# Patient Record
Sex: FEMALE | Race: WHITE | NOT HISPANIC OR LATINO | Employment: UNEMPLOYED | URBAN - METROPOLITAN AREA
[De-identification: names, ages, dates, MRNs, and addresses within clinical notes are randomized per-mention and may not be internally consistent; named-entity substitution may affect disease eponyms.]

---

## 2018-01-25 ENCOUNTER — CONVERSION ENCOUNTER (OUTPATIENT)
Dept: FAMILY MEDICINE CLINIC | Facility: CLINIC | Age: 63
End: 2018-01-25

## 2018-03-27 ENCOUNTER — CONVERSION ENCOUNTER (OUTPATIENT)
Dept: FAMILY MEDICINE CLINIC | Facility: CLINIC | Age: 63
End: 2018-03-27

## 2018-03-27 ENCOUNTER — HOSPITAL ENCOUNTER (OUTPATIENT)
Dept: FAMILY MEDICINE CLINIC | Facility: CLINIC | Age: 63
Setting detail: SPECIMEN
Discharge: HOME | End: 2018-03-27
Attending: FAMILY MEDICINE | Admitting: FAMILY MEDICINE

## 2018-03-27 LAB
ALBUMIN SERPL-MCNC: 4 G/DL (ref 3.5–4.8)
ALBUMIN/GLOB SERPL: 1.1 {RATIO} (ref 1–1.7)
ALP SERPL-CCNC: 94 IU/L (ref 32–91)
ALT SERPL-CCNC: 31 IU/L (ref 14–54)
ANION GAP SERPL CALC-SCNC: 13.8 MMOL/L (ref 10–20)
AST SERPL-CCNC: 37 IU/L (ref 15–41)
BASOPHILS # BLD AUTO: 0 10*3/UL (ref 0–0.2)
BASOPHILS NFR BLD AUTO: 0 % (ref 0–2)
BILIRUB SERPL-MCNC: 0.4 MG/DL (ref 0.3–1.2)
BUN SERPL-MCNC: 8 MG/DL (ref 8–20)
BUN/CREAT SERPL: 13.3 (ref 5.4–26.2)
CALCIUM SERPL-MCNC: 9.5 MG/DL (ref 8.9–10.3)
CHLORIDE SERPL-SCNC: 103 MMOL/L (ref 101–111)
CHOLEST SERPL-MCNC: 171 MG/DL
CHOLEST/HDLC SERPL: 5 {RATIO}
CONV CO2: 25 MMOL/L (ref 22–32)
CONV LDL CHOLESTEROL DIRECT: 106 MG/DL (ref 0–100)
CONV TOTAL PROTEIN: 7.5 G/DL (ref 6.1–7.9)
CREAT UR-MCNC: 0.6 MG/DL (ref 0.4–1)
DIFFERENTIAL METHOD BLD: (no result)
EOSINOPHIL # BLD AUTO: 0.1 10*3/UL (ref 0–0.3)
EOSINOPHIL # BLD AUTO: 2 % (ref 0–3)
ERYTHROCYTE [DISTWIDTH] IN BLOOD BY AUTOMATED COUNT: 15.3 % (ref 11.5–14.5)
GLOBULIN UR ELPH-MCNC: 3.5 G/DL (ref 2.5–3.8)
GLUCOSE SERPL-MCNC: 133 MG/DL (ref 65–99)
HCT VFR BLD AUTO: 44.5 % (ref 35–49)
HDLC SERPL-MCNC: 34 MG/DL
HGB BLD-MCNC: 14.3 G/DL (ref 12–15)
LDLC/HDLC SERPL: 3.1 {RATIO}
LIPID INTERPRETATION: ABNORMAL
LYMPHOCYTES # BLD AUTO: 1.2 10*3/UL (ref 0.8–4.8)
LYMPHOCYTES NFR BLD AUTO: 16 % (ref 18–42)
MCH RBC QN AUTO: 28.6 PG (ref 26–32)
MCHC RBC AUTO-ENTMCNC: 32.1 G/DL (ref 32–36)
MCV RBC AUTO: 89.1 FL (ref 80–94)
MONOCYTES # BLD AUTO: 0.3 10*3/UL (ref 0.1–1.3)
MONOCYTES NFR BLD AUTO: 4 % (ref 2–11)
NEUTROPHILS # BLD AUTO: 6.1 10*3/UL (ref 2.3–8.6)
NEUTROPHILS NFR BLD AUTO: 78 % (ref 50–75)
NRBC BLD AUTO-RTO: 0 /100{WBCS}
NRBC/RBC NFR BLD MANUAL: 0 10*3/UL
PLATELET # BLD AUTO: 322 10*3/UL (ref 150–450)
PMV BLD AUTO: 7.9 FL (ref 7.4–10.4)
POTASSIUM SERPL-SCNC: 3.8 MMOL/L (ref 3.6–5.1)
RBC # BLD AUTO: 5 10*6/UL (ref 4–5.4)
SODIUM SERPL-SCNC: 138 MMOL/L (ref 136–144)
TRIGL SERPL-MCNC: 239 MG/DL
VLDLC SERPL CALC-MCNC: 30.7 MG/DL
WBC # BLD AUTO: 7.7 10*3/UL (ref 4.5–11.5)

## 2018-05-10 ENCOUNTER — HOSPITAL ENCOUNTER (OUTPATIENT)
Dept: OTHER | Facility: HOSPITAL | Age: 63
Setting detail: SPECIMEN
Discharge: HOME | End: 2018-05-10
Attending: RADIOLOGY | Admitting: RADIOLOGY

## 2018-05-18 ENCOUNTER — CONVERSION ENCOUNTER (OUTPATIENT)
Dept: FAMILY MEDICINE CLINIC | Facility: CLINIC | Age: 63
End: 2018-05-18

## 2018-05-21 ENCOUNTER — HOSPITAL ENCOUNTER (OUTPATIENT)
Dept: ONCOLOGY | Facility: CLINIC | Age: 63
Setting detail: INFUSION SERIES
Discharge: HOME | End: 2018-05-21
Attending: INTERNAL MEDICINE | Admitting: INTERNAL MEDICINE

## 2018-05-21 ENCOUNTER — HOSPITAL ENCOUNTER (OUTPATIENT)
Dept: ONCOLOGY | Facility: HOSPITAL | Age: 63
Discharge: HOME | End: 2018-05-21
Attending: INTERNAL MEDICINE | Admitting: INTERNAL MEDICINE

## 2018-05-21 ENCOUNTER — CLINICAL SUPPORT (OUTPATIENT)
Dept: ONCOLOGY | Facility: HOSPITAL | Age: 63
End: 2018-05-21

## 2018-05-21 ENCOUNTER — HOSPITAL ENCOUNTER (OUTPATIENT)
Dept: OTHER | Facility: HOSPITAL | Age: 63
Discharge: HOME | End: 2018-05-21
Attending: SURGERY | Admitting: SURGERY

## 2018-05-21 LAB
ALBUMIN SERPL-MCNC: 3.9 G/DL (ref 3.5–4.8)
ALBUMIN/GLOB SERPL: 1.3 {RATIO} (ref 1–1.7)
ALP SERPL-CCNC: 98 IU/L (ref 32–91)
ALT SERPL-CCNC: 36 IU/L (ref 14–54)
ANION GAP SERPL CALC-SCNC: 12.9 MMOL/L (ref 10–20)
ANION GAP SERPL CALC-SCNC: 14.6 MMOL/L (ref 10–20)
AST SERPL-CCNC: 34 IU/L (ref 15–41)
BASOPHILS # BLD AUTO: 0.1 10*3/UL (ref 0–0.2)
BASOPHILS NFR BLD AUTO: 1 % (ref 0–2)
BILIRUB SERPL-MCNC: 0.5 MG/DL (ref 0.3–1.2)
BUN SERPL-MCNC: 3 MG/DL (ref 8–20)
BUN SERPL-MCNC: 4 MG/DL (ref 8–20)
BUN/CREAT SERPL: 5 (ref 5.4–26.2)
BUN/CREAT SERPL: 6.7 (ref 5.4–26.2)
CALCIUM SERPL-MCNC: 9.4 MG/DL (ref 8.9–10.3)
CALCIUM SERPL-MCNC: 9.4 MG/DL (ref 8.9–10.3)
CHLORIDE SERPL-SCNC: 102 MMOL/L (ref 101–111)
CHLORIDE SERPL-SCNC: 99 MMOL/L (ref 101–111)
CONV CO2: 26 MMOL/L (ref 22–32)
CONV CO2: 27 MMOL/L (ref 22–32)
CONV TOTAL PROTEIN: 7 G/DL (ref 6.1–7.9)
CREAT UR-MCNC: 0.6 MG/DL (ref 0.4–1)
CREAT UR-MCNC: 0.6 MG/DL (ref 0.4–1)
DIFFERENTIAL METHOD BLD: (no result)
EOSINOPHIL # BLD AUTO: 0.1 10*3/UL (ref 0–0.3)
EOSINOPHIL # BLD AUTO: 1 % (ref 0–3)
ERYTHROCYTE [DISTWIDTH] IN BLOOD BY AUTOMATED COUNT: 15 % (ref 11.5–14.5)
GLOBULIN UR ELPH-MCNC: 3.1 G/DL (ref 2.5–3.8)
GLUCOSE SERPL-MCNC: 117 MG/DL (ref 65–99)
GLUCOSE SERPL-MCNC: 94 MG/DL (ref 65–99)
HCT VFR BLD AUTO: 43.1 % (ref 35–49)
HGB BLD-MCNC: 14.1 G/DL (ref 12–15)
LYMPHOCYTES # BLD AUTO: 1.3 10*3/UL (ref 0.8–4.8)
LYMPHOCYTES NFR BLD AUTO: 14 % (ref 18–42)
MCH RBC QN AUTO: 28.7 PG (ref 26–32)
MCHC RBC AUTO-ENTMCNC: 32.8 G/DL (ref 32–36)
MCV RBC AUTO: 87.3 FL (ref 80–94)
MONOCYTES # BLD AUTO: 0.5 10*3/UL (ref 0.1–1.3)
MONOCYTES NFR BLD AUTO: 6 % (ref 2–11)
NEUTROPHILS # BLD AUTO: 7.1 10*3/UL (ref 2.3–8.6)
NEUTROPHILS NFR BLD AUTO: 78 % (ref 50–75)
NRBC BLD AUTO-RTO: 0 /100{WBCS}
NRBC/RBC NFR BLD MANUAL: 0 10*3/UL
PLATELET # BLD AUTO: 270 10*3/UL (ref 150–450)
PMV BLD AUTO: 7.7 FL (ref 7.4–10.4)
POTASSIUM SERPL-SCNC: 3.6 MMOL/L (ref 3.6–5.1)
POTASSIUM SERPL-SCNC: 3.9 MMOL/L (ref 3.6–5.1)
RBC # BLD AUTO: 4.93 10*6/UL (ref 4–5.4)
SODIUM SERPL-SCNC: 136 MMOL/L (ref 136–144)
SODIUM SERPL-SCNC: 138 MMOL/L (ref 136–144)
WBC # BLD AUTO: 9.1 10*3/UL (ref 4.5–11.5)

## 2018-05-21 NOTE — PROGRESS NOTES
PATIENTS ONCOLOGY RECORD LOCATED IN [facility]      Subjective     Name:  JERROD WASSERMAN     Date:  2018  Address:  [address] [place] [address]  Home: [phone]  :  1955 AGE:  62 y.o.        RECORDS OBTAINED:  Patients Oncology Record is located in [facility]

## 2018-05-24 ENCOUNTER — HOSPITAL ENCOUNTER (OUTPATIENT)
Dept: PREOP | Facility: HOSPITAL | Age: 63
Setting detail: HOSPITAL OUTPATIENT SURGERY
Discharge: HOME | End: 2018-05-24
Attending: SURGERY | Admitting: SURGERY

## 2018-05-31 ENCOUNTER — HOSPITAL ENCOUNTER (OUTPATIENT)
Dept: RADIATION ONCOLOGY | Facility: HOSPITAL | Age: 63
Setting detail: RECURRING SERIES
Discharge: HOME | End: 2018-09-23
Attending: RADIOLOGY | Admitting: RADIOLOGY

## 2018-06-04 ENCOUNTER — CLINICAL SUPPORT (OUTPATIENT)
Dept: ONCOLOGY | Facility: HOSPITAL | Age: 63
End: 2018-06-04

## 2018-06-04 ENCOUNTER — HOSPITAL ENCOUNTER (OUTPATIENT)
Dept: ONCOLOGY | Facility: CLINIC | Age: 63
Setting detail: INFUSION SERIES
Discharge: HOME | End: 2018-06-04
Attending: INTERNAL MEDICINE | Admitting: INTERNAL MEDICINE

## 2018-06-04 NOTE — PROGRESS NOTES
PATIENTS ONCOLOGY RECORD LOCATED IN [facility]      Subjective     Name:  JERROD WASSERMAN     Date:  2018  Address:  [address] [place] [address]  Home: [phone]  :  1955 AGE:  62 y.o.        RECORDS OBTAINED:  Patients Oncology Record is located in [facility]

## 2018-06-07 ENCOUNTER — CONVERSION ENCOUNTER (OUTPATIENT)
Dept: FAMILY MEDICINE CLINIC | Facility: CLINIC | Age: 63
End: 2018-06-07

## 2018-06-18 ENCOUNTER — HOSPITAL ENCOUNTER (OUTPATIENT)
Dept: ONCOLOGY | Facility: CLINIC | Age: 63
Setting detail: INFUSION SERIES
Discharge: HOME | End: 2018-06-18
Attending: INTERNAL MEDICINE | Admitting: INTERNAL MEDICINE

## 2018-06-18 ENCOUNTER — CLINICAL SUPPORT (OUTPATIENT)
Dept: ONCOLOGY | Facility: HOSPITAL | Age: 63
End: 2018-06-18

## 2018-06-18 NOTE — PROGRESS NOTES
PATIENTS ONCOLOGY RECORD LOCATED IN [facility]      Subjective     Name:  JERROD WASSERMAN     Date:  2018  Address:  [address] [place] [address]  Home: [phone]  :  1955 AGE:  62 y.o.        RECORDS OBTAINED:  Patients Oncology Record is located in [facility]

## 2018-07-13 ENCOUNTER — CONVERSION ENCOUNTER (OUTPATIENT)
Dept: FAMILY MEDICINE CLINIC | Facility: CLINIC | Age: 63
End: 2018-07-13

## 2018-08-15 ENCOUNTER — HOSPITAL ENCOUNTER (OUTPATIENT)
Dept: ONCOLOGY | Facility: HOSPITAL | Age: 63
Discharge: HOME | End: 2018-08-15
Attending: RADIOLOGY | Admitting: RADIOLOGY

## 2018-08-20 ENCOUNTER — CLINICAL SUPPORT (OUTPATIENT)
Dept: ONCOLOGY | Facility: HOSPITAL | Age: 63
End: 2018-08-20

## 2018-08-20 ENCOUNTER — HOSPITAL ENCOUNTER (OUTPATIENT)
Dept: ONCOLOGY | Facility: HOSPITAL | Age: 63
Discharge: HOME | End: 2018-08-20
Attending: INTERNAL MEDICINE | Admitting: INTERNAL MEDICINE

## 2018-08-20 ENCOUNTER — HOSPITAL ENCOUNTER (OUTPATIENT)
Dept: ONCOLOGY | Facility: CLINIC | Age: 63
Setting detail: INFUSION SERIES
Discharge: HOME | End: 2018-08-20
Attending: INTERNAL MEDICINE | Admitting: INTERNAL MEDICINE

## 2018-08-20 LAB
ALBUMIN SERPL-MCNC: 3.8 G/DL (ref 3.5–4.8)
ALBUMIN/GLOB SERPL: 1.3 {RATIO} (ref 1–1.7)
ALP SERPL-CCNC: 89 IU/L (ref 32–91)
ALT SERPL-CCNC: 31 IU/L (ref 14–54)
ANION GAP SERPL CALC-SCNC: 11.7 MMOL/L (ref 10–20)
AST SERPL-CCNC: 31 IU/L (ref 15–41)
BILIRUB SERPL-MCNC: 0.2 MG/DL (ref 0.3–1.2)
BUN SERPL-MCNC: 5 MG/DL (ref 8–20)
BUN/CREAT SERPL: 8.3 (ref 5.4–26.2)
CALCIUM SERPL-MCNC: 9.1 MG/DL (ref 8.9–10.3)
CHLORIDE SERPL-SCNC: 104 MMOL/L (ref 101–111)
CONV CO2: 25 MMOL/L (ref 22–32)
CONV TOTAL PROTEIN: 6.8 G/DL (ref 6.1–7.9)
CREAT UR-MCNC: 0.6 MG/DL (ref 0.4–1)
GLOBULIN UR ELPH-MCNC: 3 G/DL (ref 2.5–3.8)
GLUCOSE SERPL-MCNC: 116 MG/DL (ref 65–99)
POTASSIUM SERPL-SCNC: 3.7 MMOL/L (ref 3.6–5.1)
SODIUM SERPL-SCNC: 137 MMOL/L (ref 136–144)

## 2018-08-20 NOTE — PROGRESS NOTES
PATIENTS ONCOLOGY RECORD LOCATED IN [facility]      Subjective     Name:  JERROD WASSERMAN     Date:  2018  Address:  [address] [place] [address]  Home: [phone]  :  1955 AGE:  62 y.o.        RECORDS OBTAINED:  Patients Oncology Record is located in [facility]

## 2018-09-24 ENCOUNTER — CONVERSION ENCOUNTER (OUTPATIENT)
Dept: FAMILY MEDICINE CLINIC | Facility: CLINIC | Age: 63
End: 2018-09-24

## 2018-09-24 ENCOUNTER — HOSPITAL ENCOUNTER (OUTPATIENT)
Dept: FAMILY MEDICINE CLINIC | Facility: CLINIC | Age: 63
Setting detail: SPECIMEN
Discharge: HOME | End: 2018-09-24
Attending: FAMILY MEDICINE | Admitting: FAMILY MEDICINE

## 2018-09-24 LAB
ALBUMIN SERPL-MCNC: 3.4 G/DL (ref 3.5–4.8)
ALBUMIN/GLOB SERPL: 1.1 {RATIO} (ref 1–1.7)
ALP SERPL-CCNC: 94 IU/L (ref 32–91)
ALT SERPL-CCNC: 27 IU/L (ref 14–54)
ANION GAP SERPL CALC-SCNC: 14.8 MMOL/L (ref 10–20)
AST SERPL-CCNC: 26 IU/L (ref 15–41)
BASOPHILS # BLD AUTO: 0 10*3/UL (ref 0–0.2)
BASOPHILS NFR BLD AUTO: 1 % (ref 0–2)
BILIRUB SERPL-MCNC: 0.5 MG/DL (ref 0.3–1.2)
BUN SERPL-MCNC: 5 MG/DL (ref 8–20)
BUN/CREAT SERPL: 8.3 (ref 5.4–26.2)
CALCIUM SERPL-MCNC: 8.8 MG/DL (ref 8.9–10.3)
CHLORIDE SERPL-SCNC: 104 MMOL/L (ref 101–111)
CHOLEST SERPL-MCNC: 136 MG/DL
CHOLEST/HDLC SERPL: 4.9 {RATIO}
CONV CO2: 25 MMOL/L (ref 22–32)
CONV LDL CHOLESTEROL DIRECT: 89 MG/DL (ref 0–100)
CONV TOTAL PROTEIN: 6.5 G/DL (ref 6.1–7.9)
CREAT UR-MCNC: 0.6 MG/DL (ref 0.4–1)
DIFFERENTIAL METHOD BLD: (no result)
EOSINOPHIL # BLD AUTO: 0.2 10*3/UL (ref 0–0.3)
EOSINOPHIL # BLD AUTO: 3 % (ref 0–3)
ERYTHROCYTE [DISTWIDTH] IN BLOOD BY AUTOMATED COUNT: 16.3 % (ref 11.5–14.5)
GLOBULIN UR ELPH-MCNC: 3.1 G/DL (ref 2.5–3.8)
GLUCOSE SERPL-MCNC: 119 MG/DL (ref 65–99)
HCT VFR BLD AUTO: 40.9 % (ref 35–49)
HDLC SERPL-MCNC: 28 MG/DL
HGB BLD-MCNC: 13.1 G/DL (ref 12–15)
LDLC/HDLC SERPL: 3.2 {RATIO}
LIPID INTERPRETATION: ABNORMAL
LYMPHOCYTES # BLD AUTO: 0.8 10*3/UL (ref 0.8–4.8)
LYMPHOCYTES NFR BLD AUTO: 10 % (ref 18–42)
MCH RBC QN AUTO: 29.7 PG (ref 26–32)
MCHC RBC AUTO-ENTMCNC: 32.1 G/DL (ref 32–36)
MCV RBC AUTO: 92.6 FL (ref 80–94)
MONOCYTES # BLD AUTO: 0.4 10*3/UL (ref 0.1–1.3)
MONOCYTES NFR BLD AUTO: 6 % (ref 2–11)
NEUTROPHILS # BLD AUTO: 6.3 10*3/UL (ref 2.3–8.6)
NEUTROPHILS NFR BLD AUTO: 80 % (ref 50–75)
NRBC BLD AUTO-RTO: 0 /100{WBCS}
NRBC/RBC NFR BLD MANUAL: 0 10*3/UL
PLATELET # BLD AUTO: 292 10*3/UL (ref 150–450)
PMV BLD AUTO: 7.5 FL (ref 7.4–10.4)
POTASSIUM SERPL-SCNC: 3.8 MMOL/L (ref 3.6–5.1)
RBC # BLD AUTO: 4.42 10*6/UL (ref 4–5.4)
SODIUM SERPL-SCNC: 140 MMOL/L (ref 136–144)
TRIGL SERPL-MCNC: 192 MG/DL
VLDLC SERPL CALC-MCNC: 18.7 MG/DL
WBC # BLD AUTO: 7.7 10*3/UL (ref 4.5–11.5)

## 2018-11-14 ENCOUNTER — HOSPITAL ENCOUNTER (OUTPATIENT)
Dept: RADIATION ONCOLOGY | Facility: HOSPITAL | Age: 63
Discharge: HOME | End: 2018-11-14
Attending: RADIOLOGY | Admitting: RADIOLOGY

## 2018-11-19 ENCOUNTER — HOSPITAL ENCOUNTER (OUTPATIENT)
Dept: ONCOLOGY | Facility: HOSPITAL | Age: 63
Discharge: HOME | End: 2018-11-19
Attending: INTERNAL MEDICINE | Admitting: INTERNAL MEDICINE

## 2018-11-19 ENCOUNTER — HOSPITAL ENCOUNTER (OUTPATIENT)
Dept: ONCOLOGY | Facility: CLINIC | Age: 63
Setting detail: INFUSION SERIES
Discharge: HOME | End: 2018-11-19
Attending: INTERNAL MEDICINE | Admitting: INTERNAL MEDICINE

## 2018-11-19 ENCOUNTER — CLINICAL SUPPORT (OUTPATIENT)
Dept: ONCOLOGY | Facility: HOSPITAL | Age: 63
End: 2018-11-19

## 2018-11-19 LAB
ALBUMIN SERPL-MCNC: 3.9 G/DL (ref 3.5–4.8)
ALBUMIN/GLOB SERPL: 1.3 {RATIO} (ref 1–1.7)
ALP SERPL-CCNC: 100 IU/L (ref 32–91)
ALT SERPL-CCNC: 36 IU/L (ref 14–54)
ANION GAP SERPL CALC-SCNC: 11.9 MMOL/L (ref 10–20)
AST SERPL-CCNC: 51 IU/L (ref 15–41)
BILIRUB SERPL-MCNC: 0.4 MG/DL (ref 0.3–1.2)
BUN SERPL-MCNC: 10 MG/DL (ref 8–20)
BUN/CREAT SERPL: 14.3 (ref 5.4–26.2)
CALCIUM SERPL-MCNC: 9.3 MG/DL (ref 8.9–10.3)
CHLORIDE SERPL-SCNC: 103 MMOL/L (ref 101–111)
CONV CO2: 29 MMOL/L (ref 22–32)
CONV TOTAL PROTEIN: 7 G/DL (ref 6.1–7.9)
CREAT UR-MCNC: 0.7 MG/DL (ref 0.4–1)
GLOBULIN UR ELPH-MCNC: 3.1 G/DL (ref 2.5–3.8)
GLUCOSE SERPL-MCNC: 107 MG/DL (ref 65–99)
POTASSIUM SERPL-SCNC: 3.9 MMOL/L (ref 3.6–5.1)
SODIUM SERPL-SCNC: 140 MMOL/L (ref 136–144)

## 2018-11-19 NOTE — PROGRESS NOTES
PATIENTS ONCOLOGY RECORD LOCATED IN [facility]      Subjective     Name:  JERROD WASSERMAN     Date:  2018  Address:  [address] [place] [address] [place] [facility]  Home: [unfilled]  :  1955 AGE:  62 y.o.        RECORDS OBTAINED:  Patients Oncology Record is located in [facility]

## 2018-11-21 ENCOUNTER — CONVERSION ENCOUNTER (OUTPATIENT)
Dept: FAMILY MEDICINE CLINIC | Facility: CLINIC | Age: 63
End: 2018-11-21

## 2018-12-06 ENCOUNTER — CONVERSION ENCOUNTER (OUTPATIENT)
Dept: FAMILY MEDICINE CLINIC | Facility: CLINIC | Age: 63
End: 2018-12-06

## 2019-02-18 ENCOUNTER — HOSPITAL ENCOUNTER (OUTPATIENT)
Dept: ONCOLOGY | Facility: CLINIC | Age: 64
Setting detail: INFUSION SERIES
Discharge: HOME | End: 2019-02-18
Attending: INTERNAL MEDICINE | Admitting: INTERNAL MEDICINE

## 2019-02-18 ENCOUNTER — HOSPITAL ENCOUNTER (OUTPATIENT)
Dept: ONCOLOGY | Facility: HOSPITAL | Age: 64
Discharge: HOME | End: 2019-02-18
Attending: INTERNAL MEDICINE | Admitting: INTERNAL MEDICINE

## 2019-02-18 ENCOUNTER — CLINICAL SUPPORT (OUTPATIENT)
Dept: ONCOLOGY | Facility: HOSPITAL | Age: 64
End: 2019-02-18

## 2019-02-18 LAB
ALBUMIN SERPL-MCNC: 4 G/DL (ref 3.5–4.8)
ALBUMIN/GLOB SERPL: 1.5 {RATIO} (ref 1–1.7)
ALP SERPL-CCNC: 101 IU/L (ref 32–91)
ALT SERPL-CCNC: 30 IU/L (ref 14–54)
ANION GAP SERPL CALC-SCNC: 13.1 MMOL/L (ref 10–20)
AST SERPL-CCNC: 27 IU/L (ref 15–41)
BILIRUB SERPL-MCNC: 0.9 MG/DL (ref 0.3–1.2)
BUN SERPL-MCNC: 13 MG/DL (ref 8–20)
BUN/CREAT SERPL: 26 (ref 5.4–26.2)
CALCIUM SERPL-MCNC: 9.1 MG/DL (ref 8.9–10.3)
CHLORIDE SERPL-SCNC: 104 MMOL/L (ref 101–111)
CONV CO2: 23 MMOL/L (ref 22–32)
CONV TOTAL PROTEIN: 6.7 G/DL (ref 6.1–7.9)
CREAT UR-MCNC: 0.5 MG/DL (ref 0.4–1)
GLOBULIN UR ELPH-MCNC: 2.7 G/DL (ref 2.5–3.8)
GLUCOSE SERPL-MCNC: 134 MG/DL (ref 65–99)
POTASSIUM SERPL-SCNC: 4.1 MMOL/L (ref 3.6–5.1)
SODIUM SERPL-SCNC: 136 MMOL/L (ref 136–144)

## 2019-02-18 NOTE — PROGRESS NOTES
PATIENTS ONCOLOGY RECORD LOCATED IN [facility]      Subjective     Name:  JERROD WASSERMAN     Date:  2019  Address:  [address] [place] [address] [place] [facility]  Home: [unfilled]  :  1955 AGE:  63 y.o.        RECORDS OBTAINED:  Patients Oncology Record is located in [facility]

## 2019-05-14 ENCOUNTER — HOSPITAL ENCOUNTER (OUTPATIENT)
Dept: RADIATION ONCOLOGY | Facility: HOSPITAL | Age: 64
Discharge: HOME | End: 2019-05-14
Attending: RADIOLOGY | Admitting: RADIOLOGY

## 2019-05-20 ENCOUNTER — HOSPITAL ENCOUNTER (OUTPATIENT)
Dept: FAMILY MEDICINE CLINIC | Facility: CLINIC | Age: 64
Setting detail: SPECIMEN
Discharge: HOME | End: 2019-05-20
Attending: FAMILY MEDICINE | Admitting: FAMILY MEDICINE

## 2019-05-20 ENCOUNTER — HOSPITAL ENCOUNTER (OUTPATIENT)
Dept: ONCOLOGY | Facility: HOSPITAL | Age: 64
Discharge: HOME | End: 2019-05-20
Attending: INTERNAL MEDICINE | Admitting: INTERNAL MEDICINE

## 2019-05-20 ENCOUNTER — CLINICAL SUPPORT (OUTPATIENT)
Dept: ONCOLOGY | Facility: HOSPITAL | Age: 64
End: 2019-05-20

## 2019-05-20 ENCOUNTER — HOSPITAL ENCOUNTER (OUTPATIENT)
Dept: ONCOLOGY | Facility: CLINIC | Age: 64
Setting detail: INFUSION SERIES
Discharge: HOME | End: 2019-05-20
Attending: INTERNAL MEDICINE | Admitting: INTERNAL MEDICINE

## 2019-05-20 LAB
ALBUMIN SERPL-MCNC: 3.7 G/DL (ref 3.5–4.8)
ALBUMIN SERPL-MCNC: 4 G/DL (ref 3.5–4.8)
ALBUMIN/GLOB SERPL: 1.2 {RATIO} (ref 1–1.7)
ALBUMIN/GLOB SERPL: 1.3 {RATIO} (ref 1–1.7)
ALP SERPL-CCNC: 77 IU/L (ref 32–91)
ALP SERPL-CCNC: 79 IU/L (ref 32–91)
ALT SERPL-CCNC: 34 IU/L (ref 14–54)
ALT SERPL-CCNC: 36 IU/L (ref 14–54)
ANION GAP SERPL CALC-SCNC: 14.7 MMOL/L (ref 10–20)
ANION GAP SERPL CALC-SCNC: 15 MMOL/L (ref 10–20)
AST SERPL-CCNC: 36 IU/L (ref 15–41)
AST SERPL-CCNC: 37 IU/L (ref 15–41)
BASOPHILS # BLD AUTO: 0.1 10*3/UL (ref 0–0.2)
BASOPHILS NFR BLD AUTO: 1 % (ref 0–2)
BILIRUB SERPL-MCNC: 0.5 MG/DL (ref 0.3–1.2)
BILIRUB SERPL-MCNC: 0.7 MG/DL (ref 0.3–1.2)
BUN SERPL-MCNC: 6 MG/DL (ref 8–20)
BUN SERPL-MCNC: 7 MG/DL (ref 8–20)
BUN/CREAT SERPL: 10 (ref 5.4–26.2)
BUN/CREAT SERPL: 14 (ref 5.4–26.2)
CALCIUM SERPL-MCNC: 9.1 MG/DL (ref 8.9–10.3)
CALCIUM SERPL-MCNC: 9.2 MG/DL (ref 8.9–10.3)
CHLORIDE SERPL-SCNC: 100 MMOL/L (ref 101–111)
CHLORIDE SERPL-SCNC: 100 MMOL/L (ref 101–111)
CHOLEST SERPL-MCNC: 114 MG/DL
CHOLEST/HDLC SERPL: 3.4 {RATIO}
CONV CO2: 26 MMOL/L (ref 22–32)
CONV CO2: 26 MMOL/L (ref 22–32)
CONV LDL CHOLESTEROL DIRECT: 71 MG/DL (ref 0–100)
CONV TOTAL PROTEIN: 6.7 G/DL (ref 6.1–7.9)
CONV TOTAL PROTEIN: 7.2 G/DL (ref 6.1–7.9)
CREAT UR-MCNC: 0.5 MG/DL (ref 0.4–1)
CREAT UR-MCNC: 0.6 MG/DL (ref 0.4–1)
DIFFERENTIAL METHOD BLD: (no result)
EOSINOPHIL # BLD AUTO: 0.1 10*3/UL (ref 0–0.3)
EOSINOPHIL # BLD AUTO: 2 % (ref 0–3)
ERYTHROCYTE [DISTWIDTH] IN BLOOD BY AUTOMATED COUNT: 15.8 % (ref 11.5–14.5)
GLOBULIN UR ELPH-MCNC: 3 G/DL (ref 2.5–3.8)
GLOBULIN UR ELPH-MCNC: 3.2 G/DL (ref 2.5–3.8)
GLUCOSE SERPL-MCNC: 106 MG/DL (ref 65–99)
GLUCOSE SERPL-MCNC: 111 MG/DL (ref 65–99)
HBA1C MFR BLD: 5.8 % (ref 0–5.6)
HCT VFR BLD AUTO: 38.5 % (ref 35–49)
HDLC SERPL-MCNC: 33 MG/DL
HGB BLD-MCNC: 12.7 G/DL (ref 12–15)
LDLC/HDLC SERPL: 2.1 {RATIO}
LIPID INTERPRETATION: ABNORMAL
LYMPHOCYTES # BLD AUTO: 1.1 10*3/UL (ref 0.8–4.8)
LYMPHOCYTES NFR BLD AUTO: 14 % (ref 18–42)
MCH RBC QN AUTO: 30.3 PG (ref 26–32)
MCHC RBC AUTO-ENTMCNC: 32.9 G/DL (ref 32–36)
MCV RBC AUTO: 92 FL (ref 80–94)
MONOCYTES # BLD AUTO: 0.6 10*3/UL (ref 0.1–1.3)
MONOCYTES NFR BLD AUTO: 8 % (ref 2–11)
NEUTROPHILS # BLD AUTO: 5.6 10*3/UL (ref 2.3–8.6)
NEUTROPHILS NFR BLD AUTO: 75 % (ref 50–75)
NRBC BLD AUTO-RTO: 0 /100{WBCS}
NRBC/RBC NFR BLD MANUAL: 0 10*3/UL
PLATELET # BLD AUTO: 271 10*3/UL (ref 150–450)
PMV BLD AUTO: 7.4 FL (ref 7.4–10.4)
POTASSIUM SERPL-SCNC: 3.7 MMOL/L (ref 3.6–5.1)
POTASSIUM SERPL-SCNC: 4 MMOL/L (ref 3.6–5.1)
RBC # BLD AUTO: 4.19 10*6/UL (ref 4–5.4)
SODIUM SERPL-SCNC: 137 MMOL/L (ref 136–144)
SODIUM SERPL-SCNC: 137 MMOL/L (ref 136–144)
TRIGL SERPL-MCNC: 165 MG/DL
TSH SERPL-ACNC: 3.21 UIU/ML (ref 0.34–5.6)
VLDLC SERPL CALC-MCNC: 10 MG/DL
WBC # BLD AUTO: 7.5 10*3/UL (ref 4.5–11.5)

## 2019-05-20 NOTE — PROGRESS NOTES
PATIENTS ONCOLOGY RECORD LOCATED IN [facility]      Subjective     Name:  JERROD WASSERMAN     Date:  2019  Address:  [address] [place] [address] [place] [facility]  Home: [unfilled]  :  1955 AGE:  63 y.o.        RECORDS OBTAINED:  Patients Oncology Record is located in [facility]

## 2019-06-04 VITALS
HEART RATE: 67 BPM | BODY MASS INDEX: 44.82 KG/M2 | DIASTOLIC BLOOD PRESSURE: 82 MMHG | HEART RATE: 75 BPM | DIASTOLIC BLOOD PRESSURE: 84 MMHG | OXYGEN SATURATION: 96 % | OXYGEN SATURATION: 90 % | SYSTOLIC BLOOD PRESSURE: 144 MMHG | DIASTOLIC BLOOD PRESSURE: 70 MMHG | WEIGHT: 258 LBS | WEIGHT: 267 LBS | DIASTOLIC BLOOD PRESSURE: 81 MMHG | HEIGHT: 65 IN | SYSTOLIC BLOOD PRESSURE: 139 MMHG | WEIGHT: 271 LBS | SYSTOLIC BLOOD PRESSURE: 130 MMHG | SYSTOLIC BLOOD PRESSURE: 131 MMHG | BODY MASS INDEX: 43.99 KG/M2 | HEIGHT: 65 IN | HEART RATE: 61 BPM | HEIGHT: 65 IN | OXYGEN SATURATION: 97 % | WEIGHT: 277 LBS | HEIGHT: 65 IN | WEIGHT: 269 LBS | SYSTOLIC BLOOD PRESSURE: 133 MMHG | BODY MASS INDEX: 44.26 KG/M2 | DIASTOLIC BLOOD PRESSURE: 73 MMHG | OXYGEN SATURATION: 90 % | HEART RATE: 69 BPM | HEART RATE: 82 BPM | DIASTOLIC BLOOD PRESSURE: 80 MMHG | BODY MASS INDEX: 46.15 KG/M2 | HEIGHT: 65 IN | BODY MASS INDEX: 45.15 KG/M2 | OXYGEN SATURATION: 94 % | HEART RATE: 64 BPM | WEIGHT: 266 LBS | OXYGEN SATURATION: 94 % | SYSTOLIC BLOOD PRESSURE: 145 MMHG | HEIGHT: 65 IN | OXYGEN SATURATION: 91 % | SYSTOLIC BLOOD PRESSURE: 171 MMHG | DIASTOLIC BLOOD PRESSURE: 78 MMHG | WEIGHT: 264 LBS | BODY MASS INDEX: 44.32 KG/M2 | BODY MASS INDEX: 42.99 KG/M2 | OXYGEN SATURATION: 94 % | HEIGHT: 65 IN | DIASTOLIC BLOOD PRESSURE: 80 MMHG | BODY MASS INDEX: 44.48 KG/M2 | SYSTOLIC BLOOD PRESSURE: 130 MMHG | HEART RATE: 77 BPM | HEIGHT: 65 IN | HEART RATE: 68 BPM

## 2019-06-17 ENCOUNTER — TELEPHONE (OUTPATIENT)
Dept: FAMILY MEDICINE CLINIC | Facility: CLINIC | Age: 64
End: 2019-06-17

## 2019-06-17 RX ORDER — HYDROCODONE BITARTRATE AND ACETAMINOPHEN 10; 325 MG/1; MG/1
0.5 TABLET ORAL EVERY 12 HOURS
Qty: 28 TABLET | Refills: 0 | Status: SHIPPED | OUTPATIENT
Start: 2019-06-17 | End: 2019-07-15 | Stop reason: SDUPTHER

## 2019-06-17 RX ORDER — HYDROCODONE BITARTRATE AND ACETAMINOPHEN 10; 325 MG/1; MG/1
0.5 TABLET ORAL EVERY 12 HOURS
COMMUNITY
Start: 2019-05-20 | End: 2019-06-17 | Stop reason: SDUPTHER

## 2019-06-24 RX ORDER — LISINOPRIL 10 MG/1
TABLET ORAL
Qty: 90 TABLET | Refills: 1 | Status: SHIPPED | OUTPATIENT
Start: 2019-06-24 | End: 2019-09-13 | Stop reason: SDUPTHER

## 2019-06-24 RX ORDER — ROSUVASTATIN CALCIUM 20 MG/1
TABLET, COATED ORAL
Qty: 90 TABLET | Refills: 1 | Status: ON HOLD | OUTPATIENT
Start: 2019-06-24 | End: 2022-10-10 | Stop reason: SDUPTHER

## 2019-07-15 RX ORDER — HYDROCODONE BITARTRATE AND ACETAMINOPHEN 10; 325 MG/1; MG/1
0.5 TABLET ORAL EVERY 12 HOURS
Qty: 28 TABLET | Refills: 0 | Status: SHIPPED | OUTPATIENT
Start: 2019-07-15 | End: 2019-08-13 | Stop reason: SDUPTHER

## 2019-07-24 ENCOUNTER — APPOINTMENT (OUTPATIENT)
Dept: CT IMAGING | Facility: HOSPITAL | Age: 64
End: 2019-07-24

## 2019-07-24 ENCOUNTER — HOSPITAL ENCOUNTER (EMERGENCY)
Facility: HOSPITAL | Age: 64
Discharge: HOME | End: 2019-07-24
Attending: EMERGENCY MEDICINE | Admitting: EMERGENCY MEDICINE

## 2019-07-24 VITALS
RESPIRATION RATE: 18 BRPM | HEART RATE: 74 BPM | SYSTOLIC BLOOD PRESSURE: 159 MMHG | OXYGEN SATURATION: 95 % | TEMPERATURE: 98.4 F | BODY MASS INDEX: 46.83 KG/M2 | DIASTOLIC BLOOD PRESSURE: 82 MMHG | HEIGHT: 65 IN | WEIGHT: 281.09 LBS

## 2019-07-24 DIAGNOSIS — N39.0 URINARY TRACT INFECTION WITHOUT HEMATURIA, SITE UNSPECIFIED: ICD-10-CM

## 2019-07-24 DIAGNOSIS — R10.9 ABDOMINAL PAIN, UNSPECIFIED ABDOMINAL LOCATION: Primary | ICD-10-CM

## 2019-07-24 LAB
ALBUMIN SERPL-MCNC: 3.6 G/DL (ref 3.5–4.8)
ALBUMIN/GLOB SERPL: 1.4 G/DL (ref 1–1.7)
ALP SERPL-CCNC: 80 U/L (ref 32–91)
ALT SERPL W P-5'-P-CCNC: 29 U/L (ref 14–54)
ANION GAP SERPL CALCULATED.3IONS-SCNC: 15.6 MMOL/L (ref 5–15)
AST SERPL-CCNC: 37 U/L (ref 15–41)
BACTERIA UR QL AUTO: ABNORMAL /HPF
BASOPHILS # BLD AUTO: 0.1 10*3/MM3 (ref 0–0.2)
BASOPHILS NFR BLD AUTO: 0.9 % (ref 0–1.5)
BILIRUB SERPL-MCNC: 1.4 MG/DL (ref 0.3–1.2)
BILIRUB UR QL STRIP: NEGATIVE
BUN BLD-MCNC: 5 MG/DL (ref 8–20)
BUN/CREAT SERPL: 10 (ref 5.4–26.2)
CALCIUM SPEC-SCNC: 8.4 MG/DL (ref 8.9–10.3)
CHLORIDE SERPL-SCNC: 100 MMOL/L (ref 101–111)
CLARITY UR: ABNORMAL
CO2 SERPL-SCNC: 24 MMOL/L (ref 22–32)
COLOR UR: YELLOW
CREAT BLD-MCNC: 0.5 MG/DL (ref 0.4–1)
DEPRECATED RDW RBC AUTO: 52.1 FL (ref 37–54)
EOSINOPHIL # BLD AUTO: 0.2 10*3/MM3 (ref 0–0.4)
EOSINOPHIL NFR BLD AUTO: 2.9 % (ref 0.3–6.2)
ERYTHROCYTE [DISTWIDTH] IN BLOOD BY AUTOMATED COUNT: 15.9 % (ref 12.3–15.4)
GFR SERPL CREATININE-BSD FRML MDRD: 125 ML/MIN/1.73
GLOBULIN UR ELPH-MCNC: 2.5 GM/DL (ref 2.5–3.8)
GLUCOSE BLD-MCNC: 116 MG/DL (ref 65–99)
GLUCOSE UR STRIP-MCNC: NEGATIVE MG/DL
HCT VFR BLD AUTO: 38.3 % (ref 34–46.6)
HGB BLD-MCNC: 12.1 G/DL (ref 12–15.9)
HGB UR QL STRIP.AUTO: ABNORMAL
HYALINE CASTS UR QL AUTO: ABNORMAL /LPF
KETONES UR QL STRIP: NEGATIVE
LEUKOCYTE ESTERASE UR QL STRIP.AUTO: ABNORMAL
LIPASE SERPL-CCNC: 32 U/L (ref 22–51)
LYMPHOCYTES # BLD AUTO: 1.2 10*3/MM3 (ref 0.7–3.1)
LYMPHOCYTES NFR BLD AUTO: 17 % (ref 19.6–45.3)
MCH RBC QN AUTO: 29.3 PG (ref 26.6–33)
MCHC RBC AUTO-ENTMCNC: 31.7 G/DL (ref 31.5–35.7)
MCV RBC AUTO: 92.6 FL (ref 79–97)
MONOCYTES # BLD AUTO: 0.5 10*3/MM3 (ref 0.1–0.9)
MONOCYTES NFR BLD AUTO: 7.3 % (ref 5–12)
NEUTROPHILS # BLD AUTO: 5 10*3/MM3 (ref 1.7–7)
NEUTROPHILS NFR BLD AUTO: 71.9 % (ref 42.7–76)
NITRITE UR QL STRIP: NEGATIVE
NRBC BLD AUTO-RTO: 0.2 /100 WBC (ref 0–0.2)
PH UR STRIP.AUTO: 6 [PH] (ref 5–8)
PLATELET # BLD AUTO: 275 10*3/MM3 (ref 140–450)
PMV BLD AUTO: 7.7 FL (ref 6–12)
POTASSIUM BLD-SCNC: 3.6 MMOL/L (ref 3.6–5.1)
PROT SERPL-MCNC: 6.1 G/DL (ref 6.1–7.9)
PROT UR QL STRIP: NEGATIVE
RBC # BLD AUTO: 4.13 10*6/MM3 (ref 3.77–5.28)
RBC # UR: ABNORMAL /HPF
REF LAB TEST METHOD: ABNORMAL
SODIUM BLD-SCNC: 136 MMOL/L (ref 136–144)
SP GR UR STRIP: 1.01 (ref 1–1.03)
SQUAMOUS #/AREA URNS HPF: ABNORMAL /HPF
UROBILINOGEN UR QL STRIP: ABNORMAL
WBC NRBC COR # BLD: 6.9 10*3/MM3 (ref 3.4–10.8)
WBC UR QL AUTO: ABNORMAL /HPF

## 2019-07-24 PROCEDURE — 83690 ASSAY OF LIPASE: CPT | Performed by: EMERGENCY MEDICINE

## 2019-07-24 PROCEDURE — 74176 CT ABD & PELVIS W/O CONTRAST: CPT

## 2019-07-24 PROCEDURE — 80053 COMPREHEN METABOLIC PANEL: CPT | Performed by: EMERGENCY MEDICINE

## 2019-07-24 PROCEDURE — 96374 THER/PROPH/DIAG INJ IV PUSH: CPT

## 2019-07-24 PROCEDURE — 96375 TX/PRO/DX INJ NEW DRUG ADDON: CPT

## 2019-07-24 PROCEDURE — 25010000002 MORPHINE PER 10 MG: Performed by: EMERGENCY MEDICINE

## 2019-07-24 PROCEDURE — 85025 COMPLETE CBC W/AUTO DIFF WBC: CPT | Performed by: EMERGENCY MEDICINE

## 2019-07-24 PROCEDURE — 81001 URINALYSIS AUTO W/SCOPE: CPT | Performed by: EMERGENCY MEDICINE

## 2019-07-24 PROCEDURE — 25010000002 ONDANSETRON PER 1 MG: Performed by: EMERGENCY MEDICINE

## 2019-07-24 PROCEDURE — 99284 EMERGENCY DEPT VISIT MOD MDM: CPT

## 2019-07-24 PROCEDURE — 87086 URINE CULTURE/COLONY COUNT: CPT | Performed by: EMERGENCY MEDICINE

## 2019-07-24 RX ORDER — ONDANSETRON 2 MG/ML
4 INJECTION INTRAMUSCULAR; INTRAVENOUS ONCE
Status: COMPLETED | OUTPATIENT
Start: 2019-07-24 | End: 2019-07-24

## 2019-07-24 RX ORDER — CEFDINIR 300 MG/1
300 CAPSULE ORAL 2 TIMES DAILY
Qty: 14 CAPSULE | Refills: 0 | Status: SHIPPED | OUTPATIENT
Start: 2019-07-24 | End: 2019-07-31

## 2019-07-24 RX ORDER — MORPHINE SULFATE 4 MG/ML
4 INJECTION, SOLUTION INTRAMUSCULAR; INTRAVENOUS ONCE
Status: COMPLETED | OUTPATIENT
Start: 2019-07-24 | End: 2019-07-24

## 2019-07-24 RX ORDER — LEVOTHYROXINE SODIUM 0.03 MG/1
TABLET ORAL EVERY 24 HOURS
COMMUNITY
Start: 2018-01-25 | End: 2019-08-08 | Stop reason: SDUPTHER

## 2019-07-24 RX ORDER — ANASTROZOLE 1 MG/1
TABLET ORAL EVERY 24 HOURS
COMMUNITY
Start: 2018-09-24 | End: 2019-09-09 | Stop reason: SDUPTHER

## 2019-07-24 RX ORDER — RISPERIDONE 0.25 MG/1
0.25 TABLET ORAL NIGHTLY
Status: ON HOLD | COMMUNITY
Start: 2016-05-11 | End: 2022-10-10 | Stop reason: SDUPTHER

## 2019-07-24 RX ORDER — NABUMETONE 750 MG/1
TABLET, FILM COATED ORAL EVERY 12 HOURS
COMMUNITY
Start: 2018-09-06 | End: 2019-09-13 | Stop reason: SDUPTHER

## 2019-07-24 RX ORDER — OXCARBAZEPINE 150 MG/1
150 TABLET, FILM COATED ORAL 2 TIMES DAILY
Refills: 1 | Status: ON HOLD | COMMUNITY
Start: 2019-05-15 | End: 2022-10-10 | Stop reason: SDUPTHER

## 2019-07-24 RX ORDER — SODIUM CHLORIDE 0.9 % (FLUSH) 0.9 %
10 SYRINGE (ML) INJECTION AS NEEDED
Status: DISCONTINUED | OUTPATIENT
Start: 2019-07-24 | End: 2019-07-24 | Stop reason: HOSPADM

## 2019-07-24 RX ADMIN — MORPHINE SULFATE 4 MG: 4 INJECTION INTRAVENOUS at 03:51

## 2019-07-24 RX ADMIN — Medication 10 ML: at 03:52

## 2019-07-24 RX ADMIN — ONDANSETRON 4 MG: 2 INJECTION INTRAMUSCULAR; INTRAVENOUS at 03:51

## 2019-07-25 LAB — BACTERIA SPEC AEROBE CULT: NORMAL

## 2019-08-11 RX ORDER — LEVOTHYROXINE SODIUM 0.03 MG/1
TABLET ORAL
Qty: 90 TABLET | Refills: 1 | Status: SHIPPED | OUTPATIENT
Start: 2019-08-11 | End: 2022-09-24

## 2019-08-13 RX ORDER — HYDROCODONE BITARTRATE AND ACETAMINOPHEN 10; 325 MG/1; MG/1
0.5 TABLET ORAL EVERY 12 HOURS
Qty: 28 TABLET | Refills: 0 | Status: SHIPPED | OUTPATIENT
Start: 2019-08-13 | End: 2019-09-12 | Stop reason: SDUPTHER

## 2019-08-26 ENCOUNTER — RESULTS ENCOUNTER (OUTPATIENT)
Dept: ONCOLOGY | Facility: CLINIC | Age: 64
End: 2019-08-26

## 2019-08-26 ENCOUNTER — APPOINTMENT (OUTPATIENT)
Dept: LAB | Facility: HOSPITAL | Age: 64
End: 2019-08-26

## 2019-08-26 ENCOUNTER — OFFICE VISIT (OUTPATIENT)
Dept: ONCOLOGY | Facility: CLINIC | Age: 64
End: 2019-08-26

## 2019-08-26 VITALS — HEIGHT: 65 IN | TEMPERATURE: 97.9 F | WEIGHT: 267.8 LBS | RESPIRATION RATE: 20 BRPM | BODY MASS INDEX: 44.62 KG/M2

## 2019-08-26 DIAGNOSIS — C50.912 PRIMARY CANCER OF LEFT FEMALE BREAST (HCC): Primary | ICD-10-CM

## 2019-08-26 DIAGNOSIS — C50.912 PRIMARY CANCER OF LEFT FEMALE BREAST (HCC): ICD-10-CM

## 2019-08-26 PROCEDURE — 36415 COLL VENOUS BLD VENIPUNCTURE: CPT | Performed by: INTERNAL MEDICINE

## 2019-08-26 PROCEDURE — G0463 HOSPITAL OUTPT CLINIC VISIT: HCPCS | Performed by: INTERNAL MEDICINE

## 2019-08-26 PROCEDURE — 99214 OFFICE O/P EST MOD 30 MIN: CPT | Performed by: INTERNAL MEDICINE

## 2019-08-26 RX ORDER — BETAMETHASONE DIPROPIONATE 0.5 MG/G
1 CREAM TOPICAL 2 TIMES DAILY
Refills: 3 | COMMUNITY
Start: 2019-06-08 | End: 2019-11-13

## 2019-08-26 NOTE — PROGRESS NOTES
Hematology/Oncology Outpatient Follow Up    Morgan Quick  1955    Primary Care Physician: Katy Fairchild MD  Referring Physician: Patsy Henry*    Chief complaint:  Left breast infiltrating ductal carcinoma ER positive TN positive HER-2/karol negative  History of Present Illness:    Ms. Quick was diagnosed with left breast infiltrating ductal   carcinoma.    1. Oncologic History:  Ms. Quick had a routine mammogram on 4/3/18.   · 4/3/18 - Bilateral screening mammogram showed in the lateral left breast, far posterior, 8.2 cm   lateral to the nipple there is a 15 mm diameter irregularly marginated mass.    · 4/13/18 - Diagnostic left mammogram and ultrasound confirms the mass at 1.2 x 1.2 x 1.8 cm.    Round in shape at 2 to 3 cm from skin surface.    · 5/10/18 - Core needle biopsy of the left breast mass was obtained which was invasive ductal   carcinoma and DCIS low to intermediate grade.  ER positive, TN positive and HER-2 equivocal.    · Patient was sent to the [facility] and seen initially on 5/21/18.    · 5/24/18 - Patient underwent left breast lumpectomy and sentinel lymph node biopsy.  Pathology report was invasive moderately differentiated mammary carcinoma, size 1.6 cm.  Resection margins negative.  Three sentinel lymph nodes were excised which were all negative for involvement.  Tumor was staged as pT1c, snN0.   ·  6/11/18 - Oncotype DX was obtained.  Recurrence score was 22.  ER score 9.9.  TN score 7.2 positive.  HER-2/karol score 9.3, which was negative.    · 8/15/18 - Patient completed whole breast radiation therapy of her left breast.    · · August 2018 - Patient initiated on Arimidex 1 mg a day.      Past Medical History:   Diagnosis Date   • Cancer (CMS/HCC)    • Hypertension        Past Surgical History:   Procedure Laterality Date   • ABDOMINAL SURGERY     • APPENDECTOMY     • HYSTERECTOMY     • MASTECTOMY      L side         Current Outpatient  Medications:   •  anastrozole (ARIMIDEX) 1 MG tablet, Daily., Disp: , Rfl:   •  HYDROcodone-acetaminophen (NORCO)  MG per tablet, Take 0.5 tablets by mouth Every 12 (Twelve) Hours., Disp: 28 tablet, Rfl: 0  •  levothyroxine (SYNTHROID, LEVOTHROID) 25 MCG tablet, TAKE ONE TABLET BY MOUTH EVERY DAY, Disp: 90 tablet, Rfl: 1  •  lisinopril (PRINIVIL,ZESTRIL) 10 MG tablet, TAKE ONE TABLET BY MOUTH ONCE DAILY, Disp: 90 tablet, Rfl: 1  •  nabumetone (RELAFEN) 750 MG tablet, Every 12 (Twelve) Hours., Disp: , Rfl:   •  OXcarbazepine (TRILEPTAL) 150 MG tablet, Take 150 mg by mouth 2 (Two) Times a Day., Disp: , Rfl: 1  •  risperiDONE (risperDAL) 0.25 MG tablet, RISPERDAL TABS, Disp: , Rfl:   •  rosuvastatin (CRESTOR) 20 MG tablet, TAKE ONE TABLET BY MOUTH DAILY, Disp: 90 tablet, Rfl: 1  •  Sertraline HCl (ZOLOFT PO), ZOLOFT TABS, Disp: , Rfl:   •  betamethasone, augmented, (DIPROLENE) 0.05 % cream, Apply 1 application topically to the appropriate area as directed 2 (Two) Times a Day. APPLY TO AFFECTED AREA, Disp: , Rfl: 3    Allergies   Allergen Reactions   • Contrast Dye Shortness Of Breath     Pt states she had trouble breathing when she had contrast in the past.        No family history on file.    Cancer-related family history is not on file.    Social History     Tobacco Use   • Smoking status: Never Smoker   Substance Use Topics   • Alcohol use: No     Frequency: Never   • Drug use: No       I have reviewed the history of present illness, past medical history, family history, social history, lab results, all notes and other records since the patient was last seen on 5/20/2019    SUBJECTIVE:    Patient is my office for follow-up of her breast cancer.  Tolerating Arimidex well.  Trying to lose weight not successful she actually gained weight.  Has arthritis.      ROS:     Review of Systems   Constitutional: Negative for fever.   HENT: Negative for nosebleeds and trouble swallowing.    Eyes: Negative for visual  "disturbance.   Respiratory: Negative for cough, shortness of breath and wheezing.    Cardiovascular: Negative for chest pain.   Gastrointestinal: Negative for abdominal pain and blood in stool.   Endocrine: Negative for cold intolerance.   Genitourinary: Negative for dysuria and hematuria.   Musculoskeletal: Negative for joint swelling.   Skin: Negative for rash.   Allergic/Immunologic: Negative for environmental allergies.   Neurological: Negative for seizures.   Hematological: Does not bruise/bleed easily.   Psychiatric/Behavioral: The patient is not nervous/anxious.          Objective:       Vitals:    08/26/19 1054   Resp: 20   Temp: 97.9 °F (36.6 °C)   Weight: 121 kg (267 lb 12.8 oz)   Height: 165.1 cm (65\")   PainSc: 0-No pain         PHYSICAL EXAM:     Physical Exam   Constitutional: She is oriented to person, place, and time. No distress.   Morbidly obese   HENT:   Head: Normocephalic and atraumatic.   Eyes: Conjunctivae and EOM are normal. Right eye exhibits no discharge. Left eye exhibits no discharge. No scleral icterus.   Neck: Normal range of motion. Neck supple. No thyromegaly present.   Cardiovascular: Normal rate, regular rhythm and normal heart sounds. Exam reveals no gallop and no friction rub.   Pulmonary/Chest: Effort normal. No stridor. No respiratory distress. She has no wheezes.   Abdominal: Soft. Bowel sounds are normal. She exhibits no mass. There is no tenderness. There is no rebound and no guarding.   Musculoskeletal: Normal range of motion. She exhibits no tenderness.   Lymphadenopathy:     She has no cervical adenopathy.   Neurological: She is alert and oriented to person, place, and time. She exhibits normal muscle tone.   Skin: Skin is warm. No rash noted. She is not diaphoretic. No erythema.   Psychiatric: She has a normal mood and affect. Her behavior is normal.   Nursing note and vitals reviewed.       RECENT LABS:     WBC   Date Value Ref Range Status   07/24/2019 6.90 3.40 - 10.80 " 10*3/mm3 Final     RBC   Date Value Ref Range Status   07/24/2019 4.13 3.77 - 5.28 10*6/mm3 Final     Hemoglobin   Date Value Ref Range Status   07/24/2019 12.1 12.0 - 15.9 g/dL Final     Hematocrit   Date Value Ref Range Status   07/24/2019 38.3 34.0 - 46.6 % Final     MCV   Date Value Ref Range Status   07/24/2019 92.6 79.0 - 97.0 fL Final     MCH   Date Value Ref Range Status   07/24/2019 29.3 26.6 - 33.0 pg Final     MCHC   Date Value Ref Range Status   07/24/2019 31.7 31.5 - 35.7 g/dL Final     RDW   Date Value Ref Range Status   07/24/2019 15.9 (H) 12.3 - 15.4 % Final     RDW-SD   Date Value Ref Range Status   07/24/2019 52.1 37.0 - 54.0 fl Final     MPV   Date Value Ref Range Status   07/24/2019 7.7 6.0 - 12.0 fL Final     Platelets   Date Value Ref Range Status   07/24/2019 275 140 - 450 10*3/mm3 Final     Neutrophil %   Date Value Ref Range Status   07/24/2019 71.9 42.7 - 76.0 % Final     Lymphocyte %   Date Value Ref Range Status   07/24/2019 17.0 (L) 19.6 - 45.3 % Final     Monocyte %   Date Value Ref Range Status   07/24/2019 7.3 5.0 - 12.0 % Final     Eosinophil %   Date Value Ref Range Status   07/24/2019 2.9 0.3 - 6.2 % Final     Basophil %   Date Value Ref Range Status   07/24/2019 0.9 0.0 - 1.5 % Final     Neutrophils, Absolute   Date Value Ref Range Status   07/24/2019 5.00 1.70 - 7.00 10*3/mm3 Final     Lymphocytes, Absolute   Date Value Ref Range Status   07/24/2019 1.20 0.70 - 3.10 10*3/mm3 Final     Monocytes, Absolute   Date Value Ref Range Status   07/24/2019 0.50 0.10 - 0.90 10*3/mm3 Final     Eosinophils, Absolute   Date Value Ref Range Status   07/24/2019 0.20 0.00 - 0.40 10*3/mm3 Final     Basophils, Absolute   Date Value Ref Range Status   07/24/2019 0.10 0.00 - 0.20 10*3/mm3 Final     nRBC   Date Value Ref Range Status   07/24/2019 0.2 0.0 - 0.2 /100 WBC Final       Lab Results   Component Value Date    GLUCOSE 116 (H) 07/24/2019    BUN 5 (L) 07/24/2019    CREATININE 0.50 07/24/2019     EGFRIFNONA 125 07/24/2019    BCR 10.0 07/24/2019    K 3.6 07/24/2019    CO2 24.0 07/24/2019    CALCIUM 8.4 (L) 07/24/2019    ALBUMIN 3.60 07/24/2019    LABIL2 1.2 05/20/2019    AST 37 07/24/2019    ALT 29 07/24/2019         Assessment/Plan      ASSESSMENT:     1. Stage I left breast infiltrating ductal carcinoma ER positive MT positive HER-2/karol negative  2. Obesity  3. Arthritis  4. Abdominal pain  5. ECOG 1    PLAN:      1. Patient is taking Arimidex a day every day tolerating it well.  Continue at this time.  Check CBC CMP and CA-27-29.  I will schedule bilateral screening mammogram  2. Encouraged her to lose weight  3. Take Tylenol arthritis.  4. She reports abdominal pain and CT scan abdomen pelvis was normal.  She will see [facility] later this month  5. I will see her back in my office in 3 months recheck CBC and CMP CA-27-29 at that time    I have reviewed labs results, imaging, vitals, and medications with the patient today.       Patient verbalized understanding and is in agreement of the above plan.          This report was compiled using Dragon voice recognition software. I have made every effort to proof read this document; however, typographical errors may persist.

## 2019-08-27 ENCOUNTER — RESULTS ENCOUNTER (OUTPATIENT)
Dept: ONCOLOGY | Facility: CLINIC | Age: 64
End: 2019-08-27

## 2019-08-27 DIAGNOSIS — C50.912 PRIMARY CANCER OF LEFT FEMALE BREAST (HCC): ICD-10-CM

## 2019-08-27 DIAGNOSIS — C50.912 PRIMARY CANCER OF LEFT FEMALE BREAST (HCC): Primary | ICD-10-CM

## 2019-08-30 ENCOUNTER — OFFICE VISIT (OUTPATIENT)
Dept: FAMILY MEDICINE CLINIC | Facility: CLINIC | Age: 64
End: 2019-08-30

## 2019-08-30 VITALS
SYSTOLIC BLOOD PRESSURE: 137 MMHG | OXYGEN SATURATION: 92 % | HEART RATE: 62 BPM | TEMPERATURE: 97.9 F | DIASTOLIC BLOOD PRESSURE: 80 MMHG | WEIGHT: 265.2 LBS | BODY MASS INDEX: 45.27 KG/M2 | HEIGHT: 64 IN

## 2019-08-30 DIAGNOSIS — Z11.59 NEED FOR HEPATITIS C SCREENING TEST: ICD-10-CM

## 2019-08-30 DIAGNOSIS — E66.01 MORBIDLY OBESE (HCC): ICD-10-CM

## 2019-08-30 DIAGNOSIS — I10 ESSENTIAL HYPERTENSION: ICD-10-CM

## 2019-08-30 DIAGNOSIS — R60.0 BILATERAL LOWER EXTREMITY EDEMA: Primary | ICD-10-CM

## 2019-08-30 DIAGNOSIS — R76.8 HEPATITIS C ANTIBODY POSITIVE IN BLOOD: Primary | ICD-10-CM

## 2019-08-30 LAB — HCV AB SER DONR QL: REACTIVE

## 2019-08-30 PROCEDURE — 86803 HEPATITIS C AB TEST: CPT | Performed by: FAMILY MEDICINE

## 2019-08-30 PROCEDURE — 36415 COLL VENOUS BLD VENIPUNCTURE: CPT | Performed by: FAMILY MEDICINE

## 2019-08-30 PROCEDURE — 99214 OFFICE O/P EST MOD 30 MIN: CPT | Performed by: FAMILY MEDICINE

## 2019-08-30 NOTE — PROGRESS NOTES
Patient is a 63-year-old  female who was screened for hepatitis C.  Antibody screen was reactive.  Patient will need a follow-up test, genotype and PCR.  Been placed for both.    Signature    Katy Fairchild MD  [facility]        This document has been electronically signed by Katy Fairchild MD on August 30, 2019 4:11 PM

## 2019-08-30 NOTE — PROGRESS NOTES
Subjective:     Morgan Quick is a 63 y.o. female who presents for  Chief Complaint   Patient presents with   • Foot Swelling     x1 month- she stands for long periods of time at her work   • Leg Swelling     x1 month       This is a new patient to me.    Patient presents for one month history of lower extremity swelling that is exacerbated by standing. Patient works as a  at the [employer] and is required to be on her feet for approximately 5 hours a day. She does not engage in regular exercise.       Leg Swelling   This is a new problem. The current episode started more than 1 month ago. The problem occurs daily. The problem has been waxing and waning. Associated symptoms include arthralgias. Pertinent negatives include no abdominal pain, chest pain, chills, congestion, coughing, diaphoresis, fatigue, fever, myalgias, nausea, rash, sore throat or vomiting. The symptoms are aggravated by standing. She has tried position changes for the symptoms. The treatment provided mild relief.   Hypertension   This is a chronic problem. The current episode started more than 1 month ago (6 months). The problem is unchanged. The problem is controlled. Pertinent negatives include no blurred vision, chest pain or shortness of breath. Agents associated with hypertension include thyroid hormones (anastrozole). Risk factors for coronary artery disease include obesity and post-menopausal state. Current antihypertension treatment includes ACE inhibitors. The current treatment provides mild improvement. Compliance problems include exercise.      Preventative:    Health Maintenance   Topic Date Due   • TDAP/TD VACCINES (1 - Tdap) 12/22/1974   • ZOSTER VACCINE (1 of 2) 12/22/2005   • HEPATITIS C SCREENING  05/21/2018   • MEDICARE ANNUAL WELLNESS  05/21/2018   • PAP SMEAR  05/21/2018   • COLONOSCOPY  05/21/2018   • INFLUENZA VACCINE  08/01/2019   • MAMMOGRAM  05/10/2020       Past Medical Hx:  Past Medical History:    Diagnosis Date   • Cancer (CMS/HCC)    • Hypertension        Past Surgical Hx:  Past Surgical History:   Procedure Laterality Date   • ABDOMINAL SURGERY     • APPENDECTOMY     • HYSTERECTOMY     • MASTECTOMY      L side       Family Hx:  Family History   Family history unknown: Yes        Social History:  Social History     Socioeconomic History   • Marital status:      Spouse name: Not on file   • Number of children: Not on file   • Years of education: Not on file   • Highest education level: Not on file   Tobacco Use   • Smoking status: Never Smoker   • Smokeless tobacco: Never Used   Substance and Sexual Activity   • Alcohol use: No     Frequency: Never   • Drug use: No   • Sexual activity: Defer       Allergies:  Contrast dye    Current Meds:    Current Outpatient Medications:   •  anastrozole (ARIMIDEX) 1 MG tablet, Daily., Disp: , Rfl:   •  betamethasone, augmented, (DIPROLENE) 0.05 % cream, Apply 1 application topically to the appropriate area as directed 2 (Two) Times a Day. APPLY TO AFFECTED AREA, Disp: , Rfl: 3  •  HYDROcodone-acetaminophen (NORCO)  MG per tablet, Take 0.5 tablets by mouth Every 12 (Twelve) Hours., Disp: 28 tablet, Rfl: 0  •  levothyroxine (SYNTHROID, LEVOTHROID) 25 MCG tablet, TAKE ONE TABLET BY MOUTH EVERY DAY, Disp: 90 tablet, Rfl: 1  •  lisinopril (PRINIVIL,ZESTRIL) 10 MG tablet, TAKE ONE TABLET BY MOUTH ONCE DAILY, Disp: 90 tablet, Rfl: 1  •  nabumetone (RELAFEN) 750 MG tablet, Every 12 (Twelve) Hours., Disp: , Rfl:   •  OXcarbazepine (TRILEPTAL) 150 MG tablet, Take 150 mg by mouth 2 (Two) Times a Day., Disp: , Rfl: 1  •  risperiDONE (risperDAL) 0.25 MG tablet, RISPERDAL TABS, Disp: , Rfl:   •  rosuvastatin (CRESTOR) 20 MG tablet, TAKE ONE TABLET BY MOUTH DAILY, Disp: 90 tablet, Rfl: 1  •  Sertraline HCl (ZOLOFT PO), ZOLOFT TABS, Disp: , Rfl:     The following portions of the patient's history were reviewed and updated as appropriate: allergies, current medications,  "past family history, past medical history, past social history, past surgical history and problem list.    Review of Systems  Review of Systems   Constitutional: Negative for chills, diaphoresis, fatigue and fever.   HENT: Negative for congestion, rhinorrhea, sinus pressure, sneezing and sore throat.    Eyes: Negative for blurred vision, double vision and redness.   Respiratory: Negative for cough, shortness of breath and wheezing.    Cardiovascular: Positive for leg swelling. Negative for chest pain.   Gastrointestinal: Negative for abdominal pain, constipation, diarrhea, nausea and vomiting.   Endocrine: Negative for polydipsia, polyphagia and polyuria.   Genitourinary: Negative for difficulty urinating, dysuria and hematuria.   Musculoskeletal: Positive for arthralgias. Negative for gait problem and myalgias.   Skin: Negative for rash and skin lesions.   Neurological: Negative for tremors, seizures, syncope and headache.   Psychiatric/Behavioral: Negative for sleep disturbance and depressed mood. The patient is not nervous/anxious.        Objective:     /80 (BP Location: Left arm, Patient Position: Sitting, Cuff Size: Large Adult)   Pulse 62   Temp 97.9 °F (36.6 °C) (Oral)   Ht 162.6 cm (64\")   Wt 120 kg (265 lb 3.2 oz)   SpO2 92%   BMI 45.52 kg/m²     Physical Exam   Constitutional: She is oriented to person, place, and time. She appears well-developed and well-nourished. No distress. She is morbidly obese.  HENT:   Head: Normocephalic and atraumatic.   Nose: Nose normal.   Mouth/Throat: Oropharynx is clear and moist. Dental caries present.   Eyes: Conjunctivae and EOM are normal. Pupils are equal, round, and reactive to light. No scleral icterus.   Neck: Neck supple. No tracheal deviation present. No thyromegaly present.   Cardiovascular: Normal rate, regular rhythm, normal heart sounds and intact distal pulses.   Pulmonary/Chest: Effort normal and breath sounds normal.   Abdominal: Soft. Bowel " sounds are normal. There is no tenderness.   Musculoskeletal: She exhibits edema (bilateral pedal edema). She exhibits no tenderness.   Lymphadenopathy:     She has no cervical adenopathy.   Neurological: She is alert and oriented to person, place, and time.   Skin: Skin is warm and dry. Capillary refill takes less than 2 seconds. No rash noted. She is not diaphoretic.   Psychiatric: She has a normal mood and affect. Her behavior is normal.   Vitals reviewed.      Lab Results   Component Value Date    WBC 6.90 07/24/2019    HGB 12.1 07/24/2019    HCT 38.3 07/24/2019    MCV 92.6 07/24/2019     07/24/2019     Lab Results   Component Value Date    GLUCOSE 116 (H) 07/24/2019    BUN 5 (L) 07/24/2019    CREATININE 0.50 07/24/2019    EGFRIFNONA 125 07/24/2019    BCR 10.0 07/24/2019    K 3.6 07/24/2019    CO2 24.0 07/24/2019    CALCIUM 8.4 (L) 07/24/2019    ALBUMIN 3.60 07/24/2019    LABIL2 1.2 05/20/2019    AST 37 07/24/2019    ALT 29 07/24/2019     Lab Results   Component Value Date    CHOL 114 05/20/2019    TRIG 165 (H) 05/20/2019    HDL 33 (L) 05/20/2019    LDL 71 05/20/2019        Assessment/Plan:     oMrgan was seen today for foot swelling and leg swelling.    Diagnoses and all orders for this visit:    Bilateral lower extremity edema  -     Compression Knee High Stockings  - Patient's physical exam is consistent with dependent type edema.  Patient encouraged to participate in 30 minutes moderate intense activity at least 5 days a week.  Advised to keep legs elevated at rest.  Encouraged a trial of compression stockings prior to utilization of diuretics.    Essential hypertension  This is a chronic problem. Condition is well controlled. Patient was not hypertensive in the office.   Encouraged medication compliance with ACE inhibitor (lisinopril 10mg).   Encouraged 30 minutes of moderate intensity activity at least 5 days a week.   Patient provided with educational material on a DASH diet in AVS.    Morbidly  obese (CMS/HCC)  -     Compression Knee High Stockings  - Discussed health risk associated with obesity.  Encouraged 30 minutes of moderate intense activity at least 5 days a week.  Patient provided with educational material and information on lifestyle modifications in AVS.    Need for hepatitis C screening test  -     Hepatitis C Antibody      Follow-up:     Return in about 2 weeks (around 9/13/2019) for Recheck leg swelling.      Signature    Katy Fairchild MD  [facility]        This document has been electronically signed by Katy Fairchild MD on August 30, 2019 8:17 AM

## 2019-08-30 NOTE — PATIENT INSTRUCTIONS
Preventive Care 40-64 Years, Female  Preventive care refers to lifestyle choices and visits with your health care provider that can promote health and wellness.  What does preventive care include?    · A yearly physical exam. This is also called an annual well check.  · Dental exams once or twice a year.  · Routine eye exams. Ask your health care provider how often you should have your eyes checked.  · Personal lifestyle choices, including:  ? Daily care of your teeth and gums.  ? Regular physical activity.  ? Eating a healthy diet.  ? Avoiding tobacco and drug use.  ? Limiting alcohol use.  ? Practicing safe sex.  ? Taking low-dose aspirin daily starting at age 50.  ? Taking vitamin and mineral supplements as recommended by your health care provider.  What happens during an annual well check?  The services and screenings done by your health care provider during your annual well check will depend on your age, overall health, lifestyle risk factors, and family history of disease.  Counseling  Your health care provider may ask you questions about your:  · Alcohol use.  · Tobacco use.  · Drug use.  · Emotional well-being.  · Home and relationship well-being.  · Sexual activity.  · Eating habits.  · Work and work environment.  · Method of birth control.  · Menstrual cycle.  · Pregnancy history.  Screening  You may have the following tests or measurements:  · Height, weight, and BMI.  · Blood pressure.  · Lipid and cholesterol levels. These may be checked every 5 years, or more frequently if you are over 50 years old.  · Skin check.  · Lung cancer screening. You may have this screening every year starting at age 55 if you have a 30-pack-year history of smoking and currently smoke or have quit within the past 15 years.  · Colorectal cancer screening. All adults should have this screening starting at age 50 and continuing until age 75. Your health care provider may recommend screening at age 45. You will have tests every  1-10 years, depending on your results and the type of screening test. People at increased risk should start screening at an earlier age. Screening tests may include:  ? Guaiac-based fecal occult blood testing.  ? Fecal immunochemical test (FIT).  ? Stool DNA test.  ? Virtual colonoscopy.  ? Sigmoidoscopy. During this test, a flexible tube with a tiny camera (sigmoidoscope) is used to examine your rectum and lower colon. The sigmoidoscope is inserted through your anus into your rectum and lower colon.  ? Colonoscopy. During this test, a long, thin, flexible tube with a tiny camera (colonoscope) is used to examine your entire colon and rectum.  · Hepatitis C blood test.  · Hepatitis B blood test.  · Sexually transmitted disease (STD) testing.  · Diabetes screening. This is done by checking your blood sugar (glucose) after you have not eaten for a while (fasting). You may have this done every 1-3 years.  · Mammogram. This may be done every 1-2 years. Talk to your health care provider about when you should start having regular mammograms. This may depend on whether you have a family history of breast cancer.  · BRCA-related cancer screening. This may be done if you have a family history of breast, ovarian, tubal, or peritoneal cancers.  · Pelvic exam and Pap test. This may be done every 3 years starting at age 21. Starting at age 30, this may be done every 5 years if you have a Pap test in combination with an HPV test.  · Bone density scan. This is done to screen for osteoporosis. You may have this scan if you are at high risk for osteoporosis.  Discuss your test results, treatment options, and if necessary, the need for more tests with your health care provider.  Vaccines  Your health care provider may recommend certain vaccines, such as:  · Influenza vaccine. This is recommended every year.  · Tetanus, diphtheria, and acellular pertussis (Tdap, Td) vaccine. You may need a Td booster every 10 years.  · Varicella  vaccine. You may need this if you have not been vaccinated.  · Zoster vaccine. You may need this after age 60.  · Measles, mumps, and rubella (MMR) vaccine. You may need at least one dose of MMR if you were born in 1957 or later. You may also need a second dose.  · Pneumococcal 13-valent conjugate (PCV13) vaccine. You may need this if you have certain conditions and were not previously vaccinated.  · Pneumococcal polysaccharide (PPSV23) vaccine. You may need one or two doses if you smoke cigarettes or if you have certain conditions.  · Meningococcal vaccine. You may need this if you have certain conditions.  · Hepatitis A vaccine. You may need this if you have certain conditions or if you travel or work in places where you may be exposed to hepatitis A.  · Hepatitis B vaccine. You may need this if you have certain conditions or if you travel or work in places where you may be exposed to hepatitis B.  · Haemophilus influenzae type b (Hib) vaccine. You may need this if you have certain conditions.  Talk to your health care provider about which screenings and vaccines you need and how often you need them.  This information is not intended to replace advice given to you by your health care provider. Make sure you discuss any questions you have with your health care provider.  Document Released: 01/13/2017 Document Revised: 02/07/2019 Document Reviewed: 10/18/2016  [employer] Interactive Patient Education © 2019 [employer]    Exercising to Stay Healthy  To become healthy and stay healthy, it is recommended that you do moderate-intensity and vigorous-intensity exercise. You can tell that you are exercising at a moderate intensity if your heart starts beating faster and you start breathing faster but can still hold a conversation. You can tell that you are exercising at a vigorous intensity if you are breathing much harder and faster and cannot hold a conversation while exercising.  Exercising regularly is important. It  has many health benefits, such as:  · Improving overall fitness, flexibility, and endurance.  · Increasing bone density.  · Helping with weight control.  · Decreasing body fat.  · Increasing muscle strength.  · Reducing stress and tension.  · Improving overall health.  How often should I exercise?  Choose an activity that you enjoy, and set realistic goals. Your health care provider can help you make an activity plan that works for you.  Exercise regularly as told by your health care provider. This may include:  · Doing strength training two times a week, such as:  ? Lifting weights.  ? Using resistance bands.  ? Push-ups.  ? Sit-ups.  ? Yoga.  · Doing a certain intensity of exercise for a given amount of time. Choose from these options:  ? A total of 150 minutes of moderate-intensity exercise every week.  ? A total of 75 minutes of vigorous-intensity exercise every week.  ? A mix of moderate-intensity and vigorous-intensity exercise every week.  Children, pregnant women, people who have not exercised regularly, people who are overweight, and older adults may need to talk with a health care provider about what activities are safe to do. If you have a medical condition, be sure to talk with your health care provider before you start a new exercise program.  What are some exercise ideas?  Moderate-intensity exercise ideas include:  · Walking 1 mile (1.6 km) in about 15 minutes.  · Biking.  · Hiking.  · Golfing.  · Dancing.  · Water aerobics.  Vigorous-intensity exercise ideas include:  · Walking 4.5 miles (7.2 km) or more in about 1 hour.  · Jogging or running 5 miles (8 km) in about 1 hour.  · Biking 10 miles (16.1 km) or more in about 1 hour.  · Lap swimming.  · Roller-skating or in-line skating.  · Cross-country skiing.  · Vigorous competitive sports, such as football, basketball, and soccer.  · Jumping rope.  · Aerobic dancing.  What are some everyday activities that can help me to get exercise?  · Yard work, such  as:  ? Pushing a .  ? Raking and bagging leaves.  · Washing your car.  · Pushing a stroller.  · Shoveling snow.  · Gardening.  · Washing windows or floors.  How can I be more active in my day-to-day activities?  · Use stairs instead of an elevator.  · Take a walk during your lunch break.  · If you drive, park your car farther away from your work or school.  · If you take public transportation, get off one stop early and walk the rest of the way.  · Stand up or walk around during all of your indoor phone calls.  · Get up, stretch, and walk around every 30 minutes throughout the day.  · Enjoy exercise with a friend. Support to continue exercising will help you keep a regular routine of activity.  What guidelines can I follow while exercising?  · Before you start a new exercise program, talk with your health care provider.  · Do not exercise so much that you hurt yourself, feel dizzy, or get very short of breath.  · Wear comfortable clothes and wear shoes with good support.  · Drink plenty of water while you exercise to prevent dehydration or heat stroke.  · Work out until your breathing and your heartbeat get faster.  Where to find more information  · [place] Department of Health and Human Services: www.hhs.gov  · Centers for Disease Control and Prevention (CDC): www.cdc.gov  Summary  · Exercising regularly is important. It will improve your overall fitness, flexibility, and endurance.  · Regular exercise also will improve your overall health. It can help you control your weight, reduce stress, and improve your bone density.  · Do not exercise so much that you hurt yourself, feel dizzy, or get very short of breath.  · Before you start a new exercise program, talk with your health care provider.  This information is not intended to replace advice given to you by your health care provider. Make sure you discuss any questions you have with your health care provider.  Document Released: 01/20/2012 Document Revised:  11/08/2018 Document Reviewed: 11/08/2018  [employer] Interactive Patient Education © 2019 [employer]    Edema    Edema is when you have too much fluid in your body or under your skin. Edema may make your legs, feet, and ankles swell up. Swelling is also common in looser tissues, like around your eyes. This is a common condition. It gets more common as you get older. There are many possible causes of edema. Eating too much salt (sodium) and being on your feet or sitting for a long time can cause edema in your legs, feet, and ankles. Hot weather may make edema worse.  Edema is usually painless. Your skin may look swollen or shiny.  Follow these instructions at home:  · Keep the swollen body part raised (elevated) above the level of your heart when you are sitting or lying down.  · Do not sit still or stand for a long time.  · Do not wear tight clothes. Do not wear garters on your upper legs.  · Exercise your legs. This can help the swelling go down.  · Wear elastic bandages or support stockings as told by your doctor.  · Eat a low-salt (low-sodium) diet to reduce fluid as told by your doctor.  · Depending on the cause of your swelling, you may need to limit how much fluid you drink (fluid restriction).  · Take over-the-counter and prescription medicines only as told by your doctor.  Contact a doctor if:  · Treatment is not working.  · You have heart, liver, or kidney disease and have symptoms of edema.  · You have sudden and unexplained weight gain.  Get help right away if:  · You have shortness of breath or chest pain.  · You cannot breathe when you lie down.  · You have pain, redness, or warmth in the swollen areas.  · You have heart, liver, or kidney disease and get edema all of a sudden.  · You have a fever and your symptoms get worse all of a sudden.  Summary  · Edema is when you have too much fluid in your body or under your skin.  · Edema may make your legs, feet, and ankles swell up. Swelling is also common  "in looser tissues, like around your eyes.  · Raise (elevate) the swollen body part above the level of your heart when you are sitting or lying down.  · Follow your doctor's instructions about diet and how much fluid you can drink (fluid restriction).  This information is not intended to replace advice given to you by your health care provider. Make sure you discuss any questions you have with your health care provider.  Document Released: 06/05/2009 Document Revised: 01/05/2018 Document Reviewed: 01/05/2018  [employer] Interactive Patient Education © 2019 [employer]    DASH Eating Plan  DASH stands for \"Dietary Approaches to Stop Hypertension.\" The DASH eating plan is a healthy eating plan that has been shown to reduce high blood pressure (hypertension). It may also reduce your risk for type 2 diabetes, heart disease, and stroke. The DASH eating plan may also help with weight loss.  What are tips for following this plan?    General guidelines  · Avoid eating more than 2,300 mg (milligrams) of salt (sodium) a day. If you have hypertension, you may need to reduce your sodium intake to 1,500 mg a day.  · Limit alcohol intake to no more than 1 drink a day for nonpregnant women and 2 drinks a day for men. One drink equals 12 oz of beer, 5 oz of wine, or 1½ oz of hard liquor.  · Work with your health care provider to maintain a healthy body weight or to lose weight. Ask what an ideal weight is for you.  · Get at least 30 minutes of exercise that causes your heart to beat faster (aerobic exercise) most days of the week. Activities may include walking, swimming, or biking.  · Work with your health care provider or diet and nutrition specialist (dietitian) to adjust your eating plan to your individual calorie needs.  Reading food labels    · Check food labels for the amount of sodium per serving. Choose foods with less than 5 percent of the Daily Value of sodium. Generally, foods with less than 300 mg of sodium per serving " "fit into this eating plan.  · To find whole grains, look for the word \"whole\" as the first word in the ingredient list.  Shopping  · Buy products labeled as \"low-sodium\" or \"no salt added.\"  · Buy fresh foods. Avoid canned foods and premade or frozen meals.  Cooking  · Avoid adding salt when cooking. Use salt-free seasonings or herbs instead of table salt or sea salt. Check with your health care provider or pharmacist before using salt substitutes.  · Do not rodriguez foods. Cook foods using healthy methods such as baking, boiling, grilling, and broiling instead.  · Cook with heart-healthy oils, such as olive, canola, soybean, or sunflower oil.  Meal planning  · Eat a balanced diet that includes:  ? 5 or more servings of fruits and vegetables each day. At each meal, try to fill half of your plate with fruits and vegetables.  ? Up to 6-8 servings of whole grains each day.  ? Less than 6 oz of lean meat, poultry, or fish each day. A 3-oz serving of meat is about the same size as a deck of cards. One egg equals 1 oz.  ? 2 servings of low-fat dairy each day.  ? A serving of nuts, seeds, or beans 5 times each week.  ? Heart-healthy fats. Healthy fats called Omega-3 fatty acids are found in foods such as flaxseeds and coldwater fish, like sardines, salmon, and mackerel.  · Limit how much you eat of the following:  ? Canned or prepackaged foods.  ? Food that is high in trans fat, such as fried foods.  ? Food that is high in saturated fat, such as fatty meat.  ? Sweets, desserts, sugary drinks, and other foods with added sugar.  ? Full-fat dairy products.  · Do not salt foods before eating.  · Try to eat at least 2 vegetarian meals each week.  · Eat more home-cooked food and less restaurant, buffet, and fast food.  · When eating at a restaurant, ask that your food be prepared with less salt or no salt, if possible.  What foods are recommended?  The items listed may not be a complete list. Talk with your dietitian about what " dietary choices are best for you.  Grains  Whole-grain or whole-wheat bread. Whole-grain or whole-wheat pasta. Brown rice. Oatmeal. Quinoa. Bulgur. Whole-grain and low-sodium cereals. Carmen bread. Low-fat, low-sodium crackers. Whole-wheat flour tortillas.  Vegetables  Fresh or frozen vegetables (raw, steamed, roasted, or grilled). Low-sodium or reduced-sodium tomato and vegetable juice. Low-sodium or reduced-sodium tomato sauce and tomato paste. Low-sodium or reduced-sodium canned vegetables.  Fruits  All fresh, dried, or frozen fruit. Canned fruit in natural juice (without added sugar).  Meat and other protein foods  Skinless chicken or turkey. Ground chicken or turkey. Pork with fat trimmed off. Fish and seafood. Egg whites. Dried beans, peas, or lentils. Unsalted nuts, nut butters, and seeds. Unsalted canned beans. Lean cuts of beef with fat trimmed off. Low-sodium, lean deli meat.  Dairy  Low-fat (1%) or fat-free (skim) milk. Fat-free, low-fat, or reduced-fat cheeses. Nonfat, low-sodium ricotta or cottage cheese. Low-fat or nonfat yogurt. Low-fat, low-sodium cheese.  Fats and oils  Soft margarine without trans fats. Vegetable oil. Low-fat, reduced-fat, or light mayonnaise and salad dressings (reduced-sodium). Canola, safflower, olive, soybean, and sunflower oils. Avocado.  Seasoning and other foods  Herbs. Spices. Seasoning mixes without salt. Unsalted popcorn and pretzels. Fat-free sweets.  What foods are not recommended?  The items listed may not be a complete list. Talk with your dietitian about what dietary choices are best for you.  Grains  Baked goods made with fat, such as croissants, muffins, or some breads. Dry pasta or rice meal packs.  Vegetables  Creamed or fried vegetables. Vegetables in a cheese sauce. Regular canned vegetables (not low-sodium or reduced-sodium). Regular canned tomato sauce and paste (not low-sodium or reduced-sodium). Regular tomato and vegetable juice (not low-sodium or  reduced-sodium). Pickles. Olives.  Fruits  Canned fruit in a light or heavy syrup. Fried fruit. Fruit in cream or butter sauce.  Meat and other protein foods  Fatty cuts of meat. Ribs. Fried meat. Posey. Sausage. Bologna and other processed lunch meats. Salami. Fatback. Hotdogs. Bratwurst. Salted nuts and seeds. Canned beans with added salt. Canned or smoked fish. Whole eggs or egg yolks. Chicken or turkey with skin.  Dairy  Whole or 2% milk, cream, and half-and-half. Whole or full-fat cream cheese. Whole-fat or sweetened yogurt. Full-fat cheese. Nondairy creamers. Whipped toppings. Processed cheese and cheese spreads.  Fats and oils  Butter. Stick margarine. Lard. Shortening. Ghee. Posey fat. Tropical oils, such as coconut, palm kernel, or palm oil.  Seasoning and other foods  Salted popcorn and pretzels. Onion salt, garlic salt, seasoned salt, table salt, and sea salt. Worcestershire sauce. Tartar sauce. Barbecue sauce. Teriyaki sauce. Soy sauce, including reduced-sodium. Steak sauce. Canned and packaged gravies. Fish sauce. Oyster sauce. Cocktail sauce. Horseradish that you find on the shelf. Ketchup. Mustard. Meat flavorings and tenderizers. Bouillon cubes. Hot sauce and Tabasco sauce. Premade or packaged marinades. Premade or packaged taco seasonings. Relishes. Regular salad dressings.  Where to find more information:  · National Heart, Lung, and Blood [place]: www.nhlbi.nih.gov  · American Heart Association: www.heart.org  Summary  · The DASH eating plan is a healthy eating plan that has been shown to reduce high blood pressure (hypertension). It may also reduce your risk for type 2 diabetes, heart disease, and stroke.  · With the DASH eating plan, you should limit salt (sodium) intake to 2,300 mg a day. If you have hypertension, you may need to reduce your sodium intake to 1,500 mg a day.  · When on the DASH eating plan, aim to eat more fresh fruits and vegetables, whole grains, lean proteins, low-fat  dairy, and heart-healthy fats.  · Work with your health care provider or diet and nutrition specialist (dietitian) to adjust your eating plan to your individual calorie needs.  This information is not intended to replace advice given to you by your health care provider. Make sure you discuss any questions you have with your health care provider.  Document Released: 12/06/2012 Document Revised: 12/11/2017 Document Reviewed: 12/11/2017  [employer] Interactive Patient Education © 2019 [employer]

## 2019-09-09 DIAGNOSIS — C50.912 PRIMARY CANCER OF LEFT FEMALE BREAST (HCC): Primary | ICD-10-CM

## 2019-09-09 PROCEDURE — 36415 COLL VENOUS BLD VENIPUNCTURE: CPT | Performed by: FAMILY MEDICINE

## 2019-09-09 PROCEDURE — 87902 NFCT AGT GNTYP ALYS HEP C: CPT | Performed by: FAMILY MEDICINE

## 2019-09-09 PROCEDURE — 87522 HEPATITIS C REVRS TRNSCRPJ: CPT | Performed by: FAMILY MEDICINE

## 2019-09-09 RX ORDER — ANASTROZOLE 1 MG/1
1 TABLET ORAL DAILY
Qty: 90 TABLET | Refills: 1 | Status: SHIPPED | OUTPATIENT
Start: 2019-09-09 | End: 2020-03-03 | Stop reason: SDUPTHER

## 2019-09-11 LAB
HCV RNA SERPL NAA+PROBE-ACNC: NORMAL IU/ML
TEST INFORMATION: NORMAL

## 2019-09-12 RX ORDER — HYDROCODONE BITARTRATE AND ACETAMINOPHEN 10; 325 MG/1; MG/1
0.5 TABLET ORAL EVERY 12 HOURS
Qty: 28 TABLET | Refills: 0 | Status: SHIPPED | OUTPATIENT
Start: 2019-09-12 | End: 2019-10-09 | Stop reason: SDUPTHER

## 2019-09-13 ENCOUNTER — OFFICE VISIT (OUTPATIENT)
Dept: FAMILY MEDICINE CLINIC | Facility: CLINIC | Age: 64
End: 2019-09-13

## 2019-09-13 VITALS
OXYGEN SATURATION: 94 % | SYSTOLIC BLOOD PRESSURE: 143 MMHG | WEIGHT: 271 LBS | DIASTOLIC BLOOD PRESSURE: 80 MMHG | BODY MASS INDEX: 46.26 KG/M2 | HEART RATE: 68 BPM | HEIGHT: 64 IN | TEMPERATURE: 99.2 F

## 2019-09-13 DIAGNOSIS — M54.50 CHRONIC MIDLINE LOW BACK PAIN WITHOUT SCIATICA: ICD-10-CM

## 2019-09-13 DIAGNOSIS — G89.29 CHRONIC MIDLINE LOW BACK PAIN WITHOUT SCIATICA: ICD-10-CM

## 2019-09-13 DIAGNOSIS — G47.33 OBSTRUCTIVE SLEEP APNEA SYNDROME: ICD-10-CM

## 2019-09-13 DIAGNOSIS — E66.01 MORBIDLY OBESE (HCC): ICD-10-CM

## 2019-09-13 DIAGNOSIS — I10 ESSENTIAL HYPERTENSION: Primary | ICD-10-CM

## 2019-09-13 LAB
HCV GENTYP SERPL NAA+PROBE: NORMAL
Lab: NORMAL

## 2019-09-13 PROCEDURE — 99214 OFFICE O/P EST MOD 30 MIN: CPT | Performed by: FAMILY MEDICINE

## 2019-09-13 RX ORDER — LISINOPRIL 20 MG/1
20 TABLET ORAL DAILY
Qty: 30 TABLET | Refills: 5 | Status: SHIPPED | OUTPATIENT
Start: 2019-09-13 | End: 2020-07-14 | Stop reason: SDUPTHER

## 2019-09-13 RX ORDER — NABUMETONE 500 MG/1
500 TABLET, FILM COATED ORAL EVERY 12 HOURS
Qty: 60 TABLET | Refills: 0 | Status: SHIPPED | OUTPATIENT
Start: 2019-09-13 | End: 2020-03-17 | Stop reason: SDUPTHER

## 2019-09-13 NOTE — PATIENT INSTRUCTIONS
Back Exercises  The following exercises strengthen the muscles that help to support the back. They also help to keep the lower back flexible. Doing these exercises can help to prevent back pain or lessen existing pain.  If you have back pain or discomfort, try doing these exercises 2-3 times each day or as told by your health care provider. When the pain goes away, do them once each day, but increase the number of times that you repeat the steps for each exercise (do more repetitions). If you do not have back pain or discomfort, do these exercises once each day or as told by your health care provider.  Exercises  Single Knee to Chest  Repeat these steps 3-5 times for each le. Lie on your back on a firm bed or the floor with your legs extended.  2. Bring one knee to your chest. Your other leg should stay extended and in contact with the floor.  3. Hold your knee in place by grabbing your knee or thigh.  4. Pull on your knee until you feel a gentle stretch in your lower back.  5. Hold the stretch for 10-30 seconds.  6. Slowly release and straighten your leg.  Pelvic Tilt  Repeat these steps 5-10 times:  1. Lie on your back on a firm bed or the floor with your legs extended.  2. Bend your knees so they are pointing toward the ceiling and your feet are flat on the floor.  3. Tighten your lower abdominal muscles to press your lower back against the floor. This motion will tilt your pelvis so your tailbone points up toward the ceiling instead of pointing to your feet or the floor.  4. With gentle tension and even breathing, hold this position for 5-10 seconds.  Cat-Cow  Repeat these steps until your lower back becomes more flexible:  1. Get into a hands-and-knees position on a firm surface. Keep your hands under your shoulders, and keep your knees under your hips. You may place padding under your knees for comfort.  2. Let your head hang down, and point your tailbone toward the floor so your lower back becomes  rounded like the back of a cat.  3. Hold this position for 5 seconds.  4. Slowly lift your head and point your tailbone up toward the ceiling so your back forms a sagging arch like the back of a cow.  5. Hold this position for 5 seconds.    Press-Ups  Repeat these steps 5-10 times:  1. Lie on your abdomen (face-down) on the floor.  2. Place your palms near your head, about shoulder-width apart.  3. While you keep your back as relaxed as possible and keep your hips on the floor, slowly straighten your arms to raise the top half of your body and lift your shoulders. Do not use your back muscles to raise your upper torso. You may adjust the placement of your hands to make yourself more comfortable.  4. Hold this position for 5 seconds while you keep your back relaxed.  5. Slowly return to lying flat on the floor.    Bridges  Repeat these steps 10 times:  1. Lie on your back on a firm surface.  2. Bend your knees so they are pointing toward the ceiling and your feet are flat on the floor.  3. Tighten your buttocks muscles and lift your buttocks off of the floor until your waist is at almost the same height as your knees. You should feel the muscles working in your buttocks and the back of your thighs. If you do not feel these muscles, slide your feet 1-2 inches farther away from your buttocks.  4. Hold this position for 3-5 seconds.  5. Slowly lower your hips to the starting position, and allow your buttocks muscles to relax completely.  If this exercise is too easy, try doing it with your arms crossed over your chest.  Abdominal Crunches  Repeat these steps 5-10 times:  1. Lie on your back on a firm bed or the floor with your legs extended.  2. Bend your knees so they are pointing toward the ceiling and your feet are flat on the floor.  3. Cross your arms over your chest.  4. Tip your chin slightly toward your chest without bending your neck.  5. Tighten your abdominal muscles and slowly raise your trunk (torso) high  enough to lift your shoulder blades a tiny bit off of the floor. Avoid raising your torso higher than that, because it can put too much stress on your low back and it does not help to strengthen your abdominal muscles.  6. Slowly return to your starting position.  Back Lifts  Repeat these steps 5-10 times:  1. Lie on your abdomen (face-down) with your arms at your sides, and rest your forehead on the floor.  2. Tighten the muscles in your legs and your buttocks.  3. Slowly lift your chest off of the floor while you keep your hips pressed to the floor. Keep the back of your head in line with the curve in your back. Your eyes should be looking at the floor.  4. Hold this position for 3-5 seconds.  5. Slowly return to your starting position.  Contact a health care provider if:  · Your back pain or discomfort gets much worse when you do an exercise.  · Your back pain or discomfort does not lessen within 2 hours after you exercise.  If you have any of these problems, stop doing these exercises right away. Do not do them again unless your health care provider says that you can.  Get help right away if:  · You develop sudden, severe back pain. If this happens, stop doing the exercises right away. Do not do them again unless your health care provider says that you can.  This information is not intended to replace advice given to you by your health care provider. Make sure you discuss any questions you have with your health care provider.  Document Released: 01/25/2006 Document Revised: 07/31/2018 Document Reviewed: 02/11/2016  [employer] Interactive Patient Education © 2019 [employer]    Exercising to Stay Healthy  To become healthy and stay healthy, it is recommended that you do moderate-intensity and vigorous-intensity exercise. You can tell that you are exercising at a moderate intensity if your heart starts beating faster and you start breathing faster but can still hold a conversation. You can tell that you are  exercising at a vigorous intensity if you are breathing much harder and faster and cannot hold a conversation while exercising.  Exercising regularly is important. It has many health benefits, such as:  · Improving overall fitness, flexibility, and endurance.  · Increasing bone density.  · Helping with weight control.  · Decreasing body fat.  · Increasing muscle strength.  · Reducing stress and tension.  · Improving overall health.  How often should I exercise?  Choose an activity that you enjoy, and set realistic goals. Your health care provider can help you make an activity plan that works for you.  Exercise regularly as told by your health care provider. This may include:  · Doing strength training two times a week, such as:  ? Lifting weights.  ? Using resistance bands.  ? Push-ups.  ? Sit-ups.  ? Yoga.  · Doing a certain intensity of exercise for a given amount of time. Choose from these options:  ? A total of 150 minutes of moderate-intensity exercise every week.  ? A total of 75 minutes of vigorous-intensity exercise every week.  ? A mix of moderate-intensity and vigorous-intensity exercise every week.  Children, pregnant women, people who have not exercised regularly, people who are overweight, and older adults may need to talk with a health care provider about what activities are safe to do. If you have a medical condition, be sure to talk with your health care provider before you start a new exercise program.  What are some exercise ideas?  Moderate-intensity exercise ideas include:  · Walking 1 mile (1.6 km) in about 15 minutes.  · Biking.  · Hiking.  · Golfing.  · Dancing.  · Water aerobics.  Vigorous-intensity exercise ideas include:  · Walking 4.5 miles (7.2 km) or more in about 1 hour.  · Jogging or running 5 miles (8 km) in about 1 hour.  · Biking 10 miles (16.1 km) or more in about 1 hour.  · Lap swimming.  · Roller-skating or in-line skating.  · Cross-country skiing.  · Vigorous competitive sports,  such as football, basketball, and soccer.  · Jumping rope.  · Aerobic dancing.  What are some everyday activities that can help me to get exercise?  · Yard work, such as:  ? Pushing a .  ? Raking and bagging leaves.  · Washing your car.  · Pushing a stroller.  · Shoveling snow.  · Gardening.  · Washing windows or floors.  How can I be more active in my day-to-day activities?  · Use stairs instead of an elevator.  · Take a walk during your lunch break.  · If you drive, park your car farther away from your work or school.  · If you take public transportation, get off one stop early and walk the rest of the way.  · Stand up or walk around during all of your indoor phone calls.  · Get up, stretch, and walk around every 30 minutes throughout the day.  · Enjoy exercise with a friend. Support to continue exercising will help you keep a regular routine of activity.  What guidelines can I follow while exercising?  · Before you start a new exercise program, talk with your health care provider.  · Do not exercise so much that you hurt yourself, feel dizzy, or get very short of breath.  · Wear comfortable clothes and wear shoes with good support.  · Drink plenty of water while you exercise to prevent dehydration or heat stroke.  · Work out until your breathing and your heartbeat get faster.  Where to find more information  · [place] Department of Health and Human Services: www.hhs.gov  · Centers for Disease Control and Prevention (CDC): www.cdc.gov  Summary  · Exercising regularly is important. It will improve your overall fitness, flexibility, and endurance.  · Regular exercise also will improve your overall health. It can help you control your weight, reduce stress, and improve your bone density.  · Do not exercise so much that you hurt yourself, feel dizzy, or get very short of breath.  · Before you start a new exercise program, talk with your health care provider.  This information is not intended to replace advice  given to you by your health care provider. Make sure you discuss any questions you have with your health care provider.  Document Released: 01/20/2012 Document Revised: 11/08/2018 Document Reviewed: 11/08/2018  [employer] Interactive Patient Education © 2019 [employer]

## 2019-09-13 NOTE — PROGRESS NOTES
Subjective:     Morgan Quick is a 63 y.o. female who presents for  Chief Complaint   Patient presents with   • Leg Swelling     she is in for recheck on leg swelling- pt feels swelling is getting better       This is a known patient to me.     Patient presents for follow up of lower extremity edema.  Patient was prescribed compression stockings at her last visit.  Reports that she never obtained the stockings.  Has been elevating her legs.  Exacerbation of lower extremity edema continues to be requirements of work, standing for 5 hours a day as a .      Patient also presents for follow-up of essential hypertension.  Patient is currently taking lisinopril 10 mg p.o. daily.  She continues to be hypertensive in the office.  Hypertension may be exacerbated by anastrozole and untreated obstructive sleep apnea.  Patient does not have a CPAP and sleeps in a recliner.    Patient also presents with complaints of chronic low back pain.  Reports that she fell on her back while rollerskating approximately 30 years ago.  She sleeps in a recliner and does not participate in regular exercise or stretches.  Pain has been managed with Norco 10- 325 mg p.o. twice daily for several years.  Patient reports that Norco helps her stand at work.      Leg Swelling   This is a new problem. The current episode started more than 1 month ago. The problem occurs daily. The problem has been gradually improving. Associated symptoms include abdominal pain (LUQ; followed by GI) and arthralgias. Pertinent negatives include no chest pain, chills, congestion, coughing, diaphoresis, fatigue, fever, myalgias, nausea, rash, sore throat or vomiting. The symptoms are aggravated by standing. She has tried position changes for the symptoms. The treatment provided moderate relief.   Hypertension   This is a chronic problem. The current episode started more than 1 month ago (6 months). The problem is unchanged. The problem is controlled.  Associated symptoms include orthopnea (sleeps in a recliner) and peripheral edema. Pertinent negatives include no blurred vision, chest pain or shortness of breath. Agents associated with hypertension include thyroid hormones (anastrozole). Risk factors for coronary artery disease include obesity, post-menopausal state and sedentary lifestyle. Current antihypertension treatment includes ACE inhibitors (lisinopril 10mg). The current treatment provides mild improvement. Compliance problems include exercise and diet.  Identifiable causes of hypertension include a hypertension causing med (anastrozole) and sleep apnea.   Back Pain   This is a chronic problem. The current episode started more than 1 year ago (30 years ago). The problem occurs constantly. The problem is unchanged. The pain is present in the lumbar spine. The quality of the pain is described as aching. The pain does not radiate. The pain is the same all the time. The symptoms are aggravated by lying down, bending and standing. Stiffness is present all day. Associated symptoms include abdominal pain (LUQ; followed by GI). Pertinent negatives include no chest pain, dysuria or fever. Risk factors include poor posture, sedentary lifestyle, menopause, obesity and lack of exercise. She has tried analgesics (Norco 10-325mg) for the symptoms. The treatment provided moderate relief.     Preventative:    Health Maintenance   Topic Date Due   • TDAP/TD VACCINES (1 - Tdap) 12/22/1974   • ZOSTER VACCINE (1 of 2) 12/22/2005   • MEDICARE ANNUAL WELLNESS  05/21/2018   • COLONOSCOPY  05/21/2018   • MAMMOGRAM  05/16/2021   • HEPATITIS C SCREENING  Completed   • INFLUENZA VACCINE  Addressed   • PAP SMEAR  Discontinued       Past Medical Hx:  Past Medical History:   Diagnosis Date   • Cancer (CMS/HCC)    • Hypertension        Past Surgical Hx:  Past Surgical History:   Procedure Laterality Date   • ABDOMINAL SURGERY     • APPENDECTOMY     • HYSTERECTOMY     • MASTECTOMY       L side       Family Hx:  Family History   Family history unknown: Yes        Social History:  Social History     Socioeconomic History   • Marital status:      Spouse name: Not on file   • Number of children: Not on file   • Years of education: Not on file   • Highest education level: Not on file   Tobacco Use   • Smoking status: Never Smoker   • Smokeless tobacco: Never Used   Substance and Sexual Activity   • Alcohol use: No     Frequency: Never   • Drug use: No   • Sexual activity: Defer       Allergies:  Contrast dye    Current Meds:    Current Outpatient Medications:   •  anastrozole (ARIMIDEX) 1 MG tablet, Take 1 tablet by mouth Daily., Disp: 90 tablet, Rfl: 1  •  betamethasone, augmented, (DIPROLENE) 0.05 % cream, Apply 1 application topically to the appropriate area as directed 2 (Two) Times a Day. APPLY TO AFFECTED AREA, Disp: , Rfl: 3  •  HYDROcodone-acetaminophen (NORCO)  MG per tablet, Take 0.5 tablets by mouth Every 12 (Twelve) Hours., Disp: 28 tablet, Rfl: 0  •  levothyroxine (SYNTHROID, LEVOTHROID) 25 MCG tablet, TAKE ONE TABLET BY MOUTH EVERY DAY, Disp: 90 tablet, Rfl: 1  •  lisinopril (PRINIVIL,ZESTRIL) 10 MG tablet, TAKE ONE TABLET BY MOUTH ONCE DAILY, Disp: 90 tablet, Rfl: 1  •  nabumetone (RELAFEN) 750 MG tablet, Every 12 (Twelve) Hours., Disp: , Rfl:   •  OXcarbazepine (TRILEPTAL) 150 MG tablet, Take 150 mg by mouth 2 (Two) Times a Day., Disp: , Rfl: 1  •  risperiDONE (risperDAL) 0.25 MG tablet, RISPERDAL TABS, Disp: , Rfl:   •  rosuvastatin (CRESTOR) 20 MG tablet, TAKE ONE TABLET BY MOUTH DAILY, Disp: 90 tablet, Rfl: 1  •  Sertraline HCl (ZOLOFT PO), ZOLOFT TABS, Disp: , Rfl:     The following portions of the patient's history were reviewed and updated as appropriate: allergies, current medications, past family history, past medical history, past social history, past surgical history and problem list.    Review of Systems  Review of Systems   Constitutional: Negative for chills,  "diaphoresis, fatigue and fever.   HENT: Negative for congestion, rhinorrhea, sinus pressure, sneezing and sore throat.    Eyes: Negative for blurred vision, double vision and redness.   Respiratory: Negative for cough, shortness of breath and wheezing.    Cardiovascular: Positive for orthopnea (sleeps in a recliner) and leg swelling. Negative for chest pain.   Gastrointestinal: Positive for abdominal pain (LUQ; followed by GI). Negative for constipation, diarrhea, nausea and vomiting.   Endocrine: Negative for polydipsia, polyphagia and polyuria.   Genitourinary: Negative for difficulty urinating, dysuria and hematuria.   Musculoskeletal: Positive for arthralgias and back pain. Negative for gait problem and myalgias.   Skin: Negative for rash and skin lesions.   Neurological: Negative for tremors, seizures, syncope and headache.   Psychiatric/Behavioral: Positive for sleep disturbance. Negative for depressed mood. The patient is not nervous/anxious.        Objective:     /80 (BP Location: Left arm, Patient Position: Sitting, Cuff Size: Large Adult)   Pulse 68   Temp 99.2 °F (37.3 °C) (Oral)   Ht 162.6 cm (64\")   Wt 123 kg (271 lb)   SpO2 94%   BMI 46.52 kg/m²     Physical Exam   Constitutional: She is oriented to person, place, and time. She appears well-developed and well-nourished. No distress. She is morbidly obese.  HENT:   Head: Normocephalic and atraumatic.   Right Ear: Tympanic membrane and ear canal normal.   Left Ear: Tympanic membrane and ear canal normal.   Nose: Nose normal.   Mouth/Throat: Oropharynx is clear and moist and mucous membranes are normal. Dental caries present.   Eyes: Conjunctivae and EOM are normal. Pupils are equal, round, and reactive to light. No scleral icterus.   Neck: Neck supple. No tracheal deviation present. No thyromegaly present.   Cardiovascular: Normal rate, regular rhythm, normal heart sounds and intact distal pulses.   Pulmonary/Chest: Effort normal and breath " sounds normal.   Abdominal: Soft. Bowel sounds are normal. There is no tenderness.   Musculoskeletal: She exhibits edema (bilateral pedal edema). She exhibits no tenderness.        Lumbar back: She exhibits normal range of motion and no tenderness.   Patient complains of midline back pain with hip rotation and straight leg elevation.    Lymphadenopathy:     She has no cervical adenopathy.   Neurological: She is alert and oriented to person, place, and time.   Skin: Skin is warm and dry. Capillary refill takes less than 2 seconds. No rash noted. She is not diaphoretic.   Psychiatric: She has a normal mood and affect. Her behavior is normal.   Vitals reviewed.      Lab Results   Component Value Date    WBC 6.90 07/24/2019    HGB 12.1 07/24/2019    HCT 38.3 07/24/2019    MCV 92.6 07/24/2019     07/24/2019     Lab Results   Component Value Date    GLUCOSE 116 (H) 07/24/2019    BUN 5 (L) 07/24/2019    CREATININE 0.50 07/24/2019    EGFRIFNONA 125 07/24/2019    BCR 10.0 07/24/2019    K 3.6 07/24/2019    CO2 24.0 07/24/2019    CALCIUM 8.4 (L) 07/24/2019    ALBUMIN 3.60 07/24/2019    LABIL2 1.2 05/20/2019    AST 37 07/24/2019    ALT 29 07/24/2019     Lab Results   Component Value Date    CHOL 114 05/20/2019    TRIG 165 (H) 05/20/2019    HDL 33 (L) 05/20/2019    LDL 71 05/20/2019         Assessment/Plan:     Morgan was seen today for leg swelling.    Diagnoses and all orders for this visit:    Essential hypertension  -     Ambulatory Referral to Sleep Medicine  -     lisinopril (PRINIVIL,ZESTRIL) 20 MG tablet; Take 1 tablet by mouth Daily.  Increased dose from 10 mg to 20 mg.  - This is a chronic problem. Condition is moderately controlled. Patient was hypertensive in the office.   - Encouraged a low-sodium diet.  - Patient referred to sleep medicine for a sleep study and prescription for CPAP machine.    Chronic midline low back pain without sciatica  -     XR Spine Lumbar 2 or 3 View; Future  -     nabumetone  (RELAFEN) 500 MG tablet; Take 1 tablet by mouth Every 12 (Twelve) Hours. Take with food!  - This is a chronic problem that has been moderately controlled.  She is requesting a refill of her Norco.  - Review of EMR reveals no imaging of lumbar spine. Will obtain imaging and consider referral to orthopedics or pain management.   - Offered physical therapy. Patient declined. Patient provided with educational material on exercises that can be performed at home.     Obstructive sleep apnea syndrome  -     Ambulatory Referral to Sleep Medicine  - This is a chronic problem that is poorly controlled.  Patient has been without a CPAP for several years.  She is currently sleeping in a recliner.  Patient is agreeable to referral to sleep medicine for a sleep study and new CPAP prescription.    Morbidly obese (CMS/HCC)  -     Ambulatory Referral to Sleep Medicine  - Status post lab band procedure.  Discussed health risk associated with obesity.  Encouraged 30 minutes of moderate intense activity at least 5 days a week.    Follow-up:     Return in about 1 month (around 10/13/2019) for Recheck HTN and back pain.      Signature    Katy Fairchild MD  Family Medicine  [facility]        This document has been electronically signed by Katy Fairchild MD on September 13, 2019 8:20 AM

## 2019-10-11 DIAGNOSIS — G89.29 CHRONIC MIDLINE LOW BACK PAIN WITHOUT SCIATICA: Primary | ICD-10-CM

## 2019-10-11 DIAGNOSIS — M54.50 CHRONIC MIDLINE LOW BACK PAIN WITHOUT SCIATICA: Primary | ICD-10-CM

## 2019-10-11 RX ORDER — HYDROCODONE BITARTRATE AND ACETAMINOPHEN 10; 325 MG/1; MG/1
0.5 TABLET ORAL EVERY 12 HOURS
Qty: 28 TABLET | Refills: 0 | Status: SHIPPED | OUTPATIENT
Start: 2019-10-11 | End: 2020-01-29 | Stop reason: SDUPTHER

## 2019-10-11 NOTE — TELEPHONE ENCOUNTER
63-year-old female with a concurrent medical history of chronic back pain requesting refill of Norco.  Imaging ordered at last visit.  X-ray has not been done at this time.  Patient's Norco  mg p.o. daily half tablet p.o. twice daily was sent to patient's pharmacy.  INSPECT report reveals last prescription was filled on September 12, 2019.  Referral has been placed to pain management.

## 2019-10-11 NOTE — PROGRESS NOTES
63-year-old  female with a concurrent medical history of morbid obesity and chronic back pain currently managed with Norco  mg half tablet p.o. twice daily.  No imaging currently available of patient's back.  Orders have been placed for an x-ray.  Referral to pain management placed.    Signature    Katy Fairchild MD  Family Medicine  [facility]        This document has been electronically signed by Katy Fairchild MD on October 11, 2019 8:23 AM

## 2019-11-13 ENCOUNTER — OFFICE VISIT (OUTPATIENT)
Dept: RADIATION ONCOLOGY | Facility: HOSPITAL | Age: 64
End: 2019-11-13

## 2019-11-13 VITALS
HEIGHT: 64 IN | TEMPERATURE: 97.8 F | SYSTOLIC BLOOD PRESSURE: 166 MMHG | DIASTOLIC BLOOD PRESSURE: 81 MMHG | OXYGEN SATURATION: 92 % | WEIGHT: 260.2 LBS | BODY MASS INDEX: 44.42 KG/M2 | HEART RATE: 62 BPM

## 2019-11-13 DIAGNOSIS — Z17.0 MALIGNANT NEOPLASM OF UPPER-OUTER QUADRANT OF LEFT BREAST IN FEMALE, ESTROGEN RECEPTOR POSITIVE (HCC): Primary | ICD-10-CM

## 2019-11-13 DIAGNOSIS — C50.412 MALIGNANT NEOPLASM OF UPPER-OUTER QUADRANT OF LEFT BREAST IN FEMALE, ESTROGEN RECEPTOR POSITIVE (HCC): Primary | ICD-10-CM

## 2019-11-13 PROCEDURE — G0463 HOSPITAL OUTPT CLINIC VISIT: HCPCS | Performed by: RADIOLOGY

## 2019-11-13 NOTE — PROGRESS NOTES
FOLLOW-UP NOTE    Name: Morgan Quick  YOB: 1955  MRN #: [id number]  Date of Service: 11/13/2019  Primary Care Provider: Katy Fairchild MD    DIAGNOSIS:   1. Malignant neoplasm of left female breast, unspecified estrogen receptor status, unspecified site of breast (CMS/HCC)    Breast cancer follow-up; treatment fibrosis.  [id number] IDC ER/[place]+Her2-    REASON FOR VISIT: Routine scheduled follow-up for residual toxicity assessment.    RADIATION TREATMENT COURSE:s/p lumpectomy and sentinel lymph node biopsy followed by external beam radiation therapy as part of breast conserving surgery, completed on 08/08/2018; 50 Gy in 25 fractions.    HISTORY OF PRESENT ILLNESS: The patient is a 63 y.o. year old female who was last seen here 05/14/2019.  Over the interval, she had the MMG 05/16/2019 which was reviewed and is Bi-Rads 2 benign.    She denies any new breast related complaints. She is doing well overall with no complaints. She does have some left breast skin dryness without pain or discomfort. She is taking Arimidex and tolerating this well. Ms. Quick denies breast masses or suspicious skin changes other than new onset palmar erythema. She denies headaches or bone pain.    She is due bilateral MMGs each 05/2019.    The following portions of the patient's history were reviewed and updated as appropriate: allergies, current medications, past family history, past medical history, past social history, past surgical history and problem list. Reviewed with the patient and remain unchanged.    PAST MEDICAL HISTORY:  she  has a past medical history of Bipolar 1 disorder (CMS/[facility]), Breast cancer (CMS/[facility]), Cancer (CMS/HCC), Disease of thyroid gland, High cholesterol, and Hypertension.  MEDICATIONS:   Current Outpatient Medications:   •  anastrozole (ARIMIDEX) 1 MG tablet, Take 1 tablet by mouth Daily., Disp: 90 tablet, Rfl: 1  •  HYDROcodone-acetaminophen (NORCO)  MG per tablet, Take 0.5 tablets  "by mouth Every 12 (Twelve) Hours., Disp: 28 tablet, Rfl: 0  •  levothyroxine (SYNTHROID, LEVOTHROID) 25 MCG tablet, TAKE ONE TABLET BY MOUTH EVERY DAY, Disp: 90 tablet, Rfl: 1  •  lisinopril (PRINIVIL,ZESTRIL) 20 MG tablet, Take 1 tablet by mouth Daily., Disp: 30 tablet, Rfl: 5  •  nabumetone (RELAFEN) 500 MG tablet, Take 1 tablet by mouth Every 12 (Twelve) Hours. Take with food!, Disp: 60 tablet, Rfl: 0  •  OXcarbazepine (TRILEPTAL) 150 MG tablet, Take 150 mg by mouth 2 (Two) Times a Day., Disp: , Rfl: 1  •  risperiDONE (risperDAL) 0.25 MG tablet, RISPERDAL TABS, Disp: , Rfl:   •  rosuvastatin (CRESTOR) 20 MG tablet, TAKE ONE TABLET BY MOUTH DAILY, Disp: 90 tablet, Rfl: 1  •  Sertraline HCl (ZOLOFT PO), ZOLOFT TABS, Disp: , Rfl:   ALLERGIES:   Allergies   Allergen Reactions   • Contrast Dye Shortness Of Breath     Pt states she had trouble breathing when she had contrast in the past.      PAST SURGICAL HISTORY: she has a past surgical history that includes Hysterectomy; Appendectomy; Mastectomy; and Abdominal surgery.  PREVIOUS RADIOTHERAPY OR CHEMOTHERAPY: XRT as above.  FAMILY HISTORY: her family history includes Cancer in her mother.  SOCIAL HISTORY: she  reports that she has never smoked. She has never used smokeless tobacco. She reports that she does not drink alcohol or use drugs.  PAIN AND PAIN MANAGEMENT:   Vitals:    11/13/19 0911   BP: 166/81   Pulse: 62   Temp: 97.8 °F (36.6 °C)   TempSrc: Oral   SpO2: 92%   Weight: 118 kg (260 lb 3.2 oz)   Height: 162.6 cm (64\")   PainSc: 0-No pain     NUTRITIONAL STATUS:    Otherwise no issues  KPS: 80    Review of Systems:   Review of Systems   Cardiovascular: Positive for leg swelling.        And feet     Endocrine: Positive for polydipsia.   Genitourinary:        Incontinence in urgency   Musculoskeletal: Positive for back pain.   Neurological: Positive for tremors.        Since the patient was taking depakote.  She is no longer taking it but still has the tremors " "   Patient denies any breast swelling, discharge, pain, or trouble with her skin  Otherwise negative as below.   General: No fevers, chills, weight change, or drenching night sweats. Skin: No rashes or jaundice.  Heme/Lymph: No easy bruising or bleeding.  Respiratory System: No shortness of breath or cough.  Cardiovascular: No chest pain, palpitations, or dyspnea on exertion.  - Pacemaker. GI: No nausea, vomiting, diarrhea, melena, or hematochezia.   Psych: No mood changes or depression. Ext: Denies swelling.        Objective     Vitals:  Vitals:    11/13/19 0911   BP: 166/81   Pulse: 62   Temp: 97.8 °F (36.6 °C)   TempSrc: Oral   SpO2: 92%   Weight: 118 kg (260 lb 3.2 oz)   Height: 162.6 cm (64\")   PainSc: 0-No pain       PHYSICAL EXAM:  GENERAL: alert; in no apparent distress.     HEENT: normocephalic, atraumatic. Pupils are equal, round, reactive to light. Sclera anicteric. Conjunctiva not injected. Oropharynx pink, without erythema, ulcerations or thrush.   NECK: supple.  LYMPHATIC: no cervical, supraclavicular or axillary adenopathy appreciated bilaterally.   CHEST: clear to auscultation bilaterally; no wheezes, crackles or rubs. Patient is speaking comfortably on room air with normal work of breathing.  CARDIOVASCULAR: S1 and S2 detected; no murmurs, rubs or gallops.  ABDOMEN: Bowel sounds present. Soft, nontender, nondistended.   MUSCULOSKELETAL: no tenderness to palpation along the spine or scapulae. Normal range of motion.  EXTREMITIES: no clubbing, cyanosis, edema.  SKIN: no erythema, rashes, ulcerations noted.   NEUROLOGIC: cranial nerves II-XII grossly intact bilaterally. No focal neurological deficits.  PSYCHIATRIC: alert, aware, and appropriate.  BREASTS: Breasts dense bilaterally, Left breast with residual slight hyperpigmentation and fibrosis with fair cosmesis but no dominant masses,  discharge or pain; Right breast unremarkable with no dominant masses, skin changes, discharge or pain.  PERTINENT " IMAGING/PATHOLOGY/LABS (Medical Decision Making):     COORDINATION OF CARE: A copy of this note is sent to the referring provider.    PATHOLOGY (Reviewed):     IMAGING (Reviewed): MMG due next 05/2020.    LABS (Reviewed):  Hematology WBC   Date Value Ref Range Status   07/24/2019 6.90 3.40 - 10.80 10*3/mm3 Final     RBC   Date Value Ref Range Status   07/24/2019 4.13 3.77 - 5.28 10*6/mm3 Final     Hemoglobin   Date Value Ref Range Status   07/24/2019 12.1 12.0 - 15.9 g/dL Final     Hematocrit   Date Value Ref Range Status   07/24/2019 38.3 34.0 - 46.6 % Final     Platelets   Date Value Ref Range Status   07/24/2019 275 140 - 450 10*3/mm3 Final      Chemistry   Lab Results   Component Value Date    GLUCOSE 116 (H) 07/24/2019    BUN 5 (L) 07/24/2019    CREATININE 0.50 07/24/2019    EGFRIFNONA 125 07/24/2019    BCR 10.0 07/24/2019    K 3.6 07/24/2019    CO2 24.0 07/24/2019    CALCIUM 8.4 (L) 07/24/2019    ALBUMIN 3.60 07/24/2019    LABIL2 1.2 05/20/2019    AST 37 07/24/2019    ALT 29 07/24/2019         Assessment/Plan     ASSESSMENT AND PLAN:    1. Malignant neoplasm of left female breast, unspecified estrogen receptor status, unspecified site of breast (CMS/[facility])     Mrs. Quick is a 63 year old female with [id number] IDC of the left breast s/p lumpectomy and sentinel lymph node biopsy followed by external beam radiation therapy as part of breast conserving surgery, completed on 08/08/2018; 50 Gy in 25 fractions.  cNED by exam but patient is due a bilateral MMG in May and I will follow up with her in June.  Continue with Vit E, cNED now.      This assessment comes from my review of the imaging, pathology, physician notes and other pertinent information as mentioned.    TIME SPENT WITH PATIENT:   I spent greater than 15 minutes in face-to-face time with the patient and greater than 50% of those minutes  were spent in counseling and coordination of care, including review of imaging and pathology; indications, goals,  logistics, alternatives and risks - both common and rare - for my recommendations as well as surveillance and potential outcomes.    CC: MD Patsy Elmore MD John A. Cox, MD  11/13/2019  9:15 AM

## 2019-11-14 DIAGNOSIS — Z17.0 MALIGNANT NEOPLASM OF UPPER-OUTER QUADRANT OF LEFT BREAST IN FEMALE, ESTROGEN RECEPTOR POSITIVE (HCC): Primary | ICD-10-CM

## 2019-11-14 DIAGNOSIS — C50.412 MALIGNANT NEOPLASM OF UPPER-OUTER QUADRANT OF LEFT BREAST IN FEMALE, ESTROGEN RECEPTOR POSITIVE (HCC): Primary | ICD-10-CM

## 2019-11-26 ENCOUNTER — RESULTS ENCOUNTER (OUTPATIENT)
Dept: ONCOLOGY | Facility: CLINIC | Age: 64
End: 2019-11-26

## 2019-11-26 DIAGNOSIS — C50.912 PRIMARY CANCER OF LEFT FEMALE BREAST (HCC): ICD-10-CM

## 2020-01-28 ENCOUNTER — HOSPITAL ENCOUNTER (EMERGENCY)
Facility: HOSPITAL | Age: 65
Discharge: HOME | End: 2020-01-29
Attending: EMERGENCY MEDICINE | Admitting: EMERGENCY MEDICINE

## 2020-01-28 VITALS
WEIGHT: 260 LBS | DIASTOLIC BLOOD PRESSURE: 87 MMHG | SYSTOLIC BLOOD PRESSURE: 158 MMHG | BODY MASS INDEX: 43.32 KG/M2 | HEIGHT: 65 IN | TEMPERATURE: 98.6 F | OXYGEN SATURATION: 95 % | HEART RATE: 80 BPM | RESPIRATION RATE: 16 BRPM

## 2020-01-28 DIAGNOSIS — M25.562 PAIN IN BOTH KNEES, UNSPECIFIED CHRONICITY: Primary | ICD-10-CM

## 2020-01-28 DIAGNOSIS — M25.561 PAIN IN BOTH KNEES, UNSPECIFIED CHRONICITY: Primary | ICD-10-CM

## 2020-01-28 PROCEDURE — 99283 EMERGENCY DEPT VISIT LOW MDM: CPT

## 2020-01-29 RX ORDER — HYDROCODONE BITARTRATE AND ACETAMINOPHEN 10; 325 MG/1; MG/1
1 TABLET ORAL EVERY 6 HOURS PRN
Status: DISCONTINUED | OUTPATIENT
Start: 2020-01-29 | End: 2020-01-29 | Stop reason: HOSPADM

## 2020-01-29 RX ORDER — HYDROCODONE BITARTRATE AND ACETAMINOPHEN 10; 325 MG/1; MG/1
0.5 TABLET ORAL EVERY 12 HOURS
Qty: 15 TABLET | Refills: 0 | Status: SHIPPED | OUTPATIENT
Start: 2020-01-29 | End: 2020-03-17

## 2020-01-29 RX ADMIN — HYDROCODONE BITARTRATE AND ACETAMINOPHEN 1 TABLET: 10; 325 TABLET ORAL at 00:37

## 2020-03-03 ENCOUNTER — TELEPHONE (OUTPATIENT)
Dept: ONCOLOGY | Facility: CLINIC | Age: 65
End: 2020-03-03

## 2020-03-03 DIAGNOSIS — C50.912 PRIMARY CANCER OF LEFT FEMALE BREAST (HCC): ICD-10-CM

## 2020-03-03 RX ORDER — ANASTROZOLE 1 MG/1
1 TABLET ORAL DAILY
Qty: 90 TABLET | Refills: 1 | Status: SHIPPED | OUTPATIENT
Start: 2020-03-03 | End: 2020-09-16 | Stop reason: SDUPTHER

## 2020-03-17 ENCOUNTER — OFFICE VISIT (OUTPATIENT)
Dept: FAMILY MEDICINE CLINIC | Facility: CLINIC | Age: 65
End: 2020-03-17

## 2020-03-17 ENCOUNTER — HOSPITAL ENCOUNTER (OUTPATIENT)
Dept: GENERAL RADIOLOGY | Facility: HOSPITAL | Age: 65
Discharge: HOME | End: 2020-03-17
Admitting: NURSE PRACTITIONER

## 2020-03-17 VITALS
HEART RATE: 78 BPM | SYSTOLIC BLOOD PRESSURE: 148 MMHG | OXYGEN SATURATION: 93 % | BODY MASS INDEX: 43.65 KG/M2 | DIASTOLIC BLOOD PRESSURE: 80 MMHG | WEIGHT: 262 LBS | HEIGHT: 65 IN

## 2020-03-17 DIAGNOSIS — M54.50 CHRONIC BILATERAL LOW BACK PAIN WITHOUT SCIATICA: ICD-10-CM

## 2020-03-17 DIAGNOSIS — M54.50 CHRONIC BILATERAL LOW BACK PAIN WITHOUT SCIATICA: Primary | ICD-10-CM

## 2020-03-17 DIAGNOSIS — G89.29 CHRONIC BILATERAL LOW BACK PAIN WITHOUT SCIATICA: ICD-10-CM

## 2020-03-17 DIAGNOSIS — G89.29 CHRONIC BILATERAL LOW BACK PAIN WITHOUT SCIATICA: Primary | ICD-10-CM

## 2020-03-17 PROCEDURE — 99213 OFFICE O/P EST LOW 20 MIN: CPT | Performed by: NURSE PRACTITIONER

## 2020-03-17 PROCEDURE — 72100 X-RAY EXAM L-S SPINE 2/3 VWS: CPT

## 2020-03-17 RX ORDER — SERTRALINE HYDROCHLORIDE 100 MG/1
200 TABLET, FILM COATED ORAL DAILY
Status: ON HOLD | COMMUNITY
Start: 2020-02-05 | End: 2022-10-10 | Stop reason: SDUPTHER

## 2020-03-17 RX ORDER — NABUMETONE 500 MG/1
500 TABLET, FILM COATED ORAL EVERY 12 HOURS
Qty: 60 TABLET | Refills: 0 | Status: SHIPPED | OUTPATIENT
Start: 2020-03-17 | End: 2022-09-24

## 2020-03-17 RX ORDER — TIZANIDINE HYDROCHLORIDE 4 MG/1
4 CAPSULE, GELATIN COATED ORAL 3 TIMES DAILY PRN
Qty: 20 CAPSULE | Refills: 0 | Status: SHIPPED | OUTPATIENT
Start: 2020-03-17 | End: 2020-06-18

## 2020-03-18 DIAGNOSIS — M43.9 COMPRESSION DEFORMITY OF VERTEBRA: Primary | ICD-10-CM

## 2020-06-11 ENCOUNTER — TELEPHONE (OUTPATIENT)
Dept: RADIATION ONCOLOGY | Facility: HOSPITAL | Age: 65
End: 2020-06-11

## 2020-06-11 NOTE — TELEPHONE ENCOUNTER
Called patient and noted patient had orders for mammogram that were due and had not been completed prior to appointment. Called patient who wished to have mammogram done at [facility]. Gave patient time of 8:00am 6/15/2020 at [facility] for mammogram. Informed patient we were moving her appointment with Dr. Estes so he could have mammogram visit record, patient scheduled 6/17/2020 with Dr. Estes at 8:50am.

## 2020-06-12 DIAGNOSIS — Z17.0 MALIGNANT NEOPLASM OF UPPER-OUTER QUADRANT OF LEFT BREAST IN FEMALE, ESTROGEN RECEPTOR POSITIVE (HCC): Primary | ICD-10-CM

## 2020-06-12 DIAGNOSIS — C50.412 MALIGNANT NEOPLASM OF UPPER-OUTER QUADRANT OF LEFT BREAST IN FEMALE, ESTROGEN RECEPTOR POSITIVE (HCC): Primary | ICD-10-CM

## 2020-06-12 DIAGNOSIS — Z12.31 BREAST CANCER SCREENING BY MAMMOGRAM: ICD-10-CM

## 2020-06-12 NOTE — PROGRESS NOTES
[facility] called and stated they needed the order for the mammogram to be changed to a screening mammogram instead of a diagnostic mammogram.  This RN put a new order in and faxed to [facility] for appointment on 6-.                           Sandhya Zarate RN

## 2020-06-16 DIAGNOSIS — Z85.3 HISTORY OF LEFT BREAST CANCER: ICD-10-CM

## 2020-06-16 DIAGNOSIS — Z12.31 BREAST CANCER SCREENING BY MAMMOGRAM: Primary | ICD-10-CM

## 2020-06-18 ENCOUNTER — OFFICE VISIT (OUTPATIENT)
Dept: RADIATION ONCOLOGY | Facility: HOSPITAL | Age: 65
End: 2020-06-18

## 2020-06-18 VITALS
BODY MASS INDEX: 44.75 KG/M2 | DIASTOLIC BLOOD PRESSURE: 82 MMHG | RESPIRATION RATE: 19 BRPM | SYSTOLIC BLOOD PRESSURE: 173 MMHG | TEMPERATURE: 97.6 F | WEIGHT: 268.6 LBS | HEIGHT: 65 IN | OXYGEN SATURATION: 92 % | HEART RATE: 76 BPM

## 2020-06-18 DIAGNOSIS — Z17.0 MALIGNANT NEOPLASM OF OVERLAPPING SITES OF LEFT BREAST IN FEMALE, ESTROGEN RECEPTOR POSITIVE (HCC): Primary | ICD-10-CM

## 2020-06-18 DIAGNOSIS — C50.812 MALIGNANT NEOPLASM OF OVERLAPPING SITES OF LEFT BREAST IN FEMALE, ESTROGEN RECEPTOR POSITIVE (HCC): Primary | ICD-10-CM

## 2020-06-18 NOTE — PROGRESS NOTES
FOLLOW-UP NOTE    PATIENT NAME: Morgan Quick  MRN: [id number]  YOB: 1955  DATE SEEN: 06/18/2020    DIAGNOSIS:   1. Malignant neoplasm of left female breast, unspecified estrogen receptor status, unspecified site of breast (CMS/HCC)    Breast cancer follow-up; treatment fibrosis.  [id number] IDC ER/[place]+Her2-    REASON FOR VISIT: Routine scheduled follow-up for Malignant neoplasm of overlapping sites of left breast in female, estrogen receptor positive (CMS/HCC)     RADIATION TREATMENT COURSE:s/p lumpectomy and sentinel lymph node biopsy followed by external beam radiation therapy as part of breast conserving surgery, completed on 08/08/2018; 50 Gy in 25 fractions    HISTORY OF PRESENT ILLNESS: The patient is a 64 y.o.  female who was last seen here 11/13/2019.  Over the interval, she had bilateral screening MMG done on 06/15/2020--It showed a questionable posterior outer left breast asymmetry with no abnormal R breast findings.  Bi-Rads 0.  Radiology called and recommended Unilateral Diagnostic MMG +/- US and this was ordered and performed on 06/17/2020 and showed:    echnique: Tomography was utilized. A left breast digital diagnostic mammogram was performed.     FINDINGS:   There are scattered areas of fibroglandular density.     Previously questioned asymmetry within the far posterior outer left breast appears to efface on spot compression views, demonstrating a similar appearance to the patient's prior mammogram from 5/16/2019, and likely represents benign overlapping fibroglandular tissue and/or sequelae from the patient's prior lumpectomy and is probably benign.     These mammographic images were interpreted with the assistance of a computer aided detection system.     IMPRESSION:   Probably benign left breast asymmetry     RECOMMENDATION:   Recommend 6 month follow-up left breast diagnostic mammogram to ensure continued stability and/or benign progression.     BI-RADS ASSESSMENT: BI-RADS 3.  Probably Benign. Short interval followup recommended.       U/S was not required.        Clinically, she reports that she is doing well.  She denies any new breast related complaints. She is doing well overall with no complaints. She does have some left breast skin dryness without pain or discomfort. She is taking Arimidex and tolerating this well. Ms. Quick denies breast masses or suspicious skin changes other than new onset palmar erythema. She denies headaches or bone pain.       The following portions of the patient's history were reviewed and updated as appropriate: allergies, current medications, past family history, past medical history, past social history, past surgical history and problem list. Reviewed with the patient and remain unchanged.    PAST MEDICAL HISTORY:  she  has a past medical history of Bipolar 1 disorder (CMS/HCC), Breast cancer (CMS/HCC), Cancer (CMS/HCC), Disease of thyroid gland, High cholesterol, and Hypertension.  MEDICATIONS:   Current Outpatient Medications:   •  anastrozole (ARIMIDEX) 1 MG tablet, Take 1 tablet by mouth Daily., Disp: 90 tablet, Rfl: 1  •  levothyroxine (SYNTHROID, LEVOTHROID) 25 MCG tablet, TAKE ONE TABLET BY MOUTH EVERY DAY, Disp: 90 tablet, Rfl: 1  •  lisinopril (PRINIVIL,ZESTRIL) 20 MG tablet, Take 1 tablet by mouth Daily., Disp: 30 tablet, Rfl: 5  •  OXcarbazepine (TRILEPTAL) 150 MG tablet, Take 150 mg by mouth 2 (Two) Times a Day., Disp: , Rfl: 1  •  risperiDONE (risperDAL) 0.25 MG tablet, RISPERDAL TABS, Disp: , Rfl:   •  rosuvastatin (CRESTOR) 20 MG tablet, TAKE ONE TABLET BY MOUTH DAILY, Disp: 90 tablet, Rfl: 1  •  sertraline (ZOLOFT) 100 MG tablet, Take 200 mg by mouth Daily., Disp: , Rfl:   •  nabumetone (RELAFEN) 500 MG tablet, Take 1 tablet by mouth Every 12 (Twelve) Hours. Take with food!, Disp: 60 tablet, Rfl: 0  ALLERGIES:   Allergies   Allergen Reactions   • Contrast Dye Shortness Of Breath     Pt states she had trouble breathing when she had  "contrast in the past.      PAST SURGICAL HISTORY: she has a past surgical history that includes Hysterectomy; Appendectomy; Mastectomy; and Abdominal surgery.  PREVIOUS RADIOTHERAPY OR CHEMOTHERAPY: XRT as noted above.  FAMILY HISTORY: her family history includes Cancer in her mother.  SOCIAL HISTORY: she  reports that she has never smoked. She has never used smokeless tobacco. She reports that she does not drink alcohol or use drugs.  PAIN AND PAIN MANAGEMENT:  No pain.  Vitals:    06/18/20 1306   BP: 173/82   Pulse: 76   Resp: 19   Temp: 97.6 °F (36.4 °C)   TempSrc: Oral   SpO2: 92%   Weight: 122 kg (268 lb 9.6 oz)   Height: 165.1 cm (65\")   PainSc: 0-No pain     NUTRITIONAL STATUS:      Most Recent Value   Today's Session   General Information   Pregnancy Assessment   Oncology Specific Assessment   Physical Findings   Anthropometrics   Height  165.1 cm (65\")   Weight  122 kg (268 lb 9.6 oz)   Anthropometrics (Special Considerations)   Ideal Body Weight (IBW)   Ideal Body Weight (IBW) (kg)  57.29   % Ideal Body Weight  212.67   Usual Body Weight (UBW)   Body Mass Index (BMI)   BMI (kg/m2)  44.79   Nutritional Information   Physical Activity   Home Nutrition Report   Nutrition Prescription EN   Nutrition Prescription PN   Calculation Measurements   Height  165.1 cm (65\")   Estimated/Assessed Energy Needs   Indirect Calorimetry   Estimated/Assessed Protein Needs   Estimated/Assessed Fluid Needs   Estimated/Assessed Fiber Needs   Estimated/Assessed Electrolyte/Mineral Needs   Estimated/Assessed Vitamin Needs   Calorie Requirements for Pregnancy   Labs/Tests/Procedures/Meds   Labs/Procedures/Meds   Medications          REVIEW OF SYSTEMS:   Review of Systems   Musculoskeletal: Positive for back pain.   Neurological: Positive for headaches.   Both of the above are chronic.  General: No fevers, chills, weight change, or drenching night sweats. Skin: No rashes or jaundice.  HEENT: No change in vision or hearing, no " "headaches.  Neck: No dysphagia or masses.  Heme/Lymph: No easy bruising or bleeding.  Respiratory System: No shortness of breath or cough.  Cardiovascular: No chest pain, palpitations, or dyspnea on exertion.  - Pacemaker. GI: No nausea, vomiting, diarrhea, melena, or hematochezia.  : No dysuria or hematuria.  Endocrine: No heat or cold intolerance.  Psych: No mood changes or depression. Ext: Denies swelling.         Vitals:    06/18/20 1306   BP: 173/82   Pulse: 76   Resp: 19   Temp: 97.6 °F (36.4 °C)   TempSrc: Oral   SpO2: 92%   Weight: 122 kg (268 lb 9.6 oz)   Height: 165.1 cm (65\")   PainSc: 0-No pain     KPS 90    Physical Exam      PHYSICAL EXAM:  GENERAL: alert; in no apparent distress.                   HEENT: normocephalic, atraumatic. Pupils are equal, round, reactive to light. Sclera anicteric. Conjunctiva not injected. Oropharynx pink, without erythema, ulcerations or thrush.   NECK: supple.  LYMPHATIC: no cervical, supraclavicular or axillary adenopathy appreciated bilaterally.   CHEST: clear to auscultation bilaterally; no wheezes, crackles or rubs. Patient is speaking comfortably on room air with normal work of breathing.  CARDIOVASCULAR: S1 and S2 detected; no murmurs, rubs or gallops.  ABDOMEN: Bowel sounds present. Soft, nontender, nondistended.   MUSCULOSKELETAL: no tenderness to palpation along the spine or scapulae. Normal range of motion.  EXTREMITIES: no clubbing, cyanosis, edema.  SKIN: no erythema, rashes, ulcerations noted.   NEUROLOGIC: cranial nerves II-XII grossly intact bilaterally. No focal neurological deficits.  PSYCHIATRIC: alert, aware, and appropriate.  BREASTS: Breasts dense bilaterally, Left breast with residual slight hyperpigmentation and fibrosis with fair cosmesis but no dominant masses,  discharge or pain; Right breast unremarkable with no dominant masses, skin changes, discharge or pain.    PERTINENT IMAGING/PATHOLOGY/LABS (Medical Decision Making):     COORDINATION OF " CARE: A copy of this note is sent to the referring provider.    PATHOLOGY (Reviewed):    IMAGING (Reviewed): MMG unilateral due in 6 months (I will order for [facility] and see after that MMG)    LABS (Reviewed):  Hematology WBC   Date Value Ref Range Status   07/24/2019 6.90 3.40 - 10.80 10*3/mm3 Final     RBC   Date Value Ref Range Status   07/24/2019 4.13 3.77 - 5.28 10*6/mm3 Final     Hemoglobin   Date Value Ref Range Status   07/24/2019 12.1 12.0 - 15.9 g/dL Final     Hematocrit   Date Value Ref Range Status   07/24/2019 38.3 34.0 - 46.6 % Final     Platelets   Date Value Ref Range Status   07/24/2019 275 140 - 450 10*3/mm3 Final      Chemistry   Lab Results   Component Value Date    GLUCOSE 116 (H) 07/24/2019    BUN 5 (L) 07/24/2019    CREATININE 0.50 07/24/2019    EGFRIFNONA 125 07/24/2019    BCR 10.0 07/24/2019    K 3.6 07/24/2019    CO2 24.0 07/24/2019    CALCIUM 8.4 (L) 07/24/2019    ALBUMIN 3.60 07/24/2019    LABIL2 1.2 05/20/2019    AST 37 07/24/2019    ALT 29 07/24/2019       Assessment/Plan     1. Malignant neoplasm of left female breast, unspecified estrogen receptor status, unspecified site of breast (CMS/HCC)     Mrs. Quick is a 64 year old female with [id number] IDC of the left breast s/p lumpectomy and sentinel lymph node biopsy followed by external beam radiation therapy as part of breast conserving surgery, completed on 08/08/2018; 50 Gy in 25 fractions.      -cNED by exam.  -Interval Bi-RADS 0 followed by Bi-Rads 3 MMG on the L noted above.  I am ordering a repeat MMG for 6 months and will follow-up with her then.    -Continue with Vit E and massages.  -Discussed survivorship, tolerance of endocrine therapy and answered all generalized questions today.             TIME SPENT WITH PATIENT:   I spent greater than 16 minutes in face-to-face time with the patient and greater than 75% of those minutes were spent in counseling and coordination of care, including review of imaging and pathology;  indications, goals, logistics, alternatives and risks - both common and rare - for my recommendations as well as surveillance and potential outcomes.      CC: MD Patsy Merritt MD John A Cox, MD  6/22/2020  1:07 PM

## 2020-06-22 ENCOUNTER — TELEPHONE (OUTPATIENT)
Dept: ONCOLOGY | Facility: CLINIC | Age: 65
End: 2020-06-22

## 2020-06-22 PROBLEM — C50.812 MALIGNANT NEOPLASM OF OVERLAPPING SITES OF LEFT BREAST IN FEMALE, ESTROGEN RECEPTOR POSITIVE (HCC): Status: ACTIVE | Noted: 2020-06-22

## 2020-06-22 PROBLEM — Z17.0 MALIGNANT NEOPLASM OF OVERLAPPING SITES OF LEFT BREAST IN FEMALE, ESTROGEN RECEPTOR POSITIVE (HCC): Status: ACTIVE | Noted: 2020-06-22

## 2020-06-23 ENCOUNTER — TELEPHONE (OUTPATIENT)
Dept: ONCOLOGY | Facility: CLINIC | Age: 65
End: 2020-06-23

## 2020-06-23 NOTE — TELEPHONE ENCOUNTER
PT HAD A MAMMOGRAM DONE ON 6/18/20 AT [facility].   THEY FOUND A MASS ON HER LEFT BREAST.   SHE WOULD LIKE TO MAKE AN APPT TO SEE DR BURNS NEXT WEEK.      PLEASE GIVE JERROD A CALL [phone].

## 2020-06-24 ENCOUNTER — APPOINTMENT (OUTPATIENT)
Dept: LAB | Facility: HOSPITAL | Age: 65
End: 2020-06-24

## 2020-06-24 ENCOUNTER — OFFICE VISIT (OUTPATIENT)
Dept: ONCOLOGY | Facility: CLINIC | Age: 65
End: 2020-06-24

## 2020-06-24 VITALS
BODY MASS INDEX: 44.79 KG/M2 | HEIGHT: 65 IN | WEIGHT: 268.8 LBS | RESPIRATION RATE: 18 BRPM | HEART RATE: 75 BPM | SYSTOLIC BLOOD PRESSURE: 157 MMHG | DIASTOLIC BLOOD PRESSURE: 85 MMHG | TEMPERATURE: 98.4 F

## 2020-06-24 DIAGNOSIS — R93.7 ABNORMAL FINDINGS ON DIAGNOSTIC IMAGING OF OTHER PARTS OF MUSCULOSKELETAL SYSTEM: ICD-10-CM

## 2020-06-24 DIAGNOSIS — C50.912 PRIMARY CANCER OF LEFT FEMALE BREAST (HCC): Primary | ICD-10-CM

## 2020-06-24 PROCEDURE — 99214 OFFICE O/P EST MOD 30 MIN: CPT | Performed by: INTERNAL MEDICINE

## 2020-06-24 NOTE — PROGRESS NOTES
Hematology/Oncology Outpatient Follow Up    Morgan Quick  1955    Primary Care Physician: Gloria Jimenez APRN  Referring Physician: Gloria Jimenez APRN  Chief complaint:  pT1c, snN0 Left breast infiltrating ductal carcinoma ER positive NV positive HER-2/karol negative with Oncotype recurrence score of 22 diagnosed in  April 2018.  History of Present Illness:   · Ms. Quick had a routine mammogram on 4/3/18.   · 4/3/18 - Bilateral screening mammogram showed in the lateral left breast, far posterior, 8.2 cm lateral to the nipple there is a 15 mm diameter irregularly marginated mass.    · 4/13/18 - Diagnostic left mammogram and ultrasound confirms the mass at 1.2 x 1.2 x 1.8 cm.  Round in shape at 2 to 3 cm from skin surface.    · 5/10/18 - Core needle biopsy of the left breast mass was obtained which was invasive ductal  carcinoma and DCIS low to intermediate grade.  ER positive, NV positive and HER-2 equivocal.    · Patient was sent to the [facility] and seen initially on 5/21/18.    · 5/24/18 - Patient underwent left breast lumpectomy and sentinel lymph node biopsy.  Pathology report was invasive moderately differentiated mammary carcinoma, size 1.6 cm.  Resection margins negative. Three sentinel lymph nodes were excised which were all negative for involvement.  Tumor was staged as pT1c, snN0.   · 6/11/18 - Oncotype DX was obtained.  Recurrence score was 22.  ER score 9.9.  NV score 7.2 positive.  HER-2/karol score 9.3, which was negative.    · 8/15/18 - Patient completed whole breast radiation therapy of her left breast.  · August 2018 - Patient initiated on Arimidex 1 mg a day.      Past Medical History:   Diagnosis Date   • Bipolar 1 disorder (CMS/HCC)    • Breast cancer (CMS/HCC)    • Cancer (CMS/HCC)    • Disease of thyroid gland    • High cholesterol    • Hypertension        Past Surgical History:   Procedure Laterality Date   • ABDOMINAL SURGERY     • APPENDECTOMY     •  HYSTERECTOMY     • MASTECTOMY      L side         Current Outpatient Medications:   •  anastrozole (ARIMIDEX) 1 MG tablet, Take 1 tablet by mouth Daily., Disp: 90 tablet, Rfl: 1  •  levothyroxine (SYNTHROID, LEVOTHROID) 25 MCG tablet, TAKE ONE TABLET BY MOUTH EVERY DAY, Disp: 90 tablet, Rfl: 1  •  lisinopril (PRINIVIL,ZESTRIL) 20 MG tablet, Take 1 tablet by mouth Daily., Disp: 30 tablet, Rfl: 5  •  OXcarbazepine (TRILEPTAL) 150 MG tablet, Take 150 mg by mouth 2 (Two) Times a Day., Disp: , Rfl: 1  •  risperiDONE (risperDAL) 0.25 MG tablet, RISPERDAL TABS, Disp: , Rfl:   •  rosuvastatin (CRESTOR) 20 MG tablet, TAKE ONE TABLET BY MOUTH DAILY, Disp: 90 tablet, Rfl: 1  •  sertraline (ZOLOFT) 100 MG tablet, Take 200 mg by mouth Daily., Disp: , Rfl:   •  nabumetone (RELAFEN) 500 MG tablet, Take 1 tablet by mouth Every 12 (Twelve) Hours. Take with food!, Disp: 60 tablet, Rfl: 0    Allergies   Allergen Reactions   • Contrast Dye Shortness Of Breath     Pt states she had trouble breathing when she had contrast in the past.        Family History   Problem Relation Age of Onset   • Cancer Mother        Cancer-related family history includes Cancer in her mother.    Social History     Tobacco Use   • Smoking status: Never Smoker   • Smokeless tobacco: Never Used   Substance Use Topics   • Alcohol use: No     Frequency: Never   • Drug use: No       I have reviewed the history of present illness, past medical history, family history, social history, lab results, all notes and other records since the patient was last seen on 5/20/2019    SUBJECTIVE:    Patient is my office for follow-up of her breast cancer.  Tolerating Arimidex well.  Trying to lose weight not successful she actually gained weight.  Has arthritis.      ROS:     Review of Systems   Constitutional: Negative for fever.   HENT: Negative for nosebleeds and trouble swallowing.    Eyes: Negative for visual disturbance.   Respiratory: Negative for cough, shortness of  "breath and wheezing.    Cardiovascular: Negative for chest pain.   Gastrointestinal: Negative for abdominal pain and blood in stool.   Endocrine: Negative for cold intolerance.   Genitourinary: Negative for dysuria and hematuria.   Musculoskeletal: Negative for joint swelling.   Skin: Negative for rash.   Allergic/Immunologic: Negative for environmental allergies.   Neurological: Negative for seizures.   Hematological: Does not bruise/bleed easily.   Psychiatric/Behavioral: The patient is not nervous/anxious.      MD performed ROS and are negative except as mentioned in Subjective.      Objective:       Vitals:    06/24/20 1440   BP: 157/85   Pulse: 75   Resp: 18   Temp: 98.4 °F (36.9 °C)   TempSrc: Oral   Weight: 122 kg (268 lb 12.8 oz)   Height: 165.1 cm (65\")   PainSc: 0-No pain         PHYSICAL EXAM:     Physical Exam   Constitutional: She is oriented to person, place, and time. No distress.   Morbidly obese   HENT:   Head: Normocephalic and atraumatic.   Eyes: Conjunctivae and EOM are normal. Right eye exhibits no discharge. Left eye exhibits no discharge. No scleral icterus.   Neck: Normal range of motion. Neck supple. No thyromegaly present.   Cardiovascular: Normal rate, regular rhythm and normal heart sounds. Exam reveals no gallop and no friction rub.   Pulmonary/Chest: Effort normal. No stridor. No respiratory distress. She has no wheezes.   Abdominal: Soft. Bowel sounds are normal. She exhibits no mass. There is no tenderness. There is no rebound and no guarding.   Musculoskeletal: Normal range of motion. She exhibits no tenderness.   Lymphadenopathy:     She has no cervical adenopathy.   Neurological: She is alert and oriented to person, place, and time. She exhibits normal muscle tone.   Skin: Skin is warm. No rash noted. She is not diaphoretic. No erythema.   Psychiatric: She has a normal mood and affect. Her behavior is normal.   Nursing note and vitals reviewed.       RECENT LABS:     WBC   Date " Value Ref Range Status   07/24/2019 6.90 3.40 - 10.80 10*3/mm3 Final     RBC   Date Value Ref Range Status   07/24/2019 4.13 3.77 - 5.28 10*6/mm3 Final     Hemoglobin   Date Value Ref Range Status   07/24/2019 12.1 12.0 - 15.9 g/dL Final     Hematocrit   Date Value Ref Range Status   07/24/2019 38.3 34.0 - 46.6 % Final     MCV   Date Value Ref Range Status   07/24/2019 92.6 79.0 - 97.0 fL Final     MCH   Date Value Ref Range Status   07/24/2019 29.3 26.6 - 33.0 pg Final     MCHC   Date Value Ref Range Status   07/24/2019 31.7 31.5 - 35.7 g/dL Final     RDW   Date Value Ref Range Status   07/24/2019 15.9 (H) 12.3 - 15.4 % Final     RDW-SD   Date Value Ref Range Status   07/24/2019 52.1 37.0 - 54.0 fl Final     MPV   Date Value Ref Range Status   07/24/2019 7.7 6.0 - 12.0 fL Final     Platelets   Date Value Ref Range Status   07/24/2019 275 140 - 450 10*3/mm3 Final     Neutrophil %   Date Value Ref Range Status   07/24/2019 71.9 42.7 - 76.0 % Final     Lymphocyte %   Date Value Ref Range Status   07/24/2019 17.0 (L) 19.6 - 45.3 % Final     Monocyte %   Date Value Ref Range Status   07/24/2019 7.3 5.0 - 12.0 % Final     Eosinophil %   Date Value Ref Range Status   07/24/2019 2.9 0.3 - 6.2 % Final     Basophil %   Date Value Ref Range Status   07/24/2019 0.9 0.0 - 1.5 % Final     Neutrophils, Absolute   Date Value Ref Range Status   07/24/2019 5.00 1.70 - 7.00 10*3/mm3 Final     Lymphocytes, Absolute   Date Value Ref Range Status   07/24/2019 1.20 0.70 - 3.10 10*3/mm3 Final     Monocytes, Absolute   Date Value Ref Range Status   07/24/2019 0.50 0.10 - 0.90 10*3/mm3 Final     Eosinophils, Absolute   Date Value Ref Range Status   07/24/2019 0.20 0.00 - 0.40 10*3/mm3 Final     Basophils, Absolute   Date Value Ref Range Status   07/24/2019 0.10 0.00 - 0.20 10*3/mm3 Final     nRBC   Date Value Ref Range Status   07/24/2019 0.2 0.0 - 0.2 /100 WBC Final       Lab Results   Component Value Date    GLUCOSE 116 (H) 07/24/2019     BUN 5 (L) 07/24/2019    CREATININE 0.50 07/24/2019    EGFRIFNONA 125 07/24/2019    BCR 10.0 07/24/2019    K 3.6 07/24/2019    CO2 24.0 07/24/2019    CALCIUM 8.4 (L) 07/24/2019    ALBUMIN 3.60 07/24/2019    LABIL2 1.2 05/20/2019    AST 37 07/24/2019    ALT 29 07/24/2019         Assessment/Plan      ASSESSMENT:     1. Stage I left breast infiltrating ductal carcinoma ER positive [place] positive HER-2/karol negative  2. Obesity  3. Arthritis  4. Abdominal pain  5. ECOG 1    PLAN:      1. Patient is taking Arimidex a day every day tolerating it well.  Continue at this time.  Check CBC CMP and CA-27-29.  I will schedule bilateral screening mammogram  2. Encouraged her to lose weight  3. Take Tylenol arthritis.  4. She reports abdominal pain and CT scan abdomen pelvis was normal.  She will see [facility] later this month  5. I will see her back in my office in 3 months recheck CBC and CMP CA-27-29 at that time    I have reviewed labs results, imaging, vitals, and medications with the patient today.       Patient verbalized understanding and is in agreement of the above plan.  Electronically signed by Cynthia Kaplan MD, 06/25/20, 1:49 PM.          This report was compiled using Dragon voice recognition software. I have made every effort to proof read this document; however, typographical errors may persist.

## 2020-07-14 DIAGNOSIS — I10 ESSENTIAL HYPERTENSION: ICD-10-CM

## 2020-07-14 RX ORDER — LISINOPRIL 20 MG/1
20 TABLET ORAL DAILY
Qty: 90 TABLET | Refills: 1 | Status: SHIPPED | OUTPATIENT
Start: 2020-07-14 | End: 2022-10-10 | Stop reason: HOSPADM

## 2020-08-19 ENCOUNTER — OFFICE VISIT (OUTPATIENT)
Dept: RADIATION ONCOLOGY | Facility: HOSPITAL | Age: 65
End: 2020-08-19

## 2020-08-19 VITALS
BODY MASS INDEX: 44.45 KG/M2 | DIASTOLIC BLOOD PRESSURE: 81 MMHG | TEMPERATURE: 97.8 F | SYSTOLIC BLOOD PRESSURE: 148 MMHG | HEART RATE: 71 BPM | WEIGHT: 266.8 LBS | HEIGHT: 65 IN

## 2020-08-19 DIAGNOSIS — Z17.0 MALIGNANT NEOPLASM OF OVERLAPPING SITES OF LEFT BREAST IN FEMALE, ESTROGEN RECEPTOR POSITIVE (HCC): ICD-10-CM

## 2020-08-19 DIAGNOSIS — C50.812 MALIGNANT NEOPLASM OF OVERLAPPING SITES OF LEFT BREAST IN FEMALE, ESTROGEN RECEPTOR POSITIVE (HCC): ICD-10-CM

## 2020-08-19 PROCEDURE — 99212 OFFICE O/P EST SF 10 MIN: CPT | Performed by: RADIOLOGY

## 2020-08-19 NOTE — PROGRESS NOTES
"FOLLOW-UP NOTE    Name: Morgan Quick  YOB: 1955  MRN #: [id number]  Date of Service: 8/19/2020  Referring Provider: Gloria Jimenez APRN  [address] [facility]  [address]  [place] [address]  Primary Care Provider: Gloria Jimenez APRN    DIAGNOSIS: [id number] IDC, ER/VT+Her2-  Lt breast  1. Malignant neoplasm of overlapping sites of left breast in female, estrogen receptor positive (CMS/HCC)        REASON FOR VISIT: Chief complaint of Intermittent left breast pain, patient seeking u/s evaluation    RADIATION TREATMENT COURSE:s/p lumpectomy and sentinel lymph node biopsy followed by external beam radiation therapy as part of breast conserving surgery, completed on 08/08/2018; 50 Gy in 25 fractions    HISTORY OF PRESENT ILLNESS: The patient is a 64 y.o. year old female who was last seen her on 06/18/2020.  At that time she had bilateral screening MMG done on 6/15/2020 which showed a questionable posterior outer left breast asymmetry with no abnormal R breast findings.  She was called back for Diagnostic MMG +/- US on the Left for 06/17/2020 and saw me the following day.  She did not require the U/S as the previous findings on Screening MMG appears to efface on spot compression views and was similar to 5/16/2019 study with plan for 6 month f/u and no u/s.    Patient here with complaints of pain in her left breast.  Patient states \"the pain comes and goes. It lasts about a minute and she says it goes up to an 8/10.\" Patient says she does not take any thing for the pain.  Patient denies any swelling, discharge, or redness.  She is worried that she needs the u/s and asked if we could discuss with Radiology.  She has not started Vit E breast massages as we had talked about prior.    The following portions of the patient's history were reviewed and updated as appropriate: allergies, current medications, past family history, past medical history, past social history, past surgical history and problem " list. Reviewed with the patient and remain unchanged.    PAST MEDICAL HISTORY:  she  has a past medical history of Bipolar 1 disorder (CMS/HCC), Breast cancer (CMS/HCC), Cancer (CMS/HCC), Disease of thyroid gland, High cholesterol, and Hypertension.  MEDICATIONS:   Current Outpatient Medications:   •  anastrozole (ARIMIDEX) 1 MG tablet, Take 1 tablet by mouth Daily., Disp: 90 tablet, Rfl: 1  •  levothyroxine (SYNTHROID, LEVOTHROID) 25 MCG tablet, TAKE ONE TABLET BY MOUTH EVERY DAY, Disp: 90 tablet, Rfl: 1  •  lisinopril (PRINIVIL,ZESTRIL) 20 MG tablet, Take 1 tablet by mouth Daily., Disp: 90 tablet, Rfl: 1  •  nabumetone (RELAFEN) 500 MG tablet, Take 1 tablet by mouth Every 12 (Twelve) Hours. Take with food!, Disp: 60 tablet, Rfl: 0  •  OXcarbazepine (TRILEPTAL) 150 MG tablet, Take 150 mg by mouth 2 (Two) Times a Day., Disp: , Rfl: 1  •  risperiDONE (risperDAL) 0.25 MG tablet, RISPERDAL TABS, Disp: , Rfl:   •  rosuvastatin (CRESTOR) 20 MG tablet, TAKE ONE TABLET BY MOUTH DAILY, Disp: 90 tablet, Rfl: 1  •  sertraline (ZOLOFT) 100 MG tablet, Take 200 mg by mouth Daily., Disp: , Rfl:   ALLERGIES:   Allergies   Allergen Reactions   • Contrast Dye Shortness Of Breath     Pt states she had trouble breathing when she had contrast in the past.      PAST SURGICAL HISTORY: she has a past surgical history that includes Hysterectomy; Appendectomy; Mastectomy; and Abdominal surgery.  PREVIOUS RADIOTHERAPY OR CHEMOTHERAPY: yes  FAMILY HISTORY: her family history includes Cancer in her mother.  SOCIAL HISTORY: she  reports that she has never smoked. She has never used smokeless tobacco. She reports that she does not drink alcohol or use drugs.  PAIN AND PAIN MANAGEMENT: OTCs, pain is sharp/intense but does not last long enough for strong med requirements she notes.  No pain in last few days but she is worried and wanted to be seen.  Vitals:    08/19/20 1336   BP: 148/81   Pulse: 71   Temp: 97.8 °F (36.6 °C)   TempSrc: Infrared  "  Weight: 121 kg (266 lb 12.8 oz)   Height: 165.1 cm (65\")   PainSc:   8   PainLoc: Breast  Comment: left     NUTRITIONAL STATUS:    Otherwise no issues  KPS: 70      Review of Systems:   Review of Systems   Constitutional: Negative for chills and fever.   HENT: Negative for sore throat and trouble swallowing.    Eyes: Negative.    Respiratory: Negative for choking and shortness of breath.    Cardiovascular: Negative for chest pain and leg swelling.   Gastrointestinal: Negative for abdominal pain, constipation, diarrhea, nausea and vomiting.   Endocrine: Negative.    Genitourinary: Negative for difficulty urinating and hematuria.   Musculoskeletal: Positive for arthralgias and back pain.        Bulging disk and L3 fracture   Skin: Negative for rash and wound.   Allergic/Immunologic: Negative for environmental allergies and food allergies.   Neurological: Negative for dizziness, seizures and headaches.   Hematological: Negative for adenopathy.   Psychiatric/Behavioral: Negative for confusion, decreased concentration and hallucinations.     Patient here with complaints of pain in her left breast.  Patient states \"the pain comes and goes. It lasts about a minute and she says it goes up to an 8/10.\" Patient says she does not take any thing for the pain.  Patient denies any swelling, discharge, or redness.        Objective     Vitals:  Vitals:    08/19/20 1336   BP: 148/81   Pulse: 71   Temp: 97.8 °F (36.6 °C)   TempSrc: Infrared   Weight: 121 kg (266 lb 12.8 oz)   Height: 165.1 cm (65\")   PainSc:   8   PainLoc: Breast  Comment: left       PHYSICAL EXAM:  GENERAL: in no apparent distress, sitting comfortably in room.    LYMPHATIC: no cervical, supraclavicular or axillary adenopathy appreciated bilaterally.   CARDIOVASCULAR: S1 & S2 detected; no murmurs, rubs or gallops.  CHEST: clear to auscultation bilaterally; no wheezes, crackles or rubs. Work of breathing normal.  MUSCULOSKELETAL: no tenderness to palpation along the " spine or scapulae. Normal range of motion.  EXTREMITIES: no clubbing, cyanosis, edema.  SKIN: no erythema, rashes, ulcerations noted.   NEUROLOGIC: cranial nerves II-XII grossly intact bilaterally. No focal neurologic deficits.  PSYCHIATRIC:  alert, aware, and appropriate.  BREASTS : Left breast without discrete mass or concerning findings, post breast conserving surgery and XRT changes appear stable on exam with no dominant masses, skin changes, discharge or pain; Right breast unremarkable with no dominant masses, skin changes, discharge or pain.      PERTINENT IMAGING/PATHOLOGY/LABS (Medical Decision Making):     COORDINATION OF CARE: A copy of this note is sent to the referring provider.    PATHOLOGY (Reviewed):     IMAGING (Reviewed): reviewed findings with patient.    LABS (Reviewed):  Hematology WBC   Date Value Ref Range Status   07/24/2019 6.90 3.40 - 10.80 10*3/mm3 Final     RBC   Date Value Ref Range Status   07/24/2019 4.13 3.77 - 5.28 10*6/mm3 Final     Hemoglobin   Date Value Ref Range Status   07/24/2019 12.1 12.0 - 15.9 g/dL Final     Hematocrit   Date Value Ref Range Status   07/24/2019 38.3 34.0 - 46.6 % Final     Platelets   Date Value Ref Range Status   07/24/2019 275 140 - 450 10*3/mm3 Final      Chemistry   Lab Results   Component Value Date    GLUCOSE 116 (H) 07/24/2019    BUN 5 (L) 07/24/2019    CREATININE 0.50 07/24/2019    EGFRIFNONA 125 07/24/2019    BCR 10.0 07/24/2019    K 3.6 07/24/2019    CO2 24.0 07/24/2019    CALCIUM 8.4 (L) 07/24/2019    ALBUMIN 3.60 07/24/2019    LABIL2 1.2 05/20/2019    AST 37 07/24/2019    ALT 29 07/24/2019         Assessment/Plan     ASSESSMENT AND PLAN:    1. Malignant neoplasm of overlapping sites of left breast in female, estrogen receptor positive (CMS/HCC)       Mrs. Quick is a 64 year old female with [id number] IDC of the left breast s/p lumpectomy and sentinel lymph node biopsy followed by external beam radiation therapy as part of breast conserving  surgery, completed on 08/08/2018; 50 Gy in 25 fractions.    -recent call back MMG without u/s appears to show cNED.  Since that MMG patient has had more pain and wanted to be evaluated. My exam is stable from the 06/18/2020 visit as well.  -I will ask radiology if she can get the L breast u/s for evaluation of the pain given the prior order.  -She is to start Vit E oil based massages 2-3 times per day.      This assessment comes from my review of the imaging, pathology, physician notes and other pertinent information as mentioned.    DISPOSITION: f/u after U/S with breast radiology if able to perform based on insurance        TIME SPENT WITH PATIENT:   I spent greater than 12 minutes in face-to-face time with the patient and greater than 60% of those minutes  were spent in counseling and coordination of care, including review of imaging and pathology; indications, goals, logistics, alternatives and risks - both common and rare - for my recommendations as well as surveillance and potential outcomes.    CC: MD Patsy Merritt MD John A Cox, MD  8/25/2020  1:41 PM

## 2020-09-02 DIAGNOSIS — Z17.0 MALIGNANT NEOPLASM OF OVERLAPPING SITES OF LEFT BREAST IN FEMALE, ESTROGEN RECEPTOR POSITIVE (HCC): Primary | ICD-10-CM

## 2020-09-02 DIAGNOSIS — C50.812 MALIGNANT NEOPLASM OF OVERLAPPING SITES OF LEFT BREAST IN FEMALE, ESTROGEN RECEPTOR POSITIVE (HCC): Primary | ICD-10-CM

## 2020-09-16 DIAGNOSIS — C50.912 PRIMARY CANCER OF LEFT FEMALE BREAST (HCC): ICD-10-CM

## 2020-09-16 RX ORDER — ANASTROZOLE 1 MG/1
1 TABLET ORAL DAILY
Qty: 90 TABLET | Refills: 1 | Status: SHIPPED | OUTPATIENT
Start: 2020-09-16 | End: 2022-09-24

## 2020-09-16 NOTE — TELEPHONE ENCOUNTER
Can you create a tx plan for the anastrazole please?   Looks like it was sent.     Electronically signed by NIDIA Mathur, 09/16/20, 5:23 PM EDT.

## 2020-09-16 NOTE — TELEPHONE ENCOUNTER
Received prescription refill request, via fax, from [facility] for Anastrozole 1 mg po daily #90.  Last filled on 6-.

## 2020-09-17 PROBLEM — C50.912 PRIMARY CANCER OF LEFT FEMALE BREAST (HCC): Status: ACTIVE | Noted: 2020-09-17

## 2020-09-22 ENCOUNTER — TELEPHONE (OUTPATIENT)
Dept: RADIATION ONCOLOGY | Facility: HOSPITAL | Age: 65
End: 2020-09-22

## 2020-09-22 NOTE — TELEPHONE ENCOUNTER
I called [facility] radiology and they said the patient had an appointment for Sept 3rd 2020 that patient did not show up for.  I tried to call patient to have her reschedule the appointment and the voicemail has not been set up for the patient.  I was unable to leave a message.  [facility] does not call patients to schedule.  They did send a reminder to patient that she missed her appointment however she did not respond.                       Sandhya Zarate RN

## 2020-09-23 ENCOUNTER — TELEPHONE (OUTPATIENT)
Dept: ONCOLOGY | Facility: CLINIC | Age: 65
End: 2020-09-23

## 2020-09-25 ENCOUNTER — TELEPHONE (OUTPATIENT)
Dept: ONCOLOGY | Facility: CLINIC | Age: 65
End: 2020-09-25

## 2020-09-28 ENCOUNTER — OFFICE VISIT (OUTPATIENT)
Dept: ONCOLOGY | Facility: CLINIC | Age: 65
End: 2020-09-28

## 2020-09-28 ENCOUNTER — APPOINTMENT (OUTPATIENT)
Dept: LAB | Facility: HOSPITAL | Age: 65
End: 2020-09-28

## 2020-09-28 VITALS
SYSTOLIC BLOOD PRESSURE: 173 MMHG | DIASTOLIC BLOOD PRESSURE: 80 MMHG | BODY MASS INDEX: 45.32 KG/M2 | WEIGHT: 272 LBS | TEMPERATURE: 97.7 F | HEIGHT: 65 IN | RESPIRATION RATE: 20 BRPM | HEART RATE: 71 BPM

## 2020-09-28 DIAGNOSIS — Z17.0 MALIGNANT NEOPLASM OF OVERLAPPING SITES OF LEFT BREAST IN FEMALE, ESTROGEN RECEPTOR POSITIVE (HCC): Primary | ICD-10-CM

## 2020-09-28 DIAGNOSIS — E66.01 MORBIDLY OBESE (HCC): ICD-10-CM

## 2020-09-28 DIAGNOSIS — C50.812 MALIGNANT NEOPLASM OF OVERLAPPING SITES OF LEFT BREAST IN FEMALE, ESTROGEN RECEPTOR POSITIVE (HCC): Primary | ICD-10-CM

## 2020-09-28 DIAGNOSIS — C50.912 PRIMARY CANCER OF LEFT FEMALE BREAST (HCC): ICD-10-CM

## 2020-09-28 PROCEDURE — 99213 OFFICE O/P EST LOW 20 MIN: CPT | Performed by: INTERNAL MEDICINE

## 2020-09-28 NOTE — PROGRESS NOTES
ONCOLOGY/HEMATOLOGY    PATIENT: Morgan Quick  YOB: 1955  MEDICAL RECORD NUMBER: [id number] OF SERVICE: 09/28/20      CHIEF COMPLAINT:     Chief complaint:  pT1c, snN0 Left breast infiltrating ductal carcinoma ER positive TN positive HER-2/karol negative with Oncotype recurrence score of 22 diagnosed in  April 2018.  History of Present Illness:   · Ms. Quick had a routine mammogram on 4/3/18.   · 4/3/18 - Bilateral screening mammogram showed in the lateral left breast, far posterior, 8.2 cm lateral to the nipple there is a 15 mm diameter irregularly marginated mass.    · 4/13/18 - Diagnostic left mammogram and ultrasound confirms the mass at 1.2 x 1.2 x 1.8 cm.  Round in shape at 2 to 3 cm from skin surface.    · 5/10/18 - Core needle biopsy of the left breast mass was obtained which was invasive ductal  carcinoma and DCIS low to intermediate grade.  ER positive, TN positive and HER-2 equivocal.    · Patient was sent to the [facility] and seen initially on 5/21/18.    · 5/24/18 - Patient underwent left breast lumpectomy and sentinel lymph node biopsy.  Pathology report was invasive moderately differentiated mammary carcinoma, size 1.6 cm.  Resection margins negative. Three sentinel lymph nodes were excised which were all negative for involvement.  Tumor was staged as pT1c, snN0.   · 6/11/18 - Oncotype DX was obtained.  Recurrence score was 22.  ER score 9.9.  TN score 7.2 positive.  HER-2/karol score 9.3, which was negative.    · 8/15/18 - Patient completed whole breast radiation therapy of her left breast.  · August 2018 - Patient initiated on Arimidex 1 mg a day.         HISTORY OF PRESENT ILLNESS: She comes unaccompanied today.  Of note, this patient and all of these medical issues are new to me.  Since her last visit, she was having some pain in the L breast and saw Dr. Estes in rad/onc for evaluation about 1 month ago.  No adverse findings were found on exam.  She  continues on the arimidex.  The breast pain in the L breast has now resolved.  She has her next mammogram scheduled for December 2020.      Review of Systems - Oncology     Review of systems:  Constitutional: Denies fatigue, fever, night sweats or weight loss  HEENT: Denies headache, vision changes, dysphagia or odynophagia   Lymph nodes: Denies lymphadenopathy  Heme: Denies bleeding or abnormal bruising  Respiratory: Denies dyspnea, cough  Cardiovascular: Denies chest pain, palpitations or lower extremity swelling  GI: Denies abdominal pain, nausea, vomiting, diarrhea, constipation, hematochezia or melena  : Denies dysuria or hematuria  Musculoskeletal: Denies myalgia or arthralgia  Skin: Denies rash or skin lesions  Neurologic: Denies focal weakness, paresthesia, loss of balance or coordination  Endocrine: denies hot flashes  Psychologic: Denies depression or anxiety    Past Medical History:   Diagnosis Date   • Bipolar 1 disorder (CMS/HCC)    • Breast cancer (CMS/HCC)    • Cancer (CMS/HCC)    • Disease of thyroid gland    • High cholesterol    • Hypertension        Past Surgical History:   Procedure Laterality Date   • ABDOMINAL SURGERY     • APPENDECTOMY     • HYSTERECTOMY     • MASTECTOMY      L side       Family History   Problem Relation Age of Onset   • Cancer Mother        Social History     Socioeconomic History   • Marital status:      Spouse name: Not on file   • Number of children: Not on file   • Years of education: Not on file   • Highest education level: Not on file   Tobacco Use   • Smoking status: Never Smoker   • Smokeless tobacco: Never Used   Substance and Sexual Activity   • Alcohol use: No     Frequency: Never   • Drug use: No   • Sexual activity: Defer       ALLERGIES:  Contrast dye    MEDICATION:  Current Outpatient Medications   Medication Sig Dispense Refill   • anastrozole (Arimidex) 1 MG tablet Take 1 tablet by mouth Daily. 90 tablet 1   • levothyroxine (SYNTHROID, LEVOTHROID)  25 MCG tablet TAKE ONE TABLET BY MOUTH EVERY DAY 90 tablet 1   • lisinopril (PRINIVIL,ZESTRIL) 20 MG tablet Take 1 tablet by mouth Daily. 90 tablet 1   • nabumetone (RELAFEN) 500 MG tablet Take 1 tablet by mouth Every 12 (Twelve) Hours. Take with food! 60 tablet 0   • OXcarbazepine (TRILEPTAL) 150 MG tablet Take 150 mg by mouth 2 (Two) Times a Day.  1   • risperiDONE (risperDAL) 0.25 MG tablet RISPERDAL TABS     • rosuvastatin (CRESTOR) 20 MG tablet TAKE ONE TABLET BY MOUTH DAILY 90 tablet 1   • sertraline (ZOLOFT) 100 MG tablet Take 200 mg by mouth Daily.       No current facility-administered medications for this visit.            PHYSICAL EXAM:    Current Vitals:  Temperature Blood Pressure Heart Rate Resp Rate SpO2                VITAL signs in the last 24 hours: [unfilled] There were no vitals filed for this visit.    Physical Exam     ECOG PERFORMANCE STATUS:0  Physical Exam:  General: No acute distress  Eyes: Sclera anicteric, EOMS-I  ENT: no mucositis  Neck: Supple, with full ROM  Breast: deferred given recent negative exam  Lymph nodes: No cervical, lymphadenopathy  Pulmonary: no wheezes or stridor .  CV: Regular rate and rhythm.  GI: Soft, non-tender,   Vascular: no edema  Neurologic: Cranial nerves 2-12 grossly intact, no focal motor deficits  Psych/MS: Alert and oriented x3,    LABORATORY/DATA:  No results found for: CBCDIF, CMP, FERRITIN, LABIRON, SPEP, FREELIGHTCHA, CEA, , [id number], LDH     IMAGING:  No results found.    PROBLEM LIST:  Patient Active Problem List   Diagnosis   • Morbidly obese (CMS/HCC)   • Essential hypertension   • Malignant neoplasm of overlapping sites of left breast in female, estrogen receptor positive (CMS/HCC)   • Primary cancer of left female breast (CMS/HCC)       Assessment & Plan  64 y.o. F w/ stage 1A ER+/OH+ her 2 neg invasive ductal CA of the L breast on anastrozole.      1) Breast cancer:  Continue anastrozole.  Plan to do 5 years of AI which would conclude in  august 2023.    2) Breast pain:  Resolved.        Johnathan Reaz MD,  09/28/2020   14:24 EDT

## 2020-11-10 ENCOUNTER — TELEPHONE (OUTPATIENT)
Dept: RADIATION ONCOLOGY | Facility: HOSPITAL | Age: 65
End: 2020-11-10

## 2020-11-10 NOTE — TELEPHONE ENCOUNTER
I tried to call patient again to see if she has had her ultra sound of her Left Breast.  Patient did not answer and I was unable to leave a message because the mailbox was full.                  Sandhya Zarate RN

## 2020-11-22 ENCOUNTER — APPOINTMENT (OUTPATIENT)
Dept: GENERAL RADIOLOGY | Facility: HOSPITAL | Age: 65
End: 2020-11-22

## 2020-11-22 ENCOUNTER — HOSPITAL ENCOUNTER (EMERGENCY)
Facility: HOSPITAL | Age: 65
Discharge: HOME | End: 2020-11-22
Attending: EMERGENCY MEDICINE | Admitting: EMERGENCY MEDICINE

## 2020-11-22 VITALS
SYSTOLIC BLOOD PRESSURE: 153 MMHG | BODY MASS INDEX: 28.49 KG/M2 | TEMPERATURE: 98.9 F | HEIGHT: 65 IN | WEIGHT: 171 LBS | HEART RATE: 80 BPM | RESPIRATION RATE: 18 BRPM | OXYGEN SATURATION: 98 % | DIASTOLIC BLOOD PRESSURE: 83 MMHG
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
## 2020-11-22 DIAGNOSIS — S20.211A CONTUSION OF RIGHT CHEST WALL, INITIAL ENCOUNTER: Primary | ICD-10-CM

## 2020-11-22 PROCEDURE — 99282 EMERGENCY DEPT VISIT SF MDM: CPT

## 2020-11-22 PROCEDURE — 71101 X-RAY EXAM UNILAT RIBS/CHEST: CPT

## 2020-11-22 RX ORDER — NAPROXEN 375 MG/1
375 TABLET ORAL 2 TIMES DAILY PRN
Qty: 14 TABLET | Refills: 0 | Status: SHIPPED | OUTPATIENT
Start: 2020-11-22 | End: 2022-09-24
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
## 2021-01-22 ENCOUNTER — TELEPHONE (OUTPATIENT)
Dept: RADIATION ONCOLOGY | Facility: HOSPITAL | Age: 66
End: 2021-01-22

## 2021-01-22 NOTE — TELEPHONE ENCOUNTER
I called patient and her  answered.  I informed him that she was past due for her breast Ultrasound.  He stated he would remind her to go because she was not home.               Sandhya Zarate RN

## 2021-02-22 ENCOUNTER — TELEPHONE (OUTPATIENT)
Dept: ONCOLOGY | Facility: CLINIC | Age: 66
End: 2021-02-22

## 2021-02-22 NOTE — TELEPHONE ENCOUNTER
Attempted to contact patient regarding appt on 03/08 and needing to reschedule. Her  answered the phone and stated that he will let her know that we called.

## 2021-03-08 ENCOUNTER — APPOINTMENT (OUTPATIENT)
Dept: LAB | Facility: HOSPITAL | Age: 66
End: 2021-03-08

## 2021-09-30 ENCOUNTER — TELEPHONE (OUTPATIENT)
Dept: ONCOLOGY | Facility: CLINIC | Age: 66
End: 2021-09-30

## 2021-09-30 NOTE — TELEPHONE ENCOUNTER
Provider: PAPO    Caller: LEAH    Relationship to Patient:     Reason for Call: LEAH FROM [facility] CALLED REQUESTING ORDER FAXED OVER FOR PT DIAGNOSTIC BILATERAL MAMMOGRAM AND ULTRASOUND OF LEFT BREAST [phone]. PLEASE FAX ORDER

## 2021-09-30 NOTE — TELEPHONE ENCOUNTER
Called [facility] due to the fact we do not have that order for her and that she no showed to her last follow up. I told her that his pt had no showed as well as candled or rescheduled many times. I told her I would attempt to call the pt and see what the situation was and get back with her. Lelia, at [facility] expressed that this US was meant to be a 6 month recheck but it has been over a year from her original US.

## 2021-09-30 NOTE — TELEPHONE ENCOUNTER
Called the pt to touch base on her no show apts as well as her US that [facility] contacted us about. I was unable to reach the pt but left a v/m asking to give me a call back.

## 2021-10-08 ENCOUNTER — TELEPHONE (OUTPATIENT)
Dept: ONCOLOGY | Facility: CLINIC | Age: 66
End: 2021-10-08

## 2021-10-08 DIAGNOSIS — Z17.0 MALIGNANT NEOPLASM OF OVERLAPPING SITES OF LEFT BREAST IN FEMALE, ESTROGEN RECEPTOR POSITIVE (HCC): Primary | ICD-10-CM

## 2021-10-08 DIAGNOSIS — C50.812 MALIGNANT NEOPLASM OF OVERLAPPING SITES OF LEFT BREAST IN FEMALE, ESTROGEN RECEPTOR POSITIVE (HCC): Primary | ICD-10-CM

## 2021-10-08 NOTE — TELEPHONE ENCOUNTER
Spoke with the pt and got her scheduled for 11/2/21 for a follow up with Dr. Hays. She had no other questions.

## 2021-10-12 DIAGNOSIS — C50.812 MALIGNANT NEOPLASM OF OVERLAPPING SITES OF LEFT BREAST IN FEMALE, ESTROGEN RECEPTOR POSITIVE (HCC): Primary | ICD-10-CM

## 2021-10-12 DIAGNOSIS — Z17.0 MALIGNANT NEOPLASM OF OVERLAPPING SITES OF LEFT BREAST IN FEMALE, ESTROGEN RECEPTOR POSITIVE (HCC): Primary | ICD-10-CM

## 2021-11-01 ENCOUNTER — TELEPHONE (OUTPATIENT)
Dept: ONCOLOGY | Facility: CLINIC | Age: 66
End: 2021-11-01

## 2021-11-02 ENCOUNTER — APPOINTMENT (OUTPATIENT)
Dept: LAB | Facility: HOSPITAL | Age: 66
End: 2021-11-02

## 2021-11-18 NOTE — PROGRESS NOTES
Hematology/Oncology Outpatient Follow up    PATIENT: Morgan Quick  YOB: 1955  MEDICAL RECORD NUMBER: [id number] OF SERVICE: 09/28/20      Chief complaint:  pT1c, snN0 Left breast infiltrating ductal carcinoma ER positive SC positive HER-2/karol negative with Oncotype recurrence score of 22 diagnosed in  April 2018.  History of Present Illness:   · Ms. Quick had a routine mammogram on 4/3/18.   · 4/3/18 - Bilateral screening mammogram showed in the lateral left breast, far posterior, 8.2 cm lateral to the nipple there is a 15 mm diameter irregularly marginated mass.    · 4/13/18 - Diagnostic left mammogram and ultrasound confirms the mass at 1.2 x 1.2 x 1.8 cm.  Round in shape at 2 to 3 cm from skin surface.    · 5/10/18 - Core needle biopsy of the left breast mass was obtained which was invasive ductal  carcinoma and DCIS low to intermediate grade.  ER positive, SC positive and HER-2 equivocal.    · Patient was sent to the [facility] and seen initially on 5/21/18.    · 5/24/18 - Patient underwent left breast lumpectomy and sentinel lymph node biopsy.  Pathology report was invasive moderately differentiated mammary carcinoma, size 1.6 cm.  Resection margins negative. Three sentinel lymph nodes were excised which were all negative for involvement.  Tumor was staged as pT1c, snN0.   · 6/11/18 - Oncotype DX was obtained.  Recurrence score was 22.  ER score 9.9.  SC score 7.2 positive.  HER-2/karol score 9.3, which was negative.    · 8/15/18 - Patient completed whole breast radiation therapy of her left breast.  · August 2018 - Patient initiated on Arimidex 1 mg a day.         HISTORY OF PRESENT ILLNESS: Patient in for follow-up.  No new symptoms or concern.  Continues on Arimidex.    Review of Systems - Oncology     Review of systems:  Constitutional: Denies fatigue, fever, night sweats or weight loss  HEENT: Denies headache, vision changes, dysphagia or odynophagia   Lymph  nodes: Denies lymphadenopathy  Heme: Denies bleeding   Respiratory: Denies dyspnea, cough  Cardiovascular: Denies chest pain, palpitations or lower extremity swelling  GI: Denies abdominal pain, nausea, vomiting, diarrhea, constipation, hematochezia or melena  : Denies dysuria or hematuria  Musculoskeletal: Denies myalgia or arthralgia  Skin: Denies rash or skin lesions  Neurologic: Denies focal weakness, paresthesia, loss of balance or coordination  Endocrine: denies hot flashes  Psychologic: Denies depression or anxiety    Past Medical History:   Diagnosis Date   • Bipolar 1 disorder (HCC)    • Breast cancer (HCC)    • Cancer (HCC)    • Disease of thyroid gland    • High cholesterol    • Hypertension        Past Surgical History:   Procedure Laterality Date   • ABDOMINAL SURGERY     • APPENDECTOMY     • HYSTERECTOMY     • MASTECTOMY      L side       Family History   Problem Relation Age of Onset   • Cancer Mother        Social History     Socioeconomic History   • Marital status:    Tobacco Use   • Smoking status: Never Smoker   • Smokeless tobacco: Never Used   Substance and Sexual Activity   • Alcohol use: No   • Drug use: No   • Sexual activity: Defer       ALLERGIES:  Contrast dye    MEDICATION:  Current Outpatient Medications   Medication Sig Dispense Refill   • anastrozole (Arimidex) 1 MG tablet Take 1 tablet by mouth Daily. 90 tablet 1   • levothyroxine (SYNTHROID, LEVOTHROID) 25 MCG tablet TAKE ONE TABLET BY MOUTH EVERY DAY 90 tablet 1   • lisinopril (PRINIVIL,ZESTRIL) 20 MG tablet Take 1 tablet by mouth Daily. 90 tablet 1   • naproxen (NAPROSYN) 375 MG tablet Take 1 tablet by mouth 2 (Two) Times a Day As Needed for Moderate Pain . 14 tablet 0   • OXcarbazepine (TRILEPTAL) 150 MG tablet Take 150 mg by mouth 2 (Two) Times a Day.  1   • risperiDONE (risperDAL) 0.25 MG tablet RISPERDAL TABS     • rosuvastatin (CRESTOR) 20 MG tablet TAKE ONE TABLET BY MOUTH DAILY 90 tablet 1   • sertraline (ZOLOFT)  100 MG tablet Take 200 mg by mouth Daily.     • calcium-vitamin D 250-100 MG-UNIT per tablet Take 1 tablet by mouth 2 (Two) Times a Day. 60 tablet 11   • ezetimibe (ZETIA) 10 MG tablet Take 10 mg by mouth Daily.     • nabumetone (RELAFEN) 500 MG tablet Take 1 tablet by mouth Every 12 (Twelve) Hours. Take with food! 60 tablet 0     No current facility-administered medications for this visit.           PHYSICAL EXAM:    Current Vitals:  Temperature Blood Pressure Heart Rate Resp Rate SpO2 Temp: 97.1 °F (36.2 °C) BP: 179/82 Heart Rate: 66 Resp: 18 SpO2: 92 %      VITAL signs in the last 24 hours: [unfilled]       11/19/21  1206   Weight: 122 kg (269 lb)       Physical Exam  Constitutional:       Appearance: Normal appearance.   HENT:      Head: Normocephalic and atraumatic.   Eyes:      Pupils: Pupils are equal, round, and reactive to light.   Cardiovascular:      Rate and Rhythm: Normal rate and regular rhythm.      Pulses: Normal pulses.      Heart sounds: No murmur heard.      Pulmonary:      Effort: Pulmonary effort is normal.      Breath sounds: Normal breath sounds.   Abdominal:      General: There is no distension.      Palpations: Abdomen is soft. There is no mass.      Tenderness: There is no abdominal tenderness.   Musculoskeletal:         General: Normal range of motion.      Cervical back: Normal range of motion.   Skin:     General: Skin is warm.   Neurological:      General: No focal deficit present.      Mental Status: She is alert.   Psychiatric:         Mood and Affect: Mood normal.          LABORATORY/DATA:  No results found for: CBCDIF, CMP, FERRITIN, LABIRON, SPEP, FREELIGHTCHA, CEA, , [id number], LDH     IMAGING:  No results found.    PROBLEM LIST:  Patient Active Problem List   Diagnosis   • Morbidly obese (HCC)   • Essential hypertension   • Malignant neoplasm of overlapping sites of left breast in female, estrogen receptor positive (HCC)   • Primary cancer of left female breast (HCC)        Assessment & Plan    Patient is 64-year-old female with stage Ia ER/ID positive HER-2 new negative invasive ductal carcinoma of the left breast status post treatment with lumpectomy, adjuvant breast radiation now on hormonal treatment    Breast cancer  Advised to continue Arimidex until at least 2023 when she will complete 5 years of treatment.  We can potentially get a BCI at that time to see if extended adjuvant treatment would help her.  Continue screening mammogram annually last mammogram from 11/15/2021 reviewed  Follow-up with CBC CMP in 6 months.    Bone health  Discussed osteoporosis with hormonal blockade.  Will recommend getting a DEXA scan, continue calcium vitamin D.        Pino Hays MD,  09/28/2020   17:50 EST

## 2021-11-19 ENCOUNTER — LAB (OUTPATIENT)
Dept: LAB | Facility: HOSPITAL | Age: 66
End: 2021-11-19

## 2021-11-19 ENCOUNTER — OFFICE VISIT (OUTPATIENT)
Dept: ONCOLOGY | Facility: CLINIC | Age: 66
End: 2021-11-19

## 2021-11-19 VITALS
WEIGHT: 269 LBS | BODY MASS INDEX: 44.82 KG/M2 | DIASTOLIC BLOOD PRESSURE: 82 MMHG | HEART RATE: 66 BPM | RESPIRATION RATE: 18 BRPM | HEIGHT: 65 IN | OXYGEN SATURATION: 92 % | TEMPERATURE: 97.1 F | SYSTOLIC BLOOD PRESSURE: 179 MMHG

## 2021-11-19 DIAGNOSIS — C50.912 PRIMARY CANCER OF LEFT FEMALE BREAST (HCC): ICD-10-CM

## 2021-11-19 DIAGNOSIS — Z17.0 MALIGNANT NEOPLASM OF OVERLAPPING SITES OF LEFT BREAST IN FEMALE, ESTROGEN RECEPTOR POSITIVE (HCC): ICD-10-CM

## 2021-11-19 DIAGNOSIS — Z17.0 MALIGNANT NEOPLASM OF OVERLAPPING SITES OF LEFT BREAST IN FEMALE, ESTROGEN RECEPTOR POSITIVE (HCC): Primary | ICD-10-CM

## 2021-11-19 DIAGNOSIS — C50.812 MALIGNANT NEOPLASM OF OVERLAPPING SITES OF LEFT BREAST IN FEMALE, ESTROGEN RECEPTOR POSITIVE (HCC): ICD-10-CM

## 2021-11-19 DIAGNOSIS — Z09 ENCOUNTER FOR FOLLOW-UP EXAMINATION AFTER COMPLETED TREATMENT FOR CONDITIONS OTHER THAN MALIGNANT NEOPLASM: ICD-10-CM

## 2021-11-19 DIAGNOSIS — C50.812 MALIGNANT NEOPLASM OF OVERLAPPING SITES OF LEFT BREAST IN FEMALE, ESTROGEN RECEPTOR POSITIVE (HCC): Primary | ICD-10-CM

## 2021-11-19 LAB
BASOPHILS # BLD AUTO: 0.03 10*3/MM3 (ref 0–0.2)
BASOPHILS NFR BLD AUTO: 0.4 % (ref 0–1.5)
DEPRECATED RDW RBC AUTO: 52.9 FL (ref 37–54)
EOSINOPHIL # BLD AUTO: 0.08 10*3/MM3 (ref 0–0.4)
EOSINOPHIL NFR BLD AUTO: 1 % (ref 0.3–6.2)
ERYTHROCYTE [DISTWIDTH] IN BLOOD BY AUTOMATED COUNT: 14.9 % (ref 12.3–15.4)
HCT VFR BLD AUTO: 46 % (ref 34–46.6)
HGB BLD-MCNC: 14.5 G/DL (ref 12–15.9)
LYMPHOCYTES # BLD AUTO: 0.8 10*3/MM3 (ref 0.7–3.1)
LYMPHOCYTES NFR BLD AUTO: 10.5 % (ref 19.6–45.3)
MCH RBC QN AUTO: 31 PG (ref 26.6–33)
MCHC RBC AUTO-ENTMCNC: 31.5 G/DL (ref 31.5–35.7)
MCV RBC AUTO: 98.3 FL (ref 79–97)
MONOCYTES # BLD AUTO: 0.44 10*3/MM3 (ref 0.1–0.9)
MONOCYTES NFR BLD AUTO: 5.8 % (ref 5–12)
NEUTROPHILS NFR BLD AUTO: 6.29 10*3/MM3 (ref 1.7–7)
NEUTROPHILS NFR BLD AUTO: 82.3 % (ref 42.7–76)
PLATELET # BLD AUTO: 175 10*3/MM3 (ref 140–450)
PMV BLD AUTO: 10.2 FL (ref 6–12)
RBC # BLD AUTO: 4.68 10*6/MM3 (ref 3.77–5.28)
WBC NRBC COR # BLD: 7.64 10*3/MM3 (ref 3.4–10.8)

## 2021-11-19 PROCEDURE — 85025 COMPLETE CBC W/AUTO DIFF WBC: CPT

## 2021-11-19 PROCEDURE — 99214 OFFICE O/P EST MOD 30 MIN: CPT | Performed by: INTERNAL MEDICINE

## 2021-11-19 RX ORDER — EZETIMIBE 10 MG/1
10 TABLET ORAL DAILY
COMMUNITY
Start: 2021-09-17 | End: 2022-09-24

## 2022-09-22 ENCOUNTER — HOSPITAL ENCOUNTER (EMERGENCY)
Facility: HOSPITAL | Age: 67
Discharge: HOME | End: 2022-09-22
Attending: EMERGENCY MEDICINE | Admitting: EMERGENCY MEDICINE

## 2022-09-22 ENCOUNTER — APPOINTMENT (OUTPATIENT)
Dept: GENERAL RADIOLOGY | Facility: HOSPITAL | Age: 67
End: 2022-09-22

## 2022-09-22 VITALS
HEIGHT: 65 IN | RESPIRATION RATE: 16 BRPM | BODY MASS INDEX: 40.75 KG/M2 | WEIGHT: 244.6 LBS | OXYGEN SATURATION: 99 % | TEMPERATURE: 98.7 F | DIASTOLIC BLOOD PRESSURE: 84 MMHG | HEART RATE: 80 BPM | SYSTOLIC BLOOD PRESSURE: 146 MMHG

## 2022-09-22 DIAGNOSIS — A49.9 UTI (URINARY TRACT INFECTION), BACTERIAL: Primary | ICD-10-CM

## 2022-09-22 DIAGNOSIS — N39.0 UTI (URINARY TRACT INFECTION), BACTERIAL: Primary | ICD-10-CM

## 2022-09-22 DIAGNOSIS — Z20.822 COVID-19 RULED OUT BY LABORATORY TESTING: ICD-10-CM

## 2022-09-22 DIAGNOSIS — R11.2 NAUSEA AND VOMITING, UNSPECIFIED VOMITING TYPE: ICD-10-CM

## 2022-09-22 LAB
ALBUMIN SERPL-MCNC: 3.4 G/DL (ref 3.5–5.2)
ALBUMIN/GLOB SERPL: 1.5 G/DL
ALP SERPL-CCNC: 85 U/L (ref 39–117)
ALT SERPL W P-5'-P-CCNC: 7 U/L (ref 1–33)
ANION GAP SERPL CALCULATED.3IONS-SCNC: 7 MMOL/L (ref 5–15)
AST SERPL-CCNC: 11 U/L (ref 1–32)
BACTERIA UR QL AUTO: ABNORMAL /HPF
BASOPHILS # BLD AUTO: 0 10*3/MM3 (ref 0–0.2)
BASOPHILS NFR BLD AUTO: 0.2 % (ref 0–1.5)
BILIRUB SERPL-MCNC: 0.8 MG/DL (ref 0–1.2)
BILIRUB UR QL STRIP: ABNORMAL
BUN SERPL-MCNC: 7 MG/DL (ref 8–23)
BUN/CREAT SERPL: 13.2 (ref 7–25)
CALCIUM SPEC-SCNC: 8.6 MG/DL (ref 8.6–10.5)
CHLORIDE SERPL-SCNC: 97 MMOL/L (ref 98–107)
CLARITY UR: CLEAR
CO2 SERPL-SCNC: 31 MMOL/L (ref 22–29)
COLOR UR: ABNORMAL
CREAT SERPL-MCNC: 0.53 MG/DL (ref 0.57–1)
DEPRECATED RDW RBC AUTO: 66.1 FL (ref 37–54)
EGFRCR SERPLBLD CKD-EPI 2021: 102.1 ML/MIN/1.73
EOSINOPHIL # BLD AUTO: 0 10*3/MM3 (ref 0–0.4)
EOSINOPHIL NFR BLD AUTO: 0.4 % (ref 0.3–6.2)
ERYTHROCYTE [DISTWIDTH] IN BLOOD BY AUTOMATED COUNT: 17.7 % (ref 12.3–15.4)
GLOBULIN UR ELPH-MCNC: 2.3 GM/DL
GLUCOSE SERPL-MCNC: 109 MG/DL (ref 65–99)
GLUCOSE UR STRIP-MCNC: NEGATIVE MG/DL
HCT VFR BLD AUTO: 30 % (ref 34–46.6)
HGB BLD-MCNC: 10.1 G/DL (ref 12–15.9)
HGB UR QL STRIP.AUTO: NEGATIVE
HOLD SPECIMEN: NORMAL
HYALINE CASTS UR QL AUTO: ABNORMAL /LPF
KETONES UR QL STRIP: ABNORMAL
LEUKOCYTE ESTERASE UR QL STRIP.AUTO: ABNORMAL
LIPASE SERPL-CCNC: 18 U/L (ref 13–60)
LYMPHOCYTES # BLD AUTO: 0.8 10*3/MM3 (ref 0.7–3.1)
LYMPHOCYTES NFR BLD AUTO: 8.2 % (ref 19.6–45.3)
MCH RBC QN AUTO: 35.2 PG (ref 26.6–33)
MCHC RBC AUTO-ENTMCNC: 33.7 G/DL (ref 31.5–35.7)
MCV RBC AUTO: 104.6 FL (ref 79–97)
MONOCYTES # BLD AUTO: 0.1 10*3/MM3 (ref 0.1–0.9)
MONOCYTES NFR BLD AUTO: 0.9 % (ref 5–12)
NEUTROPHILS NFR BLD AUTO: 8.4 10*3/MM3 (ref 1.7–7)
NEUTROPHILS NFR BLD AUTO: 90.3 % (ref 42.7–76)
NITRITE UR QL STRIP: POSITIVE
NRBC BLD AUTO-RTO: 0.1 /100 WBC (ref 0–0.2)
PH UR STRIP.AUTO: 6.5 [PH] (ref 5–8)
PLATELET # BLD AUTO: 281 10*3/MM3 (ref 140–450)
PMV BLD AUTO: 6.6 FL (ref 6–12)
POTASSIUM SERPL-SCNC: 4.1 MMOL/L (ref 3.5–5.2)
PROT SERPL-MCNC: 5.7 G/DL (ref 6–8.5)
PROT UR QL STRIP: ABNORMAL
RBC # BLD AUTO: 2.87 10*6/MM3 (ref 3.77–5.28)
RBC # UR STRIP: ABNORMAL /HPF
REF LAB TEST METHOD: ABNORMAL
SARS-COV-2 RNA RESP QL NAA+PROBE: NOT DETECTED
SODIUM SERPL-SCNC: 135 MMOL/L (ref 136–145)
SP GR UR STRIP: 1.02 (ref 1–1.03)
SQUAMOUS #/AREA URNS HPF: ABNORMAL /HPF
UROBILINOGEN UR QL STRIP: ABNORMAL
WBC # UR STRIP: ABNORMAL /HPF
WBC NRBC COR # BLD: 9.3 10*3/MM3 (ref 3.4–10.8)

## 2022-09-22 PROCEDURE — 81001 URINALYSIS AUTO W/SCOPE: CPT | Performed by: NURSE PRACTITIONER

## 2022-09-22 PROCEDURE — 83690 ASSAY OF LIPASE: CPT | Performed by: NURSE PRACTITIONER

## 2022-09-22 PROCEDURE — 71045 X-RAY EXAM CHEST 1 VIEW: CPT

## 2022-09-22 PROCEDURE — 85025 COMPLETE CBC W/AUTO DIFF WBC: CPT | Performed by: NURSE PRACTITIONER

## 2022-09-22 PROCEDURE — 96361 HYDRATE IV INFUSION ADD-ON: CPT

## 2022-09-22 PROCEDURE — 96365 THER/PROPH/DIAG IV INF INIT: CPT

## 2022-09-22 PROCEDURE — 99284 EMERGENCY DEPT VISIT MOD MDM: CPT

## 2022-09-22 PROCEDURE — 25010000002 CEFTRIAXONE PER 250 MG: Performed by: NURSE PRACTITIONER

## 2022-09-22 PROCEDURE — 80053 COMPREHEN METABOLIC PANEL: CPT | Performed by: NURSE PRACTITIONER

## 2022-09-22 PROCEDURE — 25010000002 ONDANSETRON PER 1 MG: Performed by: NURSE PRACTITIONER

## 2022-09-22 PROCEDURE — U0003 INFECTIOUS AGENT DETECTION BY NUCLEIC ACID (DNA OR RNA); SEVERE ACUTE RESPIRATORY SYNDROME CORONAVIRUS 2 (SARS-COV-2) (CORONAVIRUS DISEASE [COVID-19]), AMPLIFIED PROBE TECHNIQUE, MAKING USE OF HIGH THROUGHPUT TECHNOLOGIES AS DESCRIBED BY CMS-2020-01-R: HCPCS | Performed by: NURSE PRACTITIONER

## 2022-09-22 PROCEDURE — 25010000002 MORPHINE PER 10 MG: Performed by: NURSE PRACTITIONER

## 2022-09-22 PROCEDURE — 96375 TX/PRO/DX INJ NEW DRUG ADDON: CPT

## 2022-09-22 RX ORDER — ONDANSETRON 4 MG/1
4 TABLET, ORALLY DISINTEGRATING ORAL 4 TIMES DAILY PRN
Qty: 10 TABLET | Refills: 0 | Status: SHIPPED | OUTPATIENT
Start: 2022-09-22 | End: 2022-09-24

## 2022-09-22 RX ORDER — ONDANSETRON 2 MG/ML
4 INJECTION INTRAMUSCULAR; INTRAVENOUS ONCE
Status: COMPLETED | OUTPATIENT
Start: 2022-09-22 | End: 2022-09-22

## 2022-09-22 RX ORDER — SODIUM CHLORIDE 0.9 % (FLUSH) 0.9 %
10 SYRINGE (ML) INJECTION AS NEEDED
Status: DISCONTINUED | OUTPATIENT
Start: 2022-09-22 | End: 2022-09-23 | Stop reason: HOSPADM

## 2022-09-22 RX ORDER — CEFDINIR 300 MG/1
300 CAPSULE ORAL 2 TIMES DAILY
Qty: 10 CAPSULE | Refills: 0 | Status: SHIPPED | OUTPATIENT
Start: 2022-09-22 | End: 2022-10-10 | Stop reason: HOSPADM

## 2022-09-22 RX ADMIN — ONDANSETRON 4 MG: 2 INJECTION INTRAMUSCULAR; INTRAVENOUS at 21:43

## 2022-09-22 RX ADMIN — CEFTRIAXONE 1 G: 1 INJECTION, POWDER, FOR SOLUTION INTRAMUSCULAR; INTRAVENOUS at 22:19

## 2022-09-22 RX ADMIN — MORPHINE SULFATE 4 MG: 4 INJECTION INTRAVENOUS at 21:38

## 2022-09-22 RX ADMIN — SODIUM CHLORIDE 1000 ML: 9 INJECTION, SOLUTION INTRAVENOUS at 21:38

## 2022-09-24 ENCOUNTER — HOSPITAL ENCOUNTER (INPATIENT)
Facility: HOSPITAL | Age: 67
LOS: 12 days | Discharge: NURSING, REHAB OR LONG-TERM CARE | End: 2022-10-10
Attending: EMERGENCY MEDICINE | Admitting: INTERNAL MEDICINE

## 2022-09-24 ENCOUNTER — APPOINTMENT (OUTPATIENT)
Dept: CT IMAGING | Facility: HOSPITAL | Age: 67
End: 2022-09-24

## 2022-09-24 ENCOUNTER — ON CAMPUS - OUTPATIENT (OUTPATIENT)
Dept: URBAN - METROPOLITAN AREA HOSPITAL 85 | Facility: HOSPITAL | Age: 67
End: 2022-09-24

## 2022-09-24 ENCOUNTER — APPOINTMENT (OUTPATIENT)
Dept: GENERAL RADIOLOGY | Facility: HOSPITAL | Age: 67
End: 2022-09-24

## 2022-09-24 DIAGNOSIS — R11.0 NAUSEA: ICD-10-CM

## 2022-09-24 DIAGNOSIS — D53.9 NUTRITIONAL ANEMIA, UNSPECIFIED: ICD-10-CM

## 2022-09-24 DIAGNOSIS — R11.2 NAUSEA WITH VOMITING, UNSPECIFIED: ICD-10-CM

## 2022-09-24 DIAGNOSIS — K29.80 DUODENITIS: ICD-10-CM

## 2022-09-24 DIAGNOSIS — R93.3 ABNORMAL FINDINGS ON DIAGNOSTIC IMAGING OF OTHER PARTS OF DI: ICD-10-CM

## 2022-09-24 DIAGNOSIS — R10.9 ABDOMINAL PAIN, UNSPECIFIED ABDOMINAL LOCATION: Primary | ICD-10-CM

## 2022-09-24 DIAGNOSIS — N39.0 URINARY TRACT INFECTION WITH HEMATURIA, SITE UNSPECIFIED: ICD-10-CM

## 2022-09-24 DIAGNOSIS — R31.9 URINARY TRACT INFECTION WITH HEMATURIA, SITE UNSPECIFIED: ICD-10-CM

## 2022-09-24 DIAGNOSIS — R06.00 DYSPNEA, UNSPECIFIED TYPE: ICD-10-CM

## 2022-09-24 DIAGNOSIS — R79.89 ELEVATED D-DIMER: ICD-10-CM

## 2022-09-24 DIAGNOSIS — R10.9 UNSPECIFIED ABDOMINAL PAIN: ICD-10-CM

## 2022-09-24 DIAGNOSIS — R09.02 HYPOXIA: ICD-10-CM

## 2022-09-24 LAB
ALBUMIN SERPL-MCNC: 3 G/DL (ref 3.5–5.2)
ALBUMIN/GLOB SERPL: 1.2 G/DL
ALP SERPL-CCNC: 86 U/L (ref 39–117)
ALT SERPL W P-5'-P-CCNC: 7 U/L (ref 1–33)
AMYLASE SERPL-CCNC: 24 U/L (ref 28–100)
ANION GAP SERPL CALCULATED.3IONS-SCNC: 7 MMOL/L (ref 5–15)
ANION GAP SERPL CALCULATED.3IONS-SCNC: 8 MMOL/L (ref 5–15)
APTT PPP: 24.1 SECONDS (ref 61–76.5)
AST SERPL-CCNC: 12 U/L (ref 1–32)
B PARAPERT DNA SPEC QL NAA+PROBE: NOT DETECTED
B PERT DNA SPEC QL NAA+PROBE: NOT DETECTED
BACTERIA UR QL AUTO: ABNORMAL /HPF
BASOPHILS # BLD AUTO: 0 10*3/MM3 (ref 0–0.2)
BASOPHILS NFR BLD AUTO: 0.2 % (ref 0–1.5)
BILIRUB SERPL-MCNC: 0.8 MG/DL (ref 0–1.2)
BILIRUB UR QL STRIP: ABNORMAL
BUN SERPL-MCNC: 6 MG/DL (ref 8–23)
BUN SERPL-MCNC: 7 MG/DL (ref 8–23)
BUN/CREAT SERPL: 12 (ref 7–25)
BUN/CREAT SERPL: 16.3 (ref 7–25)
C PNEUM DNA NPH QL NAA+NON-PROBE: NOT DETECTED
CALCIUM SPEC-SCNC: 8.2 MG/DL (ref 8.6–10.5)
CALCIUM SPEC-SCNC: 8.3 MG/DL (ref 8.6–10.5)
CHLORIDE SERPL-SCNC: 100 MMOL/L (ref 98–107)
CHLORIDE SERPL-SCNC: 98 MMOL/L (ref 98–107)
CLARITY UR: CLEAR
CO2 SERPL-SCNC: 30 MMOL/L (ref 22–29)
CO2 SERPL-SCNC: 31 MMOL/L (ref 22–29)
COLOR UR: ABNORMAL
CREAT SERPL-MCNC: 0.43 MG/DL (ref 0.57–1)
CREAT SERPL-MCNC: 0.5 MG/DL (ref 0.57–1)
D DIMER PPP FEU-MCNC: 1.43 MG/L (FEU) (ref 0–0.59)
D-LACTATE SERPL-SCNC: 1.2 MMOL/L (ref 0.5–2)
DEPRECATED RDW RBC AUTO: 64.3 FL (ref 37–54)
EGFRCR SERPLBLD CKD-EPI 2021: 103.6 ML/MIN/1.73
EGFRCR SERPLBLD CKD-EPI 2021: 107.4 ML/MIN/1.73
EOSINOPHIL # BLD AUTO: 0 10*3/MM3 (ref 0–0.4)
EOSINOPHIL NFR BLD AUTO: 0.7 % (ref 0.3–6.2)
ERYTHROCYTE [DISTWIDTH] IN BLOOD BY AUTOMATED COUNT: 17.6 % (ref 12.3–15.4)
FLUAV SUBTYP SPEC NAA+PROBE: NOT DETECTED
FLUBV RNA ISLT QL NAA+PROBE: NOT DETECTED
GLOBULIN UR ELPH-MCNC: 2.5 GM/DL
GLUCOSE SERPL-MCNC: 107 MG/DL (ref 65–99)
GLUCOSE SERPL-MCNC: 119 MG/DL (ref 65–99)
GLUCOSE UR STRIP-MCNC: NEGATIVE MG/DL
HADV DNA SPEC NAA+PROBE: NOT DETECTED
HCOV 229E RNA SPEC QL NAA+PROBE: NOT DETECTED
HCOV HKU1 RNA SPEC QL NAA+PROBE: NOT DETECTED
HCOV NL63 RNA SPEC QL NAA+PROBE: NOT DETECTED
HCOV OC43 RNA SPEC QL NAA+PROBE: NOT DETECTED
HCT VFR BLD AUTO: 28.3 % (ref 34–46.6)
HGB BLD-MCNC: 9.5 G/DL (ref 12–15.9)
HGB UR QL STRIP.AUTO: NEGATIVE
HMPV RNA NPH QL NAA+NON-PROBE: NOT DETECTED
HPIV1 RNA ISLT QL NAA+PROBE: NOT DETECTED
HPIV2 RNA SPEC QL NAA+PROBE: NOT DETECTED
HPIV3 RNA NPH QL NAA+PROBE: NOT DETECTED
HPIV4 P GENE NPH QL NAA+PROBE: NOT DETECTED
HYALINE CASTS UR QL AUTO: ABNORMAL /LPF
INR PPP: 1.06 (ref 0.93–1.1)
IRON 24H UR-MRATE: 173 MCG/DL (ref 37–145)
IRON SATN MFR SERPL: 66 % (ref 20–50)
KETONES UR QL STRIP: ABNORMAL
LEUKOCYTE ESTERASE UR QL STRIP.AUTO: ABNORMAL
LIPASE SERPL-CCNC: 22 U/L (ref 13–60)
LYMPHOCYTES # BLD AUTO: 0.6 10*3/MM3 (ref 0.7–3.1)
LYMPHOCYTES NFR BLD AUTO: 8.8 % (ref 19.6–45.3)
M PNEUMO IGG SER IA-ACNC: NOT DETECTED
MAGNESIUM SERPL-MCNC: 1.7 MG/DL (ref 1.6–2.4)
MCH RBC QN AUTO: 35.1 PG (ref 26.6–33)
MCHC RBC AUTO-ENTMCNC: 33.7 G/DL (ref 31.5–35.7)
MCV RBC AUTO: 104.1 FL (ref 79–97)
MONOCYTES # BLD AUTO: 0.1 10*3/MM3 (ref 0.1–0.9)
MONOCYTES NFR BLD AUTO: 0.9 % (ref 5–12)
NEUTROPHILS NFR BLD AUTO: 5.9 10*3/MM3 (ref 1.7–7)
NEUTROPHILS NFR BLD AUTO: 89.4 % (ref 42.7–76)
NITRITE UR QL STRIP: NEGATIVE
NRBC BLD AUTO-RTO: 0.1 /100 WBC (ref 0–0.2)
NT-PROBNP SERPL-MCNC: 69.4 PG/ML (ref 0–900)
PH UR STRIP.AUTO: 6 [PH] (ref 5–8)
PHOSPHATE SERPL-MCNC: 3.3 MG/DL (ref 2.5–4.5)
PLATELET # BLD AUTO: 224 10*3/MM3 (ref 140–450)
PMV BLD AUTO: 6.8 FL (ref 6–12)
POTASSIUM SERPL-SCNC: 3.6 MMOL/L (ref 3.5–5.2)
POTASSIUM SERPL-SCNC: 4.2 MMOL/L (ref 3.5–5.2)
PROT SERPL-MCNC: 5.5 G/DL (ref 6–8.5)
PROT UR QL STRIP: NEGATIVE
PROTHROMBIN TIME: 10.9 SECONDS (ref 9.6–11.7)
RBC # BLD AUTO: 2.72 10*6/MM3 (ref 3.77–5.28)
RBC # UR STRIP: ABNORMAL /HPF
REF LAB TEST METHOD: ABNORMAL
RHINOVIRUS RNA SPEC NAA+PROBE: NOT DETECTED
RSV RNA NPH QL NAA+NON-PROBE: NOT DETECTED
SARS-COV-2 RNA NPH QL NAA+NON-PROBE: NOT DETECTED
SODIUM SERPL-SCNC: 136 MMOL/L (ref 136–145)
SODIUM SERPL-SCNC: 138 MMOL/L (ref 136–145)
SP GR UR STRIP: 1.02 (ref 1–1.03)
SQUAMOUS #/AREA URNS HPF: ABNORMAL /HPF
TIBC SERPL-MCNC: 262 MCG/DL (ref 298–536)
TRANSFERRIN SERPL-MCNC: 176 MG/DL (ref 200–360)
TROPONIN T SERPL-MCNC: <0.01 NG/ML (ref 0–0.03)
UROBILINOGEN UR QL STRIP: ABNORMAL
WBC # UR STRIP: ABNORMAL /HPF
WBC NRBC COR # BLD: 6.5 10*3/MM3 (ref 3.4–10.8)

## 2022-09-24 PROCEDURE — 85730 THROMBOPLASTIN TIME PARTIAL: CPT | Performed by: EMERGENCY MEDICINE

## 2022-09-24 PROCEDURE — 85379 FIBRIN DEGRADATION QUANT: CPT | Performed by: EMERGENCY MEDICINE

## 2022-09-24 PROCEDURE — 83540 ASSAY OF IRON: CPT | Performed by: NURSE PRACTITIONER

## 2022-09-24 PROCEDURE — 99204 OFFICE O/P NEW MOD 45 MIN: CPT | Performed by: NURSE PRACTITIONER

## 2022-09-24 PROCEDURE — G0378 HOSPITAL OBSERVATION PER HR: HCPCS

## 2022-09-24 PROCEDURE — 85610 PROTHROMBIN TIME: CPT | Performed by: EMERGENCY MEDICINE

## 2022-09-24 PROCEDURE — 25010000002 ONDANSETRON PER 1 MG: Performed by: EMERGENCY MEDICINE

## 2022-09-24 PROCEDURE — 84466 ASSAY OF TRANSFERRIN: CPT | Performed by: NURSE PRACTITIONER

## 2022-09-24 PROCEDURE — 25010000002 CEFTRIAXONE PER 250 MG: Performed by: EMERGENCY MEDICINE

## 2022-09-24 PROCEDURE — 80053 COMPREHEN METABOLIC PANEL: CPT | Performed by: EMERGENCY MEDICINE

## 2022-09-24 PROCEDURE — 99284 EMERGENCY DEPT VISIT MOD MDM: CPT

## 2022-09-24 PROCEDURE — 93005 ELECTROCARDIOGRAM TRACING: CPT | Performed by: EMERGENCY MEDICINE

## 2022-09-24 PROCEDURE — 83880 ASSAY OF NATRIURETIC PEPTIDE: CPT | Performed by: EMERGENCY MEDICINE

## 2022-09-24 PROCEDURE — 87086 URINE CULTURE/COLONY COUNT: CPT | Performed by: EMERGENCY MEDICINE

## 2022-09-24 PROCEDURE — 0202U NFCT DS 22 TRGT SARS-COV-2: CPT | Performed by: PHYSICIAN ASSISTANT

## 2022-09-24 PROCEDURE — 87040 BLOOD CULTURE FOR BACTERIA: CPT | Performed by: EMERGENCY MEDICINE

## 2022-09-24 PROCEDURE — 25010000002 ENOXAPARIN PER 10 MG: Performed by: INTERNAL MEDICINE

## 2022-09-24 PROCEDURE — 83690 ASSAY OF LIPASE: CPT | Performed by: EMERGENCY MEDICINE

## 2022-09-24 PROCEDURE — 83605 ASSAY OF LACTIC ACID: CPT

## 2022-09-24 PROCEDURE — 83735 ASSAY OF MAGNESIUM: CPT | Performed by: INTERNAL MEDICINE

## 2022-09-24 PROCEDURE — 25010000002 ENOXAPARIN PER 10 MG: Performed by: PHYSICIAN ASSISTANT

## 2022-09-24 PROCEDURE — 85025 COMPLETE CBC W/AUTO DIFF WBC: CPT | Performed by: EMERGENCY MEDICINE

## 2022-09-24 PROCEDURE — 84484 ASSAY OF TROPONIN QUANT: CPT | Performed by: EMERGENCY MEDICINE

## 2022-09-24 PROCEDURE — 81001 URINALYSIS AUTO W/SCOPE: CPT | Performed by: EMERGENCY MEDICINE

## 2022-09-24 PROCEDURE — 82150 ASSAY OF AMYLASE: CPT | Performed by: INTERNAL MEDICINE

## 2022-09-24 PROCEDURE — 74176 CT ABD & PELVIS W/O CONTRAST: CPT

## 2022-09-24 PROCEDURE — 84100 ASSAY OF PHOSPHORUS: CPT | Performed by: INTERNAL MEDICINE

## 2022-09-24 PROCEDURE — 93005 ELECTROCARDIOGRAM TRACING: CPT

## 2022-09-24 PROCEDURE — 71045 X-RAY EXAM CHEST 1 VIEW: CPT

## 2022-09-24 PROCEDURE — 36415 COLL VENOUS BLD VENIPUNCTURE: CPT

## 2022-09-24 RX ORDER — ENOXAPARIN SODIUM 150 MG/ML
1 INJECTION SUBCUTANEOUS ONCE
Status: COMPLETED | OUTPATIENT
Start: 2022-09-24 | End: 2022-09-24

## 2022-09-24 RX ORDER — SODIUM CHLORIDE 0.9 % (FLUSH) 0.9 %
10 SYRINGE (ML) INJECTION AS NEEDED
Status: DISCONTINUED | OUTPATIENT
Start: 2022-09-24 | End: 2022-10-10 | Stop reason: HOSPADM

## 2022-09-24 RX ORDER — SODIUM CHLORIDE 0.9 % (FLUSH) 0.9 %
10 SYRINGE (ML) INJECTION EVERY 12 HOURS SCHEDULED
Status: DISCONTINUED | OUTPATIENT
Start: 2022-09-24 | End: 2022-10-10 | Stop reason: HOSPADM

## 2022-09-24 RX ORDER — RISPERIDONE 0.25 MG/1
0.25 TABLET ORAL NIGHTLY
Status: DISCONTINUED | OUTPATIENT
Start: 2022-09-24 | End: 2022-10-10 | Stop reason: HOSPADM

## 2022-09-24 RX ORDER — ONDANSETRON 2 MG/ML
4 INJECTION INTRAMUSCULAR; INTRAVENOUS ONCE
Status: COMPLETED | OUTPATIENT
Start: 2022-09-24 | End: 2022-09-24

## 2022-09-24 RX ORDER — ROSUVASTATIN CALCIUM 10 MG/1
20 TABLET, COATED ORAL NIGHTLY
Status: DISCONTINUED | OUTPATIENT
Start: 2022-09-24 | End: 2022-10-10 | Stop reason: HOSPADM

## 2022-09-24 RX ORDER — SORBITOL SOLUTION 70 %
100 SOLUTION, ORAL MISCELLANEOUS ONCE
Status: COMPLETED | OUTPATIENT
Start: 2022-09-24 | End: 2022-09-24

## 2022-09-24 RX ORDER — BISACODYL 10 MG
10 SUPPOSITORY, RECTAL RECTAL ONCE
Status: COMPLETED | OUTPATIENT
Start: 2022-09-24 | End: 2022-09-24

## 2022-09-24 RX ORDER — NITROGLYCERIN 0.4 MG/1
0.4 TABLET SUBLINGUAL
Status: DISCONTINUED | OUTPATIENT
Start: 2022-09-24 | End: 2022-10-10 | Stop reason: HOSPADM

## 2022-09-24 RX ORDER — ONDANSETRON 2 MG/ML
4 INJECTION INTRAMUSCULAR; INTRAVENOUS EVERY 6 HOURS PRN
Status: DISCONTINUED | OUTPATIENT
Start: 2022-09-24 | End: 2022-09-25

## 2022-09-24 RX ORDER — OXCARBAZEPINE 150 MG/1
150 TABLET, FILM COATED ORAL 2 TIMES DAILY
Status: DISCONTINUED | OUTPATIENT
Start: 2022-09-24 | End: 2022-10-10 | Stop reason: HOSPADM

## 2022-09-24 RX ORDER — HYDROCODONE BITARTRATE AND ACETAMINOPHEN 5; 325 MG/1; MG/1
1 TABLET ORAL EVERY 4 HOURS PRN
Status: DISPENSED | OUTPATIENT
Start: 2022-09-24 | End: 2022-10-01

## 2022-09-24 RX ORDER — SODIUM CHLORIDE 9 MG/ML
100 INJECTION, SOLUTION INTRAVENOUS CONTINUOUS
Status: DISCONTINUED | OUTPATIENT
Start: 2022-09-24 | End: 2022-09-25

## 2022-09-24 RX ORDER — CHOLECALCIFEROL (VITAMIN D3) 125 MCG
5 CAPSULE ORAL NIGHTLY PRN
Status: DISCONTINUED | OUTPATIENT
Start: 2022-09-24 | End: 2022-10-10 | Stop reason: HOSPADM

## 2022-09-24 RX ORDER — BISACODYL 10 MG
10 SUPPOSITORY, RECTAL RECTAL DAILY
Status: DISCONTINUED | OUTPATIENT
Start: 2022-09-24 | End: 2022-09-24

## 2022-09-24 RX ORDER — ENOXAPARIN SODIUM 100 MG/ML
40 INJECTION SUBCUTANEOUS 2 TIMES DAILY
Status: DISCONTINUED | OUTPATIENT
Start: 2022-09-24 | End: 2022-10-01

## 2022-09-24 RX ORDER — CEFDINIR 300 MG/1
300 CAPSULE ORAL 2 TIMES DAILY
Status: COMPLETED | OUTPATIENT
Start: 2022-09-25 | End: 2022-09-26

## 2022-09-24 RX ORDER — SERTRALINE HYDROCHLORIDE 100 MG/1
200 TABLET, FILM COATED ORAL DAILY
Status: DISCONTINUED | OUTPATIENT
Start: 2022-09-24 | End: 2022-10-10 | Stop reason: HOSPADM

## 2022-09-24 RX ORDER — PANTOPRAZOLE SODIUM 40 MG/10ML
40 INJECTION, POWDER, LYOPHILIZED, FOR SOLUTION INTRAVENOUS
Status: DISCONTINUED | OUTPATIENT
Start: 2022-09-25 | End: 2022-09-26

## 2022-09-24 RX ORDER — SUCRALFATE 1 G/1
1 TABLET ORAL
Status: DISCONTINUED | OUTPATIENT
Start: 2022-09-24 | End: 2022-10-10 | Stop reason: HOSPADM

## 2022-09-24 RX ORDER — LISINOPRIL 20 MG/1
20 TABLET ORAL DAILY
Status: DISCONTINUED | OUTPATIENT
Start: 2022-09-24 | End: 2022-10-08

## 2022-09-24 RX ORDER — PANTOPRAZOLE SODIUM 40 MG/10ML
40 INJECTION, POWDER, LYOPHILIZED, FOR SOLUTION INTRAVENOUS ONCE
Status: COMPLETED | OUTPATIENT
Start: 2022-09-24 | End: 2022-09-24

## 2022-09-24 RX ADMIN — OXCARBAZEPINE 150 MG: 150 TABLET, FILM COATED ORAL at 13:09

## 2022-09-24 RX ADMIN — LISINOPRIL 20 MG: 20 TABLET ORAL at 13:09

## 2022-09-24 RX ADMIN — PANTOPRAZOLE SODIUM 40 MG: 40 INJECTION, POWDER, FOR SOLUTION INTRAVENOUS at 05:43

## 2022-09-24 RX ADMIN — SUCRALFATE 1 G: 1 TABLET ORAL at 21:02

## 2022-09-24 RX ADMIN — BISACODYL 10 MG: 10 SUPPOSITORY RECTAL at 14:19

## 2022-09-24 RX ADMIN — SUCRALFATE 1 G: 1 TABLET ORAL at 16:51

## 2022-09-24 RX ADMIN — RISPERIDONE 0.25 MG: 0.25 TABLET ORAL at 21:03

## 2022-09-24 RX ADMIN — Medication 10 ML: at 21:02

## 2022-09-24 RX ADMIN — SORBITOL SOLUTION (BULK) 100 ML: 70 SOLUTION at 14:19

## 2022-09-24 RX ADMIN — CEFTRIAXONE 1 G: 1 INJECTION, POWDER, FOR SOLUTION INTRAMUSCULAR; INTRAVENOUS at 07:36

## 2022-09-24 RX ADMIN — ENOXAPARIN SODIUM 110 MG: 150 INJECTION SUBCUTANEOUS at 10:21

## 2022-09-24 RX ADMIN — SERTRALINE 200 MG: 100 TABLET, FILM COATED ORAL at 13:09

## 2022-09-24 RX ADMIN — OXCARBAZEPINE 150 MG: 150 TABLET, FILM COATED ORAL at 21:02

## 2022-09-24 RX ADMIN — Medication 10 ML: at 13:09

## 2022-09-24 RX ADMIN — ONDANSETRON 4 MG: 2 INJECTION INTRAMUSCULAR; INTRAVENOUS at 05:43

## 2022-09-24 RX ADMIN — SUCRALFATE 1 G: 1 TABLET ORAL at 13:09

## 2022-09-24 RX ADMIN — ENOXAPARIN SODIUM 40 MG: 100 INJECTION SUBCUTANEOUS at 21:02

## 2022-09-24 RX ADMIN — SODIUM CHLORIDE 100 ML/HR: 9 INJECTION, SOLUTION INTRAVENOUS at 10:20

## 2022-09-24 RX ADMIN — ROSUVASTATIN CALCIUM 20 MG: 10 TABLET, FILM COATED ORAL at 21:02

## 2022-09-25 ENCOUNTER — ANESTHESIA EVENT (OUTPATIENT)
Dept: GASTROENTEROLOGY | Facility: HOSPITAL | Age: 67
End: 2022-09-25

## 2022-09-25 ENCOUNTER — ANESTHESIA (OUTPATIENT)
Dept: GASTROENTEROLOGY | Facility: HOSPITAL | Age: 67
End: 2022-09-25

## 2022-09-25 ENCOUNTER — APPOINTMENT (OUTPATIENT)
Dept: GENERAL RADIOLOGY | Facility: HOSPITAL | Age: 67
End: 2022-09-25

## 2022-09-25 ENCOUNTER — ON CAMPUS - OUTPATIENT (OUTPATIENT)
Dept: URBAN - METROPOLITAN AREA HOSPITAL 85 | Facility: HOSPITAL | Age: 67
End: 2022-09-25

## 2022-09-25 DIAGNOSIS — R10.9 UNSPECIFIED ABDOMINAL PAIN: ICD-10-CM

## 2022-09-25 DIAGNOSIS — K29.80 DUODENITIS WITHOUT BLEEDING: ICD-10-CM

## 2022-09-25 DIAGNOSIS — K22.89 OTHER SPECIFIED DISEASE OF ESOPHAGUS: ICD-10-CM

## 2022-09-25 DIAGNOSIS — R11.0 NAUSEA: ICD-10-CM

## 2022-09-25 LAB
ALBUMIN SERPL-MCNC: 2.6 G/DL (ref 3.5–5.2)
ALBUMIN/GLOB SERPL: 1.2 G/DL
ALP SERPL-CCNC: 69 U/L (ref 39–117)
ALT SERPL W P-5'-P-CCNC: 7 U/L (ref 1–33)
ANION GAP SERPL CALCULATED.3IONS-SCNC: 8 MMOL/L (ref 5–15)
AST SERPL-CCNC: 12 U/L (ref 1–32)
BACTERIA SPEC AEROBE CULT: NORMAL
BILIRUB SERPL-MCNC: 0.5 MG/DL (ref 0–1.2)
BUN SERPL-MCNC: 5 MG/DL (ref 8–23)
BUN/CREAT SERPL: 11.9 (ref 7–25)
CA-I SERPL ISE-MCNC: 1.14 MMOL/L (ref 1.2–1.3)
CALCIUM SPEC-SCNC: 7.6 MG/DL (ref 8.6–10.5)
CHLORIDE SERPL-SCNC: 102 MMOL/L (ref 98–107)
CHOLEST SERPL-MCNC: 108 MG/DL (ref 0–200)
CK SERPL-CCNC: 24 U/L (ref 20–180)
CO2 SERPL-SCNC: 29 MMOL/L (ref 22–29)
CREAT SERPL-MCNC: 0.42 MG/DL (ref 0.57–1)
D-LACTATE SERPL-SCNC: 1.8 MMOL/L (ref 0.5–2)
EGFRCR SERPLBLD CKD-EPI 2021: 108 ML/MIN/1.73
GLOBULIN UR ELPH-MCNC: 2.2 GM/DL
GLUCOSE SERPL-MCNC: 105 MG/DL (ref 65–99)
HDLC SERPL-MCNC: 26 MG/DL (ref 40–60)
LDLC SERPL CALC-MCNC: 56 MG/DL (ref 0–100)
LDLC/HDLC SERPL: 2.01 {RATIO}
MAGNESIUM SERPL-MCNC: 1.8 MG/DL (ref 1.6–2.4)
PHOSPHATE SERPL-MCNC: 3.4 MG/DL (ref 2.5–4.5)
POTASSIUM SERPL-SCNC: 3.4 MMOL/L (ref 3.5–5.2)
PROCALCITONIN SERPL-MCNC: 0.11 NG/ML (ref 0–0.25)
PROT SERPL-MCNC: 4.8 G/DL (ref 6–8.5)
SODIUM SERPL-SCNC: 139 MMOL/L (ref 136–145)
TRIGL SERPL-MCNC: 149 MG/DL (ref 0–150)
TSH SERPL DL<=0.05 MIU/L-ACNC: 3.52 UIU/ML (ref 0.27–4.2)
VLDLC SERPL-MCNC: 26 MG/DL (ref 5–40)

## 2022-09-25 PROCEDURE — 88305 TISSUE EXAM BY PATHOLOGIST: CPT | Performed by: INTERNAL MEDICINE

## 2022-09-25 PROCEDURE — 83605 ASSAY OF LACTIC ACID: CPT | Performed by: INTERNAL MEDICINE

## 2022-09-25 PROCEDURE — 43239 EGD BIOPSY SINGLE/MULTIPLE: CPT | Performed by: INTERNAL MEDICINE

## 2022-09-25 PROCEDURE — G0378 HOSPITAL OBSERVATION PER HR: HCPCS

## 2022-09-25 PROCEDURE — 80061 LIPID PANEL: CPT | Performed by: INTERNAL MEDICINE

## 2022-09-25 PROCEDURE — 83735 ASSAY OF MAGNESIUM: CPT | Performed by: INTERNAL MEDICINE

## 2022-09-25 PROCEDURE — 80053 COMPREHEN METABOLIC PANEL: CPT | Performed by: INTERNAL MEDICINE

## 2022-09-25 PROCEDURE — 82330 ASSAY OF CALCIUM: CPT | Performed by: INTERNAL MEDICINE

## 2022-09-25 PROCEDURE — 82550 ASSAY OF CK (CPK): CPT | Performed by: INTERNAL MEDICINE

## 2022-09-25 PROCEDURE — 25010000002 ENOXAPARIN PER 10 MG: Performed by: INTERNAL MEDICINE

## 2022-09-25 PROCEDURE — 0DB48ZX EXCISION OF ESOPHAGOGASTRIC JUNCTION, VIA NATURAL OR ARTIFICIAL OPENING ENDOSCOPIC, DIAGNOSTIC: ICD-10-PCS | Performed by: INTERNAL MEDICINE

## 2022-09-25 PROCEDURE — 88342 IMHCHEM/IMCYTCHM 1ST ANTB: CPT | Performed by: INTERNAL MEDICINE

## 2022-09-25 PROCEDURE — 84443 ASSAY THYROID STIM HORMONE: CPT | Performed by: INTERNAL MEDICINE

## 2022-09-25 PROCEDURE — 25010000002 PROPOFOL 200 MG/20ML EMULSION: Performed by: ANESTHESIOLOGY

## 2022-09-25 PROCEDURE — 71046 X-RAY EXAM CHEST 2 VIEWS: CPT

## 2022-09-25 PROCEDURE — 84100 ASSAY OF PHOSPHORUS: CPT | Performed by: INTERNAL MEDICINE

## 2022-09-25 PROCEDURE — 83036 HEMOGLOBIN GLYCOSYLATED A1C: CPT | Performed by: INTERNAL MEDICINE

## 2022-09-25 PROCEDURE — 0DB98ZX EXCISION OF DUODENUM, VIA NATURAL OR ARTIFICIAL OPENING ENDOSCOPIC, DIAGNOSTIC: ICD-10-PCS | Performed by: INTERNAL MEDICINE

## 2022-09-25 PROCEDURE — 84145 PROCALCITONIN (PCT): CPT | Performed by: INTERNAL MEDICINE

## 2022-09-25 PROCEDURE — 97161 PT EVAL LOW COMPLEX 20 MIN: CPT

## 2022-09-25 PROCEDURE — 25010000002 CALCIUM GLUCONATE 2-0.675 GM/100ML-% SOLUTION: Performed by: INTERNAL MEDICINE

## 2022-09-25 RX ORDER — ONDANSETRON 2 MG/ML
4 INJECTION INTRAMUSCULAR; INTRAVENOUS EVERY 6 HOURS PRN
Status: DISCONTINUED | OUTPATIENT
Start: 2022-09-25 | End: 2022-10-10 | Stop reason: HOSPADM

## 2022-09-25 RX ORDER — SODIUM CHLORIDE 9 MG/ML
INJECTION, SOLUTION INTRAVENOUS CONTINUOUS PRN
Status: DISCONTINUED | OUTPATIENT
Start: 2022-09-25 | End: 2022-09-25 | Stop reason: SURG

## 2022-09-25 RX ORDER — CLOTRIMAZOLE AND BETAMETHASONE DIPROPIONATE 10; .64 MG/G; MG/G
1 CREAM TOPICAL EVERY 12 HOURS SCHEDULED
Status: DISCONTINUED | OUTPATIENT
Start: 2022-09-25 | End: 2022-10-10 | Stop reason: HOSPADM

## 2022-09-25 RX ORDER — POTASSIUM CHLORIDE 20 MEQ/1
40 TABLET, EXTENDED RELEASE ORAL DAILY
Status: DISCONTINUED | OUTPATIENT
Start: 2022-09-25 | End: 2022-10-09

## 2022-09-25 RX ORDER — ONDANSETRON 4 MG/1
4 TABLET, FILM COATED ORAL EVERY 6 HOURS PRN
Status: DISCONTINUED | OUTPATIENT
Start: 2022-09-25 | End: 2022-10-10 | Stop reason: HOSPADM

## 2022-09-25 RX ORDER — PROPOFOL 10 MG/ML
INJECTION, EMULSION INTRAVENOUS AS NEEDED
Status: DISCONTINUED | OUTPATIENT
Start: 2022-09-25 | End: 2022-09-25 | Stop reason: SURG

## 2022-09-25 RX ORDER — CALCIUM GLUCONATE 20 MG/ML
2 INJECTION, SOLUTION INTRAVENOUS ONCE
Status: COMPLETED | OUTPATIENT
Start: 2022-09-25 | End: 2022-09-25

## 2022-09-25 RX ADMIN — ENOXAPARIN SODIUM 40 MG: 100 INJECTION SUBCUTANEOUS at 20:49

## 2022-09-25 RX ADMIN — PANTOPRAZOLE SODIUM 40 MG: 40 INJECTION, POWDER, FOR SOLUTION INTRAVENOUS at 06:02

## 2022-09-25 RX ADMIN — SODIUM CHLORIDE 100 ML/HR: 9 INJECTION, SOLUTION INTRAVENOUS at 02:30

## 2022-09-25 RX ADMIN — SODIUM CHLORIDE: 0.9 INJECTION, SOLUTION INTRAVENOUS at 16:46

## 2022-09-25 RX ADMIN — CEFDINIR 300 MG: 300 CAPSULE ORAL at 08:17

## 2022-09-25 RX ADMIN — ROSUVASTATIN CALCIUM 20 MG: 10 TABLET, FILM COATED ORAL at 20:49

## 2022-09-25 RX ADMIN — SERTRALINE 200 MG: 100 TABLET, FILM COATED ORAL at 08:17

## 2022-09-25 RX ADMIN — RISPERIDONE 0.25 MG: 0.25 TABLET ORAL at 20:49

## 2022-09-25 RX ADMIN — SODIUM CHLORIDE 100 ML/HR: 9 INJECTION, SOLUTION INTRAVENOUS at 12:50

## 2022-09-25 RX ADMIN — CLOTRIMAZOLE AND BETAMETHASONE DIPROPIONATE 1 APPLICATION: 10; .5 CREAM TOPICAL at 11:26

## 2022-09-25 RX ADMIN — SUCRALFATE 1 G: 1 TABLET ORAL at 18:01

## 2022-09-25 RX ADMIN — POTASSIUM CHLORIDE 40 MEQ: 1500 TABLET, EXTENDED RELEASE ORAL at 14:38

## 2022-09-25 RX ADMIN — CALCIUM GLUCONATE 2 G: 20 INJECTION, SOLUTION INTRAVENOUS at 14:40

## 2022-09-25 RX ADMIN — CEFDINIR 300 MG: 300 CAPSULE ORAL at 20:48

## 2022-09-25 RX ADMIN — OXCARBAZEPINE 150 MG: 150 TABLET, FILM COATED ORAL at 08:17

## 2022-09-25 RX ADMIN — SUCRALFATE 1 G: 1 TABLET ORAL at 11:26

## 2022-09-25 RX ADMIN — CLOTRIMAZOLE AND BETAMETHASONE DIPROPIONATE 1 APPLICATION: 10; .5 CREAM TOPICAL at 20:49

## 2022-09-25 RX ADMIN — Medication 10 ML: at 20:49

## 2022-09-25 RX ADMIN — SUCRALFATE 1 G: 1 TABLET ORAL at 20:48

## 2022-09-25 RX ADMIN — SUCRALFATE 1 G: 1 TABLET ORAL at 08:17

## 2022-09-25 RX ADMIN — PROPOFOL 150 MG: 10 INJECTION, EMULSION INTRAVENOUS at 16:52

## 2022-09-25 RX ADMIN — OXCARBAZEPINE 150 MG: 150 TABLET, FILM COATED ORAL at 20:49

## 2022-09-25 NOTE — ANESTHESIA POSTPROCEDURE EVALUATION
Patient: Morgan Quick    Procedure Summary     Date: 09/25/22 Room / Location: [facility] ENDOSCOPY 1 / [facility] ENDOSCOPY    Anesthesia Start: 1646 Anesthesia Stop: 1701    Procedure: ESOPHAGOGASTRODUODENOSCOPY (N/A ) Diagnosis:       Abdominal pain, unspecified abdominal location      Nausea      (Abdominal pain, unspecified abdominal location [R10.9])      (Nausea [R11.0])    Surgeons: Deonte Wong MD Provider: Evens Yousif MD    Anesthesia Type: MAC ASA Status: 3          Anesthesia Type: MAC    Vitals  No vitals data found for the desired time range.          Post Anesthesia Care and Evaluation    Patient location during evaluation: PACU  Patient participation: complete - patient participated  Level of consciousness: awake  Pain scale: See nurse's notes for pain score.  Pain management: adequate    Airway patency: patent  Anesthetic complications: No anesthetic complications  PONV Status: none  Cardiovascular status: acceptable  Respiratory status: acceptable  Hydration status: acceptable    Comments: Patient seen and examined postoperatively; vital signs stable; SpO2 greater than or equal to 90%; cardiopulmonary status stable; nausea/vomiting adequately controlled; pain adequately controlled; no apparent anesthesia complications; patient discharged from anesthesia care when discharge criteria were met

## 2022-09-25 NOTE — ANESTHESIA PREPROCEDURE EVALUATION
Anesthesia Evaluation     Patient summary reviewed and Nursing notes reviewed                Airway   Mallampati: II  TM distance: >3 FB  Neck ROM: full  No difficulty expected  Dental - normal exam     Pulmonary - negative pulmonary ROS and normal exam   Cardiovascular - normal exam    (+) hypertension, hyperlipidemia,       Neuro/Psych  (+) psychiatric history,    GI/Hepatic/Renal/Endo    (+) morbid obesity,  thyroid problem hypothyroidism    Musculoskeletal (-) negative ROS    Abdominal  - normal exam    Bowel sounds: normal.   Substance History - negative use     OB/GYN negative ob/gyn ROS         Other                        Anesthesia Plan    ASA 3     MAC   total IV anesthesia  intravenous induction     Anesthetic plan, risks, benefits, and alternatives have been provided, discussed and informed consent has been obtained with: patient.  Pre-procedure education provided      CODE STATUS:

## 2022-09-26 ENCOUNTER — APPOINTMENT (OUTPATIENT)
Dept: NUCLEAR MEDICINE | Facility: HOSPITAL | Age: 67
End: 2022-09-26

## 2022-09-26 LAB
ANION GAP SERPL CALCULATED.3IONS-SCNC: 5 MMOL/L (ref 5–15)
BUN SERPL-MCNC: 6 MG/DL (ref 8–23)
BUN/CREAT SERPL: 16.2 (ref 7–25)
CALCIUM SPEC-SCNC: 8.3 MG/DL (ref 8.6–10.5)
CHLORIDE SERPL-SCNC: 100 MMOL/L (ref 98–107)
CO2 SERPL-SCNC: 30 MMOL/L (ref 22–29)
CREAT SERPL-MCNC: 0.37 MG/DL (ref 0.57–1)
EGFRCR SERPLBLD CKD-EPI 2021: 111.4 ML/MIN/1.73
GLUCOSE SERPL-MCNC: 95 MG/DL (ref 65–99)
HBA1C MFR BLD: 6.2 % (ref 3.5–5.6)
MAGNESIUM SERPL-MCNC: 1.7 MG/DL (ref 1.6–2.4)
PHOSPHATE SERPL-MCNC: 3.6 MG/DL (ref 2.5–4.5)
POTASSIUM SERPL-SCNC: 4.4 MMOL/L (ref 3.5–5.2)
SODIUM SERPL-SCNC: 135 MMOL/L (ref 136–145)

## 2022-09-26 PROCEDURE — 0 TECHNETIUM TC99M PYROPHOSPHATE: Performed by: INTERNAL MEDICINE

## 2022-09-26 PROCEDURE — 25010000002 ENOXAPARIN PER 10 MG: Performed by: INTERNAL MEDICINE

## 2022-09-26 PROCEDURE — 25010000002 ONDANSETRON PER 1 MG: Performed by: INTERNAL MEDICINE

## 2022-09-26 PROCEDURE — G0378 HOSPITAL OBSERVATION PER HR: HCPCS

## 2022-09-26 PROCEDURE — A9538 TC99M PYROPHOSPHATE: HCPCS | Performed by: INTERNAL MEDICINE

## 2022-09-26 PROCEDURE — 0 TECHNETIUM ALBUMIN AGGREGATED: Performed by: INTERNAL MEDICINE

## 2022-09-26 PROCEDURE — 83735 ASSAY OF MAGNESIUM: CPT | Performed by: INTERNAL MEDICINE

## 2022-09-26 PROCEDURE — 84100 ASSAY OF PHOSPHORUS: CPT | Performed by: INTERNAL MEDICINE

## 2022-09-26 PROCEDURE — 78582 LUNG VENTILAT&PERFUS IMAGING: CPT

## 2022-09-26 PROCEDURE — 80048 BASIC METABOLIC PNL TOTAL CA: CPT | Performed by: INTERNAL MEDICINE

## 2022-09-26 PROCEDURE — A9540 TC99M MAA: HCPCS | Performed by: INTERNAL MEDICINE

## 2022-09-26 RX ORDER — PANTOPRAZOLE SODIUM 40 MG/1
40 TABLET, DELAYED RELEASE ORAL
Status: DISCONTINUED | OUTPATIENT
Start: 2022-09-27 | End: 2022-10-10 | Stop reason: HOSPADM

## 2022-09-26 RX ORDER — POLYETHYLENE GLYCOL 3350 17 G/17G
34 POWDER, FOR SOLUTION ORAL DAILY
Status: DISCONTINUED | OUTPATIENT
Start: 2022-09-26 | End: 2022-09-29

## 2022-09-26 RX ADMIN — POLYETHYLENE GLYCOL 3350 34 G: 17 POWDER, FOR SOLUTION ORAL at 15:35

## 2022-09-26 RX ADMIN — CEFDINIR 300 MG: 300 CAPSULE ORAL at 08:22

## 2022-09-26 RX ADMIN — ROSUVASTATIN CALCIUM 20 MG: 10 TABLET, FILM COATED ORAL at 20:00

## 2022-09-26 RX ADMIN — POTASSIUM CHLORIDE 40 MEQ: 1500 TABLET, EXTENDED RELEASE ORAL at 08:22

## 2022-09-26 RX ADMIN — SUCRALFATE 1 G: 1 TABLET ORAL at 20:00

## 2022-09-26 RX ADMIN — RISPERIDONE 0.25 MG: 0.25 TABLET ORAL at 20:00

## 2022-09-26 RX ADMIN — ENOXAPARIN SODIUM 40 MG: 100 INJECTION SUBCUTANEOUS at 08:22

## 2022-09-26 RX ADMIN — ENOXAPARIN SODIUM 40 MG: 100 INJECTION SUBCUTANEOUS at 20:00

## 2022-09-26 RX ADMIN — TECHNETIUM TC99M PYROPHOSPHATE 1 DOSE: 12 INJECTION INTRAVENOUS at 10:04

## 2022-09-26 RX ADMIN — LISINOPRIL 20 MG: 20 TABLET ORAL at 08:22

## 2022-09-26 RX ADMIN — OXCARBAZEPINE 150 MG: 150 TABLET, FILM COATED ORAL at 20:00

## 2022-09-26 RX ADMIN — CLOTRIMAZOLE AND BETAMETHASONE DIPROPIONATE 1 APPLICATION: 10; .5 CREAM TOPICAL at 08:23

## 2022-09-26 RX ADMIN — SERTRALINE 200 MG: 100 TABLET, FILM COATED ORAL at 08:22

## 2022-09-26 RX ADMIN — CEFDINIR 300 MG: 300 CAPSULE ORAL at 20:00

## 2022-09-26 RX ADMIN — SUCRALFATE 1 G: 1 TABLET ORAL at 08:22

## 2022-09-26 RX ADMIN — KIT FOR THE PREPARATION OF TECHNETIUM TC 99M ALBUMIN AGGREGATED 1 DOSE: 2.5 INJECTION, POWDER, FOR SOLUTION INTRAVENOUS at 10:04

## 2022-09-26 RX ADMIN — SUCRALFATE 1 G: 1 TABLET ORAL at 11:38

## 2022-09-26 RX ADMIN — PANTOPRAZOLE SODIUM 40 MG: 40 INJECTION, POWDER, FOR SOLUTION INTRAVENOUS at 05:20

## 2022-09-26 RX ADMIN — Medication 10 ML: at 20:01

## 2022-09-26 RX ADMIN — Medication 10 ML: at 08:22

## 2022-09-26 RX ADMIN — OXCARBAZEPINE 150 MG: 150 TABLET, FILM COATED ORAL at 08:22

## 2022-09-26 RX ADMIN — ONDANSETRON 4 MG: 2 INJECTION INTRAMUSCULAR; INTRAVENOUS at 11:44

## 2022-09-26 RX ADMIN — CLOTRIMAZOLE AND BETAMETHASONE DIPROPIONATE 1 APPLICATION: 10; .5 CREAM TOPICAL at 20:00

## 2022-09-26 RX ADMIN — SUCRALFATE 1 G: 1 TABLET ORAL at 16:45

## 2022-09-27 ENCOUNTER — APPOINTMENT (OUTPATIENT)
Dept: GENERAL RADIOLOGY | Facility: HOSPITAL | Age: 67
End: 2022-09-27

## 2022-09-27 LAB
ALBUMIN SERPL-MCNC: 2.6 G/DL (ref 3.5–5.2)
ALBUMIN/GLOB SERPL: 1.2 G/DL
ALP SERPL-CCNC: 67 U/L (ref 39–117)
ALT SERPL W P-5'-P-CCNC: 5 U/L (ref 1–33)
ANION GAP SERPL CALCULATED.3IONS-SCNC: 5 MMOL/L (ref 5–15)
AST SERPL-CCNC: 9 U/L (ref 1–32)
BASOPHILS # BLD AUTO: 0 10*3/MM3 (ref 0–0.2)
BASOPHILS NFR BLD AUTO: 0.2 % (ref 0–1.5)
BILIRUB SERPL-MCNC: 0.6 MG/DL (ref 0–1.2)
BUN SERPL-MCNC: 6 MG/DL (ref 8–23)
BUN/CREAT SERPL: 16.7 (ref 7–25)
CALCIUM SPEC-SCNC: 8.1 MG/DL (ref 8.6–10.5)
CHLORIDE SERPL-SCNC: 99 MMOL/L (ref 98–107)
CO2 SERPL-SCNC: 32 MMOL/L (ref 22–29)
CREAT SERPL-MCNC: 0.36 MG/DL (ref 0.57–1)
DEPRECATED RDW RBC AUTO: 63 FL (ref 37–54)
EGFRCR SERPLBLD CKD-EPI 2021: 112.1 ML/MIN/1.73
EOSINOPHIL # BLD AUTO: 0 10*3/MM3 (ref 0–0.4)
EOSINOPHIL NFR BLD AUTO: 1.4 % (ref 0.3–6.2)
ERYTHROCYTE [DISTWIDTH] IN BLOOD BY AUTOMATED COUNT: 17.2 % (ref 12.3–15.4)
GLOBULIN UR ELPH-MCNC: 2.1 GM/DL
GLUCOSE SERPL-MCNC: 94 MG/DL (ref 65–99)
HCT VFR BLD AUTO: 21.7 % (ref 34–46.6)
HGB BLD-MCNC: 7.3 G/DL (ref 12–15.9)
LAB AP CASE REPORT: NORMAL
LAB AP DIAGNOSIS COMMENT: NORMAL
LYMPHOCYTES # BLD AUTO: 0.7 10*3/MM3 (ref 0.7–3.1)
LYMPHOCYTES NFR BLD AUTO: 20.2 % (ref 19.6–45.3)
MCH RBC QN AUTO: 35.1 PG (ref 26.6–33)
MCHC RBC AUTO-ENTMCNC: 33.5 G/DL (ref 31.5–35.7)
MCV RBC AUTO: 104.6 FL (ref 79–97)
MONOCYTES # BLD AUTO: 0 10*3/MM3 (ref 0.1–0.9)
MONOCYTES NFR BLD AUTO: 1.3 % (ref 5–12)
NEUTROPHILS NFR BLD AUTO: 2.6 10*3/MM3 (ref 1.7–7)
NEUTROPHILS NFR BLD AUTO: 76.9 % (ref 42.7–76)
NRBC BLD AUTO-RTO: 0.1 /100 WBC (ref 0–0.2)
PATH REPORT.FINAL DX SPEC: NORMAL
PATH REPORT.GROSS SPEC: NORMAL
PLATELET # BLD AUTO: 119 10*3/MM3 (ref 140–450)
PMV BLD AUTO: 7.4 FL (ref 6–12)
POTASSIUM SERPL-SCNC: 4.3 MMOL/L (ref 3.5–5.2)
PROT SERPL-MCNC: 4.7 G/DL (ref 6–8.5)
RBC # BLD AUTO: 2.07 10*6/MM3 (ref 3.77–5.28)
SODIUM SERPL-SCNC: 136 MMOL/L (ref 136–145)
WBC NRBC COR # BLD: 3.4 10*3/MM3 (ref 3.4–10.8)

## 2022-09-27 PROCEDURE — G0378 HOSPITAL OBSERVATION PER HR: HCPCS

## 2022-09-27 PROCEDURE — 71045 X-RAY EXAM CHEST 1 VIEW: CPT

## 2022-09-27 PROCEDURE — 85025 COMPLETE CBC W/AUTO DIFF WBC: CPT | Performed by: INTERNAL MEDICINE

## 2022-09-27 PROCEDURE — 80053 COMPREHEN METABOLIC PANEL: CPT | Performed by: INTERNAL MEDICINE

## 2022-09-27 PROCEDURE — 25010000002 ENOXAPARIN PER 10 MG: Performed by: INTERNAL MEDICINE

## 2022-09-27 RX ORDER — MAGNESIUM CARB/ALUMINUM HYDROX 105-160MG
150 TABLET,CHEWABLE ORAL ONCE
Status: DISCONTINUED | OUTPATIENT
Start: 2022-09-27 | End: 2022-09-27

## 2022-09-27 RX ORDER — MAGNESIUM CARB/ALUMINUM HYDROX 105-160MG
296 TABLET,CHEWABLE ORAL ONCE
Status: COMPLETED | OUTPATIENT
Start: 2022-09-27 | End: 2022-09-27

## 2022-09-27 RX ADMIN — OXCARBAZEPINE 150 MG: 150 TABLET, FILM COATED ORAL at 10:27

## 2022-09-27 RX ADMIN — SERTRALINE 200 MG: 100 TABLET, FILM COATED ORAL at 10:27

## 2022-09-27 RX ADMIN — PANTOPRAZOLE SODIUM 40 MG: 40 TABLET, DELAYED RELEASE ORAL at 05:41

## 2022-09-27 RX ADMIN — SUCRALFATE 1 G: 1 TABLET ORAL at 17:38

## 2022-09-27 RX ADMIN — SUCRALFATE 1 G: 1 TABLET ORAL at 20:01

## 2022-09-27 RX ADMIN — OXCARBAZEPINE 150 MG: 150 TABLET, FILM COATED ORAL at 20:01

## 2022-09-27 RX ADMIN — POTASSIUM CHLORIDE 40 MEQ: 1500 TABLET, EXTENDED RELEASE ORAL at 10:27

## 2022-09-27 RX ADMIN — Medication 10 ML: at 09:00

## 2022-09-27 RX ADMIN — MAGNESIUM CITRATE 296 ML: 1.75 LIQUID ORAL at 15:07

## 2022-09-27 RX ADMIN — Medication 10 ML: at 20:06

## 2022-09-27 RX ADMIN — SUCRALFATE 1 G: 1 TABLET ORAL at 13:12

## 2022-09-27 RX ADMIN — HYDROCODONE BITARTRATE AND ACETAMINOPHEN 1 TABLET: 5; 325 TABLET ORAL at 10:34

## 2022-09-27 RX ADMIN — CLOTRIMAZOLE AND BETAMETHASONE DIPROPIONATE 1 APPLICATION: 10; .5 CREAM TOPICAL at 10:27

## 2022-09-27 RX ADMIN — POLYETHYLENE GLYCOL 3350 34 G: 17 POWDER, FOR SOLUTION ORAL at 10:26

## 2022-09-27 RX ADMIN — ROSUVASTATIN CALCIUM 20 MG: 10 TABLET, FILM COATED ORAL at 20:01

## 2022-09-27 RX ADMIN — ENOXAPARIN SODIUM 40 MG: 100 INJECTION SUBCUTANEOUS at 20:02

## 2022-09-27 RX ADMIN — CLOTRIMAZOLE AND BETAMETHASONE DIPROPIONATE 1 APPLICATION: 10; .5 CREAM TOPICAL at 20:06

## 2022-09-27 RX ADMIN — Medication 5 MG: at 20:01

## 2022-09-27 RX ADMIN — RISPERIDONE 0.25 MG: 0.25 TABLET ORAL at 20:02

## 2022-09-27 RX ADMIN — ENOXAPARIN SODIUM 40 MG: 100 INJECTION SUBCUTANEOUS at 10:26

## 2022-09-28 ENCOUNTER — INPATIENT HOSPITAL (OUTPATIENT)
Dept: URBAN - METROPOLITAN AREA HOSPITAL 84 | Facility: HOSPITAL | Age: 67
End: 2022-09-28

## 2022-09-28 ENCOUNTER — APPOINTMENT (OUTPATIENT)
Dept: CT IMAGING | Facility: HOSPITAL | Age: 67
End: 2022-09-28

## 2022-09-28 DIAGNOSIS — E83.51 HYPOCALCEMIA: ICD-10-CM

## 2022-09-28 DIAGNOSIS — R10.9 UNSPECIFIED ABDOMINAL PAIN: ICD-10-CM

## 2022-09-28 DIAGNOSIS — D61.818 OTHER PANCYTOPENIA: ICD-10-CM

## 2022-09-28 DIAGNOSIS — R11.0 NAUSEA: ICD-10-CM

## 2022-09-28 DIAGNOSIS — E87.6 HYPOKALEMIA: ICD-10-CM

## 2022-09-28 DIAGNOSIS — D50.9 IRON DEFICIENCY ANEMIA, UNSPECIFIED: ICD-10-CM

## 2022-09-28 DIAGNOSIS — K59.00 CONSTIPATION, UNSPECIFIED: ICD-10-CM

## 2022-09-28 LAB
ABO GROUP BLD: NORMAL
ALBUMIN SERPL-MCNC: 2.5 G/DL (ref 3.5–5.2)
ALBUMIN/GLOB SERPL: 1 G/DL
ALP SERPL-CCNC: 68 U/L (ref 39–117)
ALT SERPL W P-5'-P-CCNC: 5 U/L (ref 1–33)
ANION GAP SERPL CALCULATED.3IONS-SCNC: 6 MMOL/L (ref 5–15)
ANION GAP SERPL CALCULATED.3IONS-SCNC: 6 MMOL/L (ref 5–15)
AST SERPL-CCNC: 10 U/L (ref 1–32)
BASOPHILS # BLD AUTO: 0 10*3/MM3 (ref 0–0.2)
BASOPHILS NFR BLD AUTO: 0.2 % (ref 0–1.5)
BILIRUB SERPL-MCNC: 0.6 MG/DL (ref 0–1.2)
BLD GP AB SCN SERPL QL: NEGATIVE
BUN SERPL-MCNC: 7 MG/DL (ref 8–23)
BUN SERPL-MCNC: 8 MG/DL (ref 8–23)
BUN/CREAT SERPL: 15.6 (ref 7–25)
BUN/CREAT SERPL: 21.6 (ref 7–25)
CALCIUM SPEC-SCNC: 7.9 MG/DL (ref 8.6–10.5)
CALCIUM SPEC-SCNC: 8.3 MG/DL (ref 8.6–10.5)
CHLORIDE SERPL-SCNC: 93 MMOL/L (ref 98–107)
CHLORIDE SERPL-SCNC: 95 MMOL/L (ref 98–107)
CO2 SERPL-SCNC: 31 MMOL/L (ref 22–29)
CO2 SERPL-SCNC: 34 MMOL/L (ref 22–29)
CREAT SERPL-MCNC: 0.37 MG/DL (ref 0.57–1)
CREAT SERPL-MCNC: 0.45 MG/DL (ref 0.57–1)
DEPRECATED RDW RBC AUTO: 64.8 FL (ref 37–54)
EGFRCR SERPLBLD CKD-EPI 2021: 106.3 ML/MIN/1.73
EGFRCR SERPLBLD CKD-EPI 2021: 111.4 ML/MIN/1.73
EOSINOPHIL # BLD AUTO: 0 10*3/MM3 (ref 0–0.4)
EOSINOPHIL NFR BLD AUTO: 1.2 % (ref 0.3–6.2)
ERYTHROCYTE [DISTWIDTH] IN BLOOD BY AUTOMATED COUNT: 17.3 % (ref 12.3–15.4)
GLOBULIN UR ELPH-MCNC: 2.4 GM/DL
GLUCOSE SERPL-MCNC: 117 MG/DL (ref 65–99)
GLUCOSE SERPL-MCNC: 123 MG/DL (ref 65–99)
HCT VFR BLD AUTO: 21.8 % (ref 34–46.6)
HCT VFR BLD AUTO: 25.9 % (ref 34–46.6)
HGB BLD-MCNC: 7.1 G/DL (ref 12–15.9)
HGB BLD-MCNC: 8.9 G/DL (ref 12–15.9)
LYMPHOCYTES # BLD AUTO: 0.4 10*3/MM3 (ref 0.7–3.1)
LYMPHOCYTES NFR BLD AUTO: 12.1 % (ref 19.6–45.3)
MCH RBC QN AUTO: 35 PG (ref 26.6–33)
MCHC RBC AUTO-ENTMCNC: 32.7 G/DL (ref 31.5–35.7)
MCV RBC AUTO: 106.9 FL (ref 79–97)
MONOCYTES # BLD AUTO: 0.1 10*3/MM3 (ref 0.1–0.9)
MONOCYTES NFR BLD AUTO: 1.9 % (ref 5–12)
NEUTROPHILS NFR BLD AUTO: 2.8 10*3/MM3 (ref 1.7–7)
NEUTROPHILS NFR BLD AUTO: 84.6 % (ref 42.7–76)
NRBC BLD AUTO-RTO: 0.1 /100 WBC (ref 0–0.2)
PLATELET # BLD AUTO: 92 10*3/MM3 (ref 140–450)
PMV BLD AUTO: 8.1 FL (ref 6–12)
POTASSIUM SERPL-SCNC: 4.1 MMOL/L (ref 3.5–5.2)
POTASSIUM SERPL-SCNC: 4.5 MMOL/L (ref 3.5–5.2)
PROT SERPL-MCNC: 4.9 G/DL (ref 6–8.5)
RBC # BLD AUTO: 2.04 10*6/MM3 (ref 3.77–5.28)
RH BLD: POSITIVE
SODIUM SERPL-SCNC: 132 MMOL/L (ref 136–145)
SODIUM SERPL-SCNC: 133 MMOL/L (ref 136–145)
T&S EXPIRATION DATE: NORMAL
WBC NRBC COR # BLD: 3.3 10*3/MM3 (ref 3.4–10.8)

## 2022-09-28 PROCEDURE — 86901 BLOOD TYPING SEROLOGIC RH(D): CPT

## 2022-09-28 PROCEDURE — 63710000001 ONDANSETRON PER 8 MG: Performed by: INTERNAL MEDICINE

## 2022-09-28 PROCEDURE — 86850 RBC ANTIBODY SCREEN: CPT | Performed by: INTERNAL MEDICINE

## 2022-09-28 PROCEDURE — 80053 COMPREHEN METABOLIC PANEL: CPT | Performed by: INTERNAL MEDICINE

## 2022-09-28 PROCEDURE — 86900 BLOOD TYPING SEROLOGIC ABO: CPT

## 2022-09-28 PROCEDURE — 36430 TRANSFUSION BLD/BLD COMPNT: CPT

## 2022-09-28 PROCEDURE — 99232 SBSQ HOSP IP/OBS MODERATE 35: CPT | Performed by: NURSE PRACTITIONER

## 2022-09-28 PROCEDURE — 85025 COMPLETE CBC W/AUTO DIFF WBC: CPT | Performed by: INTERNAL MEDICINE

## 2022-09-28 PROCEDURE — 86901 BLOOD TYPING SEROLOGIC RH(D): CPT | Performed by: INTERNAL MEDICINE

## 2022-09-28 PROCEDURE — 85014 HEMATOCRIT: CPT | Performed by: INTERNAL MEDICINE

## 2022-09-28 PROCEDURE — P9016 RBC LEUKOCYTES REDUCED: HCPCS

## 2022-09-28 PROCEDURE — 86923 COMPATIBILITY TEST ELECTRIC: CPT

## 2022-09-28 PROCEDURE — 85018 HEMOGLOBIN: CPT | Performed by: INTERNAL MEDICINE

## 2022-09-28 PROCEDURE — 74176 CT ABD & PELVIS W/O CONTRAST: CPT

## 2022-09-28 PROCEDURE — 25010000002 FUROSEMIDE PER 20 MG: Performed by: INTERNAL MEDICINE

## 2022-09-28 PROCEDURE — 86900 BLOOD TYPING SEROLOGIC ABO: CPT | Performed by: INTERNAL MEDICINE

## 2022-09-28 PROCEDURE — 25010000002 ENOXAPARIN PER 10 MG: Performed by: INTERNAL MEDICINE

## 2022-09-28 RX ORDER — FUROSEMIDE 10 MG/ML
40 INJECTION INTRAMUSCULAR; INTRAVENOUS EVERY 8 HOURS
Status: DISCONTINUED | OUTPATIENT
Start: 2022-09-28 | End: 2022-10-03

## 2022-09-28 RX ORDER — BISACODYL 10 MG
10 SUPPOSITORY, RECTAL RECTAL ONCE
Status: DISCONTINUED | OUTPATIENT
Start: 2022-09-28 | End: 2022-10-08

## 2022-09-28 RX ADMIN — POLYETHYLENE GLYCOL 3350 34 G: 17 POWDER, FOR SOLUTION ORAL at 12:24

## 2022-09-28 RX ADMIN — ENOXAPARIN SODIUM 40 MG: 100 INJECTION SUBCUTANEOUS at 12:24

## 2022-09-28 RX ADMIN — CLOTRIMAZOLE AND BETAMETHASONE DIPROPIONATE 1 APPLICATION: 10; .5 CREAM TOPICAL at 12:25

## 2022-09-28 RX ADMIN — ENOXAPARIN SODIUM 40 MG: 100 INJECTION SUBCUTANEOUS at 21:03

## 2022-09-28 RX ADMIN — FUROSEMIDE 40 MG: 10 INJECTION, SOLUTION INTRAMUSCULAR; INTRAVENOUS at 18:23

## 2022-09-28 RX ADMIN — POTASSIUM CHLORIDE 40 MEQ: 1500 TABLET, EXTENDED RELEASE ORAL at 12:26

## 2022-09-28 RX ADMIN — SERTRALINE 200 MG: 100 TABLET, FILM COATED ORAL at 12:26

## 2022-09-28 RX ADMIN — Medication 10 ML: at 12:27

## 2022-09-28 RX ADMIN — SUCRALFATE 1 G: 1 TABLET ORAL at 18:23

## 2022-09-28 RX ADMIN — CLOTRIMAZOLE AND BETAMETHASONE DIPROPIONATE 1 APPLICATION: 10; .5 CREAM TOPICAL at 21:04

## 2022-09-28 RX ADMIN — RISPERIDONE 0.25 MG: 0.25 TABLET ORAL at 21:03

## 2022-09-28 RX ADMIN — METHYLNALTREXONE BROMIDE 450 MG: 150 TABLET ORAL at 15:51

## 2022-09-28 RX ADMIN — OXCARBAZEPINE 150 MG: 150 TABLET, FILM COATED ORAL at 21:03

## 2022-09-28 RX ADMIN — PANTOPRAZOLE SODIUM 40 MG: 40 TABLET, DELAYED RELEASE ORAL at 05:27

## 2022-09-28 RX ADMIN — SUCRALFATE 1 G: 1 TABLET ORAL at 21:03

## 2022-09-28 RX ADMIN — Medication 10 ML: at 21:03

## 2022-09-28 RX ADMIN — SUCRALFATE 1 G: 1 TABLET ORAL at 12:26

## 2022-09-28 RX ADMIN — OXCARBAZEPINE 150 MG: 150 TABLET, FILM COATED ORAL at 12:26

## 2022-09-28 RX ADMIN — ONDANSETRON HYDROCHLORIDE 4 MG: 4 TABLET, FILM COATED ORAL at 00:15

## 2022-09-28 RX ADMIN — FUROSEMIDE 40 MG: 10 INJECTION, SOLUTION INTRAMUSCULAR; INTRAVENOUS at 12:24

## 2022-09-28 RX ADMIN — ROSUVASTATIN CALCIUM 20 MG: 10 TABLET, FILM COATED ORAL at 21:03

## 2022-09-29 LAB
ALBUMIN SERPL-MCNC: 2.9 G/DL (ref 3.5–5.2)
ALBUMIN/GLOB SERPL: 1 G/DL
ALP SERPL-CCNC: 92 U/L (ref 39–117)
ALT SERPL W P-5'-P-CCNC: 6 U/L (ref 1–33)
ANION GAP SERPL CALCULATED.3IONS-SCNC: 8 MMOL/L (ref 5–15)
ARTERIAL PATENCY WRIST A: POSITIVE
AST SERPL-CCNC: 16 U/L (ref 1–32)
ATMOSPHERIC PRESS: ABNORMAL MM[HG]
BACTERIA SPEC AEROBE CULT: NORMAL
BACTERIA SPEC AEROBE CULT: NORMAL
BASE EXCESS BLDA CALC-SCNC: 11 MMOL/L (ref 0–3)
BASOPHILS # BLD AUTO: 0 10*3/MM3 (ref 0–0.2)
BASOPHILS NFR BLD AUTO: 0.2 % (ref 0–1.5)
BDY SITE: ABNORMAL
BH BB BLOOD EXPIRATION DATE: NORMAL
BH BB BLOOD TYPE BARCODE: 7300
BH BB DISPENSE STATUS: NORMAL
BH BB PRODUCT CODE: NORMAL
BH BB UNIT NUMBER: NORMAL
BILIRUB SERPL-MCNC: 0.8 MG/DL (ref 0–1.2)
BUN SERPL-MCNC: 7 MG/DL (ref 8–23)
BUN/CREAT SERPL: 16.3 (ref 7–25)
CALCIUM SPEC-SCNC: 8.9 MG/DL (ref 8.6–10.5)
CHLORIDE SERPL-SCNC: 92 MMOL/L (ref 98–107)
CO2 BLDA-SCNC: 38.2 MMOL/L (ref 22–29)
CO2 SERPL-SCNC: 33 MMOL/L (ref 22–29)
CREAT SERPL-MCNC: 0.43 MG/DL (ref 0.57–1)
CROSSMATCH INTERPRETATION: NORMAL
DEPRECATED RDW RBC AUTO: 66.5 FL (ref 37–54)
EGFRCR SERPLBLD CKD-EPI 2021: 107.4 ML/MIN/1.73
EOSINOPHIL # BLD AUTO: 0.1 10*3/MM3 (ref 0–0.4)
EOSINOPHIL NFR BLD AUTO: 1.3 % (ref 0.3–6.2)
ERYTHROCYTE [DISTWIDTH] IN BLOOD BY AUTOMATED COUNT: 18.9 % (ref 12.3–15.4)
GLOBULIN UR ELPH-MCNC: 2.8 GM/DL
GLUCOSE SERPL-MCNC: 107 MG/DL (ref 65–99)
HCO3 BLDA-SCNC: 36.5 MMOL/L (ref 21–28)
HCT VFR BLD AUTO: 26.1 % (ref 34–46.6)
HCT VFR BLD AUTO: 28.3 % (ref 34–46.6)
HCT VFR BLD AUTO: 28.9 % (ref 34–46.6)
HEMODILUTION: NO
HGB BLD-MCNC: 8.7 G/DL (ref 12–15.9)
HGB BLD-MCNC: 9.5 G/DL (ref 12–15.9)
HGB BLD-MCNC: 9.8 G/DL (ref 12–15.9)
HOLD SPECIMEN: NORMAL
INHALED O2 CONCENTRATION: 32 %
LYMPHOCYTES # BLD AUTO: 0.8 10*3/MM3 (ref 0.7–3.1)
LYMPHOCYTES NFR BLD AUTO: 19.2 % (ref 19.6–45.3)
MCH RBC QN AUTO: 34.3 PG (ref 26.6–33)
MCHC RBC AUTO-ENTMCNC: 33.4 G/DL (ref 31.5–35.7)
MCV RBC AUTO: 102.6 FL (ref 79–97)
MODALITY: ABNORMAL
MONOCYTES # BLD AUTO: 0.1 10*3/MM3 (ref 0.1–0.9)
MONOCYTES NFR BLD AUTO: 2.5 % (ref 5–12)
NEUTROPHILS NFR BLD AUTO: 3.1 10*3/MM3 (ref 1.7–7)
NEUTROPHILS NFR BLD AUTO: 76.8 % (ref 42.7–76)
NRBC BLD AUTO-RTO: 0.3 /100 WBC (ref 0–0.2)
PCO2 BLDA: 54.6 MM HG (ref 35–48)
PH BLDA: 7.43 PH UNITS (ref 7.35–7.45)
PLATELET # BLD AUTO: 106 10*3/MM3 (ref 140–450)
PMV BLD AUTO: 8.4 FL (ref 6–12)
PO2 BLDA: 69.7 MM HG (ref 83–108)
POTASSIUM SERPL-SCNC: 4.3 MMOL/L (ref 3.5–5.2)
PROT SERPL-MCNC: 5.7 G/DL (ref 6–8.5)
RBC # BLD AUTO: 2.54 10*6/MM3 (ref 3.77–5.28)
SAO2 % BLDCOA: 93.8 % (ref 94–98)
SODIUM SERPL-SCNC: 133 MMOL/L (ref 136–145)
UNIT  ABO: NORMAL
UNIT  RH: NORMAL
WBC NRBC COR # BLD: 4 10*3/MM3 (ref 3.4–10.8)

## 2022-09-29 PROCEDURE — 82803 BLOOD GASES ANY COMBINATION: CPT

## 2022-09-29 PROCEDURE — 80053 COMPREHEN METABOLIC PANEL: CPT | Performed by: INTERNAL MEDICINE

## 2022-09-29 PROCEDURE — 94640 AIRWAY INHALATION TREATMENT: CPT

## 2022-09-29 PROCEDURE — 85025 COMPLETE CBC W/AUTO DIFF WBC: CPT | Performed by: INTERNAL MEDICINE

## 2022-09-29 PROCEDURE — 85018 HEMOGLOBIN: CPT | Performed by: INTERNAL MEDICINE

## 2022-09-29 PROCEDURE — 36600 WITHDRAWAL OF ARTERIAL BLOOD: CPT

## 2022-09-29 PROCEDURE — 94799 UNLISTED PULMONARY SVC/PX: CPT

## 2022-09-29 PROCEDURE — 63710000001 ONDANSETRON PER 8 MG: Performed by: INTERNAL MEDICINE

## 2022-09-29 PROCEDURE — 25010000002 ENOXAPARIN PER 10 MG: Performed by: INTERNAL MEDICINE

## 2022-09-29 PROCEDURE — 25010000002 FUROSEMIDE PER 20 MG: Performed by: INTERNAL MEDICINE

## 2022-09-29 PROCEDURE — 85014 HEMATOCRIT: CPT | Performed by: INTERNAL MEDICINE

## 2022-09-29 RX ORDER — ACETAZOLAMIDE 250 MG/1
500 TABLET ORAL 2 TIMES DAILY
Status: COMPLETED | OUTPATIENT
Start: 2022-09-29 | End: 2022-09-29

## 2022-09-29 RX ORDER — POLYETHYLENE GLYCOL 3350 17 G/17G
34 POWDER, FOR SOLUTION ORAL EVERY 4 HOURS
Status: DISPENSED | OUTPATIENT
Start: 2022-09-29 | End: 2022-09-30

## 2022-09-29 RX ORDER — BUDESONIDE AND FORMOTEROL FUMARATE DIHYDRATE 80; 4.5 UG/1; UG/1
2 AEROSOL RESPIRATORY (INHALATION)
Status: DISCONTINUED | OUTPATIENT
Start: 2022-09-29 | End: 2022-09-30

## 2022-09-29 RX ORDER — GUAIFENESIN 600 MG/1
1200 TABLET, EXTENDED RELEASE ORAL EVERY 12 HOURS SCHEDULED
Status: DISCONTINUED | OUTPATIENT
Start: 2022-09-29 | End: 2022-10-10 | Stop reason: HOSPADM

## 2022-09-29 RX ADMIN — SERTRALINE 200 MG: 100 TABLET, FILM COATED ORAL at 09:02

## 2022-09-29 RX ADMIN — ONDANSETRON HYDROCHLORIDE 4 MG: 4 TABLET, FILM COATED ORAL at 12:38

## 2022-09-29 RX ADMIN — OXCARBAZEPINE 150 MG: 150 TABLET, FILM COATED ORAL at 21:35

## 2022-09-29 RX ADMIN — CLOTRIMAZOLE AND BETAMETHASONE DIPROPIONATE 1 APPLICATION: 10; .5 CREAM TOPICAL at 21:34

## 2022-09-29 RX ADMIN — METHYLNALTREXONE BROMIDE 450 MG: 150 TABLET ORAL at 09:02

## 2022-09-29 RX ADMIN — POLYETHYLENE GLYCOL 3350 34 G: 17 POWDER, FOR SOLUTION ORAL at 12:33

## 2022-09-29 RX ADMIN — Medication 10 ML: at 09:04

## 2022-09-29 RX ADMIN — POLYETHYLENE GLYCOL 3350 34 G: 17 POWDER, FOR SOLUTION ORAL at 15:19

## 2022-09-29 RX ADMIN — FUROSEMIDE 40 MG: 10 INJECTION, SOLUTION INTRAMUSCULAR; INTRAVENOUS at 16:35

## 2022-09-29 RX ADMIN — Medication 10 ML: at 21:34

## 2022-09-29 RX ADMIN — SUCRALFATE 1 G: 1 TABLET ORAL at 12:34

## 2022-09-29 RX ADMIN — LISINOPRIL 20 MG: 20 TABLET ORAL at 09:02

## 2022-09-29 RX ADMIN — ACETAZOLAMIDE 500 MG: 250 TABLET ORAL at 12:34

## 2022-09-29 RX ADMIN — POTASSIUM CHLORIDE 40 MEQ: 1500 TABLET, EXTENDED RELEASE ORAL at 09:02

## 2022-09-29 RX ADMIN — FUROSEMIDE 40 MG: 10 INJECTION, SOLUTION INTRAMUSCULAR; INTRAVENOUS at 09:02

## 2022-09-29 RX ADMIN — GUAIFENESIN 1200 MG: 600 TABLET, EXTENDED RELEASE ORAL at 21:34

## 2022-09-29 RX ADMIN — BUDESONIDE AND FORMOTEROL FUMARATE DIHYDRATE 2 PUFF: 80; 4.5 AEROSOL RESPIRATORY (INHALATION) at 18:58

## 2022-09-29 RX ADMIN — SUCRALFATE 1 G: 1 TABLET ORAL at 16:35

## 2022-09-29 RX ADMIN — PANTOPRAZOLE SODIUM 40 MG: 40 TABLET, DELAYED RELEASE ORAL at 05:24

## 2022-09-29 RX ADMIN — ENOXAPARIN SODIUM 40 MG: 100 INJECTION SUBCUTANEOUS at 09:03

## 2022-09-29 RX ADMIN — FUROSEMIDE 40 MG: 10 INJECTION, SOLUTION INTRAMUSCULAR; INTRAVENOUS at 00:22

## 2022-09-29 RX ADMIN — ACETAZOLAMIDE 500 MG: 250 TABLET ORAL at 21:34

## 2022-09-29 RX ADMIN — SUCRALFATE 1 G: 1 TABLET ORAL at 21:35

## 2022-09-29 RX ADMIN — POLYETHYLENE GLYCOL 3350 34 G: 17 POWDER, FOR SOLUTION ORAL at 09:02

## 2022-09-29 RX ADMIN — OXCARBAZEPINE 150 MG: 150 TABLET, FILM COATED ORAL at 09:02

## 2022-09-29 RX ADMIN — ENOXAPARIN SODIUM 40 MG: 100 INJECTION SUBCUTANEOUS at 21:34

## 2022-09-29 RX ADMIN — RISPERIDONE 0.25 MG: 0.25 TABLET ORAL at 21:34

## 2022-09-29 RX ADMIN — ROSUVASTATIN CALCIUM 20 MG: 10 TABLET, FILM COATED ORAL at 21:34

## 2022-09-29 RX ADMIN — SUCRALFATE 1 G: 1 TABLET ORAL at 09:02

## 2022-09-30 LAB
HCT VFR BLD AUTO: 26 % (ref 34–46.6)
HCT VFR BLD AUTO: 28.1 % (ref 34–46.6)
HGB BLD-MCNC: 8.8 G/DL (ref 12–15.9)
HGB BLD-MCNC: 9.2 G/DL (ref 12–15.9)

## 2022-09-30 PROCEDURE — 94664 DEMO&/EVAL PT USE INHALER: CPT

## 2022-09-30 PROCEDURE — 25010000002 ENOXAPARIN PER 10 MG: Performed by: INTERNAL MEDICINE

## 2022-09-30 PROCEDURE — 97166 OT EVAL MOD COMPLEX 45 MIN: CPT

## 2022-09-30 PROCEDURE — 94799 UNLISTED PULMONARY SVC/PX: CPT

## 2022-09-30 PROCEDURE — 94761 N-INVAS EAR/PLS OXIMETRY MLT: CPT

## 2022-09-30 PROCEDURE — 85018 HEMOGLOBIN: CPT | Performed by: INTERNAL MEDICINE

## 2022-09-30 PROCEDURE — 25010000002 FUROSEMIDE PER 20 MG: Performed by: INTERNAL MEDICINE

## 2022-09-30 PROCEDURE — 85014 HEMATOCRIT: CPT | Performed by: INTERNAL MEDICINE

## 2022-09-30 PROCEDURE — 94618 PULMONARY STRESS TESTING: CPT

## 2022-09-30 RX ORDER — IPRATROPIUM BROMIDE AND ALBUTEROL SULFATE 2.5; .5 MG/3ML; MG/3ML
3 SOLUTION RESPIRATORY (INHALATION)
Status: DISCONTINUED | OUTPATIENT
Start: 2022-09-30 | End: 2022-10-02

## 2022-09-30 RX ORDER — BUDESONIDE 0.5 MG/2ML
0.5 INHALANT ORAL
Status: DISCONTINUED | OUTPATIENT
Start: 2022-09-30 | End: 2022-10-10 | Stop reason: HOSPADM

## 2022-09-30 RX ADMIN — BUDESONIDE 0.5 MG: 0.5 INHALANT RESPIRATORY (INHALATION) at 20:15

## 2022-09-30 RX ADMIN — OXCARBAZEPINE 150 MG: 150 TABLET, FILM COATED ORAL at 08:46

## 2022-09-30 RX ADMIN — Medication 10 ML: at 21:32

## 2022-09-30 RX ADMIN — OXCARBAZEPINE 150 MG: 150 TABLET, FILM COATED ORAL at 21:32

## 2022-09-30 RX ADMIN — SUCRALFATE 1 G: 1 TABLET ORAL at 08:00

## 2022-09-30 RX ADMIN — FUROSEMIDE 40 MG: 10 INJECTION, SOLUTION INTRAMUSCULAR; INTRAVENOUS at 17:05

## 2022-09-30 RX ADMIN — ROSUVASTATIN CALCIUM 20 MG: 10 TABLET, FILM COATED ORAL at 21:32

## 2022-09-30 RX ADMIN — POTASSIUM CHLORIDE 40 MEQ: 1500 TABLET, EXTENDED RELEASE ORAL at 07:59

## 2022-09-30 RX ADMIN — METHYLNALTREXONE BROMIDE 450 MG: 150 TABLET ORAL at 07:58

## 2022-09-30 RX ADMIN — CLOTRIMAZOLE AND BETAMETHASONE DIPROPIONATE 1 APPLICATION: 10; .5 CREAM TOPICAL at 21:34

## 2022-09-30 RX ADMIN — GUAIFENESIN 1200 MG: 600 TABLET, EXTENDED RELEASE ORAL at 21:32

## 2022-09-30 RX ADMIN — IPRATROPIUM BROMIDE AND ALBUTEROL SULFATE 3 ML: .5; 3 SOLUTION RESPIRATORY (INHALATION) at 20:11

## 2022-09-30 RX ADMIN — PANTOPRAZOLE SODIUM 40 MG: 40 TABLET, DELAYED RELEASE ORAL at 06:00

## 2022-09-30 RX ADMIN — FUROSEMIDE 40 MG: 10 INJECTION, SOLUTION INTRAMUSCULAR; INTRAVENOUS at 07:58

## 2022-09-30 RX ADMIN — HYDROCODONE BITARTRATE AND ACETAMINOPHEN 1 TABLET: 5; 325 TABLET ORAL at 22:14

## 2022-09-30 RX ADMIN — ENOXAPARIN SODIUM 40 MG: 100 INJECTION SUBCUTANEOUS at 21:32

## 2022-09-30 RX ADMIN — SUCRALFATE 1 G: 1 TABLET ORAL at 21:32

## 2022-09-30 RX ADMIN — RISPERIDONE 0.25 MG: 0.25 TABLET ORAL at 21:32

## 2022-09-30 RX ADMIN — SUCRALFATE 1 G: 1 TABLET ORAL at 17:05

## 2022-09-30 RX ADMIN — BUDESONIDE AND FORMOTEROL FUMARATE DIHYDRATE 2 PUFF: 80; 4.5 AEROSOL RESPIRATORY (INHALATION) at 07:25

## 2022-09-30 RX ADMIN — GUAIFENESIN 1200 MG: 600 TABLET, EXTENDED RELEASE ORAL at 07:58

## 2022-09-30 RX ADMIN — FUROSEMIDE 40 MG: 10 INJECTION, SOLUTION INTRAMUSCULAR; INTRAVENOUS at 01:43

## 2022-09-30 RX ADMIN — IPRATROPIUM BROMIDE AND ALBUTEROL SULFATE 3 ML: .5; 3 SOLUTION RESPIRATORY (INHALATION) at 11:40

## 2022-09-30 RX ADMIN — SERTRALINE 200 MG: 100 TABLET, FILM COATED ORAL at 07:59

## 2022-09-30 RX ADMIN — POLYETHYLENE GLYCOL 3350 34 G: 17 POWDER, FOR SOLUTION ORAL at 07:57

## 2022-09-30 RX ADMIN — ENOXAPARIN SODIUM 40 MG: 100 INJECTION SUBCUTANEOUS at 07:57

## 2022-09-30 RX ADMIN — LISINOPRIL 20 MG: 20 TABLET ORAL at 07:58

## 2022-09-30 RX ADMIN — Medication 10 ML: at 07:59

## 2022-09-30 RX ADMIN — CLOTRIMAZOLE AND BETAMETHASONE DIPROPIONATE 1 APPLICATION: 10; .5 CREAM TOPICAL at 08:00

## 2022-09-30 RX ADMIN — SUCRALFATE 1 G: 1 TABLET ORAL at 11:17

## 2022-10-01 LAB
ANION GAP SERPL CALCULATED.3IONS-SCNC: 10 MMOL/L (ref 5–15)
BASOPHILS # BLD AUTO: 0 10*3/MM3 (ref 0–0.2)
BASOPHILS NFR BLD AUTO: 0.1 % (ref 0–1.5)
BUN SERPL-MCNC: 10 MG/DL (ref 8–23)
BUN/CREAT SERPL: 17.2 (ref 7–25)
CALCIUM SPEC-SCNC: 8.5 MG/DL (ref 8.6–10.5)
CHLORIDE SERPL-SCNC: 88 MMOL/L (ref 98–107)
CO2 SERPL-SCNC: 29 MMOL/L (ref 22–29)
CREAT SERPL-MCNC: 0.58 MG/DL (ref 0.57–1)
DEPRECATED RDW RBC AUTO: 65.2 FL (ref 37–54)
EGFRCR SERPLBLD CKD-EPI 2021: 99.9 ML/MIN/1.73
EOSINOPHIL # BLD AUTO: 0 10*3/MM3 (ref 0–0.4)
EOSINOPHIL NFR BLD AUTO: 0.9 % (ref 0.3–6.2)
ERYTHROCYTE [DISTWIDTH] IN BLOOD BY AUTOMATED COUNT: 18.4 % (ref 12.3–15.4)
GLUCOSE SERPL-MCNC: 94 MG/DL (ref 65–99)
HCT VFR BLD AUTO: 26.5 % (ref 34–46.6)
HGB BLD-MCNC: 8.8 G/DL (ref 12–15.9)
LYMPHOCYTES # BLD AUTO: 1 10*3/MM3 (ref 0.7–3.1)
LYMPHOCYTES NFR BLD AUTO: 20.6 % (ref 19.6–45.3)
MCH RBC QN AUTO: 34.3 PG (ref 26.6–33)
MCHC RBC AUTO-ENTMCNC: 33.2 G/DL (ref 31.5–35.7)
MCV RBC AUTO: 103.4 FL (ref 79–97)
MONOCYTES # BLD AUTO: 0.2 10*3/MM3 (ref 0.1–0.9)
MONOCYTES NFR BLD AUTO: 4.4 % (ref 5–12)
NEUTROPHILS NFR BLD AUTO: 3.7 10*3/MM3 (ref 1.7–7)
NEUTROPHILS NFR BLD AUTO: 74 % (ref 42.7–76)
NRBC BLD AUTO-RTO: 0.6 /100 WBC (ref 0–0.2)
PLATELET # BLD AUTO: 107 10*3/MM3 (ref 140–450)
PMV BLD AUTO: 8.6 FL (ref 6–12)
POTASSIUM SERPL-SCNC: 3.7 MMOL/L (ref 3.5–5.2)
RBC # BLD AUTO: 2.57 10*6/MM3 (ref 3.77–5.28)
SODIUM SERPL-SCNC: 127 MMOL/L (ref 136–145)
WBC NRBC COR # BLD: 5 10*3/MM3 (ref 3.4–10.8)

## 2022-10-01 PROCEDURE — 25010000002 FUROSEMIDE PER 20 MG: Performed by: INTERNAL MEDICINE

## 2022-10-01 PROCEDURE — 25010000002 ENOXAPARIN PER 10 MG: Performed by: INTERNAL MEDICINE

## 2022-10-01 PROCEDURE — 80048 BASIC METABOLIC PNL TOTAL CA: CPT | Performed by: HOSPITALIST

## 2022-10-01 PROCEDURE — 85007 BL SMEAR W/DIFF WBC COUNT: CPT | Performed by: HOSPITALIST

## 2022-10-01 PROCEDURE — 94799 UNLISTED PULMONARY SVC/PX: CPT

## 2022-10-01 PROCEDURE — 94664 DEMO&/EVAL PT USE INHALER: CPT

## 2022-10-01 PROCEDURE — 94761 N-INVAS EAR/PLS OXIMETRY MLT: CPT

## 2022-10-01 PROCEDURE — 85025 COMPLETE CBC W/AUTO DIFF WBC: CPT | Performed by: HOSPITALIST

## 2022-10-01 PROCEDURE — 97162 PT EVAL MOD COMPLEX 30 MIN: CPT

## 2022-10-01 RX ADMIN — FUROSEMIDE 40 MG: 10 INJECTION, SOLUTION INTRAMUSCULAR; INTRAVENOUS at 09:48

## 2022-10-01 RX ADMIN — GUAIFENESIN 1200 MG: 600 TABLET, EXTENDED RELEASE ORAL at 09:43

## 2022-10-01 RX ADMIN — SUCRALFATE 1 G: 1 TABLET ORAL at 08:01

## 2022-10-01 RX ADMIN — OXCARBAZEPINE 150 MG: 150 TABLET, FILM COATED ORAL at 09:43

## 2022-10-01 RX ADMIN — PANTOPRAZOLE SODIUM 40 MG: 40 TABLET, DELAYED RELEASE ORAL at 06:23

## 2022-10-01 RX ADMIN — BUDESONIDE 0.5 MG: 0.5 INHALANT RESPIRATORY (INHALATION) at 07:50

## 2022-10-01 RX ADMIN — BUDESONIDE 0.5 MG: 0.5 INHALANT RESPIRATORY (INHALATION) at 19:24

## 2022-10-01 RX ADMIN — SUCRALFATE 1 G: 1 TABLET ORAL at 18:09

## 2022-10-01 RX ADMIN — FUROSEMIDE 40 MG: 10 INJECTION, SOLUTION INTRAMUSCULAR; INTRAVENOUS at 18:09

## 2022-10-01 RX ADMIN — ROSUVASTATIN CALCIUM 20 MG: 10 TABLET, FILM COATED ORAL at 21:25

## 2022-10-01 RX ADMIN — Medication 10 ML: at 09:48

## 2022-10-01 RX ADMIN — IPRATROPIUM BROMIDE AND ALBUTEROL SULFATE 3 ML: .5; 3 SOLUTION RESPIRATORY (INHALATION) at 07:45

## 2022-10-01 RX ADMIN — OXCARBAZEPINE 150 MG: 150 TABLET, FILM COATED ORAL at 21:25

## 2022-10-01 RX ADMIN — Medication 10 ML: at 21:25

## 2022-10-01 RX ADMIN — GUAIFENESIN 1200 MG: 600 TABLET, EXTENDED RELEASE ORAL at 21:25

## 2022-10-01 RX ADMIN — IPRATROPIUM BROMIDE AND ALBUTEROL SULFATE 3 ML: .5; 3 SOLUTION RESPIRATORY (INHALATION) at 11:05

## 2022-10-01 RX ADMIN — METHYLNALTREXONE BROMIDE 450 MG: 150 TABLET ORAL at 09:42

## 2022-10-01 RX ADMIN — FUROSEMIDE 40 MG: 10 INJECTION, SOLUTION INTRAMUSCULAR; INTRAVENOUS at 01:57

## 2022-10-01 RX ADMIN — ENOXAPARIN SODIUM 40 MG: 100 INJECTION SUBCUTANEOUS at 09:47

## 2022-10-01 RX ADMIN — CLOTRIMAZOLE AND BETAMETHASONE DIPROPIONATE 1 APPLICATION: 10; .5 CREAM TOPICAL at 21:24

## 2022-10-01 RX ADMIN — IPRATROPIUM BROMIDE AND ALBUTEROL SULFATE 3 ML: .5; 3 SOLUTION RESPIRATORY (INHALATION) at 19:24

## 2022-10-01 RX ADMIN — RISPERIDONE 0.25 MG: 0.25 TABLET ORAL at 21:25

## 2022-10-01 RX ADMIN — POTASSIUM CHLORIDE 40 MEQ: 1500 TABLET, EXTENDED RELEASE ORAL at 09:43

## 2022-10-01 RX ADMIN — CLOTRIMAZOLE AND BETAMETHASONE DIPROPIONATE 1 APPLICATION: 10; .5 CREAM TOPICAL at 09:48

## 2022-10-01 RX ADMIN — IPRATROPIUM BROMIDE AND ALBUTEROL SULFATE 3 ML: .5; 3 SOLUTION RESPIRATORY (INHALATION) at 14:47

## 2022-10-01 RX ADMIN — LISINOPRIL 20 MG: 20 TABLET ORAL at 09:43

## 2022-10-01 RX ADMIN — SERTRALINE 200 MG: 100 TABLET, FILM COATED ORAL at 09:43

## 2022-10-01 RX ADMIN — SUCRALFATE 1 G: 1 TABLET ORAL at 21:25

## 2022-10-01 RX ADMIN — SUCRALFATE 1 G: 1 TABLET ORAL at 12:50

## 2022-10-01 NOTE — PLAN OF CARE
Goal Outcome Evaluation:  Plan of Care Reviewed With: patient, spouse           Outcome Evaluation: Pt is a 65 yo F who presents to [facility] with abdominal pain. Evaluated by PT and discharged on 9/25 due to ability to complete moblity with SBA. Pt with EGD performed on 9/25. Evaluation this date performed due to increased weakness and decreased activity tolerance. Pt typically indepdendent with ALDs, driving, and community mobility. Significant impairments in activity tolerance with balance impairments noted this date. Pt requires increased UE assist to maintain balance and reduce falls risk. Recommending dc to KIM to facilitate improvements in mobility, activity tolerance, strength, safety with ADLs, and decreased caregiver assist.

## 2022-10-01 NOTE — PLAN OF CARE
Goal Outcome Evaluation:      Pt alert and oriented x 4. Psych doctor came back and asked can we hold psych consult b/o pt oriented today.  Dr Gant replied ok to hold it.  Spouse at bedside. No complained pain this shift. Wearing 2 liter oxygen with NC . Supplied pt with ensure shake.

## 2022-10-01 NOTE — PLAN OF CARE
Goal Outcome Evaluation:  Plan of Care Reviewed With: patient        Progress: no change  Outcome Evaluation: Pt currently resting in bed with no complaints. Remains on 2L O2 via NC. Call light within reach;  at bedside; will continue to monitor.

## 2022-10-01 NOTE — PROGRESS NOTES
[facility]     Progress Note    Patient Name: Morgan Quick  : 1955  MRN: [id number]  Primary Care Physician:  Wayne Saavedra MD  Date of admission: 2022  Service date and time: 10/01/22 13:04 EDT  Subjective   Subjective     Chief Complaint: abdominal pain     HPI:  Patient worked with PT/OT and requires SNF, feeling fine, SW following for placement      Objective   Objective     Vitals:   Temp:  [98.1 °F (36.7 °C)-98.3 °F (36.8 °C)] 98.1 °F (36.7 °C)  Heart Rate:  [61-76] 68  Resp:  [15-18] 15  BP: ()/(52-65) 106/65  Flow (L/min):  [2-3] 2  Physical Exam    Constitutional: Awake, alert   Eyes: PERRLA, sclerae anicteric, no conjunctival injection   HENT: NCAT, mucous membranes moist   Neck: Supple, no thyromegaly, no lymphadenopathy, trachea midline   Respiratory: Clear to auscultation bilaterally, nonlabored respirations    Cardiovascular: RRR, no murmurs, rubs, or gallops, palpable pedal pulses bilaterally   Gastrointestinal: Positive bowel sounds, soft, nontender, nondistended   Musculoskeletal: No bilateral ankle edema, no clubbing or cyanosis to extremities   Psychiatric: Appropriate affect, cooperative   Neurologic: Oriented x 3, strength symmetric in all extremities, Cranial Nerves grossly intact to confrontation, speech clear   Skin: No rashes     Result Review    Result Review:  I have personally reviewed the results from the time of this admission to 10/1/2022 13:04 EDT and agree with these findings:  [x]  Laboratory list / accordion  [x]  Microbiology  [x]  Radiology  [x]  EKG/Telemetry   []  Cardiology/Vascular   []  Pathology  []  Old records  []  Other:        Assessment & Plan   Assessment / Plan       Active Hospital Problems:  Active Hospital Problems    Diagnosis    • **Abdominal pain, unspecified abdominal location    • Bipolar 1 disorder (HCC)    • High cholesterol    • Anorexia    • Nausea    • Morbidly obese (HCC)    • Essential hypertension    Physical  Deconditioning    Plan:        Abdominal pain, nausea - improved  CT>Strandy density in the anterior pararenal space inferior to the  pancreas and adjacent to the horizontal limb of the duodenal C-loop. I  would favor duodenitis versus less likely acute pancreatitis. I would  recommend correlation with pancreatic enzyme levels.  2. There is a hypodense area in the left lobe of the liver near the main  portal vein which may represent a small cyst or cavernous hemangioma. It  was not seen well on the previous exam. The patient reportedly has a  history of left breast cancer so I cannot completely exclude metastatic  Disease.  Per Pt no BM.  Repeat CT of abdomen states moderate stool burden.  Pt states, still no BM.  Will order Miralax 34g Q4H till BM.  Then can stop.  Pt should have MRI as out pt with and without contrast to evaluate liver lesion.     Duodenitis - resolved  -zophran IV  -GI following,  S/P EGD.  Showed duodenitis on EGD and a benign appearing nodule, that was biopsied.  -Protonix and Carafate.  -lovenox bid  -Diet was advanced.  -On yesterday CT, duodenitis resolved.     Constipation/Fecal retention -resolved  - having bm's  -Doc sodium/miralax  -No BM with Miralax.  Will order Mag Citrate and f/u with BMs.    -Per Pt no BM.  Repeat CT of abdomen states moderate stool burden.  -Pt states, still no BM.  Will order Miralax 34g Q4H till BM.  Then can stop.     Hypocalcemia/hypokalemia- replace/monitor     Elevated D dimer:  VQ-scan is negative.     Acute hypoxic respiratory failure  - failed 6 minute walk test, will need home oxygen setup, currently on 2 liters  -Secondary to fluid overload.  -Lasix IV was ordered.  -Pt increase her O2 needs from 2lit NC to 4.  ABG shows hypercapnia, and hypoxia.  -Pt might need Bipap. And sleep study.  -Pulmonology was consulted.     Anemia:  - cont to monitor  -Transfused a unit of blood.  -Appropriate compensation post transfusion.    Physical Deconditioning - PT/OT,  needs SNF, SW following for placement    DVT prophylaxis:  Mechanical DVT prophylaxis orders are present.    CODE STATUS:   Level Of Support Discussed With: Patient  Code Status (Patient has no pulse and is not breathing): CPR (Attempt to Resuscitate)  Medical Interventions (Patient has pulse or is breathing): Full Support  Release to patient: Routine Release    Disposition:  I expect patient to be discharged 2 days.    Gerardo Gant MD

## 2022-10-01 NOTE — THERAPY EVALUATION
Patient Name: Morgan Quick  : 1955    MRN: [id number]                              Today's Date: 10/1/2022       Admit Date: 2022    Visit Dx:     ICD-10-CM ICD-9-CM   1. Abdominal pain, unspecified abdominal location  R10.9 789.00   2. Nausea  R11.0 787.02   3. Duodenitis  K29.80 535.60   4. Urinary tract infection with hematuria, site unspecified  N39.0 599.0    R31.9 599.70   5. Dyspnea, unspecified type  R06.00 786.09   6. Hypoxia  R09.02 799.02   7. Elevated d-dimer  R79.89 790.92     Patient Active Problem List   Diagnosis   • Morbidly obese (HCC)   • Essential hypertension   • Malignant neoplasm of overlapping sites of left breast in female, estrogen receptor positive (HCC)   • Primary cancer of left female breast (HCC)   • Abdominal pain, unspecified abdominal location   • Bipolar 1 disorder (HCC)   • High cholesterol   • Anorexia   • Nausea     Past Medical History:   Diagnosis Date   • Bipolar 1 disorder (HCC)    • Breast cancer (HCC)    • Cancer (HCC)    • Disease of thyroid gland    • High cholesterol    • Hypertension      Past Surgical History:   Procedure Laterality Date   • ABDOMINAL SURGERY     • APPENDECTOMY     • ENDOSCOPY N/A 2022    Procedure: ESOPHAGOGASTRODUODENOSCOPY with biopsy x 2 areas;  Surgeon: Deonte Wong MD;  Location: [facility];  Service: Gastroenterology;  Laterality: N/A;  post: gastritis, duodinitis, nodule,    • HYSTERECTOMY     • MASTECTOMY      L side      General Information     Row Name 10/01/22 1637          Physical Therapy Time and Intention    Document Type evaluation  -HS     Mode of Treatment physical therapy  -HS     Row Name 10/01/22 163          General Information    Prior Level of Function independent:;community mobility;gait;ADL's;driving  -HS     Existing Precautions/Restrictions fall  -HS     Row Name 10/01/22 1637          Living Environment    People in Home spouse  -HS     Row Name 10/01/22 1637          Home Main Entrance     Number of Stairs, Main Entrance four  -HS     Stair Railings, Main Entrance railings safe and in good condition  -HS     Row Name 10/01/22 1637          Cognition    Orientation Status (Cognition) oriented x 4  -HS     Row Name 10/01/22 1637          Safety Issues, Functional Mobility    Safety Issues Affecting Function (Mobility) insight into deficits/self-awareness  -HS     Impairments Affecting Function (Mobility) balance;endurance/activity tolerance;strength  -HS           User Key  (r) = Recorded By, (t) = Taken By, (c) = Cosigned By    Initials Name Provider Type     Hetal Cano PT Physical Therapist               Mobility     Row Name 10/01/22 1639          Bed Mobility    Bed Mobility bed mobility (all) activities  -     All Activities, Wilcox (Bed Mobility) supervision  -     Assistive Device (Bed Mobility) bed rails  -     Comment, (Bed Mobility) Increased time to perform transfers  -HS     Row Name 10/01/22 1639          Sit-Stand Transfer    Sit-Stand Wilcox (Transfers) contact guard  -HS     Comment, (Sit-Stand Transfer) Difficulty performing STS transfer from commode to stand; pulling on HR and wall to assist. Reports independence at home.  -     Row Name 10/01/22 1639          Gait/Stairs (Locomotion)    Wilcox Level (Gait) contact guard;minimum assist (75% patient effort)  -HS     Distance in Feet (Gait) 40x2  -HS     Deviations/Abnormal Patterns (Gait) gait speed decreased;aydin decreased  -HS     Bilateral Gait Deviations forward flexed posture  -HS     Comment, (Gait/Stairs) Pt frequently holding onto HR, walls for support. Significant fatigue with noted balance impairments FDC through walking in hallway. Pt requires cueing to turn around and return to room.  -HS           User Key  (r) = Recorded By, (t) = Taken By, (c) = Cosigned By    Initials Name Provider Type     Hetal Cano PT Physical Therapist               Obj/Interventions     Row Name  10/01/22 1641          Range of Motion Comprehensive    General Range of Motion no range of motion deficits identified  -HS     Row Name 10/01/22 1641          Strength Comprehensive (MMT)    Comment, General Manual Muscle Testing (MMT) Assessment B hip flexion ~4/5; B shoulder flexion 3+/5  -HS     Row Name 10/01/22 1641          Balance    Balance Assessment standing dynamic balance  -HS     Dynamic Standing Balance minimal assist  -HS           User Key  (r) = Recorded By, (t) = Taken By, (c) = Cosigned By    Initials Name Provider Type    HS Hetal Cano PT Physical Therapist               Goals/Plan     Row Name 10/01/22 1647          Transfer Goal 1 (PT)    Activity/Assistive Device (Transfer Goal 1, PT) transfers, all  -HS     Rawson Level/Cues Needed (Transfer Goal 1, PT) independent  -HS     Time Frame (Transfer Goal 1, PT) 2 weeks  -HS     Row Name 10/01/22 1647          Gait Training Goal 1 (PT)    Activity/Assistive Device (Gait Training Goal 1, PT) gait (walking locomotion)  -HS     Rawson Level (Gait Training Goal 1, PT) independent  -HS     Distance (Gait Training Goal 1, PT) 60'  -HS     Row Name 10/01/22 1647          Stairs Goal 1 (PT)    Activity/Assistive Device (Stairs Goal 1, PT) decrease fall risk;step-to-step  -HS     Rawson Level/Cues Needed (Stairs Goal 1, PT) independent  -HS     Number of Stairs (Stairs Goal 1, PT) 4  -HS     Row Name 10/01/22 1647          Therapy Assessment/Plan (PT)    Planned Therapy Interventions (PT) balance training;bed mobility training;gait training;home exercise program;manual therapy techniques;postural re-education;patient/family education;neuromuscular re-education;stair training;strengthening;transfer training  -HS           User Key  (r) = Recorded By, (t) = Taken By, (c) = Cosigned By    Initials Name Provider Type    HS Hetal Cano PT Physical Therapist               Clinical Impression     Row Name 10/01/22 1642          Pain     Pretreatment Pain Rating 10/10  LBP; pt reports chronic disc issue but refusing medication or PT for pain  -HS     Row Name 10/01/22 1642          Plan of Care Review    Plan of Care Reviewed With patient;spouse  -HS     Outcome Evaluation Pt is a 65 yo F who [facility] to [facility] with abdominal pain. Evaluated by PT and discharged on 9/25 due to ability to complete moblity with SBA. Pt with EGD performed on 9/25. Evaluation this date performed due to increased weakness and decreased activity tolerance. Pt typically indepdendent with ALDs, driving, and community mobility. Significant impairments in activity tolerance with balance impairments noted this date. Pt requires increased UE assist to maintain balance and reduce falls risk. Recommending dc to [facility]IM to facilitate improvements in mobility, activity tolerance, strength, safety with ADLs, and decreased caregiver assist.  -HS     Row Name 10/01/22 1642          Therapy Assessment/Plan (PT)    Criteria for Skilled Interventions Met (PT) yes  -HS     Therapy Frequency (PT) 5 times/wk  -HS     Row Name 10/01/22 1642          Vital Signs    O2 Delivery Pre Treatment room air  2L  -HS     O2 Delivery Intra Treatment room air  2L  -HS     O2 Delivery Post Treatment room air  2L  -HS     Rest Breaks  2  -HS     Row Name 10/01/22 1642          Positioning and Restraints    Post Treatment Position bed  -HS     In Bed notified nsg;call light within reach;supine;encouraged to call for assist;exit alarm on  -HS           User Key  (r) = Recorded By, (t) = Taken By, (c) = Cosigned By    Initials Name Provider Type    Hetal Acevedo PT Physical Therapist               Outcome Measures     Row Name 10/01/22 1648 10/01/22 0801       How much help from another person do you currently need...    Turning from your back to your side while in flat bed without using bedrails? 4  -HS 3  -YK    Moving from lying on back to sitting on the side of a flat bed without bedrails? 4  -HS 3  -YK     Moving to and from a bed to a chair (including a wheelchair)? 3  -HS 3  -YK    Standing up from a chair using your arms (e.g., wheelchair, bedside chair)? 3  -HS 3  -YK    Climbing 3-5 steps with a railing? 3  -HS 3  -YK    To walk in hospital room? 3  -HS 3  -YK    AM-PAC 6 Clicks Score (PT) 20  -HS 18  -YK    Highest level of mobility 6 --> Walked 10 steps or more  -HS 6 --> Walked 10 steps or more  -YK          User Key  (r) = Recorded By, (t) = Taken By, (c) = Cosigned By    Initials Name Provider Type     Hetal Cano, PT Physical Therapist    Gordon Bartlett, RN Registered Nurse                             Physical Therapy Education                 Title: PT OT SLP Therapies (In Progress)     Topic: Physical Therapy (Done)     Point: Mobility training (Done)     Learning Progress Summary           Patient Acceptance, E, VU,NR by  at 10/1/2022 1648    Acceptance, E,D, VU,DU by BR at 9/25/2022 1249                   Point: Home exercise program (Done)     Learning Progress Summary           Patient Acceptance, E, VU,NR by  at 10/1/2022 1648    Acceptance, E,D, VU,DU by BR at 9/25/2022 1249                   Point: Body mechanics (Done)     Learning Progress Summary           Patient Acceptance, E, VU,NR by  at 10/1/2022 1648    Acceptance, E,D, VU,DU by BR at 9/25/2022 1249                   Point: Precautions (Done)     Learning Progress Summary           Patient Acceptance, E, VU,NR by  at 10/1/2022 1648    Acceptance, E,D, VU,DU by BR at 9/25/2022 1249                               User Key     Initials Effective Dates Name Provider Type Discipline     09/13/21 -  Hetal Caon, PT Physical Therapist PT    BR 02/01/22 -  Eve Agee, ALO Physical Therapist PT              PT Recommendation and Plan  Planned Therapy Interventions (PT): balance training, bed mobility training, gait training, home exercise program, manual therapy techniques, postural re-education, patient/family education,  neuromuscular re-education, stair training, strengthening, transfer training  Plan of Care Reviewed With: patient, spouse  Outcome Evaluation: Pt is a 67 yo F who presents to [facility] with abdominal pain. Evaluated by PT and discharged on 9/25 due to ability to complete moblity with SBA. Pt with EGD performed on 9/25. Evaluation this date performed due to increased weakness and decreased activity tolerance. Pt typically indepdendent with ALDs, driving, and community mobility. Significant impairments in activity tolerance with balance impairments noted this date. Pt requires increased UE assist to maintain balance and reduce falls risk. Recommending dc to KIM to facilitate improvements in mobility, activity tolerance, strength, safety with ADLs, and decreased caregiver assist.     Time Calculation:    PT Charges     Row Name 10/01/22 1649             Time Calculation    Start Time 1359  -HS      Stop Time 1420  -HS      Time Calculation (min) 21 min  -HS      PT Received On 10/01/22  -HS      PT - Next Appointment 10/03/22  -      PT Goal Re-Cert Due Date 10/15/22  -            User Key  (r) = Recorded By, (t) = Taken By, (c) = Cosigned By    Initials Name Provider Type     Hetal Cano PT Physical Therapist              Therapy Charges for Today     Code Description Service Date Service Provider Modifiers Qty    38155777967 HC PT EVAL MOD COMPLEXITY 3 10/1/2022 Hetal Cano PT GP 1          PT G-Codes  Outcome Measure Options: AM-PAC 6 Clicks Basic Mobility (PT)  AM-PAC 6 Clicks Score (PT): 20    Hetal Cano PT  10/1/2022

## 2022-10-01 NOTE — PROGRESS NOTES
Daily Progress Note        Abdominal pain, unspecified abdominal location    Morbidly obese (HCC)    Essential hypertension    Bipolar 1 disorder (HCC)    High cholesterol    Anorexia    Nausea      Assessment  Abdominal pain, unspecified abdominal location  Hypoxemia with hypercapnia  RIGOBERTO/obesity hypoventilation syndrome  Mild bibasilar atelectasis with small effusion  Do tinnitus  Hypodense area in the left lobe of the liver  Constipation  Elevated D-dimer but negative VQ scan  Anemia        Plan  Pulmicort and DuoNebs    Oxygen supplement and titration to maintain saturation 88 to 93%: 2 L NC and BiPAP as needed    Mucinex  DVT/GI prophylaxis-Lovenox and Protonix  Encourage use of incentive spirometer  Blood pressure controlled  Diuresis  Treatment for constipation as per GI             LOS: 3 days     Subjective     Mild cough, constipation    Objective     Vital signs for last 24 hours:  Vitals:    10/01/22 1105 10/01/22 1400 10/01/22 1447 10/01/22 1450   BP:  105/75     BP Location:       Patient Position:       Pulse: 68 70 71 71   Resp: 15 16 15 15   Temp:  98.1 °F (36.7 °C)     TempSrc:       SpO2: 94% 95% 95% 95%   Weight:       Height:           Intake/Output last 3 shifts:  I/O last 3 completed shifts:  In: 1680 [P.O.:1680]  Out: -   Intake/Output this shift:  I/O this shift:  In: 840 [P.O.:840]  Out: 600 [Urine:600]      Radiology  Imaging Results (Last 24 Hours)     ** No results found for the last 24 hours. **          Labs:  Results from last 7 days   Lab Units 10/01/22  0349   WBC 10*3/mm3 5.00   HEMOGLOBIN g/dL 8.8*   HEMATOCRIT % 26.5*   PLATELETS 10*3/mm3 107*     Results from last 7 days   Lab Units 10/01/22  0349 09/29/22  0021   SODIUM mmol/L 127* 133*   POTASSIUM mmol/L 3.7 4.3   CHLORIDE mmol/L 88* 92*   CO2 mmol/L 29.0 33.0*   BUN mg/dL 10 7*   CREATININE mg/dL 0.58 0.43*   CALCIUM mg/dL 8.5* 8.9   BILIRUBIN mg/dL  --  0.8   ALK PHOS U/L  --  92   ALT (SGPT) U/L  --  6   AST (SGOT) U/L  --   16   GLUCOSE mg/dL 94 107*     Results from last 7 days   Lab Units 09/29/22  0758   PH, ARTERIAL pH units 7.433   PO2 ART mm Hg 69.7*   PCO2, ARTERIAL mm Hg 54.6*   HCO3 ART mmol/L 36.5*     Results from last 7 days   Lab Units 09/29/22  0021 09/28/22  0133 09/27/22  0053   ALBUMIN g/dL 2.90* 2.50* 2.60*     Results from last 7 days   Lab Units 09/25/22  0439   CK TOTAL U/L 24         Results from last 7 days   Lab Units 09/26/22  0204   MAGNESIUM mg/dL 1.7         Results from last 7 days   Lab Units 09/25/22  0439   TSH uIU/mL 3.520           Meds:   SCHEDULE  bisacodyl, 10 mg, Rectal, Once  budesonide, 0.5 mg, Nebulization, BID - RT  clotrimazole-betamethasone, 1 application, Topical, Q12H  furosemide, 40 mg, Intravenous, Q8H  guaiFENesin, 1,200 mg, Oral, Q12H  ipratropium-albuterol, 3 mL, Nebulization, 4x Daily - RT  lisinopril, 20 mg, Oral, Daily  Methylnaltrexone Bromide, 450 mg, Oral, Daily  OXcarbazepine, 150 mg, Oral, BID  pantoprazole, 40 mg, Oral, Q AM  potassium chloride, 40 mEq, Oral, Daily  risperiDONE, 0.25 mg, Oral, Nightly  rosuvastatin, 20 mg, Oral, Nightly  sertraline, 200 mg, Oral, Daily  sodium chloride, 10 mL, Intravenous, Q12H  sucralfate, 1 g, Oral, 4x Daily AC & at Bedtime      Infusions     PRNs  melatonin  •  nitroglycerin  •  ondansetron **OR** ondansetron  •  [COMPLETED] Insert peripheral IV **AND** sodium chloride  •  sodium chloride    Physical Exam:  Physical Exam  General Appearance:  Alert   HEENT:  Normocephalic, without obvious abnormality, Conjunctiva/corneas clear,.   Nares normal, no drainage     Neck:  Supple, symmetrical, trachea midline. No JVD.  Lungs /Chest wall: Mild left basal rhonchi, respirations unlabored, symmetrical wall movement.     Heart:  Regular rate and rhythm, S1 S2 normal  Abdomen: Soft, non-tender, no masses, no organomegaly.    Extremities: No edema, no clubbing or cyanosis    ROS  Review of Systems  Constitutional: Negative for chills, fever and  malaise/fatigue.   HENT: Negative.    Eyes: Negative.    Cardiovascular: Negative.    Respiratory: Positive for mild cough and shortness of breath.    Skin: Negative.    Musculoskeletal: Negative.    Gastrointestinal: Improved abdominal pain,   Genitourinary: Negative.    Neurological: Negative.    Psychiatric/Behavioral: Negative.      I reviewed the recent clinical results    Much of this encounter note is an electronic transcription/translation of spoken language to printed text using Dragon Software which might include inadvertent errors in transcription.

## 2022-10-02 LAB
ANION GAP SERPL CALCULATED.3IONS-SCNC: 9 MMOL/L (ref 5–15)
ANISOCYTOSIS BLD QL: ABNORMAL
BUN SERPL-MCNC: 10 MG/DL (ref 8–23)
BUN/CREAT SERPL: 20.4 (ref 7–25)
CALCIUM SPEC-SCNC: 8.9 MG/DL (ref 8.6–10.5)
CHLORIDE SERPL-SCNC: 87 MMOL/L (ref 98–107)
CO2 SERPL-SCNC: 29 MMOL/L (ref 22–29)
CREAT SERPL-MCNC: 0.49 MG/DL (ref 0.57–1)
DEPRECATED RDW RBC AUTO: 66.5 FL (ref 37–54)
EGFRCR SERPLBLD CKD-EPI 2021: 104.1 ML/MIN/1.73
ERYTHROCYTE [DISTWIDTH] IN BLOOD BY AUTOMATED COUNT: 18.8 % (ref 12.3–15.4)
GLUCOSE SERPL-MCNC: 93 MG/DL (ref 65–99)
HCT VFR BLD AUTO: 26.3 % (ref 34–46.6)
HGB BLD-MCNC: 8.7 G/DL (ref 12–15.9)
LARGE PLATELETS: ABNORMAL
LYMPHOCYTES # BLD MANUAL: 0.98 10*3/MM3 (ref 0.7–3.1)
LYMPHOCYTES NFR BLD MANUAL: 6 % (ref 5–12)
MCH RBC QN AUTO: 34.7 PG (ref 26.6–33)
MCHC RBC AUTO-ENTMCNC: 33.1 G/DL (ref 31.5–35.7)
MCV RBC AUTO: 104.6 FL (ref 79–97)
METAMYELOCYTES NFR BLD MANUAL: 2 % (ref 0–0)
MONOCYTES # BLD: 0.37 10*3/MM3 (ref 0.1–0.9)
NEUTROPHILS # BLD AUTO: 4.64 10*3/MM3 (ref 1.7–7)
NEUTROPHILS NFR BLD MANUAL: 66 % (ref 42.7–76)
NEUTS BAND NFR BLD MANUAL: 10 % (ref 0–5)
PLATELET # BLD AUTO: 128 10*3/MM3 (ref 140–450)
PMV BLD AUTO: 8.7 FL (ref 6–12)
POTASSIUM SERPL-SCNC: 4 MMOL/L (ref 3.5–5.2)
RBC # BLD AUTO: 2.51 10*6/MM3 (ref 3.77–5.28)
SCAN SLIDE: NORMAL
SODIUM SERPL-SCNC: 125 MMOL/L (ref 136–145)
VARIANT LYMPHS NFR BLD MANUAL: 16 % (ref 19.6–45.3)
WBC MORPH BLD: NORMAL
WBC NRBC COR # BLD: 6.1 10*3/MM3 (ref 3.4–10.8)

## 2022-10-02 PROCEDURE — 94664 DEMO&/EVAL PT USE INHALER: CPT

## 2022-10-02 PROCEDURE — 25010000002 FUROSEMIDE PER 20 MG: Performed by: INTERNAL MEDICINE

## 2022-10-02 PROCEDURE — 94799 UNLISTED PULMONARY SVC/PX: CPT

## 2022-10-02 RX ORDER — HYDROCODONE BITARTRATE AND ACETAMINOPHEN 5; 325 MG/1; MG/1
1 TABLET ORAL EVERY 4 HOURS PRN
Status: DISPENSED | OUTPATIENT
Start: 2022-10-02 | End: 2022-10-03

## 2022-10-02 RX ORDER — IPRATROPIUM BROMIDE AND ALBUTEROL SULFATE 2.5; .5 MG/3ML; MG/3ML
3 SOLUTION RESPIRATORY (INHALATION)
Status: DISCONTINUED | OUTPATIENT
Start: 2022-10-02 | End: 2022-10-02

## 2022-10-02 RX ORDER — IPRATROPIUM BROMIDE AND ALBUTEROL SULFATE 2.5; .5 MG/3ML; MG/3ML
3 SOLUTION RESPIRATORY (INHALATION) EVERY 4 HOURS PRN
Status: DISCONTINUED | OUTPATIENT
Start: 2022-10-02 | End: 2022-10-10 | Stop reason: HOSPADM

## 2022-10-02 RX ADMIN — SERTRALINE 200 MG: 100 TABLET, FILM COATED ORAL at 08:35

## 2022-10-02 RX ADMIN — FUROSEMIDE 40 MG: 10 INJECTION, SOLUTION INTRAMUSCULAR; INTRAVENOUS at 18:27

## 2022-10-02 RX ADMIN — FUROSEMIDE 40 MG: 10 INJECTION, SOLUTION INTRAMUSCULAR; INTRAVENOUS at 08:32

## 2022-10-02 RX ADMIN — LISINOPRIL 20 MG: 20 TABLET ORAL at 08:37

## 2022-10-02 RX ADMIN — Medication 10 ML: at 21:29

## 2022-10-02 RX ADMIN — ROSUVASTATIN CALCIUM 20 MG: 10 TABLET, FILM COATED ORAL at 21:29

## 2022-10-02 RX ADMIN — CLOTRIMAZOLE AND BETAMETHASONE DIPROPIONATE 1 APPLICATION: 10; .5 CREAM TOPICAL at 08:36

## 2022-10-02 RX ADMIN — RISPERIDONE 0.25 MG: 0.25 TABLET ORAL at 21:29

## 2022-10-02 RX ADMIN — GUAIFENESIN 1200 MG: 600 TABLET, EXTENDED RELEASE ORAL at 21:29

## 2022-10-02 RX ADMIN — FUROSEMIDE 40 MG: 10 INJECTION, SOLUTION INTRAMUSCULAR; INTRAVENOUS at 01:22

## 2022-10-02 RX ADMIN — SUCRALFATE 1 G: 1 TABLET ORAL at 21:29

## 2022-10-02 RX ADMIN — IPRATROPIUM BROMIDE AND ALBUTEROL SULFATE 3 ML: .5; 3 SOLUTION RESPIRATORY (INHALATION) at 07:08

## 2022-10-02 RX ADMIN — Medication 10 ML: at 08:37

## 2022-10-02 RX ADMIN — SUCRALFATE 1 G: 1 TABLET ORAL at 18:27

## 2022-10-02 RX ADMIN — SUCRALFATE 1 G: 1 TABLET ORAL at 12:24

## 2022-10-02 RX ADMIN — BUDESONIDE 0.5 MG: 0.5 INHALANT RESPIRATORY (INHALATION) at 07:08

## 2022-10-02 RX ADMIN — HYDROCODONE BITARTRATE AND ACETAMINOPHEN 1 TABLET: 5; 325 TABLET ORAL at 22:24

## 2022-10-02 RX ADMIN — GUAIFENESIN 1200 MG: 600 TABLET, EXTENDED RELEASE ORAL at 08:35

## 2022-10-02 RX ADMIN — PANTOPRAZOLE SODIUM 40 MG: 40 TABLET, DELAYED RELEASE ORAL at 06:40

## 2022-10-02 RX ADMIN — METHYLNALTREXONE BROMIDE 450 MG: 150 TABLET ORAL at 08:34

## 2022-10-02 RX ADMIN — OXCARBAZEPINE 150 MG: 150 TABLET, FILM COATED ORAL at 08:34

## 2022-10-02 RX ADMIN — POTASSIUM CHLORIDE 40 MEQ: 1500 TABLET, EXTENDED RELEASE ORAL at 08:33

## 2022-10-02 RX ADMIN — SUCRALFATE 1 G: 1 TABLET ORAL at 08:34

## 2022-10-02 RX ADMIN — OXCARBAZEPINE 150 MG: 150 TABLET, FILM COATED ORAL at 21:29

## 2022-10-02 RX ADMIN — BUDESONIDE 0.5 MG: 0.5 INHALANT RESPIRATORY (INHALATION) at 18:37

## 2022-10-02 NOTE — CASE MANAGEMENT/SOCIAL WORK
Social Work Assessment   [place]     Patient Name: Morgan Quick  MRN: [id number]  Today's Date: 10/2/2022    Admit Date: 9/24/2022     Psychosocial     Row Name 10/02/22 1042       Coping/Stress    Major Change/Loss/Stressor housing concerns    Coping/Stress Comments On 9/30, LSW received a call from APS representative, Arina Fu, reporting they received a report (2 days prior roughly), that patient is homeless & possible Dementia documented in report. Reported patient/spouse have been living in their car in front of the apartment complex d/t infestation. APS representative requested  see for resource assistance. LSW advised Dementia dx was not on file and patient was documented as A&Ox3. APS requests notification of d/c plan. This LSW sent secure chat to assigned  (Sandy) and CM (Samreen) for follow-up 9/30. Patient unable to be seen d/t working with therapy by assigned social work, but SNF choices obtained at later time by CM. LSW met with patient today, 10/2, regarding above reporting. Inquired about housing and patient states they rent a home (denied it being an apartment complex), lives at home with spouse.  LSW inquired further, but patient states they have housing, no needs, even with LSW sharing above information reported. Patient did not elect to discuss further with LSW regarding resource assistance when offered. Confirmed patient does remain agreeable to SNF placement at d/c. Referrals pending from CM charting. LSW contact APS rep with update and left voicemail 10/2. Needs notified on day of d/c still.       Developmental Stage (Eriksson's)    Developmental Stage Stage 8 (65 years-death/Late Adulthood) Integrity vs. Despair           Met with patient in room wearing PPE: mask.  Maintained distance greater than six feet and spent less than 15 minutes in the room.    ALICIA Amador    Phone: [phone]  Cell: [phone]  Fax:  [phone]  [email or web address]

## 2022-10-02 NOTE — CONSULTS
"Called to see the pt as a consult with non specific reason (\"dx evaluation\"?? )   According to the nurse, no reason to see the pt, will cancel consult   "

## 2022-10-02 NOTE — PROGRESS NOTES
Daily Progress Note        Abdominal pain, unspecified abdominal location    Morbidly obese (HCC)    Essential hypertension    Bipolar 1 disorder (HCC)    High cholesterol    Anorexia    Nausea      Assessment  Abdominal pain, unspecified abdominal location  Hypoxemia with hypercapnia  RIGOBERTO/obesity hypoventilation syndrome  Mild bibasilar atelectasis with small effusion  Do tinnitus  Hypodense area in the left lobe of the liver  Constipation  Elevated D-dimer but negative VQ scan  Anemia        Plan  Pulmicort and DuoNebs    Oxygen supplement and titration to maintain saturation 88 to 93%: 2 L NC and BiPAP as needed    Mucinex  DVT/GI prophylaxis-Lovenox and Protonix  Encourage use of incentive spirometer  Blood pressure controlled  Diuresis  Treatment for constipation as per GI             LOS: 4 days     Subjective     Mild cough, constipation    Objective     Vital signs for last 24 hours:  Vitals:    10/02/22 0708 10/02/22 0711 10/02/22 0712 10/02/22 0715   BP:       BP Location:       Patient Position:       Pulse: 66 65 66 66   Resp: 18 18 18 18   Temp:       TempSrc:       SpO2: 96% 96% 97% 96%   Weight:       Height:           Intake/Output last 3 shifts:  I/O last 3 completed shifts:  In: 2280 [P.O.:2280]  Out: 800 [Urine:800]  Intake/Output this shift:  I/O this shift:  In: 600 [P.O.:600]  Out: 600 [Urine:600]      Radiology  Imaging Results (Last 24 Hours)     ** No results found for the last 24 hours. **          Labs:  Results from last 7 days   Lab Units 10/01/22  2359   WBC 10*3/mm3 6.10   HEMOGLOBIN g/dL 8.7*   HEMATOCRIT % 26.3*   PLATELETS 10*3/mm3 128*     Results from last 7 days   Lab Units 10/01/22  2359 10/01/22  0349 09/29/22  0021   SODIUM mmol/L 125*   < > 133*   POTASSIUM mmol/L 4.0   < > 4.3   CHLORIDE mmol/L 87*   < > 92*   CO2 mmol/L 29.0   < > 33.0*   BUN mg/dL 10   < > 7*   CREATININE mg/dL 0.49*   < > 0.43*   CALCIUM mg/dL 8.9   < > 8.9   BILIRUBIN mg/dL  --   --  0.8   ALK PHOS U/L   --   --  92   ALT (SGPT) U/L  --   --  6   AST (SGOT) U/L  --   --  16   GLUCOSE mg/dL 93   < > 107*    < > = values in this interval not displayed.     Results from last 7 days   Lab Units 09/29/22  0758   PH, ARTERIAL pH units 7.433   PO2 ART mm Hg 69.7*   PCO2, ARTERIAL mm Hg 54.6*   HCO3 ART mmol/L 36.5*     Results from last 7 days   Lab Units 09/29/22  0021 09/28/22  0133 09/27/22  0053   ALBUMIN g/dL 2.90* 2.50* 2.60*             Results from last 7 days   Lab Units 09/26/22  0204   MAGNESIUM mg/dL 1.7                   Meds:   SCHEDULE  bisacodyl, 10 mg, Rectal, Once  budesonide, 0.5 mg, Nebulization, BID - RT  clotrimazole-betamethasone, 1 application, Topical, Q12H  furosemide, 40 mg, Intravenous, Q8H  guaiFENesin, 1,200 mg, Oral, Q12H  ipratropium-albuterol, 3 mL, Nebulization, BID - RT  lisinopril, 20 mg, Oral, Daily  Methylnaltrexone Bromide, 450 mg, Oral, Daily  OXcarbazepine, 150 mg, Oral, BID  pantoprazole, 40 mg, Oral, Q AM  potassium chloride, 40 mEq, Oral, Daily  risperiDONE, 0.25 mg, Oral, Nightly  rosuvastatin, 20 mg, Oral, Nightly  sertraline, 200 mg, Oral, Daily  sodium chloride, 10 mL, Intravenous, Q12H  sucralfate, 1 g, Oral, 4x Daily AC & at Bedtime      Infusions     PRNs  melatonin  •  nitroglycerin  •  ondansetron **OR** ondansetron  •  [COMPLETED] Insert peripheral IV **AND** sodium chloride  •  sodium chloride    Physical Exam:  Physical Exam  General Appearance:  Alert   HEENT:  Normocephalic, without obvious abnormality, Conjunctiva/corneas clear,.   Nares normal, no drainage     Neck:  Supple, symmetrical, trachea midline. No JVD.  Lungs /Chest wall: Mild left basal rhonchi, respirations unlabored, symmetrical wall movement.     Heart:  Regular rate and rhythm, S1 S2 normal  Abdomen: Soft, non-tender, no masses, no organomegaly.    Extremities: No edema, no clubbing or cyanosis    ROS  Review of Systems  Constitutional: Negative for chills, fever and malaise/fatigue.   HENT:  Negative.    Eyes: Negative.    Cardiovascular: Negative.    Respiratory: Positive for improvement in the mild cough and shortness of breath.    Skin: Negative.    Musculoskeletal: Negative.    Gastrointestinal: Improved abdominal pain,   Genitourinary: Negative.    Neurological: Negative.    Psychiatric/Behavioral: Negative.      I reviewed the recent clinical results    Much of this encounter note is an electronic transcription/translation of spoken language to printed text using Dragon Software which might include inadvertent errors in transcription.

## 2022-10-02 NOTE — PROGRESS NOTES
[facility]     Progress Note    Patient Name: Morgan Quick  : 1955  MRN: [id number]  Primary Care Physician:  Wayne Saavedra MD  Date of admission: 2022  Service date and time: 10/02/22 11:17 EDT  Subjective   Subjective     Chief Complaint: abdominal pain     HPI:  Patient worked with PT/OT and requires SNF, awaiting placement      Objective   Objective     Vitals:   Temp:  [98 °F (36.7 °C)-98.1 °F (36.7 °C)] 98 °F (36.7 °C)  Heart Rate:  [64-80] 66  Resp:  [15-18] 18  BP: (105-108)/(68-75) 108/68  Flow (L/min):  [2] 2  Physical Exam    Constitutional: Awake, alert   Eyes: PERRLA, sclerae anicteric, no conjunctival injection   HENT: NCAT, mucous membranes moist   Neck: Supple, no thyromegaly, no lymphadenopathy, trachea midline   Respiratory: Clear to auscultation bilaterally, nonlabored respirations    Cardiovascular: RRR, no murmurs, rubs, or gallops, palpable pedal pulses bilaterally   Gastrointestinal: Positive bowel sounds, soft, nontender, nondistended   Musculoskeletal: No bilateral ankle edema, no clubbing or cyanosis to extremities   Psychiatric: Appropriate affect, cooperative   Neurologic: Oriented x 3, strength symmetric in all extremities, Cranial Nerves grossly intact to confrontation, speech clear   Skin: No rashes     Result Review    Result Review:  I have personally reviewed the results from the time of this admission to 10/2/2022 11:17 EDT and agree with these findings:  [x]  Laboratory list / accordion  [x]  Microbiology  [x]  Radiology  [x]  EKG/Telemetry   []  Cardiology/Vascular   []  Pathology  []  Old records  []  Other:        Assessment & Plan   Assessment / Plan       Active Hospital Problems:  Active Hospital Problems    Diagnosis    • **Abdominal pain, unspecified abdominal location    • Bipolar 1 disorder (HCC)    • High cholesterol    • Anorexia    • Nausea    • Morbidly obese (HCC)    • Essential hypertension    Physical Deconditioning    Plan:         Abdominal pain, nausea - improved  CT>Strandy density in the anterior pararenal space inferior to the  pancreas and adjacent to the horizontal limb of the duodenal C-loop. I  would favor duodenitis versus less likely acute pancreatitis. I would  recommend correlation with pancreatic enzyme levels.  2. There is a hypodense area in the left lobe of the liver near the main  portal vein which may represent a small cyst or cavernous hemangioma. It  was not seen well on the previous exam. The patient reportedly has a  history of left breast cancer so I cannot completely exclude metastatic  Disease.  Per Pt no BM.  Repeat CT of abdomen states moderate stool burden.  Pt states, still no BM.  Will order Miralax 34g Q4H till BM.  Then can stop.  Pt should have MRI as out pt with and without contrast to evaluate liver lesion.     Duodenitis - resolved  -zophran IV  -GI following,  S/P EGD.  Showed duodenitis on EGD and a benign appearing nodule, that was biopsied.  -Protonix and Carafate.  -lovenox bid  -Diet was advanced.  -On yesterday CT, duodenitis resolved.     Constipation/Fecal retention -resolved  - having bm's  -Doc sodium/miralax  -No BM with Miralax.  Will order Mag Citrate and f/u with BMs.    -Per Pt no BM.  Repeat CT of abdomen states moderate stool burden.  -Pt states, still no BM.  Will order Miralax 34g Q4H till BM.  Then can stop.     Hypocalcemia/hypokalemia- replace/monitor     Elevated D dimer:  VQ-scan is negative.     Acute hypoxic respiratory failure  - failed 6 minute walk test, will need home oxygen setup, currently on 2 liters  -Secondary to fluid overload.  -cont lasix  -abg reviewed  -Pt might need Bipap. And sleep study.  -Pulmonology was consulted.     Anemia:  - cont to monitor  -Transfused a unit of blood.  -Appropriate compensation post transfusion.    Physical Deconditioning - PT/OT, needs SNF, SW following for placement, possibly in 1 to 2 days    DVT prophylaxis:  Mechanical DVT  prophylaxis orders are present.    CODE STATUS:   Level Of Support Discussed With: Patient  Code Status (Patient has no pulse and is not breathing): CPR (Attempt to Resuscitate)  Medical Interventions (Patient has pulse or is breathing): Full Support  Release to patient: Routine Release    Disposition:  I expect patient to be discharged 2 days.    Gerardo Gant MD

## 2022-10-02 NOTE — CASE MANAGEMENT/SOCIAL WORK
Continued Stay Note  [facility]     Patient Name: Morgan Quick  MRN: [id number]  Today's Date: 10/2/2022    Admit Date: 9/24/2022    Plan: D/C Plan: SN[facility] with [facility], pending [facility]. Precert required and PASRR per facility. APS Report -Arina Fu needs notified of final dc plan. See SW note.   Discharge Plan     Row Name 10/02/22 1211       Plan    Plan Comments Per [facility] liaison, they cannot accept at this time. Message sent to [facility] Liaison, awaiting response.    Row Name 10/02/22 1146       Plan    Plan D/C Plan: SNF with [facility], pending acceptance. Precert required and PASRR per facility. APS Report -Arina Fu needs notified of final dc plan. See SW note.    Plan Comments Per  Liaison, pt is declined at this time. Referral sent through [facility] to [facility]. Messasge sent to [facility] Liaison, awating response.    Row Name 10/02/22 1042       Plan    Plan Comments See LSW note from 10/2.                Expected Discharge Date and Time     Expected Discharge Date Expected Discharge Time    Oct 4, 2022       Gloria Sanchez RN    Phone [phone]  Fax [phone]  Phone communication or documentation only - no physical contact with patient or family.

## 2022-10-02 NOTE — PLAN OF CARE
Goal Outcome Evaluation:      Discussed plan of care with patient and spouse. No major questions or complaints during shift.

## 2022-10-03 ENCOUNTER — APPOINTMENT (OUTPATIENT)
Dept: CARDIOLOGY | Facility: HOSPITAL | Age: 67
End: 2022-10-03

## 2022-10-03 ENCOUNTER — APPOINTMENT (OUTPATIENT)
Dept: CT IMAGING | Facility: HOSPITAL | Age: 67
End: 2022-10-03

## 2022-10-03 LAB
ANION GAP SERPL CALCULATED.3IONS-SCNC: 8 MMOL/L (ref 5–15)
BASOPHILS # BLD AUTO: 0 10*3/MM3 (ref 0–0.2)
BASOPHILS NFR BLD AUTO: 0 % (ref 0–1.5)
BUN SERPL-MCNC: 11 MG/DL (ref 8–23)
BUN/CREAT SERPL: 19.6 (ref 7–25)
CALCIUM SPEC-SCNC: 8.7 MG/DL (ref 8.6–10.5)
CHLORIDE SERPL-SCNC: 87 MMOL/L (ref 98–107)
CO2 SERPL-SCNC: 29 MMOL/L (ref 22–29)
CORTIS SERPL-MCNC: 18.95 MCG/DL
CREAT SERPL-MCNC: 0.56 MG/DL (ref 0.57–1)
DEPRECATED RDW RBC AUTO: 69.1 FL (ref 37–54)
EGFRCR SERPLBLD CKD-EPI 2021: 100.8 ML/MIN/1.73
EOSINOPHIL # BLD AUTO: 0.1 10*3/MM3 (ref 0–0.4)
EOSINOPHIL NFR BLD AUTO: 1 % (ref 0.3–6.2)
ERYTHROCYTE [DISTWIDTH] IN BLOOD BY AUTOMATED COUNT: 19.5 % (ref 12.3–15.4)
GLUCOSE SERPL-MCNC: 102 MG/DL (ref 65–99)
HCT VFR BLD AUTO: 26.5 % (ref 34–46.6)
HGB BLD-MCNC: 8.8 G/DL (ref 12–15.9)
LYMPHOCYTES # BLD AUTO: 1.1 10*3/MM3 (ref 0.7–3.1)
LYMPHOCYTES NFR BLD AUTO: 14.9 % (ref 19.6–45.3)
MCH RBC QN AUTO: 34.7 PG (ref 26.6–33)
MCHC RBC AUTO-ENTMCNC: 33.2 G/DL (ref 31.5–35.7)
MCV RBC AUTO: 104.6 FL (ref 79–97)
MONOCYTES # BLD AUTO: 0.6 10*3/MM3 (ref 0.1–0.9)
MONOCYTES NFR BLD AUTO: 7.8 % (ref 5–12)
NEUTROPHILS NFR BLD AUTO: 5.7 10*3/MM3 (ref 1.7–7)
NEUTROPHILS NFR BLD AUTO: 76.3 % (ref 42.7–76)
NRBC BLD AUTO-RTO: 0.2 /100 WBC (ref 0–0.2)
OSMOLALITY SERPL: 259 MOSM/KG (ref 280–301)
OSMOLALITY UR: 163 MOSM/KG (ref 300–800)
PLATELET # BLD AUTO: 160 10*3/MM3 (ref 140–450)
PMV BLD AUTO: 8.6 FL (ref 6–12)
POTASSIUM SERPL-SCNC: 4.3 MMOL/L (ref 3.5–5.2)
QT INTERVAL: 392 MS
QT INTERVAL: 398 MS
RBC # BLD AUTO: 2.53 10*6/MM3 (ref 3.77–5.28)
SODIUM SERPL-SCNC: 124 MMOL/L (ref 136–145)
SODIUM UR-SCNC: <20 MMOL/L
TSH SERPL DL<=0.05 MIU/L-ACNC: 6 UIU/ML (ref 0.27–4.2)
WBC NRBC COR # BLD: 7.4 10*3/MM3 (ref 3.4–10.8)

## 2022-10-03 PROCEDURE — 25010000002 ONDANSETRON PER 1 MG: Performed by: INTERNAL MEDICINE

## 2022-10-03 PROCEDURE — 83935 ASSAY OF URINE OSMOLALITY: CPT | Performed by: INTERNAL MEDICINE

## 2022-10-03 PROCEDURE — 83930 ASSAY OF BLOOD OSMOLALITY: CPT | Performed by: INTERNAL MEDICINE

## 2022-10-03 PROCEDURE — 97110 THERAPEUTIC EXERCISES: CPT

## 2022-10-03 PROCEDURE — 82533 TOTAL CORTISOL: CPT | Performed by: INTERNAL MEDICINE

## 2022-10-03 PROCEDURE — 94664 DEMO&/EVAL PT USE INHALER: CPT

## 2022-10-03 PROCEDURE — 84443 ASSAY THYROID STIM HORMONE: CPT | Performed by: INTERNAL MEDICINE

## 2022-10-03 PROCEDURE — 84300 ASSAY OF URINE SODIUM: CPT | Performed by: INTERNAL MEDICINE

## 2022-10-03 PROCEDURE — 71250 CT THORAX DX C-: CPT

## 2022-10-03 PROCEDURE — 97116 GAIT TRAINING THERAPY: CPT

## 2022-10-03 PROCEDURE — 93306 TTE W/DOPPLER COMPLETE: CPT

## 2022-10-03 PROCEDURE — 93306 TTE W/DOPPLER COMPLETE: CPT | Performed by: INTERNAL MEDICINE

## 2022-10-03 PROCEDURE — 94761 N-INVAS EAR/PLS OXIMETRY MLT: CPT

## 2022-10-03 PROCEDURE — 25010000002 SULFUR HEXAFLUORIDE MICROSPH 60.7-25 MG RECONSTITUTED SUSPENSION: Performed by: HOSPITALIST

## 2022-10-03 PROCEDURE — 25010000002 FUROSEMIDE PER 20 MG: Performed by: INTERNAL MEDICINE

## 2022-10-03 PROCEDURE — 80048 BASIC METABOLIC PNL TOTAL CA: CPT | Performed by: HOSPITALIST

## 2022-10-03 PROCEDURE — 94799 UNLISTED PULMONARY SVC/PX: CPT

## 2022-10-03 PROCEDURE — 85025 COMPLETE CBC W/AUTO DIFF WBC: CPT | Performed by: HOSPITALIST

## 2022-10-03 RX ORDER — FUROSEMIDE 40 MG/1
40 TABLET ORAL
Status: DISCONTINUED | OUTPATIENT
Start: 2022-10-03 | End: 2022-10-03

## 2022-10-03 RX ORDER — FUROSEMIDE 40 MG/1
40 TABLET ORAL 3 TIMES DAILY
Status: DISCONTINUED | OUTPATIENT
Start: 2022-10-03 | End: 2022-10-05

## 2022-10-03 RX ADMIN — BUDESONIDE 0.5 MG: 0.5 INHALANT RESPIRATORY (INHALATION) at 06:48

## 2022-10-03 RX ADMIN — Medication 10 ML: at 00:28

## 2022-10-03 RX ADMIN — GUAIFENESIN 1200 MG: 600 TABLET, EXTENDED RELEASE ORAL at 21:02

## 2022-10-03 RX ADMIN — OXCARBAZEPINE 150 MG: 150 TABLET, FILM COATED ORAL at 08:59

## 2022-10-03 RX ADMIN — RISPERIDONE 0.25 MG: 0.25 TABLET ORAL at 21:02

## 2022-10-03 RX ADMIN — BUDESONIDE 0.5 MG: 0.5 INHALANT RESPIRATORY (INHALATION) at 19:22

## 2022-10-03 RX ADMIN — PANTOPRAZOLE SODIUM 40 MG: 40 TABLET, DELAYED RELEASE ORAL at 04:30

## 2022-10-03 RX ADMIN — GUAIFENESIN 1200 MG: 600 TABLET, EXTENDED RELEASE ORAL at 08:24

## 2022-10-03 RX ADMIN — CLOTRIMAZOLE AND BETAMETHASONE DIPROPIONATE 1 APPLICATION: 10; .5 CREAM TOPICAL at 21:03

## 2022-10-03 RX ADMIN — SULFUR HEXAFLUORIDE 2 ML: KIT at 14:33

## 2022-10-03 RX ADMIN — SUCRALFATE 1 G: 1 TABLET ORAL at 12:02

## 2022-10-03 RX ADMIN — ONDANSETRON 4 MG: 2 INJECTION INTRAMUSCULAR; INTRAVENOUS at 18:07

## 2022-10-03 RX ADMIN — Medication 10 ML: at 21:03

## 2022-10-03 RX ADMIN — FUROSEMIDE 40 MG: 10 INJECTION, SOLUTION INTRAMUSCULAR; INTRAVENOUS at 08:24

## 2022-10-03 RX ADMIN — FUROSEMIDE 40 MG: 10 INJECTION, SOLUTION INTRAMUSCULAR; INTRAVENOUS at 00:27

## 2022-10-03 RX ADMIN — HYDROCODONE BITARTRATE AND ACETAMINOPHEN 1 TABLET: 5; 325 TABLET ORAL at 08:59

## 2022-10-03 RX ADMIN — POTASSIUM CHLORIDE 40 MEQ: 1500 TABLET, EXTENDED RELEASE ORAL at 08:24

## 2022-10-03 RX ADMIN — FUROSEMIDE 40 MG: 40 TABLET ORAL at 16:17

## 2022-10-03 RX ADMIN — SERTRALINE 200 MG: 100 TABLET, FILM COATED ORAL at 08:24

## 2022-10-03 RX ADMIN — FUROSEMIDE 40 MG: 40 TABLET ORAL at 21:02

## 2022-10-03 RX ADMIN — HYDROCODONE BITARTRATE AND ACETAMINOPHEN 1 TABLET: 5; 325 TABLET ORAL at 04:30

## 2022-10-03 RX ADMIN — SUCRALFATE 1 G: 1 TABLET ORAL at 08:24

## 2022-10-03 RX ADMIN — SUCRALFATE 1 G: 1 TABLET ORAL at 21:03

## 2022-10-03 RX ADMIN — Medication 10 ML: at 08:24

## 2022-10-03 RX ADMIN — ROSUVASTATIN CALCIUM 20 MG: 10 TABLET, FILM COATED ORAL at 21:02

## 2022-10-03 RX ADMIN — OXCARBAZEPINE 150 MG: 150 TABLET, FILM COATED ORAL at 21:02

## 2022-10-03 RX ADMIN — LISINOPRIL 20 MG: 20 TABLET ORAL at 08:24

## 2022-10-03 RX ADMIN — METHYLNALTREXONE BROMIDE 450 MG: 150 TABLET ORAL at 08:24

## 2022-10-03 RX ADMIN — SUCRALFATE 1 G: 1 TABLET ORAL at 16:17

## 2022-10-03 NOTE — PLAN OF CARE
Goal Outcome Evaluation:  Plan of Care Reviewed With: patient, spouse        Progress: improving  Outcome Evaluation: Pt continues to complain of lower back and has smal relief with repositioning and prn pain medications. Pt had CT chest and transthoracic echo on 10/3. Pt will go to [facility] upon discharge possibly on 10/4. Pt resting in chair with eyes closed. Will continue to monitor.

## 2022-10-03 NOTE — PLAN OF CARE
Goal Outcome Evaluation:      Assessment: Morgan Quick presents with functional mobility impairments which indicate the need for skilled intervention. Patient continues to have decreased activity tolerance as well as fear of falling. Patient using walls and furniture while ambulating in room with decreased gait speed and hesitation when encouraged to ambulate on her own. Will benefit from use of rw.  Tolerating session today without incident. Will continue to follow and progress as tolerated.

## 2022-10-03 NOTE — CASE MANAGEMENT/SOCIAL WORK
Social Work Assessment  [facility]     Patient Name: Morgan Quick  MRN: [id number]  Today's Date: 10/3/2022    Admit Date: 9/24/2022     Discharge Plan     Row Name 10/03/22 1217       Plan    Plan Comments Contacted APS representative, Arina Fu, with update on d/c plan. Left voicemail d/t no answer. Notified that patient will d/c to [facility] for skilled therapy services.           No physical contact with patient on this date.  ALICIA Amador    Phone: [phone]  Cell: [phone]  Fax: [phone]  [email or web address]

## 2022-10-03 NOTE — CASE MANAGEMENT/SOCIAL WORK
Continued Stay Note   [facility]     Patient Name: Morgan Quick  MRN: [id number]  Today's Date: 10/3/2022    Admit Date: 9/24/2022    Plan: [facility] Accepted, Precert approved 10/3, Bed available today after 2pm, PASRR per facility   Discharge Plan     Row Name 10/03/22 1137       Plan    Plan [facility] Accepted, Precert approved 10/3, Bed available today after 2pm, PASRR per facility    Plan Comments Per Edwin Peguero with [facility]. Patient accepted and precert was started and already approved today 10/3. they will have a bed available today after 2pm/ MD and Bedside Nurse sent an update. patient was update at bedside.    Update: per MD anticipate d/c tomorrow do to Sodium 124, Edwin Feliz             Met with patient at bedside wearing mask and goggles, Spent less than 15 minutes in room at greater than 6 feet distance.       Avis Mary RN

## 2022-10-03 NOTE — PROGRESS NOTES
Daily Progress Note        Abdominal pain, unspecified abdominal location    Morbidly obese (HCC)    Essential hypertension    Bipolar 1 disorder (HCC)    High cholesterol    Anorexia    Nausea      Assessment    RIGOBERTO/obesity hypoventilation syndrome   Mild bibasilar atelectasis with small effusion     hyponatremia, on exam either hypovolemic or euvolemic    Abdominal pain, unspecified abdominal location  Constipation  Hypodense area in the left lobe of the liver    Elevated D-dimer but negative VQ scan  Anemia     Plan     Work-up for hyponatremia to include non-contrast chest CT,   serum osmolarity, urine osmolarity, urine sodium, TSH, cortisol   echo    Oxygen supplement and titration to maintain saturation 88 to 93%: 2 L NC and BiPAP as needed    Lasix 40 mg every 8 hours      Pulmicort and DuoNebs  Mucinex   DVT/GI prophylaxis-Lovenox and Protonix  Encourage use of incentive spirometer  Blood pressure controlled    Treatment for constipation as per GI             LOS: 5 days     Subjective         Objective     Vital signs for last 24 hours:  Vitals:    10/02/22 1837 10/02/22 1841 10/02/22 2005 10/03/22 0417   BP:   104/68 110/48   BP Location:   Left arm Left arm   Patient Position:   Lying Lying   Pulse: 65 65 70 60   Resp: 18 18 17 16   Temp:   98.2 °F (36.8 °C) 98 °F (36.7 °C)   TempSrc:   Oral Oral   SpO2: 95% 100% 96% 95%   Weight:    107 kg (235 lb 3.7 oz)   Height:           Intake/Output last 3 shifts:  I/O last 3 completed shifts:  In: 2160 [P.O.:2160]  Out: 1500 [Urine:1500]  Intake/Output this shift:  I/O this shift:  In: 957 [P.O.:957]  Out: -       Radiology  Imaging Results (Last 24 Hours)     ** No results found for the last 24 hours. **          Labs:  Results from last 7 days   Lab Units 10/03/22  0053   WBC 10*3/mm3 7.40   HEMOGLOBIN g/dL 8.8*   HEMATOCRIT % 26.5*   PLATELETS 10*3/mm3 160     Results from last 7 days   Lab Units 10/03/22  0053 10/01/22  0349 09/29/22  0021   SODIUM mmol/L 124*    < > 133*   POTASSIUM mmol/L 4.3   < > 4.3   CHLORIDE mmol/L 87*   < > 92*   CO2 mmol/L 29.0   < > 33.0*   BUN mg/dL 11   < > 7*   CREATININE mg/dL 0.56*   < > 0.43*   CALCIUM mg/dL 8.7   < > 8.9   BILIRUBIN mg/dL  --   --  0.8   ALK PHOS U/L  --   --  92   ALT (SGPT) U/L  --   --  6   AST (SGOT) U/L  --   --  16   GLUCOSE mg/dL 102*   < > 107*    < > = values in this interval not displayed.     Results from last 7 days   Lab Units 09/29/22  0758   PH, ARTERIAL pH units 7.433   PO2 ART mm Hg 69.7*   PCO2, ARTERIAL mm Hg 54.6*   HCO3 ART mmol/L 36.5*     Results from last 7 days   Lab Units 09/29/22  0021 09/28/22  0133 09/27/22  0053   ALBUMIN g/dL 2.90* 2.50* 2.60*                               Meds:   SCHEDULE  bisacodyl, 10 mg, Rectal, Once  budesonide, 0.5 mg, Nebulization, BID - RT  clotrimazole-betamethasone, 1 application, Topical, Q12H  furosemide, 40 mg, Intravenous, Q8H  guaiFENesin, 1,200 mg, Oral, Q12H  lisinopril, 20 mg, Oral, Daily  Methylnaltrexone Bromide, 450 mg, Oral, Daily  OXcarbazepine, 150 mg, Oral, BID  pantoprazole, 40 mg, Oral, Q AM  potassium chloride, 40 mEq, Oral, Daily  risperiDONE, 0.25 mg, Oral, Nightly  rosuvastatin, 20 mg, Oral, Nightly  sertraline, 200 mg, Oral, Daily  sodium chloride, 10 mL, Intravenous, Q12H  sucralfate, 1 g, Oral, 4x Daily AC & at Bedtime      Infusions     PRNs  HYDROcodone-acetaminophen  •  ipratropium-albuterol  •  melatonin  •  nitroglycerin  •  ondansetron **OR** ondansetron  •  [COMPLETED] Insert peripheral IV **AND** sodium chloride  •  sodium chloride    Physical Exam:  Physical Exam  Pulmonary:      Breath sounds: Rhonchi and rales present.         ROS  Review of Systems    I have reviewed the patient's new clinical results.    Electronically signed by Marc Diaz MD

## 2022-10-03 NOTE — PLAN OF CARE
Goal Outcome Evaluation:  Plan of Care Reviewed With: patient, spouse        Progress: no change  Outcome Evaluation: pt c/o pain gave prn med, rested off and on during the night, d/c awaiting placement

## 2022-10-03 NOTE — THERAPY TREATMENT NOTE
"Subjective: Pt agreeable to therapeutic plan of care.    Objective:   On 2l/nc    Bed mobility - Supervision  Transfers - SBA and CGA  Ambulation- 20 feet x 2 CGA  Thera ex in sitting 10 reps  Vitals: WNL    Pain: 4 VAS  Education: Provided education on importance of mobility and skilled verbal / tactile cueing throughout intervention.     Assessment: Morgan Quick presents with functional mobility impairments which indicate the need for skilled intervention. Patient continues to have decreased activity tolerance as well as fear of falling. Patient using walls and furniture while ambulating in room with decreased gait speed and hesitation when encouraged to ambulate on her own. Will benefit from use of rw.  Tolerating session today without incident. Will continue to follow and progress as tolerated.     Plan/Recommendations:   Moderate Intensity Therapy recommended post-acute care. This is recommended as therapy feels the patient would require 3-4 days per week and wouldn't tolerate \"3 hour daily\" rehab intensity. SNF would be the preferred choice. If the patient does not agree to SNF, arrange HH or OP depending on home bound status. If patient is medically complex, consider LTACH.. Pt requires rolling walker at discharge.     Pt desires Skilled Rehab placement at discharge. Pt cooperative; agreeable to therapeutic recommendations and plan of care.         Basic Mobility 6-click:  Rollin = Total, A lot = 2, A little = 3; 4 = None  Supine>Sit:   1 = Total, A lot = 2, A little = 3; 4 = None   Sit>Stand with arms:  1 = Total, A lot = 2, A little = 3; 4 = None  Bed>Chair:   1 = Total, A lot = 2, A little = 3; 4 = None  Ambulate in room:  1 = Total, A lot = 2, A little = 3; 4 = None  3-5 Steps with railin = Total, A lot = 2, A little = 3; 4 = None  Score: 18      Post-Tx Position: Up in Chair, Alarms activated and Call light and personal items within reach  PPE: gloves, eye protection and " respirator

## 2022-10-03 NOTE — PLAN OF CARE
Goal Outcome Evaluation:      No pain reported. Ate good. No drainage or open area noted on bilateral lower leg. Just scattered scabs. Open to air. Psych consult canceled by psych doctor.

## 2022-10-04 LAB
ANION GAP SERPL CALCULATED.3IONS-SCNC: 8 MMOL/L (ref 5–15)
ANISOCYTOSIS BLD QL: ABNORMAL
BUN SERPL-MCNC: 9 MG/DL (ref 8–23)
BUN/CREAT SERPL: 17.6 (ref 7–25)
CALCIUM SPEC-SCNC: 8.7 MG/DL (ref 8.6–10.5)
CHLORIDE SERPL-SCNC: 87 MMOL/L (ref 98–107)
CO2 SERPL-SCNC: 29 MMOL/L (ref 22–29)
CREAT SERPL-MCNC: 0.51 MG/DL (ref 0.57–1)
DEPRECATED RDW RBC AUTO: 69.1 FL (ref 37–54)
EGFRCR SERPLBLD CKD-EPI 2021: 103.1 ML/MIN/1.73
EOSINOPHIL # BLD MANUAL: 0.16 10*3/MM3 (ref 0–0.4)
EOSINOPHIL NFR BLD MANUAL: 2 % (ref 0.3–6.2)
ERYTHROCYTE [DISTWIDTH] IN BLOOD BY AUTOMATED COUNT: 18.9 % (ref 12.3–15.4)
GLUCOSE SERPL-MCNC: 107 MG/DL (ref 65–99)
HCT VFR BLD AUTO: 27.6 % (ref 34–46.6)
HGB BLD-MCNC: 9.2 G/DL (ref 12–15.9)
LARGE PLATELETS: ABNORMAL
LYMPHOCYTES # BLD MANUAL: 1.34 10*3/MM3 (ref 0.7–3.1)
LYMPHOCYTES NFR BLD MANUAL: 6 % (ref 5–12)
MCH RBC QN AUTO: 35.4 PG (ref 26.6–33)
MCHC RBC AUTO-ENTMCNC: 33.5 G/DL (ref 31.5–35.7)
MCV RBC AUTO: 105.9 FL (ref 79–97)
METAMYELOCYTES NFR BLD MANUAL: 1 % (ref 0–0)
MONOCYTES # BLD: 0.47 10*3/MM3 (ref 0.1–0.9)
NEUTROPHILS # BLD AUTO: 5.85 10*3/MM3 (ref 1.7–7)
NEUTROPHILS NFR BLD MANUAL: 73 % (ref 42.7–76)
NEUTS BAND NFR BLD MANUAL: 1 % (ref 0–5)
PLATELET # BLD AUTO: 200 10*3/MM3 (ref 140–450)
PMV BLD AUTO: 8.1 FL (ref 6–12)
POIKILOCYTOSIS BLD QL SMEAR: ABNORMAL
POTASSIUM SERPL-SCNC: 4.3 MMOL/L (ref 3.5–5.2)
RBC # BLD AUTO: 2.6 10*6/MM3 (ref 3.77–5.28)
SCAN SLIDE: NORMAL
SODIUM SERPL-SCNC: 124 MMOL/L (ref 136–145)
VARIANT LYMPHS NFR BLD MANUAL: 17 % (ref 19.6–45.3)
WBC MORPH BLD: NORMAL
WBC NRBC COR # BLD: 7.9 10*3/MM3 (ref 3.4–10.8)

## 2022-10-04 PROCEDURE — 94799 UNLISTED PULMONARY SVC/PX: CPT

## 2022-10-04 PROCEDURE — 94760 N-INVAS EAR/PLS OXIMETRY 1: CPT

## 2022-10-04 PROCEDURE — 63710000001 ONDANSETRON PER 8 MG: Performed by: INTERNAL MEDICINE

## 2022-10-04 PROCEDURE — 97116 GAIT TRAINING THERAPY: CPT

## 2022-10-04 PROCEDURE — 85025 COMPLETE CBC W/AUTO DIFF WBC: CPT | Performed by: HOSPITALIST

## 2022-10-04 PROCEDURE — 85007 BL SMEAR W/DIFF WBC COUNT: CPT | Performed by: HOSPITALIST

## 2022-10-04 PROCEDURE — 97530 THERAPEUTIC ACTIVITIES: CPT

## 2022-10-04 PROCEDURE — 80048 BASIC METABOLIC PNL TOTAL CA: CPT | Performed by: HOSPITALIST

## 2022-10-04 PROCEDURE — 97535 SELF CARE MNGMENT TRAINING: CPT

## 2022-10-04 PROCEDURE — 94664 DEMO&/EVAL PT USE INHALER: CPT

## 2022-10-04 RX ORDER — OXYCODONE HYDROCHLORIDE 5 MG/1
5 TABLET ORAL EVERY 4 HOURS PRN
Status: DISCONTINUED | OUTPATIENT
Start: 2022-10-04 | End: 2022-10-10 | Stop reason: HOSPADM

## 2022-10-04 RX ORDER — SODIUM CHLORIDE 1000 MG
1 TABLET, SOLUBLE MISCELLANEOUS
Status: DISCONTINUED | OUTPATIENT
Start: 2022-10-04 | End: 2022-10-09

## 2022-10-04 RX ADMIN — OXCARBAZEPINE 150 MG: 150 TABLET, FILM COATED ORAL at 21:06

## 2022-10-04 RX ADMIN — RISPERIDONE 0.25 MG: 0.25 TABLET ORAL at 21:06

## 2022-10-04 RX ADMIN — SUCRALFATE 1 G: 1 TABLET ORAL at 11:42

## 2022-10-04 RX ADMIN — OXYCODONE 5 MG: 5 TABLET ORAL at 05:01

## 2022-10-04 RX ADMIN — ONDANSETRON HYDROCHLORIDE 4 MG: 4 TABLET, FILM COATED ORAL at 21:35

## 2022-10-04 RX ADMIN — SODIUM CHLORIDE 1 G: 1 TABLET ORAL at 12:49

## 2022-10-04 RX ADMIN — OXYCODONE 5 MG: 5 TABLET ORAL at 11:42

## 2022-10-04 RX ADMIN — FUROSEMIDE 40 MG: 40 TABLET ORAL at 18:19

## 2022-10-04 RX ADMIN — Medication 10 ML: at 08:51

## 2022-10-04 RX ADMIN — FUROSEMIDE 40 MG: 40 TABLET ORAL at 08:48

## 2022-10-04 RX ADMIN — Medication 10 ML: at 21:06

## 2022-10-04 RX ADMIN — SODIUM CHLORIDE 1 G: 1 TABLET ORAL at 09:28

## 2022-10-04 RX ADMIN — GUAIFENESIN 1200 MG: 600 TABLET, EXTENDED RELEASE ORAL at 08:47

## 2022-10-04 RX ADMIN — GUAIFENESIN 1200 MG: 600 TABLET, EXTENDED RELEASE ORAL at 21:06

## 2022-10-04 RX ADMIN — POTASSIUM CHLORIDE 40 MEQ: 1500 TABLET, EXTENDED RELEASE ORAL at 08:47

## 2022-10-04 RX ADMIN — OXCARBAZEPINE 150 MG: 150 TABLET, FILM COATED ORAL at 08:47

## 2022-10-04 RX ADMIN — BUDESONIDE 0.5 MG: 0.5 INHALANT RESPIRATORY (INHALATION) at 07:15

## 2022-10-04 RX ADMIN — CLOTRIMAZOLE AND BETAMETHASONE DIPROPIONATE 1 APPLICATION: 10; .5 CREAM TOPICAL at 21:06

## 2022-10-04 RX ADMIN — SUCRALFATE 1 G: 1 TABLET ORAL at 21:06

## 2022-10-04 RX ADMIN — SUCRALFATE 1 G: 1 TABLET ORAL at 18:20

## 2022-10-04 RX ADMIN — FUROSEMIDE 40 MG: 40 TABLET ORAL at 21:06

## 2022-10-04 RX ADMIN — SODIUM CHLORIDE 1 G: 1 TABLET ORAL at 18:20

## 2022-10-04 RX ADMIN — PANTOPRAZOLE SODIUM 40 MG: 40 TABLET, DELAYED RELEASE ORAL at 05:01

## 2022-10-04 RX ADMIN — METHYLNALTREXONE BROMIDE 450 MG: 150 TABLET ORAL at 08:47

## 2022-10-04 RX ADMIN — SERTRALINE 200 MG: 100 TABLET, FILM COATED ORAL at 08:47

## 2022-10-04 RX ADMIN — OXYCODONE 5 MG: 5 TABLET ORAL at 23:58

## 2022-10-04 RX ADMIN — LISINOPRIL 20 MG: 20 TABLET ORAL at 08:47

## 2022-10-04 RX ADMIN — ROSUVASTATIN CALCIUM 20 MG: 10 TABLET, FILM COATED ORAL at 21:06

## 2022-10-04 RX ADMIN — SUCRALFATE 1 G: 1 TABLET ORAL at 08:47

## 2022-10-04 NOTE — CASE MANAGEMENT/SOCIAL WORK
Continued Stay Note  [facility]     Patient Name: Morgan Quick  MRN: [id number]  Today's Date: 10/4/2022    Admit Date: 2022    Plan: [facility] Accepted, precert approved 10/3, expires today 10/4 will need new one submitted once d/c ready and need new PT/OT notes that day also. Sodium 124   Discharge Plan     Row Name 10/04/22 1152       Plan    Plan [facility] Accept[facility], precert approved 10/3, expires today 10/4 will need new one submitted once d/c ready and need new PT/OT notes that day also. Sodium 124    Plan Comments Per Liachiara Peguero precert  today so will need new Precert started once d/c ready and will need Pt/Ot notes on the day she is discahrge ready. D/C Barriers: [place] Sodium 124              Met with patient at bedside wearing mask and goggles, Spent less than 15 minutes in room at greater than 6 feet distance.         Avis Mary RN

## 2022-10-04 NOTE — PLAN OF CARE
"Goal Outcome Evaluation:     Bed mobility - N/A or Not attempted.  Pt already up at edge of the bed  Transfers - CGA and with rolling walker sit to stand off edge of the bed and out of bedside chair  Ambulation - 30 feet CGA and with rolling walker very slow pace, poor foot clearance, slightly stooped posture    Moderate Intensity Therapy recommended post-acute care. This is recommended as therapy feels the patient would require 3-4 days per week and wouldn't tolerate \"3 hour daily\" rehab intensity. SNF REHAB would be the preferred choice.  Pt requires no DME at discharge.     Pt desires Skilled Rehab placement at discharge. Pt reported she was not 100% sure.  Her  reported she needs to make up her own mind. Pt cooperative; agreeable to therapeutic recommendations and plan of care.             "

## 2022-10-04 NOTE — PROGRESS NOTES
[facility]     Progress Note    Patient Name: Morgan Quick  : 1955  MRN: [id number]  Primary Care Physician:  Wayne Saavedra MD  Date of admission: 2022  Service date and time: 10/04/22 10:37 EDT  Subjective   Subjective     Chief Complaint: abdominal pain     HPI:  Patient doing fine, + weak, Na 124       Objective   Objective     Vitals:   Temp:  [97.9 °F (36.6 °C)-98.2 °F (36.8 °C)] 97.9 °F (36.6 °C)  Heart Rate:  [62-82] 62  Resp:  [16-18] 18  BP: (101-125)/(63-74) 106/69  Flow (L/min):  [2] 2  Physical Exam    Constitutional: Awake, alert   Eyes: PERRLA, sclerae anicteric, no conjunctival injection   HENT: NCAT, mucous membranes moist   Neck: Supple, no thyromegaly, no lymphadenopathy, trachea midline   Respiratory: Clear to auscultation bilaterally, nonlabored respirations    Cardiovascular: RRR, no murmurs, rubs, or gallops, palpable pedal pulses bilaterally   Gastrointestinal: Positive bowel sounds, soft, nontender, nondistended   Musculoskeletal: No bilateral ankle edema, no clubbing or cyanosis to extremities   Psychiatric: Appropriate affect, cooperative   Neurologic: Oriented x 3, strength symmetric in all extremities, Cranial Nerves grossly intact to confrontation, speech clear   Skin: No rashes     Result Review    Result Review:  I have personally reviewed the results from the time of this admission to 10/4/2022 10:37 EDT and agree with these findings:  [x]  Laboratory list / accordion  [x]  Microbiology  [x]  Radiology  [x]  EKG/Telemetry   []  Cardiology/Vascular   []  Pathology  []  Old records  []  Other:        Assessment & Plan   Assessment / Plan       Active Hospital Problems:  Active Hospital Problems    Diagnosis    • **Abdominal pain, unspecified abdominal location    • Bipolar 1 disorder (HCC)    • High cholesterol    • Anorexia    • Nausea    • Morbidly obese (HCC)    • Essential hypertension    Physical Deconditioning    Plan:      Hyponatremia  - CT chest  reviewed, echo pending, Na 124, start Salt tabs, trend daily  - appears to be euvolemic    Hx of breast Cancer  - outpatient f/u    Abdominal pain, nausea - improved  CT>Strandy density in the anterior pararenal space inferior to the  pancreas and adjacent to the horizontal limb of the duodenal C-loop. I  would favor duodenitis versus less likely acute pancreatitis. I would  recommend correlation with pancreatic enzyme levels.  2. There is a hypodense area in the left lobe of the liver near the main  portal vein which may represent a small cyst or cavernous hemangioma. It  was not seen well on the previous exam. The patient reportedly has a  history of left breast cancer so I cannot completely exclude metastatic  Disease.  Per Pt no BM.  Repeat CT of abdomen states moderate stool burden.  Pt states, still no BM.  Will order Miralax 34g Q4H till BM.  Then can stop.  Pt should have MRI as out pt with and without contrast to evaluate liver lesion.     Duodenitis - resolved  -zophran IV  -GI following,  S/P EGD.  Showed duodenitis on EGD and a benign appearing nodule, that was biopsied.  -Protonix and Carafate.  -lovenox bid  -Diet was advanced.  -On yesterday CT, duodenitis resolved.     Constipation/Fecal retention -resolved  - having bm's  -Doc sodium/miralax  -No BM with Miralax.  Will order Mag Citrate and f/u with BMs.    -Per Pt no BM.  Repeat CT of abdomen states moderate stool burden.  -Pt states, still no BM.  Will order Miralax 34g Q4H till BM.  Then can stop.     Hypocalcemia/hypokalemia- replace/monitor     Elevated D dimer:  VQ-scan is negative.     Acute hypoxic respiratory failure  - failed 6 minute walk test, will need home oxygen setup, currently on 2 liters  -Secondary to fluid overload.  -cont lasix  -abg reviewed  - Bipap prn, needs outpatient sleep study.  -Pulmonology following     Anemia:  - cont to monitor  -Transfused a unit of blood.  -Appropriate compensation post transfusion.    Physical  Deconditioning - PT/OT, needs SNF, SW following for placement    DVT prophylaxis:  Mechanical DVT prophylaxis orders are present.    CODE STATUS:   Level Of Support Discussed With: Patient  Code Status (Patient has no pulse and is not breathing): CPR (Attempt to Resuscitate)  Medical Interventions (Patient has pulse or is breathing): Full Support  Release to patient: Routine Release    Disposition:  I expect patient to be discharged 2 days.    Gerardo Gant MD

## 2022-10-04 NOTE — PLAN OF CARE
Goal Outcome Evaluation:  Plan of Care Reviewed With: patient, spouse        Progress: no change  Outcome Evaluation: Pt continues to c/o of lower back pain and sees some relief with prn pain medications. Pt did c/o of some nausea and had relief with prn zofran. Pt will go to [place] upon discharge in the future. Pt resting in chair watching tv. Will continue to monitor.

## 2022-10-04 NOTE — THERAPY TREATMENT NOTE
"Subjective: Pt agreeable to therapeutic plan of care.    Objective:     Bed mobility - N/A or Not attempted.  Pt already up at edge of the bed  Transfers - CGA and with rolling walker sit to stand off edge of the bed and out of bedside chair  Ambulation - 30 feet CGA and with rolling walker very slow pace, poor foot clearance, slightly stooped posture    Vitals: Desaturates Sats on 2L nc 97%.  Ambulated pt on room air and sats dropped to 76%.  Returned pt back on 2L and pt returned back to 95%. Pt did not demonstrate being short of air after gait training.     Pain: 0 VAS  Education: Provided education on importance of mobility and skilled verbal / tactile cueing throughout intervention.     Assessment: Morgan Quick presents with functional mobility impairments which indicate the need for skilled intervention.  pt still very weak with poor endurance. Slow progress toward goals.  Tolerating session today without incident. Will continue to follow and progress as tolerated.     Plan/Recommendations:   Moderate Intensity Therapy recommended post-acute care. This is recommended as therapy feels the patient would require 3-4 days per week and wouldn't tolerate \"3 hour daily\" rehab intensity. SNF REHAB would be the preferred choice.  Pt requires no DME at discharge.     Pt desires Skilled Rehab placement at discharge. Pt reported she was not 100% sure.  Her  reported she needs to make up her own mind. Pt cooperative; agreeable to therapeutic recommendations and plan of care.     Basic Mobility 6-click:  Rollin = Total, A lot = 2, A little = 3; 4 = None  Supine>Sit:   1 = Total, A lot = 2, A little = 3; 4 = None   Sit>Stand with arms:  1 = Total, A lot = 2, A little = 3; 4 = None  Bed>Chair:   1 = Total, A lot = 2, A little = 3; 4 = None  Ambulate in room:  1 = Total, A lot = 2, A little = 3; 4 = None  3-5 Steps with railin = Total, A lot = 2, A little = 3; 4 = None  Score: 18    Post-Tx " Position: Up in Chair, Alarms activated and Call light and personal items within reach  PPE: mask, gloves and eye protection

## 2022-10-04 NOTE — PROGRESS NOTES
Daily Progress Note        Abdominal pain, unspecified abdominal location    Morbidly obese (HCC)    Essential hypertension    Bipolar 1 disorder (HCC)    High cholesterol    Anorexia    Nausea      Assessment    RIGOBERTO/obesity hypoventilation syndrome   Mild bibasilar atelectasis with small effusion    Hyponatremia, possible diuretic effect  Borderline  TSH 6.0  Cortisol 18.95  Osmolarity 259, urine osmolarity 163    Abdominal pain, unspecified abdominal location  Constipation  Hypodense area in the left lobe of the liver    Elevated D-dimer but negative VQ scan  Anemia     Plan       Oxygen supplement and titration to maintain saturation 88 to 93%: 2 L NC and BiPAP as needed    Lasix 40 mg every 8 hours  2D echo, pending      Pulmicort and DuoNebs  Mucinex   DVT/GI prophylaxis-Lovenox and Protonix  Encourage use of incentive spirometer  Blood pressure controlled    Treatment for constipation as per GI             LOS: 6 days     Subjective         Objective     Vital signs for last 24 hours:  Vitals:    10/03/22 1904 10/03/22 1925 10/03/22 1928 10/04/22 0406   BP: 101/66   114/73   BP Location: Left arm   Left arm   Patient Position: Sitting   Sitting   Pulse: 72 81 82 67   Resp: 16 18 18 17   Temp: 98.2 °F (36.8 °C)   97.9 °F (36.6 °C)   TempSrc: Oral   Oral   SpO2: 97% 98% 98% 99%   Weight:    105 kg (230 lb 9.6 oz)   Height:           Intake/Output last 3 shifts:  I/O last 3 completed shifts:  In: 1437 [P.O.:1437]  Out: 1000 [Urine:1000]  Intake/Output this shift:  No intake/output data recorded.      Radiology  Imaging Results (Last 24 Hours)     Procedure Component Value Units Date/Time    CT Chest Without Contrast Diagnostic [[id number]] Collected: 10/03/22 0959     Updated: 10/03/22 1009    Narrative:      CT CHEST WO CONTRAST DIAGNOSTIC-     Date of Exam: 10/3/2022 9:47 AM     Indication: Dyspnea, chronic, unclear etiology; R10.9-Unspecified  abdominal pain; R11.0-Nausea; K29.80-Duodenitis without  bleeding;  N39.0-Urinary tract infection, site not specified; R31.9-Hematuria,  unspecified; R06.00-Dyspnea, unspecified; R09.02-Hypoxemia; R79.89-Other  specified abnormal findings of blood chemistry     Comparison: None available.     Technique: Serial and axial CT images of the chest were obtained.  Reconstructions in the coronal and sagittal planes were performed.   Automated exposure control and iterative reconstruction methods were  used.     FINDINGS:     There are patchy groundglass opacities in the upper lobes bilaterally,  left greater than right. There is linear consolidation in the bases  bilaterally suggesting minor atelectasis. There are two 6 mm  indeterminate nodules in the posterior base of the left lower lobe on  images #62 and 63. There is an additional 6 mm nodule in the posterior  medial right lower lobe on image #51. There is an additional 5 mm nodule  in the base of the right upper lobe on image #45. There is a 6 mm  fissural nodule in the left lung on image #45 the axial series. The  central airways appear grossly patent. Coronary and other vascular  calcification is present. No gross mediastinal or hilar adenopathy is  identified. Heart size appears prominent. No gross axillary or  supraclavicular adenopathy is identified. There is a small hiatal hernia  present. There is a gastric banding device present. Mild splenomegaly is  present. There is relative hypodensity within the liver suggesting  steatosis. More focal areas of hypodensity are noted adjacent to the  jessica hepatis and in the dome of the medial left hepatic lobe. Please  see recent CT scan abdomen and pelvis 9/28/2022 4 discussion and  recommendations. No aggressive osseous lesions are identified.       Impression:         1. There are patchy groundglass opacities in the upper lobes  bilaterally, suggesting an infectious or inflammatory process.  2. Multiple subcentimeter indeterminate pulmonary nodules bilaterally,  measuring up  to 6 mm. Patient reportedly has a history of breast cancer.  Serial CT follow-up recommended.  3. Additional findings as given above.           Electronically Signed By-Cy Rondon MD On:10/3/2022 10:07 AM  This report was finalized on [id number] by  Cy Rondon MD.          Labs:  Results from last 7 days   Lab Units 10/04/22  0446   WBC 10*3/mm3 7.90   HEMOGLOBIN g/dL 9.2*   HEMATOCRIT % 27.6*   PLATELETS 10*3/mm3 200     Results from last 7 days   Lab Units 10/04/22  0446 10/01/22  0349 09/29/22  0021   SODIUM mmol/L 124*   < > 133*   POTASSIUM mmol/L 4.3   < > 4.3   CHLORIDE mmol/L 87*   < > 92*   CO2 mmol/L 29.0   < > 33.0*   BUN mg/dL 9   < > 7*   CREATININE mg/dL 0.51*   < > 0.43*   CALCIUM mg/dL 8.7   < > 8.9   BILIRUBIN mg/dL  --   --  0.8   ALK PHOS U/L  --   --  92   ALT (SGPT) U/L  --   --  6   AST (SGOT) U/L  --   --  16   GLUCOSE mg/dL 107*   < > 107*    < > = values in this interval not displayed.     Results from last 7 days   Lab Units 09/29/22  0758   PH, ARTERIAL pH units 7.433   PO2 ART mm Hg 69.7*   PCO2, ARTERIAL mm Hg 54.6*   HCO3 ART mmol/L 36.5*     Results from last 7 days   Lab Units 09/29/22  0021 09/28/22  0133   ALBUMIN g/dL 2.90* 2.50*                     Results from last 7 days   Lab Units 10/03/22  0940   TSH uIU/mL 6.000*           Meds:   SCHEDULE  bisacodyl, 10 mg, Rectal, Once  budesonide, 0.5 mg, Nebulization, BID - RT  clotrimazole-betamethasone, 1 application, Topical, Q12H  furosemide, 40 mg, Oral, TID  guaiFENesin, 1,200 mg, Oral, Q12H  lisinopril, 20 mg, Oral, Daily  Methylnaltrexone Bromide, 450 mg, Oral, Daily  OXcarbazepine, 150 mg, Oral, BID  pantoprazole, 40 mg, Oral, Q AM  potassium chloride, 40 mEq, Oral, Daily  risperiDONE, 0.25 mg, Oral, Nightly  rosuvastatin, 20 mg, Oral, Nightly  sertraline, 200 mg, Oral, Daily  sodium chloride, 10 mL, Intravenous, Q12H  sucralfate, 1 g, Oral, 4x Daily AC & at Bedtime      Infusions     PRNs  ipratropium-albuterol  •   melatonin  •  nitroglycerin  •  ondansetron **OR** ondansetron  •  oxyCODONE  •  [COMPLETED] Insert peripheral IV **AND** sodium chloride  •  sodium chloride    Physical Exam:  Physical Exam  Vitals reviewed.   Pulmonary:      Breath sounds: Rhonchi and rales present.   Skin:     General: Skin is warm and dry.   Neurological:      Mental Status: She is alert and oriented to person, place, and time.         ROS  Review of Systems    I have reviewed the patient's new clinical results.    Electronically signed by Marc Diaz MD

## 2022-10-04 NOTE — PLAN OF CARE
Goal Outcome Evaluation:  Plan of Care Reviewed With: patient        Progress: no change   Patient c/o pain in lower back, PRN pain meds ordered and given. Patient on 2L O2. Call light in reach, will continue to monitor.

## 2022-10-04 NOTE — PLAN OF CARE
Goal Outcome Evaluation:        Assessment: Morgan Quick presents with ADL impairments below baseline abilities which indicate the need for continued skilled intervention while inpatient. Pt lethargic, though participaes well in therapy.  Requires encouragement to attempt ADLs. Tolerating session today without incident. Will continue to follow and progress as tolerated.     Plan/Recommendations:   Pt would benefit from Skilled Rehab placement at discharge from facility.   Pt desires Skilled Rehab placement at discharge. Pt cooperative; agreeable to therapeutic recommendations and plan of care.

## 2022-10-04 NOTE — THERAPY TREATMENT NOTE
Subjective: Pt agreeable to therapeutic plan of care.  Cognition: arousal/alertness: Attentive and lethargic     Objective:     Bed Mobility: N/A or Not attempted.   Functional Transfers: SBA  Functional Ambulation: N/A or Not attempted.    Bathing: CGA  ADL Position: supported sitting  ADL Comments: Assist for back     Lower Body Dressing: CGA  ADL Position: supported sitting  ADL Comments: Donning socks.  Cues to scoot posteriorly in chair to prevent sliding out of chair     Pain: 0 VAS  Education: Provided education on importance of mobility and skilled verbal / tactile cueing throughout intervention.     Assessment: Morgan Quick presents with ADL impairments below baseline abilities which indicate the need for continued skilled intervention while inpatient. Pt lethargic, though participaes well in therapy.  Requires encouragement to attempt ADLs. Tolerating session today without incident. Will continue to follow and progress as tolerated.     Plan/Recommendations:   Pt would benefit from Skilled Rehab placement at discharge from facility.   Pt desires Skilled Rehab placement at discharge. Pt cooperative; agreeable to therapeutic recommendations and plan of care.     Modified Kevin: N/A = No pre-op stroke/TIA    Post-Tx Position: Up in Chair and Call light and personal items within reach  PPE: mask, gloves and eye protection

## 2022-10-05 LAB
ANION GAP SERPL CALCULATED.3IONS-SCNC: 7 MMOL/L (ref 5–15)
BASOPHILS # BLD AUTO: 0 10*3/MM3 (ref 0–0.2)
BASOPHILS NFR BLD AUTO: 0.1 % (ref 0–1.5)
BH CV ECHO MEAS - ACS: 1.98 CM
BH CV ECHO MEAS - AO MAX PG: 11 MMHG
BH CV ECHO MEAS - AO MEAN PG: 4.9 MMHG
BH CV ECHO MEAS - AO ROOT DIAM: 3.1 CM
BH CV ECHO MEAS - AO V2 MAX: 165.5 CM/SEC
BH CV ECHO MEAS - AO V2 VTI: 35.2 CM
BH CV ECHO MEAS - AVA(I,D): 3.4 CM2
BH CV ECHO MEAS - EDV(CUBED): 78.1 ML
BH CV ECHO MEAS - EDV(MOD-SP4): 94.2 ML
BH CV ECHO MEAS - EF(MOD-BP): 52 %
BH CV ECHO MEAS - EF(MOD-SP4): 52.2 %
BH CV ECHO MEAS - ESV(CUBED): 30.9 ML
BH CV ECHO MEAS - ESV(MOD-SP4): 45 ML
BH CV ECHO MEAS - FS: 26.6 %
BH CV ECHO MEAS - IVS/LVPW: 1.07 CM
BH CV ECHO MEAS - IVSD: 1.64 CM
BH CV ECHO MEAS - LA A2CS (ATRIAL LENGTH): 4.2 CM
BH CV ECHO MEAS - LV DIASTOLIC VOL/BSA (35-75): 44.5 CM2
BH CV ECHO MEAS - LV MASS(C)D: 278.8 GRAMS
BH CV ECHO MEAS - LV MAX PG: 6.4 MMHG
BH CV ECHO MEAS - LV MEAN PG: 3.2 MMHG
BH CV ECHO MEAS - LV SYSTOLIC VOL/BSA (12-30): 21.2 CM2
BH CV ECHO MEAS - LV V1 MAX: 126.6 CM/SEC
BH CV ECHO MEAS - LV V1 VTI: 32.2 CM
BH CV ECHO MEAS - LVIDD: 4.3 CM
BH CV ECHO MEAS - LVIDS: 3.1 CM
BH CV ECHO MEAS - LVOT AREA: 3.7 CM2
BH CV ECHO MEAS - LVOT DIAM: 2.17 CM
BH CV ECHO MEAS - LVPWD: 1.53 CM
BH CV ECHO MEAS - MV A MAX VEL: 6 CM/SEC
BH CV ECHO MEAS - MV DEC SLOPE: 440.8 CM/SEC2
BH CV ECHO MEAS - MV DEC TIME: 0.22 MSEC
BH CV ECHO MEAS - MV E MAX VEL: 95.5 CM/SEC
BH CV ECHO MEAS - MV E/A: 15.8
BH CV ECHO MEAS - MV MAX PG: 5 MMHG
BH CV ECHO MEAS - MV MEAN PG: 2.1 MMHG
BH CV ECHO MEAS - MV V2 VTI: 38.1 CM
BH CV ECHO MEAS - MVA(VTI): 3.1 CM2
BH CV ECHO MEAS - PA V2 MAX: 108.1 CM/SEC
BH CV ECHO MEAS - RVDD: 2.8 CM
BH CV ECHO MEAS - SI(MOD-SP4): 23.2 ML/M2
BH CV ECHO MEAS - SV(LVOT): 118.7 ML
BH CV ECHO MEAS - SV(MOD-SP4): 49.2 ML
BUN SERPL-MCNC: 9 MG/DL (ref 8–23)
BUN/CREAT SERPL: 17.3 (ref 7–25)
CALCIUM SPEC-SCNC: 9.1 MG/DL (ref 8.6–10.5)
CHLORIDE SERPL-SCNC: 84 MMOL/L (ref 98–107)
CO2 SERPL-SCNC: 29 MMOL/L (ref 22–29)
CREAT SERPL-MCNC: 0.52 MG/DL (ref 0.57–1)
DEPRECATED RDW RBC AUTO: 68.3 FL (ref 37–54)
EGFRCR SERPLBLD CKD-EPI 2021: 102.6 ML/MIN/1.73
EOSINOPHIL # BLD AUTO: 0.1 10*3/MM3 (ref 0–0.4)
EOSINOPHIL NFR BLD AUTO: 1 % (ref 0.3–6.2)
ERYTHROCYTE [DISTWIDTH] IN BLOOD BY AUTOMATED COUNT: 19.3 % (ref 12.3–15.4)
GLUCOSE SERPL-MCNC: 120 MG/DL (ref 65–99)
HCT VFR BLD AUTO: 26.9 % (ref 34–46.6)
HGB BLD-MCNC: 9.1 G/DL (ref 12–15.9)
HOLD SPECIMEN: NORMAL
LYMPHOCYTES # BLD AUTO: 1.1 10*3/MM3 (ref 0.7–3.1)
LYMPHOCYTES NFR BLD AUTO: 14.2 % (ref 19.6–45.3)
MAXIMAL PREDICTED HEART RATE: 154 BPM
MCH RBC QN AUTO: 34.8 PG (ref 26.6–33)
MCHC RBC AUTO-ENTMCNC: 33.8 G/DL (ref 31.5–35.7)
MCV RBC AUTO: 103.2 FL (ref 79–97)
MONOCYTES # BLD AUTO: 0.6 10*3/MM3 (ref 0.1–0.9)
MONOCYTES NFR BLD AUTO: 8.2 % (ref 5–12)
NEUTROPHILS NFR BLD AUTO: 5.8 10*3/MM3 (ref 1.7–7)
NEUTROPHILS NFR BLD AUTO: 76.5 % (ref 42.7–76)
NRBC BLD AUTO-RTO: 0.1 /100 WBC (ref 0–0.2)
PLATELET # BLD AUTO: 207 10*3/MM3 (ref 140–450)
PMV BLD AUTO: 7.3 FL (ref 6–12)
POTASSIUM SERPL-SCNC: 4.6 MMOL/L (ref 3.5–5.2)
RBC # BLD AUTO: 2.61 10*6/MM3 (ref 3.77–5.28)
SODIUM SERPL-SCNC: 120 MMOL/L (ref 136–145)
STRESS TARGET HR: 131 BPM
WBC NRBC COR # BLD: 7.6 10*3/MM3 (ref 3.4–10.8)

## 2022-10-05 PROCEDURE — 94799 UNLISTED PULMONARY SVC/PX: CPT

## 2022-10-05 PROCEDURE — 97116 GAIT TRAINING THERAPY: CPT | Performed by: PHYSICAL THERAPIST

## 2022-10-05 PROCEDURE — 94664 DEMO&/EVAL PT USE INHALER: CPT

## 2022-10-05 PROCEDURE — 80048 BASIC METABOLIC PNL TOTAL CA: CPT | Performed by: HOSPITALIST

## 2022-10-05 PROCEDURE — 97530 THERAPEUTIC ACTIVITIES: CPT | Performed by: PHYSICAL THERAPIST

## 2022-10-05 PROCEDURE — 63710000001 ONDANSETRON PER 8 MG: Performed by: INTERNAL MEDICINE

## 2022-10-05 PROCEDURE — 85025 COMPLETE CBC W/AUTO DIFF WBC: CPT | Performed by: HOSPITALIST

## 2022-10-05 RX ADMIN — BUDESONIDE 0.5 MG: 0.5 INHALANT RESPIRATORY (INHALATION) at 07:26

## 2022-10-05 RX ADMIN — OXCARBAZEPINE 150 MG: 150 TABLET, FILM COATED ORAL at 10:07

## 2022-10-05 RX ADMIN — SUCRALFATE 1 G: 1 TABLET ORAL at 20:51

## 2022-10-05 RX ADMIN — GUAIFENESIN 1200 MG: 600 TABLET, EXTENDED RELEASE ORAL at 20:51

## 2022-10-05 RX ADMIN — SODIUM CHLORIDE 1 G: 1 TABLET ORAL at 10:06

## 2022-10-05 RX ADMIN — ONDANSETRON HYDROCHLORIDE 4 MG: 4 TABLET, FILM COATED ORAL at 15:30

## 2022-10-05 RX ADMIN — RISPERIDONE 0.25 MG: 0.25 TABLET ORAL at 20:52

## 2022-10-05 RX ADMIN — METHYLNALTREXONE BROMIDE 450 MG: 150 TABLET ORAL at 10:07

## 2022-10-05 RX ADMIN — Medication 10 ML: at 10:07

## 2022-10-05 RX ADMIN — PANTOPRAZOLE SODIUM 40 MG: 40 TABLET, DELAYED RELEASE ORAL at 06:00

## 2022-10-05 RX ADMIN — SODIUM CHLORIDE 1 G: 1 TABLET ORAL at 12:37

## 2022-10-05 RX ADMIN — BUDESONIDE 0.5 MG: 0.5 INHALANT RESPIRATORY (INHALATION) at 18:24

## 2022-10-05 RX ADMIN — OXYCODONE 5 MG: 5 TABLET ORAL at 20:54

## 2022-10-05 RX ADMIN — POTASSIUM CHLORIDE 40 MEQ: 1500 TABLET, EXTENDED RELEASE ORAL at 10:06

## 2022-10-05 RX ADMIN — Medication 10 ML: at 20:50

## 2022-10-05 RX ADMIN — GUAIFENESIN 1200 MG: 600 TABLET, EXTENDED RELEASE ORAL at 10:07

## 2022-10-05 RX ADMIN — SODIUM CHLORIDE 1 G: 1 TABLET ORAL at 18:19

## 2022-10-05 RX ADMIN — ROSUVASTATIN CALCIUM 20 MG: 10 TABLET, FILM COATED ORAL at 20:51

## 2022-10-05 RX ADMIN — SUCRALFATE 1 G: 1 TABLET ORAL at 10:07

## 2022-10-05 RX ADMIN — SUCRALFATE 1 G: 1 TABLET ORAL at 12:37

## 2022-10-05 RX ADMIN — OXCARBAZEPINE 150 MG: 150 TABLET, FILM COATED ORAL at 20:51

## 2022-10-05 RX ADMIN — SERTRALINE 200 MG: 100 TABLET, FILM COATED ORAL at 10:07

## 2022-10-05 RX ADMIN — SUCRALFATE 1 G: 1 TABLET ORAL at 18:19

## 2022-10-05 NOTE — PROGRESS NOTES
"Daily Progress Note        Abdominal pain, unspecified abdominal location    Morbidly obese (HCC)    Essential hypertension    Bipolar 1 disorder (HCC)    High cholesterol    Anorexia    Nausea      Assessment    RIGOBERTO/obesity hypoventilation syndrome   Mild bibasilar atelectasis with small effusion    Hyponatremia, most likely diuretic effect, echo results reviewed EF 55% CT chest resolution of pleural effusions no evidence of pulmonary edema  Borderline  TSH 6.0  Cortisol 18.95  Osmolarity 259, urine osmolarity 163    Abdominal pain, unspecified abdominal location  Constipation  Hypodense area in the left lobe of the liver    Elevated D-dimer but negative VQ scan  Anemia    Echo 10/3/2022  EF 52%  Aortic calcification noted     Plan       Oxygen supplement and titration to maintain saturation 88 to 93%: 2 L NC and BiPAP as needed    DC Lasix and monitor sodium  May benefit from fluid restriction 1.5 L daily    Pulmicort and DuoNebs  Mucinex   DVT/GI prophylaxis-Lovenox and Protonix  Encourage use of incentive spirometer  Blood pressure controlled    Treatment for constipation as per GI             LOS: 7 days     Subjective         Objective     Vital signs for last 24 hours:  Vitals:    10/04/22 1400 10/04/22 1908 10/04/22 2043 10/05/22 0615   BP: 114/66   101/64   BP Location:    Left arm   Patient Position:    Lying   Pulse: 67  67 62   Resp: 19  18 17   Temp: 99 °F (37.2 °C)  97.8 °F (36.6 °C) 98.1 °F (36.7 °C)   TempSrc:   Oral Oral   SpO2: 98% 97% 97% 97%   Weight:       Height:   165.1 cm (65\")        Intake/Output last 3 shifts:  I/O last 3 completed shifts:  In: 960 [P.O.:960]  Out: 1800 [Urine:1800]  Intake/Output this shift:  I/O this shift:  In: 300 [P.O.:300]  Out: -       Radiology  Imaging Results (Last 24 Hours)     ** No results found for the last 24 hours. **          Labs:  Results from last 7 days   Lab Units 10/04/22  0446   WBC 10*3/mm3 7.90   HEMOGLOBIN g/dL 9.2*   HEMATOCRIT % 27.6* "   PLATELETS 10*3/mm3 200     Results from last 7 days   Lab Units 10/04/22  0446 10/01/22  0349 09/29/22  0021   SODIUM mmol/L 124*   < > 133*   POTASSIUM mmol/L 4.3   < > 4.3   CHLORIDE mmol/L 87*   < > 92*   CO2 mmol/L 29.0   < > 33.0*   BUN mg/dL 9   < > 7*   CREATININE mg/dL 0.51*   < > 0.43*   CALCIUM mg/dL 8.7   < > 8.9   BILIRUBIN mg/dL  --   --  0.8   ALK PHOS U/L  --   --  92   ALT (SGPT) U/L  --   --  6   AST (SGOT) U/L  --   --  16   GLUCOSE mg/dL 107*   < > 107*    < > = values in this interval not displayed.     Results from last 7 days   Lab Units 09/29/22  0758   PH, ARTERIAL pH units 7.433   PO2 ART mm Hg 69.7*   PCO2, ARTERIAL mm Hg 54.6*   HCO3 ART mmol/L 36.5*     Results from last 7 days   Lab Units 09/29/22  0021   ALBUMIN g/dL 2.90*                     Results from last 7 days   Lab Units 10/03/22  0940   TSH uIU/mL 6.000*           Meds:   SCHEDULE  bisacodyl, 10 mg, Rectal, Once  budesonide, 0.5 mg, Nebulization, BID - RT  clotrimazole-betamethasone, 1 application, Topical, Q12H  furosemide, 40 mg, Oral, TID  guaiFENesin, 1,200 mg, Oral, Q12H  lisinopril, 20 mg, Oral, Daily  Methylnaltrexone Bromide, 450 mg, Oral, Daily  OXcarbazepine, 150 mg, Oral, BID  pantoprazole, 40 mg, Oral, Q AM  potassium chloride, 40 mEq, Oral, Daily  risperiDONE, 0.25 mg, Oral, Nightly  rosuvastatin, 20 mg, Oral, Nightly  sertraline, 200 mg, Oral, Daily  sodium chloride, 10 mL, Intravenous, Q12H  sodium chloride, 1 g, Oral, TID With Meals  sucralfate, 1 g, Oral, 4x Daily AC & at Bedtime      Infusions     PRNs  ipratropium-albuterol  •  melatonin  •  nitroglycerin  •  ondansetron **OR** ondansetron  •  oxyCODONE  •  [COMPLETED] Insert peripheral IV **AND** sodium chloride  •  sodium chloride    Physical Exam:  Physical Exam  Vitals reviewed.   Pulmonary:      Breath sounds: Rhonchi and rales present.   Skin:     General: Skin is warm and dry.   Neurological:      Mental Status: She is alert and oriented to person,  place, and time.         ROS  Review of Systems    I have reviewed the patient's new clinical results.    Electronically signed by Marc Diaz MD

## 2022-10-05 NOTE — CASE MANAGEMENT/SOCIAL WORK
Continued Stay Note  DANIELA Damico     Patient Name: Morgan Quick  MRN: [id number]  Today's Date: 10/5/2022    Admit Date: 9/24/2022    Plan: [place] Accepted, Will need new precert once medically ready for discharge, D/C barriers: Sodium 120   Discharge Plan     Row Name 10/05/22 1324       Plan    Plan [place] Accepted, Will need new precert once medically ready for discharge, D/C barriers: Sodium 120              Phone communication or documentation only - no physical contact with patient or family.      Avis Mary RN

## 2022-10-05 NOTE — PLAN OF CARE
Goal Outcome Evaluation:  Plan of Care Reviewed With: patient        Progress: improving   Patient c/o intermittent nausea, alleviated with PRN anti-nausea meds. C/o lower back pain, alleviated with PRN pain meds. On 2L of O2. Resting throughout night, will continue to monitor.

## 2022-10-05 NOTE — THERAPY TREATMENT NOTE
"Subjective: Pt agreeable to therapeutic plan of care.  No new reports.     Objective:     Bed mobility - SBA  Transfers - CGA and with rolling walker  Ambulation - 35 ft, 45 ft  CGA and with rolling walker, on 2L O2     *Gait belt applied and non-skid socks worn during treatment.       Vitals: WNL    Pain: 0 VAS  Education: Provided education on importance of mobility and skilled verbal / tactile cueing throughout intervention.     Assessment: Morgan Quick presents with functional mobility impairments which indicate the need for skilled intervention. Tolerating session today without incident. Pt was able to increase her ambulation distance this date.  Is still very slow in gait speed, and has wider turn when turning around.  Will continue to follow and progress as tolerated.     Plan/Recommendations:   Moderate Intensity Therapy recommended post-acute care. This is recommended as therapy feels the patient would require 3-4 days per week and wouldn't tolerate \"3 hour daily\" rehab intensity. SNF would be the preferred choice. If the patient does not agree to SNF, arrange HH or OP depending on home bound status. If patient is medically complex, consider LTACH.. Pt requires no DME at discharge.     Pt desires Skilled Rehab placement at discharge. Pt cooperative; agreeable to therapeutic recommendations and plan of care.         Basic Mobility 6-click:  Rollin = Total, A lot = 2, A little = 3; 4 = None  Supine>Sit:   1 = Total, A lot = 2, A little = 3; 4 = None   Sit>Stand with arms:  1 = Total, A lot = 2, A little = 3; 4 = None  Bed>Chair:   1 = Total, A lot = 2, A little = 3; 4 = None  Ambulate in room:  1 = Total, A lot = 2, A little = 3; 4 = None  3-5 Steps with railin = Total, A lot = 2, A little = 3; 4 = None  Score: 19    Modified Kevin: N/A = No pre-op stroke/TIA    Post-Tx Position: Up in Chair, Alarms activated and Call light and personal items within reach  PPE: mask, gloves and eye " protection

## 2022-10-05 NOTE — PLAN OF CARE
"Goal Outcome Evaluation:               Assessment: Morgan Quick presents with functional mobility impairments which indicate the need for skilled intervention. Tolerating session today without incident. Pt was able to increase her ambulation distance this date.  Is still very slow in gait speed, and has wider turn when turning around.  Will continue to follow and progress as tolerated.     Plan/Recommendations:   Moderate Intensity Therapy recommended post-acute care. This is recommended as therapy feels the patient would require 3-4 days per week and wouldn't tolerate \"3 hour daily\" rehab intensity. SNF would be the preferred choice. If the patient does not agree to SNF, arrange HH or OP depending on home bound status. If patient is medically complex, consider LTACH.. Pt requires no DME at discharge.   "

## 2022-10-05 NOTE — PROGRESS NOTES
"Nutrition Services    Patient Name: Morgan Quick  YOB: 1955  MRN: [id number]  Admission date: 9/24/2022    Comment:    Boost Plus q daily (Provides 360 kcals, 14 g of protein if consumed)       PPE Documentation        PPE Worn By Provider mask and eye protection   PPE Worn By Patient  none     CLINICAL NUTRITION ASSESSMENT      Reason for Assessment Follow up  9/27: JUAN consult     H&P      Past Medical History:   Diagnosis Date   • Bipolar 1 disorder (HCC)    • Breast cancer (HCC)    • Cancer (HCC)    • Disease of thyroid gland    • High cholesterol    • Hypertension        Past Surgical History:   Procedure Laterality Date   • ABDOMINAL SURGERY     • APPENDECTOMY     • ENDOSCOPY N/A 9/25/2022    Procedure: ESOPHAGOGASTRODUODENOSCOPY with biopsy x 2 areas;  Surgeon: Deonte Wong MD;  Location: [facility] ENDOSCOPY;  Service: Gastroenterology;  Laterality: N/A;  post: gastritis, duodinitis, nodule,    • HYSTERECTOMY     • MASTECTOMY      L side        Current Problems   Abdominal pain, nausea    Duodenitis    Constipation/fecal retention  -on Bowel regimen    Hypocalcemia/hypokalemia    Elev D-dimer       Encounter Information        Trending Narrative     10/5: Visited patient in room, reports she is feeling better, but appetite remains depressed. Is receiving and consuming Boost supplement per her report.    9/27: Visited patient in room. Reports she currently is nauseated due to constipation, drinking laxative at visit. States a decreased appetite for 1-2 weeks PTA. Reports typically eating 1 meal per day with snacks.      Anthropometrics        Current Height, Weight Height: 165.1 cm (65\")  Weight: 105 kg (230 lb 9.6 oz) (10/04/22 0406)       Ideal Body Weight (IBW) 125lb   Usual Body Weight (UBW) GISELLE, pt unsure       Trending Weight Hx     This admission: 10/5: current weight down 13l from last assessment  9/27: current weight stable since admit             PTA: 9/27: current weight down " 10% x10 months    Wt Readings from Last 30 Encounters:   10/04/22 0406 105 kg (230 lb 9.6 oz)   10/03/22 1335 107 kg (235 lb)   10/03/22 0417 107 kg (235 lb 3.7 oz)   10/02/22 0600 106 kg (233 lb 0.4 oz)   10/02/22 0337 106 kg (233 lb 11 oz)   10/01/22 0410 105 kg (231 lb 7.7 oz)   09/30/22 0349 106 kg (234 lb 2.1 oz)   09/29/22 0513 107 kg (236 lb 5.3 oz)   09/28/22 0406 112 kg (247 lb 2.2 oz)   09/27/22 0420 111 kg (243 lb 13.3 oz)   09/26/22 0326 111 kg (245 lb 6 oz)   09/25/22 0505 110 kg (242 lb 4.6 oz)   09/24/22 0322 111 kg (244 lb 11.4 oz)   09/22/22 1844 111 kg (244 lb 9.6 oz)   11/19/21 1206 122 kg (269 lb)   11/22/20 1601 77.6 kg (171 lb)   09/28/20 1428 123 kg (272 lb)   08/19/20 1336 121 kg (266 lb 12.8 oz)   06/24/20 1440 122 kg (268 lb 12.8 oz)   06/18/20 1306 122 kg (268 lb 9.6 oz)   03/17/20 1459 119 kg (262 lb)   01/28/20 2350 118 kg (260 lb)   11/13/19 0911 118 kg (260 lb 3.2 oz)   09/13/19 0816 123 kg (271 lb)   08/30/19 0814 120 kg (265 lb 3.2 oz)   08/26/19 1054 121 kg (267 lb 12.8 oz)   07/24/19 0233 127 kg (281 lb 1.4 oz)   05/20/19 1115 122 kg (270 lb)   12/06/18 1031 121 kg (267 lb)   09/24/18 0905 121 kg (266 lb)   07/13/18 0754 117 kg (258 lb)   06/07/18 0906 120 kg (264 lb)   05/18/18 0943 122 kg (269 lb)   03/27/18 1042 123 kg (271 lb)   01/25/18 1350 126 kg (277 lb)   10/19/16 1019 126 kg (278 lb)   05/11/16 0837 126 kg (278 lb)      BMI kg/m2 Body mass index is 38.37 kg/m².       Labs        Pertinent Labs    Results from last 7 days   Lab Units 10/05/22  1043 10/04/22  0446 10/03/22  0053 10/01/22  0349 09/29/22  0021   SODIUM mmol/L 120* 124* 124*   < > 133*   POTASSIUM mmol/L 4.6 4.3 4.3   < > 4.3   CHLORIDE mmol/L 84* 87* 87*   < > 92*   CO2 mmol/L 29.0 29.0 29.0   < > 33.0*   BUN mg/dL 9 9 11   < > 7*   CREATININE mg/dL 0.52* 0.51* 0.56*   < > 0.43*   CALCIUM mg/dL 9.1 8.7 8.7   < > 8.9   BILIRUBIN mg/dL  --   --   --   --  0.8   ALK PHOS U/L  --   --   --   --  92   ALT (SGPT)  U/L  --   --   --   --  6   AST (SGOT) U/L  --   --   --   --  16   GLUCOSE mg/dL 120* 107* 102*   < > 107*    < > = values in this interval not displayed.     Results from last 7 days   Lab Units 10/05/22  0648   HEMOGLOBIN g/dL 9.1*   HEMATOCRIT % 26.9*     COVID19   Date Value Ref Range Status   09/24/2022 Not Detected Not Detected - Ref. Range Final     Lab Results   Component Value Date    HGBA1C 6.2 (H) 09/25/2022        Medications    Scheduled Medications bisacodyl, 10 mg, Rectal, Once  budesonide, 0.5 mg, Nebulization, BID - RT  clotrimazole-betamethasone, 1 application, Topical, Q12H  guaiFENesin, 1,200 mg, Oral, Q12H  lisinopril, 20 mg, Oral, Daily  Methylnaltrexone Bromide, 450 mg, Oral, Daily  OXcarbazepine, 150 mg, Oral, BID  pantoprazole, 40 mg, Oral, Q AM  potassium chloride, 40 mEq, Oral, Daily  risperiDONE, 0.25 mg, Oral, Nightly  rosuvastatin, 20 mg, Oral, Nightly  sertraline, 200 mg, Oral, Daily  sodium chloride, 10 mL, Intravenous, Q12H  sodium chloride, 1 g, Oral, TID With Meals  sucralfate, 1 g, Oral, 4x Daily AC & at Bedtime        Infusions      PRN Medications ipratropium-albuterol  •  melatonin  •  nitroglycerin  •  ondansetron **OR** ondansetron  •  oxyCODONE  •  [COMPLETED] Insert peripheral IV **AND** sodium chloride  •  sodium chloride     Physical Findings        Trending Physical   Appearance, NFPE 10/5: Continues visually well-nourished  9/27: Pt visually well-nourished   --  Edema  trace   Bowel Function Last BM 10/3   Tubes none   Chewing/Swallowing No issues   Skin No breakdown    --  Current Nutrition Orders & Evaluation of Intake       Oral Nutrition     Food Allergies NKFA   Current PO Diet Diet Regular; Fluid Restriction; 1500cc/day   Supplement    PO Evaluation     Trending % PO Intake 10/5: %  9/27: 45% last 5 meals   --  Nutritional Risk Screening        NRS-2002 Score          Nutrition Diagnosis         Nutrition Dx Problem 1 Inadequate oral intake related to  clinical course as evidenced by variable meal intake, %.      Nutrition Dx Problem 2        Intervention Goal         Intervention Goal(s) Meal intake at least 65% of meals  Consumption of ONS     Nutrition Intervention        RD Action Decreased to Boost Plus daily 2/2 fluid restriction     Nutrition Prescription          Diet Prescription Regular, 1500cc fluid restriction   Supplement Prescription Boost Plus q daily (Provides 360 kcals, 14 g of protein if consumed)      --  Monitor/Evaluation        Monitor PO intake, Supplement intake, Pertinent labs, Weight, Skin status, GI status         Electronically signed by:  Shaniqua Meek RD  10/05/22 13:34 EDT

## 2022-10-05 NOTE — PROGRESS NOTES
[facility]     Progress Note    Patient Name: Morgan Quick  : 1955  MRN: [id number]  Primary Care Physician:  Wayne Saavedra MD  Date of admission: 2022  Service date and time: 10/05/22 10:56 EDT  Subjective   Subjective     Chief Complaint: abdominal pain     HPI:  Patient doing fine, + weak, awaiting labs for AM      Objective   Objective     Vitals:   Temp:  [97.8 °F (36.6 °C)-99 °F (37.2 °C)] 97.9 °F (36.6 °C)  Heart Rate:  [61-67] 66  Resp:  [16-19] 17  BP: (101-114)/(51-66) 101/51  Flow (L/min):  [2] 2  Physical Exam    Constitutional: Awake, alert   Eyes: PERRLA, sclerae anicteric, no conjunctival injection   HENT: NCAT, mucous membranes moist   Neck: Supple, no thyromegaly, no lymphadenopathy, trachea midline   Respiratory: Clear to auscultation bilaterally, nonlabored respirations    Cardiovascular: RRR, no murmurs, rubs, or gallops, palpable pedal pulses bilaterally   Gastrointestinal: Positive bowel sounds, soft, nontender, nondistended   Musculoskeletal: No bilateral ankle edema, no clubbing or cyanosis to extremities   Psychiatric: Appropriate affect, cooperative   Neurologic: Oriented x 3, strength symmetric in all extremities, Cranial Nerves grossly intact to confrontation, speech clear   Skin: No rashes     Result Review    Result Review:  I have personally reviewed the results from the time of this admission to 10/5/2022 10:56 EDT and agree with these findings:  [x]  Laboratory list / accordion  [x]  Microbiology  [x]  Radiology  [x]  EKG/Telemetry   []  Cardiology/Vascular   []  Pathology  []  Old records  []  Other:        Assessment & Plan   Assessment / Plan       Active Hospital Problems:  Active Hospital Problems    Diagnosis    • **Abdominal pain, unspecified abdominal location    • Bipolar 1 disorder (HCC)    • High cholesterol    • Anorexia    • Nausea    • Morbidly obese (HCC)    • Essential hypertension    Physical Deconditioning    Plan:      Hyponatremia  - CT  chest reviewed, echo reviewed, awaiting labs from AM, cont Salt tabs, trend daily  - d/c lasix and fluid restriction  - appears to be euvolemic    Hx of breast Cancer  - outpatient f/u    Abdominal pain, nausea - improved  CT>Strandy density in the anterior pararenal space inferior to the  pancreas and adjacent to the horizontal limb of the duodenal C-loop. I  would favor duodenitis versus less likely acute pancreatitis. I would  recommend correlation with pancreatic enzyme levels.  2. There is a hypodense area in the left lobe of the liver near the main  portal vein which may represent a small cyst or cavernous hemangioma. It  was not seen well on the previous exam. The patient reportedly has a  history of left breast cancer so I cannot completely exclude metastatic  Disease.  Per Pt no BM.  Repeat CT of abdomen states moderate stool burden.  Pt states, still no BM.  Will order Miralax 34g Q4H till BM.  Then can stop.  Pt should have MRI as out pt with and without contrast to evaluate liver lesion.     Duodenitis - resolved  -zophran IV  -GI following,  S/P EGD.  Showed duodenitis on EGD and a benign appearing nodule, that was biopsied.  -Protonix and Carafate.  -lovenox bid  -Diet was advanced.  -On yesterday CT, duodenitis resolved.     Constipation/Fecal retention -resolved  - having bm's  -Doc sodium/miralax  -No BM with Miralax.  Will order Mag Citrate and f/u with BMs.    -Per Pt no BM.  Repeat CT of abdomen states moderate stool burden.  -Pt states, still no BM.  Will order Miralax 34g Q4H till BM.  Then can stop.     Hypocalcemia/hypokalemia- replace/monitor     Elevated D dimer:  VQ-scan is negative.     Acute hypoxic respiratory failure  - failed 6 minute walk test, will need home oxygen setup, currently on 2 liters  -Secondary to fluid overload.  -abg reviewed  - Bipap prn, needs outpatient sleep study.  -Pulmonology following     Anemia:  - cont to monitor  -Transfused a unit of blood.  -Appropriate  compensation post transfusion.    Physical Deconditioning - PT/OT, needs SNF, SW following for placement once Na normalizes    DVT prophylaxis:  Mechanical DVT prophylaxis orders are present.    CODE STATUS:   Level Of Support Discussed With: Patient  Code Status (Patient has no pulse and is not breathing): CPR (Attempt to Resuscitate)  Medical Interventions (Patient has pulse or is breathing): Full Support  Release to patient: Routine Release    Disposition:  I expect patient to be discharged 2 days.    Gerardo Gant MD

## 2022-10-06 LAB
ANION GAP SERPL CALCULATED.3IONS-SCNC: 7 MMOL/L (ref 5–15)
BASOPHILS # BLD AUTO: 0 10*3/MM3 (ref 0–0.2)
BASOPHILS NFR BLD AUTO: 0.1 % (ref 0–1.5)
BUN SERPL-MCNC: 6 MG/DL (ref 8–23)
BUN/CREAT SERPL: 12.8 (ref 7–25)
CALCIUM SPEC-SCNC: 8.9 MG/DL (ref 8.6–10.5)
CHLORIDE SERPL-SCNC: 89 MMOL/L (ref 98–107)
CO2 SERPL-SCNC: 29 MMOL/L (ref 22–29)
CREAT SERPL-MCNC: 0.47 MG/DL (ref 0.57–1)
DEPRECATED RDW RBC AUTO: 69.6 FL (ref 37–54)
EGFRCR SERPLBLD CKD-EPI 2021: 105.1 ML/MIN/1.73
EOSINOPHIL # BLD AUTO: 0 10*3/MM3 (ref 0–0.4)
EOSINOPHIL NFR BLD AUTO: 0.7 % (ref 0.3–6.2)
ERYTHROCYTE [DISTWIDTH] IN BLOOD BY AUTOMATED COUNT: 19.5 % (ref 12.3–15.4)
GLUCOSE SERPL-MCNC: 119 MG/DL (ref 65–99)
HCT VFR BLD AUTO: 25.7 % (ref 34–46.6)
HGB BLD-MCNC: 8.9 G/DL (ref 12–15.9)
LYMPHOCYTES # BLD AUTO: 0.8 10*3/MM3 (ref 0.7–3.1)
LYMPHOCYTES NFR BLD AUTO: 12.2 % (ref 19.6–45.3)
MCH RBC QN AUTO: 35.6 PG (ref 26.6–33)
MCHC RBC AUTO-ENTMCNC: 34.5 G/DL (ref 31.5–35.7)
MCV RBC AUTO: 103.4 FL (ref 79–97)
MONOCYTES # BLD AUTO: 0.6 10*3/MM3 (ref 0.1–0.9)
MONOCYTES NFR BLD AUTO: 8.8 % (ref 5–12)
NEUTROPHILS NFR BLD AUTO: 5.2 10*3/MM3 (ref 1.7–7)
NEUTROPHILS NFR BLD AUTO: 78.2 % (ref 42.7–76)
NRBC BLD AUTO-RTO: 0.2 /100 WBC (ref 0–0.2)
PLATELET # BLD AUTO: 285 10*3/MM3 (ref 140–450)
PMV BLD AUTO: 7 FL (ref 6–12)
POTASSIUM SERPL-SCNC: 4.8 MMOL/L (ref 3.5–5.2)
RBC # BLD AUTO: 2.49 10*6/MM3 (ref 3.77–5.28)
SODIUM SERPL-SCNC: 125 MMOL/L (ref 136–145)
T4 FREE SERPL-MCNC: 1.06 NG/DL (ref 0.93–1.7)
WBC NRBC COR # BLD: 6.6 10*3/MM3 (ref 3.4–10.8)

## 2022-10-06 PROCEDURE — 84439 ASSAY OF FREE THYROXINE: CPT | Performed by: INTERNAL MEDICINE

## 2022-10-06 PROCEDURE — 25010000002 FUROSEMIDE PER 20 MG: Performed by: INTERNAL MEDICINE

## 2022-10-06 PROCEDURE — 80048 BASIC METABOLIC PNL TOTAL CA: CPT | Performed by: HOSPITALIST

## 2022-10-06 PROCEDURE — 94664 DEMO&/EVAL PT USE INHALER: CPT

## 2022-10-06 PROCEDURE — 94799 UNLISTED PULMONARY SVC/PX: CPT

## 2022-10-06 PROCEDURE — 85025 COMPLETE CBC W/AUTO DIFF WBC: CPT | Performed by: HOSPITALIST

## 2022-10-06 PROCEDURE — 97116 GAIT TRAINING THERAPY: CPT

## 2022-10-06 PROCEDURE — 94760 N-INVAS EAR/PLS OXIMETRY 1: CPT

## 2022-10-06 RX ORDER — LEVOTHYROXINE SODIUM 0.03 MG/1
25 TABLET ORAL
Status: DISCONTINUED | OUTPATIENT
Start: 2022-10-06 | End: 2022-10-10 | Stop reason: HOSPADM

## 2022-10-06 RX ORDER — FUROSEMIDE 10 MG/ML
40 INJECTION INTRAMUSCULAR; INTRAVENOUS EVERY 12 HOURS
Status: DISCONTINUED | OUTPATIENT
Start: 2022-10-06 | End: 2022-10-07

## 2022-10-06 RX ADMIN — BUDESONIDE 0.5 MG: 0.5 INHALANT RESPIRATORY (INHALATION) at 22:28

## 2022-10-06 RX ADMIN — SUCRALFATE 1 G: 1 TABLET ORAL at 08:30

## 2022-10-06 RX ADMIN — OXYCODONE 5 MG: 5 TABLET ORAL at 19:15

## 2022-10-06 RX ADMIN — POTASSIUM CHLORIDE 40 MEQ: 1500 TABLET, EXTENDED RELEASE ORAL at 08:40

## 2022-10-06 RX ADMIN — OXCARBAZEPINE 150 MG: 150 TABLET, FILM COATED ORAL at 08:40

## 2022-10-06 RX ADMIN — Medication 10 ML: at 08:39

## 2022-10-06 RX ADMIN — ROSUVASTATIN CALCIUM 20 MG: 10 TABLET, FILM COATED ORAL at 20:08

## 2022-10-06 RX ADMIN — LEVOTHYROXINE SODIUM 25 MCG: 25 TABLET ORAL at 09:27

## 2022-10-06 RX ADMIN — PANTOPRAZOLE SODIUM 40 MG: 40 TABLET, DELAYED RELEASE ORAL at 05:13

## 2022-10-06 RX ADMIN — SUCRALFATE 1 G: 1 TABLET ORAL at 11:21

## 2022-10-06 RX ADMIN — SODIUM CHLORIDE 1 G: 1 TABLET ORAL at 16:57

## 2022-10-06 RX ADMIN — RISPERIDONE 0.25 MG: 0.25 TABLET ORAL at 20:08

## 2022-10-06 RX ADMIN — Medication 10 ML: at 20:09

## 2022-10-06 RX ADMIN — LISINOPRIL 20 MG: 20 TABLET ORAL at 08:39

## 2022-10-06 RX ADMIN — FUROSEMIDE 40 MG: 10 INJECTION, SOLUTION INTRAMUSCULAR; INTRAVENOUS at 22:14

## 2022-10-06 RX ADMIN — FUROSEMIDE 40 MG: 10 INJECTION, SOLUTION INTRAMUSCULAR; INTRAVENOUS at 09:28

## 2022-10-06 RX ADMIN — GUAIFENESIN 1200 MG: 600 TABLET, EXTENDED RELEASE ORAL at 08:39

## 2022-10-06 RX ADMIN — OXCARBAZEPINE 150 MG: 150 TABLET, FILM COATED ORAL at 20:08

## 2022-10-06 RX ADMIN — SODIUM CHLORIDE 1 G: 1 TABLET ORAL at 11:21

## 2022-10-06 RX ADMIN — BUDESONIDE 0.5 MG: 0.5 INHALANT RESPIRATORY (INHALATION) at 07:05

## 2022-10-06 RX ADMIN — METHYLNALTREXONE BROMIDE 450 MG: 150 TABLET ORAL at 08:40

## 2022-10-06 RX ADMIN — SUCRALFATE 1 G: 1 TABLET ORAL at 16:57

## 2022-10-06 RX ADMIN — GUAIFENESIN 1200 MG: 600 TABLET, EXTENDED RELEASE ORAL at 20:08

## 2022-10-06 RX ADMIN — CLOTRIMAZOLE AND BETAMETHASONE DIPROPIONATE 1 APPLICATION: 10; .5 CREAM TOPICAL at 09:45

## 2022-10-06 RX ADMIN — SODIUM CHLORIDE 1 G: 1 TABLET ORAL at 08:40

## 2022-10-06 RX ADMIN — SUCRALFATE 1 G: 1 TABLET ORAL at 20:08

## 2022-10-06 RX ADMIN — CLOTRIMAZOLE AND BETAMETHASONE DIPROPIONATE 1 APPLICATION: 10; .5 CREAM TOPICAL at 20:14

## 2022-10-06 RX ADMIN — SERTRALINE 200 MG: 100 TABLET, FILM COATED ORAL at 08:40

## 2022-10-06 NOTE — PLAN OF CARE
"Assessment: Morgan Quick presents with functional mobility impairments which indicate the need for skilled intervention. Tolerating session today without incident. Patient was able to walk without AD with CGA inside the room and CGA with RW outside the room.Will continue to follow and progress as tolerated.     Plan/Recommendations:   Moderate Intensity Therapy recommended post-acute care. This is recommended as therapy feels the patient would require 3-4 days per week and wouldn't tolerate \"3 hour daily\" rehab intensity. SNF would be the preferred choice. If the patient does not agree to SNF, arrange HH or OP depending on home bound status. If patient is medically complex, consider LTACH.. Pt requires no DME at discharge.     Pt desires Skilled Rehab placement at discharge. Pt cooperative; agreeable to therapeutic recommendations and plan of care.     "

## 2022-10-06 NOTE — PROGRESS NOTES
Daily Progress Note        Abdominal pain, unspecified abdominal location    Morbidly obese (HCC)    Essential hypertension    Bipolar 1 disorder (HCC)    High cholesterol    Anorexia    Nausea      Assessment    RIGOBERTO/obesity hypoventilation syndrome   Mild bibasilar atelectasis with small effusion    Hyponatremia, most likely diuretic effect, echo results reviewed EF 55% CT chest resolution of pleural effusions no evidence of pulmonary edema  Borderline  TSH 6.0  Cortisol 18.95  Osmolarity 259, urine osmolarity 163 urine sodium less than 20 several    Abdominal pain, unspecified abdominal location  Constipation  Hypodense area in the left lobe of the liver    Elevated D-dimer but negative VQ scan  Anemia    Echo 10/3/2022  EF 52%  Aortic calcification noted     Plan     Oxygen supplement and titration to maintain saturation 88 to 93%: 2 L NC and BiPAP as needed    Lasix 40 mg IV every 12 and monitor sodium  fluid restriction 1.5 L daily  Nephrology consult    Start low-dose Synthroid   Check free T4   although TSH is only 6 could have either euthyroid sick syndrome or mild hypothyroidism    Pulmicort and DuoNebs  Mucinex   DVT/GI prophylaxis-Lovenox and Protonix  Encourage use of incentive spirometer  Blood pressure controlled             LOS: 8 days     Subjective         Objective     Vital signs for last 24 hours:  Vitals:    10/05/22 1824 10/05/22 1827 10/05/22 2058 10/06/22 0557   BP:   135/70 111/73   BP Location:   Left arm Left arm   Patient Position:   Lying Lying   Pulse: 75 75 78 69   Resp: 18 18 18 16   Temp:   98.7 °F (37.1 °C) 98.3 °F (36.8 °C)   TempSrc:   Oral Oral   SpO2: 94% 94% 98% 95%   Weight:    104 kg (228 lb 9.9 oz)   Height:           Intake/Output last 3 shifts:  I/O last 3 completed shifts:  In: 780 [P.O.:780]  Out: 800 [Urine:800]  Intake/Output this shift:  I/O this shift:  In: 480 [P.O.:480]  Out: -       Radiology  Imaging Results (Last 24 Hours)     ** No results found for the last 24  hours. **          Labs:  Results from last 7 days   Lab Units 10/05/22  0648   WBC 10*3/mm3 7.60   HEMOGLOBIN g/dL 9.1*   HEMATOCRIT % 26.9*   PLATELETS 10*3/mm3 207     Results from last 7 days   Lab Units 10/05/22  1043   SODIUM mmol/L 120*   POTASSIUM mmol/L 4.6   CHLORIDE mmol/L 84*   CO2 mmol/L 29.0   BUN mg/dL 9   CREATININE mg/dL 0.52*   CALCIUM mg/dL 9.1   GLUCOSE mg/dL 120*     Results from last 7 days   Lab Units 09/29/22  0758   PH, ARTERIAL pH units 7.433   PO2 ART mm Hg 69.7*   PCO2, ARTERIAL mm Hg 54.6*   HCO3 ART mmol/L 36.5*                         Results from last 7 days   Lab Units 10/03/22  0940   TSH uIU/mL 6.000*           Meds:   SCHEDULE  bisacodyl, 10 mg, Rectal, Once  budesonide, 0.5 mg, Nebulization, BID - RT  clotrimazole-betamethasone, 1 application, Topical, Q12H  guaiFENesin, 1,200 mg, Oral, Q12H  lisinopril, 20 mg, Oral, Daily  Methylnaltrexone Bromide, 450 mg, Oral, Daily  OXcarbazepine, 150 mg, Oral, BID  pantoprazole, 40 mg, Oral, Q AM  potassium chloride, 40 mEq, Oral, Daily  risperiDONE, 0.25 mg, Oral, Nightly  rosuvastatin, 20 mg, Oral, Nightly  sertraline, 200 mg, Oral, Daily  sodium chloride, 10 mL, Intravenous, Q12H  sodium chloride, 1 g, Oral, TID With Meals  sucralfate, 1 g, Oral, 4x Daily AC & at Bedtime      Infusions     PRNs  ipratropium-albuterol  •  melatonin  •  nitroglycerin  •  ondansetron **OR** ondansetron  •  oxyCODONE  •  [COMPLETED] Insert peripheral IV **AND** sodium chloride  •  sodium chloride    Physical Exam:  Physical Exam  Vitals reviewed.   Pulmonary:      Breath sounds: Rhonchi and rales present.   Skin:     General: Skin is warm and dry.   Neurological:      Mental Status: She is alert and oriented to person, place, and time.         ROS  Review of Systems    I have reviewed the patient's new clinical results.    Electronically signed by Marc Diaz MD

## 2022-10-06 NOTE — PLAN OF CARE
Goal Outcome Evaluation:        Patient has worked with therapy this shift. No respiratory distress noted.  Patient has no c/o pain or discomfort noted. Call light within reach.

## 2022-10-06 NOTE — THERAPY TREATMENT NOTE
"Subjective: Pt agreeable to therapeutic plan of care.    Objective:     Bed mobility - SBA  Transfers - Min A and with Rolling Walker  Ambulation - 40 feet CGA and with rolling walker  15 ft CGA without AD.    Vitals: WNL    Pain: 0 VAS  Education: Provided education on importance of mobility and skilled verbal / tactile cueing throughout intervention.     Assessment: Morgan Quick presents with functional mobility impairments which indicate the need for skilled intervention. Tolerating session today without incident. Patient was able to walk without AD with CGA inside the room and CGA with RW outside the room.Will continue to follow and progress as tolerated.     Plan/Recommendations:   Moderate Intensity Therapy recommended post-acute care. This is recommended as therapy feels the patient would require 3-4 days per week and wouldn't tolerate \"3 hour daily\" rehab intensity. SNF would be the preferred choice. If the patient does not agree to SNF, arrange HH or OP depending on home bound status. If patient is medically complex, consider LTACH.. Pt requires no DME at discharge.     Pt desires Skilled Rehab placement at discharge. Pt cooperative; agreeable to therapeutic recommendations and plan of care.         Basic Mobility 6-click:  Rollin = Total, A lot = 2, A little = 3; 4 = None  Supine>Sit:   1 = Total, A lot = 2, A little = 3; 4 = None   Sit>Stand with arms:  1 = Total, A lot = 2, A little = 3; 4 = None  Bed>Chair:   1 = Total, A lot = 2, A little = 3; 4 = None  Ambulate in room:  1 = Total, A lot = 2, A little = 3; 4 = None  3-5 Steps with railin = Total, A lot = 2, A little = 3; 4 = None  Score: 20    Modified [place]: N/A = No pre-op stroke/TIA    Post-Tx Position: Supine with HOB Elevated, Alarms activated and Call light and personal items within reach  PPE: mask, gloves and eye protection  "

## 2022-10-06 NOTE — CASE MANAGEMENT/SOCIAL WORK
Continued Stay Note  DANIELA Damico     Patient Name: Morgan Quick  MRN: [id number]  Today's Date: 10/6/2022    Admit Date: 9/24/2022    Plan: DC PLAN: [facility] accepted, will need Precert once medically ready for DC.   Discharge Plan     Row Name 10/06/22 1023       Plan    Plan DC PLAN: [facility] accepted, will need Precert once medically ready for DC.    Patient/Family in Agreement with Plan yes    Plan Comments Reached out to Anny Burris regarding new precert, awaiting response. DC BARRIERS: , Nephrology consulted.                    Expected Discharge Date and Time     Expected Discharge Date Expected Discharge Time    Oct 7, 2022         Brittany Guillaume RN     Office phone: [phone]  Cell phone: [phone]

## 2022-10-06 NOTE — CONSULTS
INITIAL CONSULT NOTE      Patient Name: Morgan Quick  : 1955  MRN: [id number]  Primary Care Physician: Wayne Saavedra MD  Date of admission: 2022    Patient Care Team:  Wayne Saavedra MD as PCP - General (Internal Medicine)        Reason for Consult:       Hyponatremia  Subjective   History of Present Illness:   Chief Complaint:   Chief Complaint   Patient presents with   • Nausea     HISTORY:  Morgan Quick is a 66 y.o. female with past medical history of sleep apnea, hypertension, dyslipidemia, anemia,. who presents with increasing shortness of breath.  She was admitted on .  Her serum sodium was 132 but slowly dropped to 120 but today is 124.  Patient denies any other complaint.  No nausea vomiting.  Her medication did include lisinopril with some potassium and also on high-dose ZoloftShe is also started on sodium chloride 1 g 3 times a day.  Also on loop diuretic reticulocyte.  Patient denies any other complaint urine output is adequate.  Urine studies showed urine osmolality is only 163 with urine sodium of less than 20.    2    Review of systems:  All other review of system unremarkable  Constitutional: No fever, no chills, no lethargy, no weakness.  HEENT:  No headache, otalgia, itchy eyes, nasal discharge or sore throat.  Cardiac:  No chest pain, dyspnea, orthopnea or PND.  Chest:              No cough, phlegm or wheezing.  Abdomen:  No abdominal pain, nausea or vomiting.  Neuro:  No focal weakness, abnormal movements orseizure like activity.  :   No hematuria, no pyuria, no dysuria, no flank pain.  ROS was otherwise negative except as mentioned in the [place].       Personal History:     Past Medical History:   Past Medical History:   Diagnosis Date   • Bipolar 1 disorder (HCC)    • Breast cancer (HCC)    • Cancer (HCC)    • Disease of thyroid gland    • High cholesterol    • Hypertension        Surgical History:      Past Surgical History:   Procedure Laterality Date   •  ABDOMINAL SURGERY     • APPENDECTOMY     • ENDOSCOPY N/A 9/25/2022    Procedure: ESOPHAGOGASTRODUODENOSCOPY with biopsy x 2 areas;  Surgeon: Deonte Wong MD;  Location: [facility] ENDOSCOPY;  Service: Gastroenterology;  Laterality: N/A;  post: gastritis, duodinitis, nodule,    • HYSTERECTOMY     • MASTECTOMY      L side       Family History: family history includes Cancer in her mother. Otherwise pertinent FHx was reviewed and unremarkable.     Social History:  reports that she has never smoked. She has never used smokeless tobacco. She reports that she does not drink alcohol and does not use drugs.    Medications:  Prior to Admission medications    Medication Sig Start Date End Date Taking? Authorizing Provider   cefdinir (OMNICEF) 300 MG capsule Take 1 capsule by mouth 2 (Two) Times a Day. 9/22/22  Yes Willa Jean APRN   lisinopril (PRINIVIL,ZESTRIL) 20 MG tablet Take 1 tablet by mouth Daily. 7/14/20  Yes Glorai Orta PA   OXcarbazepine (TRILEPTAL) 150 MG tablet Take 150 mg by mouth 2 (Two) Times a Day. 5/15/19  Yes Brennon Serrano MD   risperiDONE (risperDAL) 0.25 MG tablet Take 0.25 mg by mouth Every Night. 5/11/16  Yes Brennon Serrano MD   rosuvastatin (CRESTOR) 20 MG tablet TAKE ONE TABLET BY MOUTH DAILY 6/24/19  Yes Patsy Henry MD   sertraline (ZOLOFT) 100 MG tablet Take 200 mg by mouth Daily. 2/5/20  Yes Brennon Serrano MD     Scheduled Meds:bisacodyl, 10 mg, Rectal, Once  budesonide, 0.5 mg, Nebulization, BID - RT  clotrimazole-betamethasone, 1 application, Topical, Q12H  furosemide, 40 mg, Intravenous, Q12H  guaiFENesin, 1,200 mg, Oral, Q12H  levothyroxine, 25 mcg, Oral, Q AM  lisinopril, 20 mg, Oral, Daily  Methylnaltrexone Bromide, 450 mg, Oral, Daily  OXcarbazepine, 150 mg, Oral, BID  pantoprazole, 40 mg, Oral, Q AM  potassium chloride, 40 mEq, Oral, Daily  risperiDONE, 0.25 mg, Oral, Nightly  rosuvastatin, 20 mg, Oral, Nightly  sertraline, 200 mg,  Oral, Daily  sodium chloride, 10 mL, Intravenous, Q12H  sodium chloride, 1 g, Oral, TID With Meals  sucralfate, 1 g, Oral, 4x Daily AC & at Bedtime      Continuous Infusions:   PRN Meds:ipratropium-albuterol  •  melatonin  •  nitroglycerin  •  ondansetron **OR** ondansetron  •  oxyCODONE  •  [COMPLETED] Insert peripheral IV **AND** sodium chloride  •  sodium chloride  Allergies:    Allergies   Allergen Reactions   • Contrast Dye Shortness Of Breath     Pt states she had trouble breathing when she had contrast in the past.        Objective   Exam:     Vital Signs  Temp:  [98.1 °F (36.7 °C)-98.7 °F (37.1 °C)] 98.1 °F (36.7 °C)  Heart Rate:  [68-78] 69  Resp:  [16-18] 16  BP: (104-135)/(65-73) 127/65  SpO2:  [93 %-98 %] 94 %  on  Flow (L/min):  [2] 2;   Device (Oxygen Therapy): nasal cannula  Body mass index is 38.04 kg/m².  EXAM  General: Middle-age female  female in no acute distress.    Head:      Normocephalic and atraumatic.    Eyes:      PERRL/EOM intact, conjunctivae and sclerae clear without nystagmus.    Neck:      No masses, thyromegaly,  trachea central   Lungs:    Mild rhonchi bilaterally to auscultation.    Heart:      Regular rate and rhythm, no murmur no gallop  Abd:        Soft, nontender, not distended, bowel sounds positive, no shifting dullness.  Msk:        No deformity or scoliosis noted of thoracic or lumbar spine.    Pulses:   Pulses normal in all 4 extremities.    Extremities:        No cyanosis or clubbing--mild edema.    Neuro:    No focal deficits.   alert oriented x3  Skin:       Intact without lesions or rashes.    Psych:    Alert and cooperative; normal mood and affect; normal attention span       Results Review:  I have personally reviewed most recent Data :  BMP @LABRCNTIP(creatinine:10)  CBC    Results from last 7 days   Lab Units 10/06/22  0812 10/05/22  0648 10/04/22  0446 10/03/22  0053 10/01/22  2359 10/01/22  0349 09/30/22  0734   WBC 10*3/mm3 6.60 7.60 7.90 7.40 6.10 5.00  --     HEMOGLOBIN g/dL 8.9* 9.1* 9.2* 8.8* 8.7* 8.8* 8.8*   PLATELETS 10*3/mm3 285 207 200 160 128* 107*  --      CMP   Results from last 7 days   Lab Units 10/06/22  0812 10/05/22  1043 10/04/22  0446 10/03/22  0053 10/01/22  2359 10/01/22  0349   SODIUM mmol/L 125* 120* 124* 124* 125* 127*   POTASSIUM mmol/L 4.8 4.6 4.3 4.3 4.0 3.7   CHLORIDE mmol/L 89* 84* 87* 87* 87* 88*   CO2 mmol/L 29.0 29.0 29.0 29.0 29.0 29.0   BUN mg/dL 6* 9 9 11 10 10   CREATININE mg/dL 0.47* 0.52* 0.51* 0.56* 0.49* 0.58   GLUCOSE mg/dL 119* 120* 107* 102* 93 94     ABG      CT Chest Without Contrast Diagnostic    Result Date: 10/3/2022   1. There are patchy groundglass opacities in the upper lobes bilaterally, suggesting an infectious or inflammatory process. 2. Multiple subcentimeter indeterminate pulmonary nodules bilaterally, measuring up to 6 mm. Patient reportedly has a history of breast cancer. Serial CT follow-up recommended. 3. Additional findings as given above.    Electronically Signed By-Cy Rondon MD On:10/3/2022 10:07 AM This report was finalized on [id number] by  Cy Rondon MD.      Results for orders placed during the hospital encounter of 09/24/22    Adult Transthoracic Echo Complete W/ Cont if Necessary Per Protocol    Interpretation Summary  · Estimated left ventricular EF was in agreement with the calculated left ventricular EF. Left ventricular ejection fraction appears to be 56 - 60%. Left ventricular systolic function is normal.  · There is calcification of the aortic valve mainly affecting the non-coronary, left coronary and right coronary cusp(s).  · The study is technically difficult for diagnosis.        Assessment & Plan   Assessment and Plan:         Abdominal pain, unspecified abdominal location    Morbidly obese (HCC)    Essential hypertension    Bipolar 1 disorder (HCC)    High cholesterol    Anorexia    Nausea    ASSESSMENT:  • Hyponatremia: Etiology might be related to SSRI but urine sodium is less than  20 with urine osmolality of 1.63 that is against SIADH but it do correlate with increased free water intake in the presence of SSRI and may be small roll placed by lisinopril  • Bipolar disorder  • History of hypertension  • Hyperlipidemia        Plan:     • Sodium level is already getting better  • Continue fluid restriction  • Continue loop diuretics  • Continue salt replacement  • Repeat labs tomorrow morning  • Thank you for letting me participate    Note started  by Moises Olivas MD,   [facility] kidney consultant  [phone]

## 2022-10-06 NOTE — PLAN OF CARE
Problem: Adult Inpatient Plan of Care  Goal: Plan of Care Review  Outcome: Ongoing, Progressing   Goal Outcome Evaluation:               Pt rested well overnight, c/o pain x1, treated per MAR with relief noted. VSS. Pt remains on 2 L NC. Spouse at bedside. Discharge pending to [place], barriers include NA: 120 & need for precert. Pt eductaed on current plan of care, verbalized understanding.

## 2022-10-06 NOTE — PROGRESS NOTES
[facility]     Progress Note    Patient Name: Morgan Quick  : 1955  MRN: [id number]  Primary Care Physician:  Wayne Saavedra MD  Date of admission: 2022  Service date and time: 10/06/22 11:05 EDT  Subjective   Subjective     Chief Complaint: abdominal pain     HPI:  Patient doing fine, + weak, Na 125 today      Objective   Objective     Vitals:   Temp:  [97.4 °F (36.3 °C)-98.7 °F (37.1 °C)] 98.1 °F (36.7 °C)  Heart Rate:  [68-78] 68  Resp:  [16-18] 18  BP: (104-135)/(66-73) 104/66  Flow (L/min):  [2] 2  Physical Exam    Constitutional: Awake, alert, obese   Eyes: PERRLA, sclerae anicteric, no conjunctival injection   HENT: NCAT, mucous membranes moist   Neck: Supple, no thyromegaly, no lymphadenopathy, trachea midline   Respiratory: Clear to auscultation bilaterally, nonlabored respirations    Cardiovascular: RRR, no murmurs, rubs, or gallops, palpable pedal pulses bilaterally   Gastrointestinal: Positive bowel sounds, soft, nontender, nondistended   Musculoskeletal: No bilateral ankle edema, no clubbing or cyanosis to extremities   Psychiatric: Appropriate affect, cooperative   Neurologic: Oriented x 3, strength symmetric in all extremities, Cranial Nerves grossly intact to confrontation, speech clear   Skin: No rashes     Result Review    Result Review:  I have personally reviewed the results from the time of this admission to 10/6/2022 11:05 EDT and agree with these findings:  [x]  Laboratory list / accordion  [x]  Microbiology  [x]  Radiology  [x]  EKG/Telemetry   []  Cardiology/Vascular   []  Pathology  []  Old records  []  Other:        Assessment & Plan   Assessment / Plan       Active Hospital Problems:  Active Hospital Problems    Diagnosis    • **Abdominal pain, unspecified abdominal location    • Bipolar 1 disorder (HCC)    • High cholesterol    • Anorexia    • Nausea    • Morbidly obese (HCC)    • Essential hypertension    Physical Deconditioning    Plan:      Hyponatremia  - CT  chest reviewed, echo reviewed, Na 124, cont Salt tabs, trend daily  - renal consult, fluid restriction  - appears to be euvolemic    Hx of breast Cancer  - outpatient f/u    Abdominal pain, nausea - improved  CT>Strandy density in the anterior pararenal space inferior to the  pancreas and adjacent to the horizontal limb of the duodenal C-loop. I  would favor duodenitis versus less likely acute pancreatitis. I would  recommend correlation with pancreatic enzyme levels.  2. There is a hypodense area in the left lobe of the liver near the main  portal vein which may represent a small cyst or cavernous hemangioma. It  was not seen well on the previous exam. The patient reportedly has a  history of left breast cancer so I cannot completely exclude metastatic  Disease.  Per Pt no BM.  Repeat CT of abdomen states moderate stool burden.  Pt states, still no BM.  Will order Miralax 34g Q4H till BM.  Then can stop.  Pt should have MRI as out pt with and without contrast to evaluate liver lesion.     Duodenitis - resolved  -zophran IV  -GI following,  S/P EGD.  Showed duodenitis on EGD and a benign appearing nodule, that was biopsied.  -Protonix and Carafate.  -lovenox bid  -Diet was advanced.  -On yesterday CT, duodenitis resolved.     Constipation/Fecal retention -resolved  - having bm's  -Doc sodium/miralax  -No BM with Miralax.  Will order Mag Citrate and f/u with BMs.    -Per Pt no BM.  Repeat CT of abdomen states moderate stool burden.  -Pt states, still no BM.  Will order Miralax 34g Q4H till BM.  Then can stop.     Hypocalcemia/hypokalemia- replace/monitor     Elevated D dimer:  VQ-scan is negative.     Acute hypoxic respiratory failure   - failed 6 minute walk test, will need home oxygen setup, currently on 2 liters  -Secondary to fluid overload. CT chest reviewed, cont lasix  -abg reviewed  - Bipap prn, needs outpatient sleep study.  -Pulmonology following     Anemia:  - cont to monitor  -Transfused a unit of  blood.  -Appropriate compensation post transfusion.    Physical Deconditioning - PT/OT, needs SNF, SW following for placement once Na normalizes    DVT prophylaxis:  Mechanical DVT prophylaxis orders are present.    CODE STATUS:   Level Of Support Discussed With: Patient  Code Status (Patient has no pulse and is not breathing): CPR (Attempt to Resuscitate)  Medical Interventions (Patient has pulse or is breathing): Full Support  Release to patient: Routine Release    Disposition:  I expect patient to be discharged 2 days.    Gerardo Gant MD

## 2022-10-07 LAB
ANION GAP SERPL CALCULATED.3IONS-SCNC: 10 MMOL/L (ref 5–15)
BASOPHILS # BLD AUTO: 0 10*3/MM3 (ref 0–0.2)
BASOPHILS NFR BLD AUTO: 0.2 % (ref 0–1.5)
BUN SERPL-MCNC: 10 MG/DL (ref 8–23)
BUN/CREAT SERPL: 18.5 (ref 7–25)
CALCIUM SPEC-SCNC: 9.2 MG/DL (ref 8.6–10.5)
CHLORIDE SERPL-SCNC: 89 MMOL/L (ref 98–107)
CO2 SERPL-SCNC: 27 MMOL/L (ref 22–29)
CREAT SERPL-MCNC: 0.54 MG/DL (ref 0.57–1)
DEPRECATED RDW RBC AUTO: 68.3 FL (ref 37–54)
EGFRCR SERPLBLD CKD-EPI 2021: 101.7 ML/MIN/1.73
EOSINOPHIL # BLD AUTO: 0 10*3/MM3 (ref 0–0.4)
EOSINOPHIL NFR BLD AUTO: 0.5 % (ref 0.3–6.2)
ERYTHROCYTE [DISTWIDTH] IN BLOOD BY AUTOMATED COUNT: 18.9 % (ref 12.3–15.4)
GLUCOSE SERPL-MCNC: 88 MG/DL (ref 65–99)
HCT VFR BLD AUTO: 25.8 % (ref 34–46.6)
HGB BLD-MCNC: 8.7 G/DL (ref 12–15.9)
LYMPHOCYTES # BLD AUTO: 0.6 10*3/MM3 (ref 0.7–3.1)
LYMPHOCYTES NFR BLD AUTO: 7 % (ref 19.6–45.3)
MCH RBC QN AUTO: 35.1 PG (ref 26.6–33)
MCHC RBC AUTO-ENTMCNC: 33.7 G/DL (ref 31.5–35.7)
MCV RBC AUTO: 104 FL (ref 79–97)
MONOCYTES # BLD AUTO: 0.5 10*3/MM3 (ref 0.1–0.9)
MONOCYTES NFR BLD AUTO: 5.5 % (ref 5–12)
NEUTROPHILS NFR BLD AUTO: 7.4 10*3/MM3 (ref 1.7–7)
NEUTROPHILS NFR BLD AUTO: 86.8 % (ref 42.7–76)
NRBC BLD AUTO-RTO: 0 /100 WBC (ref 0–0.2)
OSMOLALITY UR: 233 MOSM/KG (ref 300–800)
PLATELET # BLD AUTO: 273 10*3/MM3 (ref 140–450)
PMV BLD AUTO: 6.9 FL (ref 6–12)
POTASSIUM SERPL-SCNC: 4.6 MMOL/L (ref 3.5–5.2)
RBC # BLD AUTO: 2.48 10*6/MM3 (ref 3.77–5.28)
SODIUM SERPL-SCNC: 126 MMOL/L (ref 136–145)
SODIUM UR-SCNC: <20 MMOL/L
WBC NRBC COR # BLD: 8.5 10*3/MM3 (ref 3.4–10.8)

## 2022-10-07 PROCEDURE — 85025 COMPLETE CBC W/AUTO DIFF WBC: CPT | Performed by: HOSPITALIST

## 2022-10-07 PROCEDURE — 84300 ASSAY OF URINE SODIUM: CPT | Performed by: INTERNAL MEDICINE

## 2022-10-07 PROCEDURE — 83935 ASSAY OF URINE OSMOLALITY: CPT | Performed by: INTERNAL MEDICINE

## 2022-10-07 PROCEDURE — 80048 BASIC METABOLIC PNL TOTAL CA: CPT | Performed by: INTERNAL MEDICINE

## 2022-10-07 PROCEDURE — 94799 UNLISTED PULMONARY SVC/PX: CPT

## 2022-10-07 PROCEDURE — 94761 N-INVAS EAR/PLS OXIMETRY MLT: CPT

## 2022-10-07 RX ORDER — FUROSEMIDE 20 MG/1
20 TABLET ORAL DAILY
Status: DISCONTINUED | OUTPATIENT
Start: 2022-10-07 | End: 2022-10-10 | Stop reason: HOSPADM

## 2022-10-07 RX ADMIN — GUAIFENESIN 1200 MG: 600 TABLET, EXTENDED RELEASE ORAL at 08:01

## 2022-10-07 RX ADMIN — RISPERIDONE 0.25 MG: 0.25 TABLET ORAL at 20:54

## 2022-10-07 RX ADMIN — LISINOPRIL 20 MG: 20 TABLET ORAL at 08:01

## 2022-10-07 RX ADMIN — SUCRALFATE 1 G: 1 TABLET ORAL at 16:59

## 2022-10-07 RX ADMIN — FUROSEMIDE 20 MG: 20 TABLET ORAL at 08:08

## 2022-10-07 RX ADMIN — ROSUVASTATIN CALCIUM 20 MG: 10 TABLET, FILM COATED ORAL at 20:54

## 2022-10-07 RX ADMIN — CLOTRIMAZOLE AND BETAMETHASONE DIPROPIONATE 1 APPLICATION: 10; .5 CREAM TOPICAL at 20:54

## 2022-10-07 RX ADMIN — PANTOPRAZOLE SODIUM 40 MG: 40 TABLET, DELAYED RELEASE ORAL at 05:39

## 2022-10-07 RX ADMIN — OXYCODONE 5 MG: 5 TABLET ORAL at 16:59

## 2022-10-07 RX ADMIN — OXCARBAZEPINE 150 MG: 150 TABLET, FILM COATED ORAL at 08:00

## 2022-10-07 RX ADMIN — SODIUM CHLORIDE 1 G: 1 TABLET ORAL at 07:59

## 2022-10-07 RX ADMIN — LEVOTHYROXINE SODIUM 25 MCG: 25 TABLET ORAL at 05:39

## 2022-10-07 RX ADMIN — SUCRALFATE 1 G: 1 TABLET ORAL at 11:20

## 2022-10-07 RX ADMIN — Medication 10 ML: at 20:55

## 2022-10-07 RX ADMIN — OXYCODONE 5 MG: 5 TABLET ORAL at 03:51

## 2022-10-07 RX ADMIN — SUCRALFATE 1 G: 1 TABLET ORAL at 07:59

## 2022-10-07 RX ADMIN — SODIUM CHLORIDE 1 G: 1 TABLET ORAL at 17:00

## 2022-10-07 RX ADMIN — SERTRALINE 200 MG: 100 TABLET, FILM COATED ORAL at 07:59

## 2022-10-07 RX ADMIN — SODIUM CHLORIDE 1 G: 1 TABLET ORAL at 11:20

## 2022-10-07 RX ADMIN — CLOTRIMAZOLE AND BETAMETHASONE DIPROPIONATE 1 APPLICATION: 10; .5 CREAM TOPICAL at 07:57

## 2022-10-07 RX ADMIN — GUAIFENESIN 1200 MG: 600 TABLET, EXTENDED RELEASE ORAL at 20:54

## 2022-10-07 RX ADMIN — OXCARBAZEPINE 150 MG: 150 TABLET, FILM COATED ORAL at 20:55

## 2022-10-07 RX ADMIN — SUCRALFATE 1 G: 1 TABLET ORAL at 20:55

## 2022-10-07 RX ADMIN — METHYLNALTREXONE BROMIDE 450 MG: 150 TABLET ORAL at 08:01

## 2022-10-07 RX ADMIN — Medication 10 ML: at 07:57

## 2022-10-07 RX ADMIN — POTASSIUM CHLORIDE 40 MEQ: 1500 TABLET, EXTENDED RELEASE ORAL at 08:00

## 2022-10-07 RX ADMIN — BUDESONIDE 0.5 MG: 0.5 INHALANT RESPIRATORY (INHALATION) at 07:33

## 2022-10-07 NOTE — PLAN OF CARE
Goal Outcome Evaluation:  Plan of Care Reviewed With: patient        Progress: improving  Outcome Evaluation: cont. to have occ. c/o pain with prn meds effective. Sodium is improved today. Remains risk for skin injury & falls. Remains non-compliant with fluid restriction

## 2022-10-07 NOTE — PROGRESS NOTES
Nutrition Services    Patient Name: Morgan Quick  YOB: 1955  MRN: [id number]  Admission date: 9/24/2022    PPE Documentation        PPE Worn By Provider Did not enter room for this encounter   PPE Worn By Patient  N/A     PROGRESS NOTE      Encounter Information: Progress note to check on PO intakes. Pt is eating well, and sodium level is trending upward with fluid restriction in place. Will continue to monitor. At this point, no additional nutrition interventions are indicated.        PO Diet: Diet Regular; Fluid Restriction; 1500cc/day   PO Supplements: Boost Plus once daily    PO Intake:  % intake at all recent meals        Nutrition support orders:    Nutrition support review:        Labs (reviewed below): Hyponatremia - fluid restriction is in place; beginning to improve        GI Function:  Stool Output  Stool Unmeasured Occurrence: 1 (10/04/22 1500)  Bowel Incontinence: No (10/04/22 1500)  Stool Amount: small (10/04/22 1500)   Bowel meds are ordered/in place       Nutrition Intervention: Continue PO diet with fluid restriction per MD prescription.       Results from last 7 days   Lab Units 10/07/22  0407 10/06/22  0812 10/05/22  1043   SODIUM mmol/L 126* 125* 120*   POTASSIUM mmol/L 4.6 4.8 4.6   CHLORIDE mmol/L 89* 89* 84*   CO2 mmol/L 27.0 29.0 29.0   BUN mg/dL 10 6* 9   CREATININE mg/dL 0.54* 0.47* 0.52*   CALCIUM mg/dL 9.2 8.9 9.1   GLUCOSE mg/dL 88 119* 120*     Results from last 7 days   Lab Units 10/07/22  1008   HEMOGLOBIN g/dL 8.7*   HEMATOCRIT % 25.8*     COVID19   Date Value Ref Range Status   09/24/2022 Not Detected Not Detected - Ref. Range Final     Lab Results   Component Value Date    HGBA1C 6.2 (H) 09/25/2022     RD to follow up per protocol.    Electronically signed by:  Desirae Turner RD  10/07/22 15:23 EDT

## 2022-10-07 NOTE — PROGRESS NOTES
[facility]     Progress Note    Patient Name: Morgan Quick  : 1955  MRN: [id number]  Primary Care Physician:  Wayne Saavedra MD  Date of admission: 2022  Service date and time: 10/07/22 10:48 EDT  Subjective   Subjective     Chief Complaint: abdominal pain     HPI:  Patient doing fine, + weak, Na 126 today      Objective   Objective     Vitals:   Temp:  [97.9 °F (36.6 °C)-98.6 °F (37 °C)] 97.9 °F (36.6 °C)  Heart Rate:  [65-75] 70  Resp:  [14-18] 16  BP: (101-127)/(59-65) 101/64  Flow (L/min):  [2-3] 3  Physical Exam    Constitutional: Awake, alert, obese   Eyes: PERRLA, sclerae anicteric, no conjunctival injection   HENT: NCAT, mucous membranes moist   Neck: Supple, no thyromegaly, no lymphadenopathy, trachea midline   Respiratory: Clear to auscultation bilaterally, nonlabored respirations    Cardiovascular: RRR, no murmurs, rubs, or gallops, palpable pedal pulses bilaterally   Gastrointestinal: Positive bowel sounds, soft, nontender, nondistended   Musculoskeletal: No bilateral ankle edema, no clubbing or cyanosis to extremities   Psychiatric: Appropriate affect, cooperative   Neurologic: Oriented x 3, strength symmetric in all extremities, Cranial Nerves grossly intact to confrontation, speech clear   Skin: No rashes     Result Review    Result Review:  I have personally reviewed the results from the time of this admission to 10/7/2022 10:48 EDT and agree with these findings:  [x]  Laboratory list / accordion  [x]  Microbiology  [x]  Radiology  [x]  EKG/Telemetry   []  Cardiology/Vascular   []  Pathology  []  Old records  []  Other:        Assessment & Plan   Assessment / Plan       Active Hospital Problems:  Active Hospital Problems    Diagnosis    • **Abdominal pain, unspecified abdominal location    • Bipolar 1 disorder (HCC)    • High cholesterol    • Anorexia    • Nausea    • Morbidly obese (HCC)    • Essential hypertension    Physical Deconditioning    Plan:      Hyponatremia  - CT  chest reviewed, echo reviewed, Na 126, cont Salt tabs, trend daily  - renal following, fluid restriction  - appears to be euvolemic    Hx of breast Cancer  - outpatient f/u    Abdominal pain, nausea - improved  CT>Strandy density in the anterior pararenal space inferior to the  pancreas and adjacent to the horizontal limb of the duodenal C-loop. I  would favor duodenitis versus less likely acute pancreatitis. I would  recommend correlation with pancreatic enzyme levels.  2. There is a hypodense area in the left lobe of the liver near the main  portal vein which may represent a small cyst or cavernous hemangioma. It  was not seen well on the previous exam. The patient reportedly has a  history of left breast cancer so I cannot completely exclude metastatic  Disease.  Per Pt no BM.  Repeat CT of abdomen states moderate stool burden.  Pt states, still no BM.  Will order Miralax 34g Q4H till BM.  Then can stop.  Pt should have MRI as out pt with and without contrast to evaluate liver lesion.     Duodenitis - resolved  -zophran IV  -GI following,  S/P EGD.  Showed duodenitis on EGD and a benign appearing nodule, that was biopsied.  -Protonix and Carafate.  -lovenox bid  -Diet was advanced.  -On yesterday CT, duodenitis resolved.     Constipation/Fecal retention -resolved  - having bm's  -Doc sodium/miralax  -No BM with Miralax.  Will order Mag Citrate and f/u with BMs.    -Per Pt no BM.  Repeat CT of abdomen states moderate stool burden.  -Pt states, still no BM.  Will order Miralax 34g Q4H till BM.  Then can stop.     Hypocalcemia/hypokalemia- replace/monitor     Elevated D dimer:  VQ-scan is negative.     Acute hypoxic respiratory failure   - failed 6 minute walk test, will need home oxygen setup, currently on 2 liters  -Secondary to fluid overload. CT chest reviewed, cont lasix  -abg reviewed  - Bipap prn, needs outpatient sleep study.  -Pulmonology following     Anemia:  - cont to monitor  -Transfused a unit of  blood.  -Appropriate compensation post transfusion.    Physical Deconditioning - PT/OT, needs SNF, SW following for placement once Na normalizes    DVT prophylaxis:  Mechanical DVT prophylaxis orders are present.    CODE STATUS:   Level Of Support Discussed With: Patient  Code Status (Patient has no pulse and is not breathing): CPR (Attempt to Resuscitate)  Medical Interventions (Patient has pulse or is breathing): Full Support  Release to patient: Routine Release    Disposition:  I expect patient to be discharged 2 days.    Gerardo Gant MD

## 2022-10-07 NOTE — PROGRESS NOTES
Daily Progress Note        Abdominal pain, unspecified abdominal location    Morbidly obese (HCC)    Essential hypertension    Bipolar 1 disorder (HCC)    High cholesterol    Anorexia    Nausea      Assessment    RIGOBERTO/obesity hypoventilation syndrome   Mild bibasilar atelectasis with small effusion    Hyponatremia, most likely diuretic effect, echo results reviewed EF 55% CT chest resolution of pleural effusions no evidence of pulmonary edema  Borderline  TSH 6.0  Cortisol 18.95  Osmolarity 259, urine osmolarity 163 urine sodium less than 20 several    Abdominal pain, unspecified abdominal location  Constipation  Hypodense area in the left lobe of the liver    Elevated D-dimer but negative VQ scan  Anemia    Echo 10/3/2022  EF 52%  Aortic calcification noted     Plan     Oxygen supplement and titration to maintain saturation 88 to 93%: 2 L NC and BiPAP as needed    Regarding hyponatremia, IV Lasix was DC'd on 10/5/2022 and her sodium improved within 24-hour from 120 up to 125 on 10/6/2022, Lasix was restarted on 10/6/2022 with 40 mg IV every 12 with water restriction and her sodium today 126, I recommend changing the Lasix to 20 mg p.o. daily and continue water restriction, however hyponatremia work-up is not consistent of 1 pattern probably mixed process  fluid restriction 1.5 L daily  Appreciate nephrology input     low-dose Synthroid   although TSH is only 6 could have either euthyroid sick syndrome or mild hypothyroidism    Pulmicort and DuoNebs  Mucinex   DVT/GI prophylaxis-Lovenox and Protonix  Encourage use of incentive spirometer  Blood pressure controlled             LOS: 9 days     Subjective         Objective     Vital signs for last 24 hours:  Vitals:    10/06/22 2159 10/06/22 2228 10/06/22 2232 10/07/22 0335   BP: 101/59   117/65   BP Location: Left arm   Left arm   Patient Position: Lying   Lying   Pulse: 71 74 74 71   Resp: 16 16 16 14   Temp: 98.6 °F (37 °C)   97.9 °F (36.6 °C)   TempSrc: Oral   Oral    SpO2: 94% 97% 97% 93%   Weight:       Height:           Intake/Output last 3 shifts:  I/O last 3 completed shifts:  In: 480 [P.O.:480]  Out: -   Intake/Output this shift:  I/O this shift:  In: 480 [P.O.:480]  Out: 300 [Urine:300]      Radiology  Imaging Results (Last 24 Hours)     ** No results found for the last 24 hours. **          Labs:  Results from last 7 days   Lab Units 10/06/22  0812   WBC 10*3/mm3 6.60   HEMOGLOBIN g/dL 8.9*   HEMATOCRIT % 25.7*   PLATELETS 10*3/mm3 285     Results from last 7 days   Lab Units 10/06/22  0812   SODIUM mmol/L 125*   POTASSIUM mmol/L 4.8   CHLORIDE mmol/L 89*   CO2 mmol/L 29.0   BUN mg/dL 6*   CREATININE mg/dL 0.47*   CALCIUM mg/dL 8.9   GLUCOSE mg/dL 119*                             Results from last 7 days   Lab Units 10/06/22  0812 10/03/22  0940   TSH uIU/mL  --  6.000*   FREE T4 ng/dL 1.06  --            Meds:   SCHEDULE  bisacodyl, 10 mg, Rectal, Once  budesonide, 0.5 mg, Nebulization, BID - RT  clotrimazole-betamethasone, 1 application, Topical, Q12H  furosemide, 40 mg, Intravenous, Q12H  guaiFENesin, 1,200 mg, Oral, Q12H  levothyroxine, 25 mcg, Oral, Q AM  lisinopril, 20 mg, Oral, Daily  Methylnaltrexone Bromide, 450 mg, Oral, Daily  OXcarbazepine, 150 mg, Oral, BID  pantoprazole, 40 mg, Oral, Q AM  potassium chloride, 40 mEq, Oral, Daily  risperiDONE, 0.25 mg, Oral, Nightly  rosuvastatin, 20 mg, Oral, Nightly  sertraline, 200 mg, Oral, Daily  sodium chloride, 10 mL, Intravenous, Q12H  sodium chloride, 1 g, Oral, TID With Meals  sucralfate, 1 g, Oral, 4x Daily AC & at Bedtime      Infusions     PRNs  ipratropium-albuterol  •  melatonin  •  nitroglycerin  •  ondansetron **OR** ondansetron  •  oxyCODONE  •  [COMPLETED] Insert peripheral IV **AND** sodium chloride  •  sodium chloride    Physical Exam:  Physical Exam  Vitals reviewed.   Pulmonary:      Breath sounds: Rhonchi and rales present.   Skin:     General: Skin is warm and dry.   Neurological:      Mental  Status: She is alert and oriented to person, place, and time.         ROS  Review of Systems    I have reviewed the patient's new clinical results.    Electronically signed by Marc Diaz MD

## 2022-10-07 NOTE — PLAN OF CARE
Problem: Adult Inpatient Plan of Care  Goal: Plan of Care Review  Outcome: Ongoing, Not Progressing   Goal Outcome Evaluation:               Pt c/o pain x1, treated per MAR. VSS.  remains at bedside. Pt educated on fluid restriction and current plan of care, verbalized understanding.

## 2022-10-07 NOTE — PROGRESS NOTES
PROGRESS NOTE      Patient Name: Morgan Quick  : 1955  MRN: [id number]  Primary Care Physician: Wayne Saavedra MD  Date of admission: 2022    Patient Care Team:  Wayne Saavedra MD as PCP - General (Internal Medicine)        Subjective   Subjective:     Patient is feeling better no new complaints  Review of systems:  All other review of system unremarkable      Allergies:    Allergies   Allergen Reactions   • Contrast Dye Shortness Of Breath     Pt states she had trouble breathing when she had contrast in the past.        Objective   Exam:     Vital Signs  Temp:  [97.9 °F (36.6 °C)-98.6 °F (37 °C)] 97.9 °F (36.6 °C)  Heart Rate:  [65-75] 70  Resp:  [14-18] 16  BP: (101-127)/(59-65) 101/64  SpO2:  [75 %-100 %] 100 %  on  Flow (L/min):  [2-3] 3;   Device (Oxygen Therapy): nasal cannula  Body mass index is 38.04 kg/m².    General: Middle-age  female in no acute distress.    Head:      Normocephalic and atraumatic.    Eyes:      PERRL/EOM intact, conjunctiva and sclera clear with out nystagmus.    Neck:      No masses, thyromegaly,  trachea central with normal respiratory effort   Lungs:    Clear bilaterally to auscultation.    Heart:      Regular rate and rhythm, no murmur no gallop  Abd:        Soft, nontender, not distended, bowel sounds positive, no shifting dullness   Pulses:   Pulses palpable  Extr:        No cyanosis or clubbing--no significant edema.    Neuro:    No focal deficits.   alert oriented x3  Skin:       Intact without lesions or rashes.    Psych:    Alert and cooperative; normal mood and affect; .      Results Review:  I have personally reviewed most recent Data :  CBC    Results from last 7 days   Lab Units 10/06/22  0812 10/05/22  0648 10/04/22  0446 10/03/22  0053 10/01/22  8329 10/01/22  0349   WBC 10*3/mm3 6.60 7.60 7.90 7.40 6.10 5.00   HEMOGLOBIN g/dL 8.9* 9.1* 9.2* 8.8* 8.7* 8.8*   PLATELETS 10*3/mm3 285 207 200 160 128* 107*     CMP   Results from last 7 days   Lab  Units 10/07/22  0407 10/06/22  0812 10/05/22  1043 10/04/22  0446 10/03/22  0053 10/01/22  2359 10/01/22  0349   SODIUM mmol/L 126* 125* 120* 124* 124* 125* 127*   POTASSIUM mmol/L 4.6 4.8 4.6 4.3 4.3 4.0 3.7   CHLORIDE mmol/L 89* 89* 84* 87* 87* 87* 88*   CO2 mmol/L 27.0 29.0 29.0 29.0 29.0 29.0 29.0   BUN mg/dL 10 6* 9 9 11 10 10   CREATININE mg/dL 0.54* 0.47* 0.52* 0.51* 0.56* 0.49* 0.58   GLUCOSE mg/dL 88 119* 120* 107* 102* 93 94     ABG      No radiology results for the last day    Results for orders placed during the hospital encounter of 09/24/22    Adult Transthoracic Echo Complete W/ Cont if Necessary Per Protocol    Interpretation Summary  · Estimated left ventricular EF was in agreement with the calculated left ventricular EF. Left ventricular ejection fraction appears to be 56 - 60%. Left ventricular systolic function is normal.  · There is calcification of the aortic valve mainly affecting the non-coronary, left coronary and right coronary cusp(s).  · The study is technically difficult for diagnosis.    Scheduled Meds:bisacodyl, 10 mg, Rectal, Once  budesonide, 0.5 mg, Nebulization, BID - RT  clotrimazole-betamethasone, 1 application, Topical, Q12H  furosemide, 20 mg, Oral, Daily  guaiFENesin, 1,200 mg, Oral, Q12H  levothyroxine, 25 mcg, Oral, Q AM  lisinopril, 20 mg, Oral, Daily  Methylnaltrexone Bromide, 450 mg, Oral, Daily  OXcarbazepine, 150 mg, Oral, BID  pantoprazole, 40 mg, Oral, Q AM  potassium chloride, 40 mEq, Oral, Daily  risperiDONE, 0.25 mg, Oral, Nightly  rosuvastatin, 20 mg, Oral, Nightly  sertraline, 200 mg, Oral, Daily  sodium chloride, 10 mL, Intravenous, Q12H  sodium chloride, 1 g, Oral, TID With Meals  sucralfate, 1 g, Oral, 4x Daily AC & at Bedtime      Continuous Infusions:   PRN Meds:ipratropium-albuterol  •  melatonin  •  nitroglycerin  •  ondansetron **OR** ondansetron  •  oxyCODONE  •  [COMPLETED] Insert peripheral IV **AND** sodium chloride  •  sodium chloride    Assessment &  Plan   Assessment and Plan:         Abdominal pain, unspecified abdominal location    Morbidly obese (HCC)    Essential hypertension    Bipolar 1 disorder (HCC)    High cholesterol    Anorexia    Nausea    ASSESSMENT:  · Hyponatremia: Etiology might be related to SSRI but urine sodium is less than 20 with urine osmolality of 1.63 that is against SIADH but it do correlate with increased free water intake in the presence of SSRI and may be small roll placed by lisinopril  · Bipolar disorder  · History of hypertension  · Hyperlipidemia           Plan:      · Sodium level is slightly better with salt pill and low-dose Lasix   · because of the use of SSRI underlying lung issues there is a possibility patient may have some SIADH although urine sodium and urine osmolality is not on the high side   · In the presence of increased free water intake that is very difficult to control I agree with low-dose loop diuretics and salt pills   · Continue fluid restriction although patient is not compliant  · Continue loop diuretics  · Repeat labs tomorrow morning  · Thank you for letting me participate    •           Electronically signed by Moises Olivas MD,   [facility] kidney consultant  [phone]

## 2022-10-07 NOTE — CASE MANAGEMENT/SOCIAL WORK
Continued Stay Note  DANIELA Damico     Patient Name: Morgan Quick  MRN: [id number]  Today's Date: 10/7/2022    Admit Date: 9/24/2022    Plan: DC PLAN: [facility] accepted, will need precert once medically ready for DC.   Discharge Plan     Row Name 10/07/22 1029       Plan    Plan DC PLAN: [facility] accepted, Precert Approved 10/7    Patient/Family in Agreement with Plan yes    Plan Comments Message sent to Anny with [facility] regarding Precert- Approved 10/7 DC BARRIERS: , BIPAP prn, Nephrology following.                    Expected Discharge Date and Time     Expected Discharge Date Expected Discharge Time    Oct 8, 2022             Brittany Guillaume RN     Office phone: [phone]  Cell phone: [phone]

## 2022-10-08 LAB
ANION GAP SERPL CALCULATED.3IONS-SCNC: 8 MMOL/L (ref 5–15)
BUN SERPL-MCNC: 13 MG/DL (ref 8–23)
BUN/CREAT SERPL: 22.4 (ref 7–25)
CALCIUM SPEC-SCNC: 9 MG/DL (ref 8.6–10.5)
CHLORIDE SERPL-SCNC: 91 MMOL/L (ref 98–107)
CO2 SERPL-SCNC: 28 MMOL/L (ref 22–29)
CREAT SERPL-MCNC: 0.58 MG/DL (ref 0.57–1)
EGFRCR SERPLBLD CKD-EPI 2021: 99.9 ML/MIN/1.73
GLUCOSE SERPL-MCNC: 103 MG/DL (ref 65–99)
POTASSIUM SERPL-SCNC: 5 MMOL/L (ref 3.5–5.2)
SODIUM SERPL-SCNC: 127 MMOL/L (ref 136–145)
URATE SERPL-MCNC: 4.5 MG/DL (ref 2.4–5.7)

## 2022-10-08 PROCEDURE — 80048 BASIC METABOLIC PNL TOTAL CA: CPT | Performed by: INTERNAL MEDICINE

## 2022-10-08 PROCEDURE — 84550 ASSAY OF BLOOD/URIC ACID: CPT | Performed by: INTERNAL MEDICINE

## 2022-10-08 PROCEDURE — 94799 UNLISTED PULMONARY SVC/PX: CPT

## 2022-10-08 PROCEDURE — 94761 N-INVAS EAR/PLS OXIMETRY MLT: CPT

## 2022-10-08 PROCEDURE — 94664 DEMO&/EVAL PT USE INHALER: CPT

## 2022-10-08 RX ORDER — LISINOPRIL 5 MG/1
10 TABLET ORAL DAILY
Status: DISCONTINUED | OUTPATIENT
Start: 2022-10-08 | End: 2022-10-09

## 2022-10-08 RX ORDER — AMOXICILLIN 250 MG
1 CAPSULE ORAL 2 TIMES DAILY
Status: DISCONTINUED | OUTPATIENT
Start: 2022-10-08 | End: 2022-10-10 | Stop reason: HOSPADM

## 2022-10-08 RX ORDER — BISACODYL 10 MG
10 SUPPOSITORY, RECTAL RECTAL DAILY
Status: DISCONTINUED | OUTPATIENT
Start: 2022-10-08 | End: 2022-10-10 | Stop reason: HOSPADM

## 2022-10-08 RX ADMIN — OXCARBAZEPINE 150 MG: 150 TABLET, FILM COATED ORAL at 10:26

## 2022-10-08 RX ADMIN — CLOTRIMAZOLE AND BETAMETHASONE DIPROPIONATE 1 APPLICATION: 10; .5 CREAM TOPICAL at 10:25

## 2022-10-08 RX ADMIN — Medication 10 ML: at 20:30

## 2022-10-08 RX ADMIN — SENNOSIDES AND DOCUSATE SODIUM 1 TABLET: 50; 8.6 TABLET ORAL at 14:54

## 2022-10-08 RX ADMIN — SENNOSIDES AND DOCUSATE SODIUM 1 TABLET: 50; 8.6 TABLET ORAL at 20:28

## 2022-10-08 RX ADMIN — SODIUM CHLORIDE 1 G: 1 TABLET ORAL at 18:20

## 2022-10-08 RX ADMIN — PANTOPRAZOLE SODIUM 40 MG: 40 TABLET, DELAYED RELEASE ORAL at 05:26

## 2022-10-08 RX ADMIN — GUAIFENESIN 1200 MG: 600 TABLET, EXTENDED RELEASE ORAL at 10:25

## 2022-10-08 RX ADMIN — CLOTRIMAZOLE AND BETAMETHASONE DIPROPIONATE 1 APPLICATION: 10; .5 CREAM TOPICAL at 20:28

## 2022-10-08 RX ADMIN — BUDESONIDE 0.5 MG: 0.5 INHALANT RESPIRATORY (INHALATION) at 18:47

## 2022-10-08 RX ADMIN — OXCARBAZEPINE 150 MG: 150 TABLET, FILM COATED ORAL at 20:29

## 2022-10-08 RX ADMIN — OXYCODONE 5 MG: 5 TABLET ORAL at 20:28

## 2022-10-08 RX ADMIN — SUCRALFATE 1 G: 1 TABLET ORAL at 14:54

## 2022-10-08 RX ADMIN — OXYCODONE 5 MG: 5 TABLET ORAL at 02:45

## 2022-10-08 RX ADMIN — LISINOPRIL 10 MG: 5 TABLET ORAL at 10:25

## 2022-10-08 RX ADMIN — SUCRALFATE 1 G: 1 TABLET ORAL at 18:20

## 2022-10-08 RX ADMIN — SUCRALFATE 1 G: 1 TABLET ORAL at 20:28

## 2022-10-08 RX ADMIN — Medication 5 MG: at 20:29

## 2022-10-08 RX ADMIN — RISPERIDONE 0.25 MG: 0.25 TABLET ORAL at 20:29

## 2022-10-08 RX ADMIN — OXYCODONE 5 MG: 5 TABLET ORAL at 16:23

## 2022-10-08 RX ADMIN — METHYLNALTREXONE BROMIDE 450 MG: 150 TABLET ORAL at 10:26

## 2022-10-08 RX ADMIN — SODIUM CHLORIDE 1 G: 1 TABLET ORAL at 14:54

## 2022-10-08 RX ADMIN — BUDESONIDE 0.5 MG: 0.5 INHALANT RESPIRATORY (INHALATION) at 08:22

## 2022-10-08 RX ADMIN — Medication 10 ML: at 10:27

## 2022-10-08 RX ADMIN — LEVOTHYROXINE SODIUM 25 MCG: 25 TABLET ORAL at 05:26

## 2022-10-08 RX ADMIN — BISACODYL 10 MG: 10 SUPPOSITORY RECTAL at 14:53

## 2022-10-08 RX ADMIN — ROSUVASTATIN CALCIUM 20 MG: 10 TABLET, FILM COATED ORAL at 20:28

## 2022-10-08 RX ADMIN — FUROSEMIDE 20 MG: 20 TABLET ORAL at 10:25

## 2022-10-08 RX ADMIN — POTASSIUM CHLORIDE 40 MEQ: 1500 TABLET, EXTENDED RELEASE ORAL at 10:26

## 2022-10-08 RX ADMIN — SODIUM CHLORIDE 1 G: 1 TABLET ORAL at 10:27

## 2022-10-08 RX ADMIN — SUCRALFATE 1 G: 1 TABLET ORAL at 10:27

## 2022-10-08 RX ADMIN — SERTRALINE 200 MG: 100 TABLET, FILM COATED ORAL at 10:26

## 2022-10-08 RX ADMIN — GUAIFENESIN 1200 MG: 600 TABLET, EXTENDED RELEASE ORAL at 20:28

## 2022-10-08 NOTE — PROGRESS NOTES
PROGRESS NOTE      Patient Name: Morgan Quick  : 1955  MRN: [id number]  Primary Care Physician: Wayne Saavedra MD  Date of admission: 2022    Patient Care Team:  Wayne Saavedra MD as PCP - General (Internal Medicine)        Subjective   Subjective:     Patient is feeling better no new complaints  Sodium slowly improving, 127, yesterday was 126,  Review of systems:  All other review of system unremarkable      Allergies:    Allergies   Allergen Reactions   • Contrast Dye Shortness Of Breath     Pt states she had trouble breathing when she had contrast in the past.        Objective   Exam:     Vital Signs  Temp:  [98 °F (36.7 °C)-98.4 °F (36.9 °C)] 98 °F (36.7 °C)  Heart Rate:  [65-72] 65  Resp:  [17-18] 18  BP: ()/(42-66) 109/66  SpO2:  [94 %-97 %] 94 %  on  Flow (L/min):  [2-3] 2;   Device (Oxygen Therapy): nasal cannula  Body mass index is 37.27 kg/m².    General: Middle-age  female in no acute distress.    Head:      Normocephalic and atraumatic.    Eyes:      PERRL/EOM intact, conjunctiva and sclera clear with out nystagmus.    Neck:      No masses, thyromegaly,  trachea central with normal respiratory effort   Lungs:    Clear bilaterally to auscultation.    Heart:      Regular rate and rhythm, no murmur no gallop  Abd:        Soft, nontender, not distended, bowel sounds positive, no shifting dullness   Pulses:   Pulses palpable  Extr:        No cyanosis or clubbing--no significant edema.    Neuro:    No focal deficits.   alert oriented x3  Skin:       Intact without lesions or rashes.    Psych:    Alert and cooperative; normal mood and affect; .      Results Review:  I have personally reviewed most recent Data :  CBC    Results from last 7 days   Lab Units 10/07/22  1008 10/06/22  0812 10/05/22  0648 10/04/22  0446 10/03/22  0053 10/01/22  2359   WBC 10*3/mm3 8.50 6.60 7.60 7.90 7.40 6.10   HEMOGLOBIN g/dL 8.7* 8.9* 9.1* 9.2* 8.8* 8.7*   PLATELETS 10*3/mm3 273 285 207 200 160 128*      CMP   Results from last 7 days   Lab Units 10/08/22  0010 10/07/22  0407 10/06/22  0812 10/05/22  1043 10/04/22  0446 10/03/22  0053 10/01/22  2359   SODIUM mmol/L 127* 126* 125* 120* 124* 124* 125*   POTASSIUM mmol/L 5.0 4.6 4.8 4.6 4.3 4.3 4.0   CHLORIDE mmol/L 91* 89* 89* 84* 87* 87* 87*   CO2 mmol/L 28.0 27.0 29.0 29.0 29.0 29.0 29.0   BUN mg/dL 13 10 6* 9 9 11 10   CREATININE mg/dL 0.58 0.54* 0.47* 0.52* 0.51* 0.56* 0.49*   GLUCOSE mg/dL 103* 88 119* 120* 107* 102* 93     ABG      No radiology results for the last day    Results for orders placed during the hospital encounter of 09/24/22    Adult Transthoracic Echo Complete W/ Cont if Necessary Per Protocol    Interpretation Summary  · Estimated left ventricular EF was in agreement with the calculated left ventricular EF. Left ventricular ejection fraction appears to be 56 - 60%. Left ventricular systolic function is normal.  · There is calcification of the aortic valve mainly affecting the non-coronary, left coronary and right coronary cusp(s).  · The study is technically difficult for diagnosis.    Scheduled Meds:bisacodyl, 10 mg, Rectal, Once  budesonide, 0.5 mg, Nebulization, BID - RT  clotrimazole-betamethasone, 1 application, Topical, Q12H  furosemide, 20 mg, Oral, Daily  guaiFENesin, 1,200 mg, Oral, Q12H  levothyroxine, 25 mcg, Oral, Q AM  lisinopril, 20 mg, Oral, Daily  Methylnaltrexone Bromide, 450 mg, Oral, Daily  OXcarbazepine, 150 mg, Oral, BID  pantoprazole, 40 mg, Oral, Q AM  potassium chloride, 40 mEq, Oral, Daily  risperiDONE, 0.25 mg, Oral, Nightly  rosuvastatin, 20 mg, Oral, Nightly  sertraline, 200 mg, Oral, Daily  sodium chloride, 10 mL, Intravenous, Q12H  sodium chloride, 1 g, Oral, TID With Meals  sucralfate, 1 g, Oral, 4x Daily AC & at Bedtime      Continuous Infusions:   PRN Meds:•  ipratropium-albuterol  •  melatonin  •  nitroglycerin  •  ondansetron **OR** ondansetron  •  oxyCODONE  •  [COMPLETED] Insert peripheral IV **AND**  sodium chloride  •  sodium chloride    Assessment & Plan   Assessment and Plan:         Abdominal pain, unspecified abdominal location    Morbidly obese (HCC)    Essential hypertension    Bipolar 1 disorder (HCC)    High cholesterol    Anorexia    Nausea    ASSESSMENT:  · Hyponatremia: Etiology might be related to SSRI but urine sodium is less than 20 with urine osmolality of 1.63 that is against SIADH but it do correlate with increased free water intake in the presence of SSRI and may be small roll placed by lisinopril  · Bipolar disorder  · History of hypertension  · Hyperlipidemia  · Echo reviewed, EF 56 to 60%,           Plan:      · Sodium today 127, initial work-up, cortisol level 18.9, adequate, TSH was 6, urine sodium less than 20, urine osmolality 233,  Uric acid checked, around 4.5,    · Urine output was around 1 L, patient remains on sodium chloride 1 g 3 times daily along with the Lasix 20 mg daily,  Clinically, do not look overtly fluid overload, I will repeat the BMP again in the afternoon, if the sodium is not improving, will consider increasing sodium chloride,  Continue fluid restriction,  · Blood pressure occasionally running on the lower side, I will decrease the lisinopril to 10 mg daily,  · Supplement with Ensure or boost, for better nutritional support,  ·   · because of the use of SSRI underlying lung issues there is a possibility patient may have some SIADH although urine sodium and urine osmolality is not on the high side   · Continue fluid restriction although patient is not compliant  · Continue loop diuretics  · Repeat labs tomorrow morning  · Thank you for letting me participate    •         Liyah Ramirez  [facility] kidney consultant  [phone]

## 2022-10-08 NOTE — PROGRESS NOTES
Daily Progress Note        Abdominal pain, unspecified abdominal location    Morbidly obese (HCC)    Essential hypertension    Bipolar 1 disorder (HCC)    High cholesterol    Anorexia    Nausea      Assessment    RIGOBERTO/obesity hypoventilation syndrome   Mild bibasilar atelectasis with small effusion    Hyponatremia, most likely diuretic effect, echo results reviewed EF 55% CT chest resolution of pleural effusions no evidence of pulmonary edema  Borderline  TSH 6.0  Cortisol 18.95  Osmolarity 259, urine osmolarity 163 urine sodium less than 20 several    Abdominal pain, unspecified abdominal location  Constipation  Hypodense area in the left lobe of the liver    Elevated D-dimer but negative VQ scan  Anemia    Echo 10/3/2022  EF 52%  Aortic calcification noted     Plan     Oxygen supplement and titration to maintain saturation 88 to 93%: 2-3 L NC and BiPAP as needed    Nephrology following for hyponatremia    Synthroid   although TSH is only 6 could have either euthyroid sick syndrome or mild hypothyroidism    Pulmicort and DuoNebs  Mucinex   DVT/GI prophylaxis-Lovenox and Protonix  Encourage use of incentive spirometer  Blood pressure controlled             LOS: 10 days     Subjective     Reports mild shortness of breath    Objective     Vital signs for last 24 hours:  Vitals:    10/07/22 2141 10/08/22 0545 10/08/22 0822 10/08/22 0828   BP: 134/64 109/66     BP Location: Left arm Left arm     Patient Position: Lying Lying     Pulse:  65 63 60   Resp:  18 18 20   Temp:  98 °F (36.7 °C)     TempSrc:  Oral     SpO2:  94% 93% 94%   Weight:  102 kg (223 lb 15.8 oz)     Height:           Intake/Output last 3 shifts:  I/O last 3 completed shifts:  In: 1737 [P.O.:1737]  Out: 1300 [Urine:1300]  Intake/Output this shift:  I/O this shift:  In: 360 [P.O.:360]  Out: 400 [Urine:400]      Radiology  Imaging Results (Last 24 Hours)     ** No results found for the last 24 hours. **          Labs:  Results from last 7 days   Lab Units  10/07/22  1008   WBC 10*3/mm3 8.50   HEMOGLOBIN g/dL 8.7*   HEMATOCRIT % 25.8*   PLATELETS 10*3/mm3 273     Results from last 7 days   Lab Units 10/08/22  0010   SODIUM mmol/L 127*   POTASSIUM mmol/L 5.0   CHLORIDE mmol/L 91*   CO2 mmol/L 28.0   BUN mg/dL 13   CREATININE mg/dL 0.58   CALCIUM mg/dL 9.0   GLUCOSE mg/dL 103*                             Results from last 7 days   Lab Units 10/06/22  0812 10/03/22  0940   TSH uIU/mL  --  6.000*   FREE T4 ng/dL 1.06  --            Meds:   SCHEDULE  bisacodyl, 10 mg, Rectal, Daily  budesonide, 0.5 mg, Nebulization, BID - RT  clotrimazole-betamethasone, 1 application, Topical, Q12H  furosemide, 20 mg, Oral, Daily  guaiFENesin, 1,200 mg, Oral, Q12H  levothyroxine, 25 mcg, Oral, Q AM  lisinopril, 10 mg, Oral, Daily  Methylnaltrexone Bromide, 450 mg, Oral, Daily  OXcarbazepine, 150 mg, Oral, BID  pantoprazole, 40 mg, Oral, Q AM  potassium chloride, 40 mEq, Oral, Daily  risperiDONE, 0.25 mg, Oral, Nightly  rosuvastatin, 20 mg, Oral, Nightly  senna-docusate sodium, 1 tablet, Oral, BID  sertraline, 200 mg, Oral, Daily  sodium chloride, 10 mL, Intravenous, Q12H  sodium chloride, 1 g, Oral, TID With Meals  sucralfate, 1 g, Oral, 4x Daily AC & at Bedtime      Infusions     PRNs  •  ipratropium-albuterol  •  melatonin  •  nitroglycerin  •  ondansetron **OR** ondansetron  •  oxyCODONE  •  [COMPLETED] Insert peripheral IV **AND** sodium chloride  •  sodium chloride    Physical Exam:  General Appearance:  Alert   HEENT:  Normocephalic, without obvious abnormality, Conjunctiva/corneas clear,.   Nares normal, no drainage     Neck:  Supple, symmetrical, trachea midline. No JVD.  Lungs /Chest wall: Mild bilateral basal rhonchi, respirations unlabored, symmetrical wall movement.     Heart:  Regular rate and rhythm, S1 S2 normal  Abdomen: Soft, non-tender, no masses, no organomegaly.    Extremities: Trace edema, no clubbing or cyanosis    ROS  Review of Systems  Constitutional: Negative for  chills, fever and malaise/fatigue.   HENT: Negative.    Eyes: Negative.    Cardiovascular: Negative.    Respiratory: Positive for cough and shortness of breath.    Skin: Negative.    Musculoskeletal: Negative.    Gastrointestinal: Negative.    Genitourinary: Negative.    Neurological: Negative.    Psychiatric/Behavioral: Negative.    I reviewed the recent clinical results    Much of this encounter note is an electronic transcription/translation of spoken language to printed text using Dragon Software which might include inadvertent errors in transcription.

## 2022-10-08 NOTE — PLAN OF CARE
Goal Outcome Evaluation:      Contacted Epic team/ IT several times to get fixed scanner.   Got an another ticket earlier but answered he is still on the way . All meds were given without scanning in this shift while waiting for repair. Discussed with epic team about pain med  administration without scanner because pt complained back pain and requested pain med. They answered I can override it this shft  because there is a reasonable situation.

## 2022-10-08 NOTE — PLAN OF CARE
Goal Outcome Evaluation:  Plan of Care Reviewed With: patient        Progress: improving  Outcome Evaluation: Pt remains on 2 LO2 during the night with no distress noted, pt rested with no complaints, pt is educated on fluid restriction, will cont to monitor.

## 2022-10-08 NOTE — PROGRESS NOTES
[facility]     Progress Note    Patient Name: Morgan Quick  : 1955  MRN: [id number]  Primary Care Physician:  Wayne Saavedra MD  Date of admission: 2022  Service date and time: 10/08/22 11:13 EDT  Subjective   Subjective     Chief Complaint: abdominal pain     HPI:  Still complaining of constipation, no bowel movement for past 4 days.  Rectal suppository and enema added for today.  Sodium slowly improving.    Pertinent positives as noted in HPI/subjective.  All other systems were reviewed and are negative.    Objective   Objective     Vitals:   Temp:  [98 °F (36.7 °C)-98.4 °F (36.9 °C)] 98 °F (36.7 °C)  Heart Rate:  [60-72] 60  Resp:  [17-20] 20  BP: ()/(42-66) 109/66  Flow (L/min):  [2-3] 3  Physical Exam    Constitutional: Awake, alert, obese, lying in bed,   Eyes: PERRLA, sclerae anicteric, no conjunctival injection   Respiratory: Clear to auscultation bilaterally, nonlabored respirations    Cardiovascular: RRR, no murmurs, rubs, or gallops, palpable pedal pulses bilaterally   Gastrointestinal: Positive bowel sounds, soft, nontender, nondistended   Musculoskeletal: Generalized weakness, no clubbing or cyanosis to extremities   Psychiatric: Appropriate affect, cooperative   Neurologic: Oriented x 3, strength symmetric in all extremities, Cranial Nerves grossly intact to confrontation, speech clear   Skin: No rashes     Result Review    Result Review:  I have personally reviewed the results from the time of this admission to 10/8/2022 11:13 EDT and agree with these findings:  [x]  Laboratory list / accordion  [x]  Microbiology  [x]  Radiology  [x]  EKG/Telemetry   []  Cardiology/Vascular   []  Pathology  [x]  Old records  []  Other:        Assessment & Plan   Assessment / Plan       Active Hospital Problems:  Active Hospital Problems    Diagnosis    • **Abdominal pain, unspecified abdominal location    • Bipolar 1 disorder (HCC)    • High cholesterol    • Anorexia    • Nausea    •  Morbidly obese (HCC)    • Essential hypertension    Physical Deconditioning    Plan:      Hyponatremia  -Possibly SIADH  - CT chest noted, echo noted  - renal following, fluid restriction  -Monitor daily    Hx of breast Cancer  -Continue outpatient f/u    Abdominal pain, nausea - improved  CT>Strandy density in the anterior pararenal space inferior to the  pancreas and adjacent to the horizontal limb of the duodenal C-loop. I  would favor duodenitis versus less likely acute pancreatitis. I would  recommend correlation with pancreatic enzyme levels.  2. There is a hypodense area in the left lobe of the liver near the main  portal vein which may represent a small cyst or cavernous hemangioma. It  was not seen well on the previous exam. The patient reportedly has a  history of left breast cancer so I cannot completely exclude metastatic  Disease.  Pt should have MRI as out pt with and without contrast to evaluate liver lesion.  -Bowel regimen added, rectal suppository and enema if needed     Duodenitis - resolved  -GI s/o,  S/P EGD.  Showed duodenitis on EGD and a benign appearing nodule, that was biopsied.  -Protonix and Carafate.  -lovenox bid  -Diet as tolerated     Constipation/Fecal retention  -Continue bowel regimen, monitor     Hypocalcemia/hypokalemia- replace/monitor     Elevated D dimer:  VQ-scan is negative.     Acute hypoxic respiratory failure   - failed 6 minute walk test, will need home oxygen setup, currently on 2 liters  -Secondary to fluid overload. CT chest reviewed, cont lasix  -abg reviewed  - Bipap prn, needs outpatient sleep study.  -Pulmonology following     Anemia:  - cont to monitor  -Transfused a unit of blood.  -Appropriate compensation post transfusion.    Physical Deconditioning - PT/OT, needs SNF, SW following for placement once Na normalizes    DVT prophylaxis:  Mechanical DVT prophylaxis orders are present.    CODE STATUS:   Level Of Support Discussed With: Patient  Code Status (Patient  has no pulse and is not breathing): CPR (Attempt to Resuscitate)  Medical Interventions (Patient has pulse or is breathing): Full Support  Release to patient: Routine Release    Disposition: Possible discharge to rehab in next 24 to 48 hours if cleared by nephrology.

## 2022-10-08 NOTE — PLAN OF CARE
Goal Outcome Evaluation:            Scanner for medications was out of order from this morning.tried with new scanner but didn't work. Called  the dept and a Tech came back later.  The tech left a barcode paper  to use instead. Later, this nurse tried to use the barcode, that was given by tech ,but noticed this barcode doesn't work neither, called 3 times the dept to get help. Still waiting for help.

## 2022-10-09 LAB
ALBUMIN SERPL-MCNC: 3.5 G/DL (ref 3.5–5.2)
ALBUMIN/GLOB SERPL: 1.1 G/DL
ALP SERPL-CCNC: 106 U/L (ref 39–117)
ALT SERPL W P-5'-P-CCNC: 13 U/L (ref 1–33)
ANION GAP SERPL CALCULATED.3IONS-SCNC: 6 MMOL/L (ref 5–15)
ANION GAP SERPL CALCULATED.3IONS-SCNC: 8 MMOL/L (ref 5–15)
AST SERPL-CCNC: 18 U/L (ref 1–32)
BILIRUB SERPL-MCNC: 0.3 MG/DL (ref 0–1.2)
BUN SERPL-MCNC: 12 MG/DL (ref 8–23)
BUN SERPL-MCNC: 16 MG/DL (ref 8–23)
BUN/CREAT SERPL: 24.6 (ref 7–25)
BUN/CREAT SERPL: 25.5 (ref 7–25)
CALCIUM SPEC-SCNC: 9 MG/DL (ref 8.6–10.5)
CALCIUM SPEC-SCNC: 9.5 MG/DL (ref 8.6–10.5)
CHLORIDE SERPL-SCNC: 90 MMOL/L (ref 98–107)
CHLORIDE SERPL-SCNC: 93 MMOL/L (ref 98–107)
CK SERPL-CCNC: 27 U/L (ref 20–180)
CO2 SERPL-SCNC: 29 MMOL/L (ref 22–29)
CO2 SERPL-SCNC: 29 MMOL/L (ref 22–29)
CREAT SERPL-MCNC: 0.47 MG/DL (ref 0.57–1)
CREAT SERPL-MCNC: 0.65 MG/DL (ref 0.57–1)
EGFRCR SERPLBLD CKD-EPI 2021: 105.1 ML/MIN/1.73
EGFRCR SERPLBLD CKD-EPI 2021: 97.2 ML/MIN/1.73
GLOBULIN UR ELPH-MCNC: 3.1 GM/DL
GLUCOSE BLDC GLUCOMTR-MCNC: 207 MG/DL (ref 70–105)
GLUCOSE SERPL-MCNC: 118 MG/DL (ref 65–99)
GLUCOSE SERPL-MCNC: 122 MG/DL (ref 65–99)
PHOSPHATE SERPL-MCNC: 4.6 MG/DL (ref 2.5–4.5)
POTASSIUM SERPL-SCNC: 4.6 MMOL/L (ref 3.5–5.2)
POTASSIUM SERPL-SCNC: 5.4 MMOL/L (ref 3.5–5.2)
POTASSIUM SERPL-SCNC: 5.6 MMOL/L (ref 3.5–5.2)
PROT SERPL-MCNC: 6.6 G/DL (ref 6–8.5)
SODIUM SERPL-SCNC: 127 MMOL/L (ref 136–145)
SODIUM SERPL-SCNC: 128 MMOL/L (ref 136–145)

## 2022-10-09 PROCEDURE — 94761 N-INVAS EAR/PLS OXIMETRY MLT: CPT

## 2022-10-09 PROCEDURE — 25010000002 FUROSEMIDE PER 20 MG: Performed by: INTERNAL MEDICINE

## 2022-10-09 PROCEDURE — 25010000002 CALCIUM GLUCONATE-NACL 1-0.675 GM/50ML-% SOLUTION: Performed by: NURSE PRACTITIONER

## 2022-10-09 PROCEDURE — 82962 GLUCOSE BLOOD TEST: CPT

## 2022-10-09 PROCEDURE — 80053 COMPREHEN METABOLIC PANEL: CPT | Performed by: INTERNAL MEDICINE

## 2022-10-09 PROCEDURE — 94799 UNLISTED PULMONARY SVC/PX: CPT

## 2022-10-09 PROCEDURE — 63710000001 INSULIN REGULAR HUMAN PER 5 UNITS: Performed by: NURSE PRACTITIONER

## 2022-10-09 PROCEDURE — 94664 DEMO&/EVAL PT USE INHALER: CPT

## 2022-10-09 PROCEDURE — 97116 GAIT TRAINING THERAPY: CPT

## 2022-10-09 PROCEDURE — 82550 ASSAY OF CK (CPK): CPT | Performed by: INTERNAL MEDICINE

## 2022-10-09 PROCEDURE — 84132 ASSAY OF SERUM POTASSIUM: CPT | Performed by: NURSE PRACTITIONER

## 2022-10-09 PROCEDURE — 84100 ASSAY OF PHOSPHORUS: CPT | Performed by: INTERNAL MEDICINE

## 2022-10-09 RX ORDER — SODIUM CHLORIDE 1000 MG
2 TABLET, SOLUBLE MISCELLANEOUS
Status: DISCONTINUED | OUTPATIENT
Start: 2022-10-10 | End: 2022-10-10 | Stop reason: HOSPADM

## 2022-10-09 RX ORDER — DEXTROSE MONOHYDRATE 25 G/50ML
50 INJECTION, SOLUTION INTRAVENOUS ONCE
Status: COMPLETED | OUTPATIENT
Start: 2022-10-09 | End: 2022-10-09

## 2022-10-09 RX ORDER — CALCIUM GLUCONATE 20 MG/ML
1 INJECTION, SOLUTION INTRAVENOUS ONCE
Status: COMPLETED | OUTPATIENT
Start: 2022-10-09 | End: 2022-10-09

## 2022-10-09 RX ORDER — FUROSEMIDE 10 MG/ML
40 INJECTION INTRAMUSCULAR; INTRAVENOUS ONCE
Status: COMPLETED | OUTPATIENT
Start: 2022-10-09 | End: 2022-10-09

## 2022-10-09 RX ORDER — FUROSEMIDE 10 MG/ML
20 INJECTION INTRAMUSCULAR; INTRAVENOUS ONCE
Status: COMPLETED | OUTPATIENT
Start: 2022-10-09 | End: 2022-10-09

## 2022-10-09 RX ORDER — SODIUM CHLORIDE 1000 MG
1 TABLET, SOLUBLE MISCELLANEOUS ONCE
Status: COMPLETED | OUTPATIENT
Start: 2022-10-09 | End: 2022-10-09

## 2022-10-09 RX ADMIN — SODIUM CHLORIDE 1 G: 1 TABLET ORAL at 17:16

## 2022-10-09 RX ADMIN — GUAIFENESIN 1200 MG: 600 TABLET, EXTENDED RELEASE ORAL at 20:25

## 2022-10-09 RX ADMIN — SENNOSIDES AND DOCUSATE SODIUM 1 TABLET: 50; 8.6 TABLET ORAL at 20:25

## 2022-10-09 RX ADMIN — BUDESONIDE 0.5 MG: 0.5 INHALANT RESPIRATORY (INHALATION) at 07:07

## 2022-10-09 RX ADMIN — CALCIUM GLUCONATE 1 G: 20 INJECTION, SOLUTION INTRAVENOUS at 02:17

## 2022-10-09 RX ADMIN — METHYLNALTREXONE BROMIDE 450 MG: 150 TABLET ORAL at 08:19

## 2022-10-09 RX ADMIN — OXCARBAZEPINE 150 MG: 150 TABLET, FILM COATED ORAL at 20:25

## 2022-10-09 RX ADMIN — OXCARBAZEPINE 150 MG: 150 TABLET, FILM COATED ORAL at 08:19

## 2022-10-09 RX ADMIN — PANTOPRAZOLE SODIUM 40 MG: 40 TABLET, DELAYED RELEASE ORAL at 05:08

## 2022-10-09 RX ADMIN — FUROSEMIDE 40 MG: 10 INJECTION, SOLUTION INTRAMUSCULAR; INTRAVENOUS at 19:15

## 2022-10-09 RX ADMIN — GUAIFENESIN 1200 MG: 600 TABLET, EXTENDED RELEASE ORAL at 08:19

## 2022-10-09 RX ADMIN — SODIUM CHLORIDE 1 G: 1 TABLET ORAL at 11:50

## 2022-10-09 RX ADMIN — SUCRALFATE 1 G: 1 TABLET ORAL at 20:25

## 2022-10-09 RX ADMIN — FUROSEMIDE 20 MG: 10 INJECTION, SOLUTION INTRAMUSCULAR; INTRAVENOUS at 10:13

## 2022-10-09 RX ADMIN — OXYCODONE 5 MG: 5 TABLET ORAL at 05:09

## 2022-10-09 RX ADMIN — LEVOTHYROXINE SODIUM 25 MCG: 25 TABLET ORAL at 05:08

## 2022-10-09 RX ADMIN — SODIUM CHLORIDE 1 G: 1 TABLET ORAL at 02:17

## 2022-10-09 RX ADMIN — RISPERIDONE 0.25 MG: 0.25 TABLET ORAL at 20:25

## 2022-10-09 RX ADMIN — INSULIN HUMAN 10 UNITS: 100 INJECTION, SOLUTION PARENTERAL at 02:15

## 2022-10-09 RX ADMIN — CLOTRIMAZOLE AND BETAMETHASONE DIPROPIONATE 1 APPLICATION: 10; .5 CREAM TOPICAL at 20:26

## 2022-10-09 RX ADMIN — SENNOSIDES AND DOCUSATE SODIUM 1 TABLET: 50; 8.6 TABLET ORAL at 08:20

## 2022-10-09 RX ADMIN — FUROSEMIDE 20 MG: 20 TABLET ORAL at 08:19

## 2022-10-09 RX ADMIN — Medication 10 ML: at 08:20

## 2022-10-09 RX ADMIN — BISACODYL 10 MG: 10 SUPPOSITORY RECTAL at 08:21

## 2022-10-09 RX ADMIN — LISINOPRIL 10 MG: 5 TABLET ORAL at 08:28

## 2022-10-09 RX ADMIN — SUCRALFATE 1 G: 1 TABLET ORAL at 08:19

## 2022-10-09 RX ADMIN — Medication 10 ML: at 20:25

## 2022-10-09 RX ADMIN — SODIUM CHLORIDE 1 G: 1 TABLET ORAL at 08:19

## 2022-10-09 RX ADMIN — ROSUVASTATIN CALCIUM 20 MG: 10 TABLET, FILM COATED ORAL at 20:25

## 2022-10-09 RX ADMIN — CLOTRIMAZOLE AND BETAMETHASONE DIPROPIONATE 1 APPLICATION: 10; .5 CREAM TOPICAL at 08:21

## 2022-10-09 RX ADMIN — SUCRALFATE 1 G: 1 TABLET ORAL at 17:16

## 2022-10-09 RX ADMIN — SUCRALFATE 1 G: 1 TABLET ORAL at 11:50

## 2022-10-09 RX ADMIN — DEXTROSE MONOHYDRATE 50 ML: 25 INJECTION, SOLUTION INTRAVENOUS at 02:16

## 2022-10-09 RX ADMIN — SERTRALINE 200 MG: 100 TABLET, FILM COATED ORAL at 08:19

## 2022-10-09 RX ADMIN — POTASSIUM CHLORIDE 40 MEQ: 1500 TABLET, EXTENDED RELEASE ORAL at 08:19

## 2022-10-09 NOTE — THERAPY TREATMENT NOTE
"Subjective: Pt agreeable to therapeutic plan of care.    Objective:     Bed mobility - N/A, sitting EOB  Transfers - CGA and with rolling walker  Ambulation - 30 feet CGA and with rolling walker    Vitals: WNL n 2L O2    Pain: 0 VAS  Education: Provided education on importance of mobility and skilled verbal / tactile cueing throughout intervention.     Assessment: Morgan Quick presents with functional mobility impairments which indicate the need for skilled intervention. Tolerating session today without incident. Pt only wanted to amb in room.  Very slow moving but no LOB noted. Plans on rehab at [place].Will continue to follow and progress as tolerated.     Plan/Recommendations:   Moderate Intensity Therapy recommended post-acute care. This is recommended as therapy feels the patient would require 3-4 days per week and wouldn't tolerate \"3 hour daily\" rehab intensity. SNF would be the preferred choice. If the patient does not agree to SNF, arrange HH or OP depending on home bound status. If patient is medically complex, consider LTACH.. Pt requires no DME at discharge.     Pt desires Skilled Rehab placement at discharge. Pt cooperative; agreeable to therapeutic recommendations and plan of care.         Basic Mobility 6-click:  Rollin = Total, A lot = 2, A little = 3; 4 = None  Supine>Sit:   1 = Total, A lot = 2, A little = 3; 4 = None   Sit>Stand with arms:  1 = Total, A lot = 2, A little = 3; 4 = None  Bed>Chair:   1 = Total, A lot = 2, A little = 3; 4 = None  Ambulate in room:  1 = Total, A lot = 2, A little = 3; 4 = None  3-5 Steps with railin = Total, A lot = 2, A little = 3; 4 = None  Score: 20    Modified [place]: 4 = Moderately severe disability (Unable to attend to own bodily needs without assistance, and unable to walk unassisted)     Post-Tx Position: Up in Chair, Alarms activated and Call light and personal items within reach  PPE: mask and gloves  "

## 2022-10-09 NOTE — PLAN OF CARE
Goal Outcome Evaluation:  Plan of Care Reviewed With: patient, spouse        Progress: improving  Outcome Evaluation: Pt rested during shift. Pain med given. Pt received dextrose/insulin/calcium gluconate for elevated potassium. blood glucose monitored. Pt remains on 2LO2 with no distress noted, Pt educated about fluid restriction but sill non compliant, will cont to monitor and educate.

## 2022-10-09 NOTE — PROGRESS NOTES
PROGRESS NOTE      Patient Name: Morgan Quick  : 1955  MRN: [id number]  Primary Care Physician: Wayne Saavedra MD  Date of admission: 2022    Patient Care Team:  Wayne Saavedra MD as PCP - General (Internal Medicine)        Subjective   Subjective:     Patient is feeling better no new complaints  Sodium slowly improving,    Review of systems:  All other review of system unremarkable      Allergies:    Allergies   Allergen Reactions   • Contrast Dye Shortness Of Breath     Pt states she had trouble breathing when she had contrast in the past.        Objective   Exam:     Vital Signs  Temp:  [98 °F (36.7 °C)-98.1 °F (36.7 °C)] 98 °F (36.7 °C)  Heart Rate:  [65-79] 67  Resp:  [16-19] 18  BP: (105-112)/(65-70) 110/67  SpO2:  [90 %-94 %] 92 %  on  Flow (L/min):  [2] 2;   Device (Oxygen Therapy): nasal cannula  Body mass index is 41.02 kg/m².    General: Middle-age  female in no acute distress.    Head:      Normocephalic and atraumatic.    Eyes:      PERRL/EOM intact, conjunctiva and sclera clear with out nystagmus.    Neck:      No masses, thyromegaly,  trachea central with normal respiratory effort   Lungs:    Clear bilaterally to auscultation.    Heart:      Regular rate and rhythm, no murmur no gallop  Abd:        Soft, nontender, not distended, bowel sounds positive, no shifting dullness   Pulses:   Pulses palpable  Extr:        No cyanosis or clubbing--no significant edema.    Neuro:    No focal deficits.   alert oriented x3  Skin:       Intact without lesions or rashes.    Psych:    Alert and cooperative; normal mood and affect; .      Results Review:  I have personally reviewed most recent Data :  CBC    Results from last 7 days   Lab Units 10/07/22  1008 10/06/22  0812 10/05/22  0648 10/04/22  0446 10/03/22  0053   WBC 10*3/mm3 8.50 6.60 7.60 7.90 7.40   HEMOGLOBIN g/dL 8.7* 8.9* 9.1* 9.2* 8.8*   PLATELETS 10*3/mm3 273 285 207 200 160     CMP   Results from last 7 days   Lab Units  10/09/22  0216 10/08/22  2343 10/08/22  0010 10/07/22  0407 10/06/22  0812 10/05/22  1043 10/04/22  0446 10/03/22  0053   SODIUM mmol/L  --  128* 127* 126* 125* 120* 124* 124*   POTASSIUM mmol/L 5.4* 5.6* 5.0 4.6 4.8 4.6 4.3 4.3   CHLORIDE mmol/L  --  93* 91* 89* 89* 84* 87* 87*   CO2 mmol/L  --  29.0 28.0 27.0 29.0 29.0 29.0 29.0   BUN mg/dL  --  16 13 10 6* 9 9 11   CREATININE mg/dL  --  0.65 0.58 0.54* 0.47* 0.52* 0.51* 0.56*   GLUCOSE mg/dL  --  122* 103* 88 119* 120* 107* 102*     ABG      No radiology results for the last day    Results for orders placed during the hospital encounter of 09/24/22    Adult Transthoracic Echo Complete W/ Cont if Necessary Per Protocol    Interpretation Summary  · Estimated left ventricular EF was in agreement with the calculated left ventricular EF. Left ventricular ejection fraction appears to be 56 - 60%. Left ventricular systolic function is normal.  · There is calcification of the aortic valve mainly affecting the non-coronary, left coronary and right coronary cusp(s).  · The study is technically difficult for diagnosis.    Scheduled Meds:bisacodyl, 10 mg, Rectal, Daily  budesonide, 0.5 mg, Nebulization, BID - RT  clotrimazole-betamethasone, 1 application, Topical, Q12H  furosemide, 20 mg, Oral, Daily  guaiFENesin, 1,200 mg, Oral, Q12H  levothyroxine, 25 mcg, Oral, Q AM  lisinopril, 10 mg, Oral, Daily  Methylnaltrexone Bromide, 450 mg, Oral, Daily  OXcarbazepine, 150 mg, Oral, BID  pantoprazole, 40 mg, Oral, Q AM  potassium chloride, 40 mEq, Oral, Daily  risperiDONE, 0.25 mg, Oral, Nightly  rosuvastatin, 20 mg, Oral, Nightly  senna-docusate sodium, 1 tablet, Oral, BID  sertraline, 200 mg, Oral, Daily  sodium chloride, 10 mL, Intravenous, Q12H  sodium chloride, 1 g, Oral, TID With Meals  sucralfate, 1 g, Oral, 4x Daily AC & at Bedtime      Continuous Infusions:   PRN Meds:•  ipratropium-albuterol  •  melatonin  •  nitroglycerin  •  ondansetron **OR** ondansetron  •   oxyCODONE  •  [COMPLETED] Insert peripheral IV **AND** sodium chloride  •  sodium chloride    Assessment & Plan   Assessment and Plan:         Abdominal pain, unspecified abdominal location    Morbidly obese (HCC)    Essential hypertension    Bipolar 1 disorder (HCC)    High cholesterol    Anorexia    Nausea    ASSESSMENT:  · Hyponatremia: Etiology might be related to SSRI but urine sodium is less than 20 with urine osmolality of 1.63 that is against SIADH but it do correlate with increased free water intake in the presence of SSRI and may be small roll placed by lisinopril  · Bipolar disorder  · History of hypertension  · Hyperlipidemia  · Echo reviewed, EF 56 to 60%,           Plan:      · Hyponatremia, initial work-up, cortisol level 18.9, adequate, TSH was 6, urine sodium less than 20, urine osmolality 233,  Uric acid checked, around 4.5,    · Awaiting labs this am, noted, labs, overnight high potassium ,repeat 5.4, slightly hemolzyed, DC potassium supplement, give dose of iv lasix, fu repeat BMP. Sodium was 128 during the night.   · Had decreased lisinopril yesterday, hold for now. Due to high k, as well as bp, on softer side,  Resume again, once k is stable and bp stable. Remains on statin too, check ck, phos.   · Fluid restriction.   · I/o monitoring, fair diuresis yesterday.   · Pt on sodium chloride 1 gm tid   · Supplement with Ensure or boost, for better nutritional support,  · because of the use of SSRI underlying lung issues there is a possibility patient may have some SIADH although urine sodium and urine osmolality is not on the high side   · Repeat labs tomorrow morning  · Thank you for letting me participate    •         [facility] consultant  [phone]

## 2022-10-09 NOTE — PLAN OF CARE
Assessment: Morgan Quick presents with functional mobility impairments which indicate the need for skilled intervention. Tolerating session today without incident. Pt only wanted to amb in room.  Very slow moving but no LOB noted. Plans on rehab at [place].Will continue to follow and progress as tolerated.

## 2022-10-09 NOTE — PROGRESS NOTES
[facility]     Progress Note    Patient Name: Morgan Quick  : 1955  MRN: [id number]  Primary Care Physician:  Wayne Saavedra MD  Date of admission: 2022  Service date and time: 10/09/22 09:25 EDT     Subjective   Subjective     Chief Complaint: abdominal pain     HPI:  Feeling better but still overall weak.  Did have bowel movement yesterday x2 per nursing.  No other complaints this morning.    Pertinent positives as noted in HPI/subjective.  All other systems were reviewed and are negative.    Objective   Objective     Vitals:   Temp:  [98 °F (36.7 °C)-98.1 °F (36.7 °C)] 98 °F (36.7 °C)  Heart Rate:  [65-79] 67  Resp:  [16-19] 18  BP: (105-112)/(65-70) 110/67  Flow (L/min):  [2] 2  Physical Exam    Constitutional: Awake, alert, obese, lying in bed,   Eyes: PERRLA, sclerae anicteric, no conjunctival injection   Respiratory: Clear to auscultation bilaterally, nonlabored respirations    Cardiovascular: RRR, no murmurs, rubs, or gallops, palpable pedal pulses bilaterally   Gastrointestinal: Positive bowel sounds, soft, nontender, nondistended   Musculoskeletal: Generalized weakness, no clubbing or cyanosis to extremities   Psychiatric: Appropriate affect, cooperative   Neurologic: Oriented x 3, strength symmetric in all extremities, Cranial Nerves grossly intact to confrontation, speech clear   Skin: No rashes     Result Review    Result Review:  I have personally reviewed the results from the time of this admission to 10/9/2022 09:25 EDT and agree with these findings:  [x]  Laboratory list / accordion  []  Microbiology  []  Radiology  []  EKG/Telemetry   []  Cardiology/Vascular   []  Pathology  []  Old records  []  Other:        Assessment & Plan   Assessment / Plan       Active Hospital Problems:  Active Hospital Problems    Diagnosis    • **Abdominal pain, unspecified abdominal location    • Bipolar 1 disorder (HCC)    • High cholesterol    • Anorexia    • Nausea    • Morbidly obese (HCC)     • Essential hypertension    Physical Deconditioning    Plan:      Hyponatremia  -Possibly SIADH  - CT chest noted, echo noted  - renal following, fluid restriction and diuresis as needed  -Monitor daily    Hx of breast Cancer  -Continue outpatient f/u    Abdominal pain, nausea - improved  CT>Strandy density in the anterior pararenal space inferior to the  pancreas and adjacent to the horizontal limb of the duodenal C-loop. I  would favor duodenitis versus less likely acute pancreatitis. I would  recommend correlation with pancreatic enzyme levels.  2. There is a hypodense area in the left lobe of the liver near the main  portal vein which may represent a small cyst or cavernous hemangioma. It  was not seen well on the previous exam. The patient reportedly has a  history of left breast cancer so I cannot completely exclude metastatic  Disease.  Pt should have MRI as out pt with and without contrast to evaluate liver lesion.  -Bowel regimen added, rectal suppository and enema if needed  -Had multiple bowel movements     Duodenitis - resolved  -GI s/o,  S/P EGD.  Showed duodenitis on EGD and a benign appearing nodule, that was biopsied.  -Protonix and Carafate.  -lovenox bid  -Diet as tolerated     Constipation/Fecal retention  -Continue bowel regimen, monitor     Hypocalcemia/hypokalemia- replace/monitor     Elevated D dimer:  VQ-scan is negative.     Acute hypoxic respiratory failure   - failed 6 minute walk test, will need home oxygen setup, patient can be weaned down to room air as tolerated at the rehab  -Secondary to fluid overload. CT chest reviewed, cont lasix  -abg reviewed  - Bipap prn, needs outpatient sleep study.  -Pulmonology following     Anemia:  - cont to monitor  -Transfused a unit of blood.  -Appropriate compensation post transfusion.    Physical Deconditioning - PT/OT, needs SNF, SW following for placement once Na normalizes    DVT prophylaxis:  Mechanical DVT prophylaxis orders are  present.    CODE STATUS:   Level Of Support Discussed With: Patient  Code Status (Patient has no pulse and is not breathing): CPR (Attempt to Resuscitate)  Medical Interventions (Patient has pulse or is breathing): Full Support  Release to patient: Routine Release    Disposition: Possible discharge to rehab in next 24 to 48 hours if cleared by nephrology.

## 2022-10-09 NOTE — PLAN OF CARE
Goal Outcome Evaluation:         Goes to bathroom with standby assist. Pt didn't request any pain med in day shift.  Potasium was 5.4 in morning, administered lasix iv per order and now pot level 4.6.  Dr Redd said pt can go home tomorrow if her kidney function is ok.

## 2022-10-09 NOTE — PROGRESS NOTES
Daily Progress Note        Abdominal pain, unspecified abdominal location    Morbidly obese (HCC)    Essential hypertension    Bipolar 1 disorder (HCC)    High cholesterol    Anorexia    Nausea      Assessment    RIGOBERTO/obesity hypoventilation syndrome   Mild bibasilar atelectasis with small effusion    Hyponatremia, most likely diuretic effect, echo results reviewed EF 55% CT chest resolution of pleural effusions no evidence of pulmonary edema  Borderline  TSH 6.0  Cortisol 18.95  Osmolarity 259, urine osmolarity 163 urine sodium less than 20 several    Abdominal pain, unspecified abdominal location  Constipation  Hypodense area in the left lobe of the liver    Elevated D-dimer but negative VQ scan  Anemia    Echo 10/3/2022  EF 52%  Aortic calcification noted     Plan     Oxygen supplement and titration to maintain saturation 88 to 93%: 2-3 L NC and BiPAP as needed    Nephrology following for hyponatremia    Synthroid   although TSH is only 6 could have either euthyroid sick syndrome or mild hypothyroidism    Pulmicort and DuoNebs  Mucinex   DVT/GI prophylaxis-Lovenox and Protonix  Encourage use of incentive spirometer  Blood pressure controlled             LOS: 11 days     Subjective     Reports mild shortness of breath    Objective     Vital signs for last 24 hours:  Vitals:    10/09/22 0556 10/09/22 0707 10/09/22 0713 10/09/22 0815   BP: 105/65   110/67   BP Location: Left arm      Patient Position: Lying      Pulse: 68 68 65 67   Resp: 17 16 18    Temp: 98 °F (36.7 °C)      TempSrc: Oral      SpO2: 91% 92% 92%    Weight: 112 kg (246 lb 7.6 oz)      Height:           Intake/Output last 3 shifts:  I/O last 3 completed shifts:  In: 1630 [P.O.:1580; IV Piggyback:50]  Out: 1300 [Urine:1300]  Intake/Output this shift:  I/O this shift:  In: 240 [P.O.:240]  Out: 200 [Urine:200]      Radiology  Imaging Results (Last 24 Hours)     ** No results found for the last 24 hours. **          Labs:  Results from last 7 days   Lab  Units 10/07/22  1008   WBC 10*3/mm3 8.50   HEMOGLOBIN g/dL 8.7*   HEMATOCRIT % 25.8*   PLATELETS 10*3/mm3 273     Results from last 7 days   Lab Units 10/09/22  0957   SODIUM mmol/L 127*   POTASSIUM mmol/L 4.6   CHLORIDE mmol/L 90*   CO2 mmol/L 29.0   BUN mg/dL 12   CREATININE mg/dL 0.47*   CALCIUM mg/dL 9.5   BILIRUBIN mg/dL 0.3   ALK PHOS U/L 106   ALT (SGPT) U/L 13   AST (SGOT) U/L 18   GLUCOSE mg/dL 118*         Results from last 7 days   Lab Units 10/09/22  0957   ALBUMIN g/dL 3.50     Results from last 7 days   Lab Units 10/09/22  0957   CK TOTAL U/L 27                 Results from last 7 days   Lab Units 10/06/22  0812 10/03/22  0940   TSH uIU/mL  --  6.000*   FREE T4 ng/dL 1.06  --            Meds:   SCHEDULE  bisacodyl, 10 mg, Rectal, Daily  budesonide, 0.5 mg, Nebulization, BID - RT  clotrimazole-betamethasone, 1 application, Topical, Q12H  furosemide, 20 mg, Oral, Daily  guaiFENesin, 1,200 mg, Oral, Q12H  levothyroxine, 25 mcg, Oral, Q AM  Methylnaltrexone Bromide, 450 mg, Oral, Daily  OXcarbazepine, 150 mg, Oral, BID  pantoprazole, 40 mg, Oral, Q AM  risperiDONE, 0.25 mg, Oral, Nightly  rosuvastatin, 20 mg, Oral, Nightly  senna-docusate sodium, 1 tablet, Oral, BID  sertraline, 200 mg, Oral, Daily  sodium chloride, 10 mL, Intravenous, Q12H  sodium chloride, 1 g, Oral, TID With Meals  sucralfate, 1 g, Oral, 4x Daily AC & at Bedtime      Infusions     PRNs  •  ipratropium-albuterol  •  melatonin  •  nitroglycerin  •  ondansetron **OR** ondansetron  •  oxyCODONE  •  [COMPLETED] Insert peripheral IV **AND** sodium chloride  •  sodium chloride    Physical Exam:  General Appearance:  Alert   HEENT:  Normocephalic, without obvious abnormality, Conjunctiva/corneas clear,.   Nares normal, no drainage     Neck:  Supple, symmetrical, trachea midline. No JVD.  Lungs /Chest wall: Mild bilateral basal rhonchi, respirations unlabored, symmetrical wall movement.     Heart:  Regular rate and rhythm, S1 S2 normal  Abdomen:  Soft, non-tender, no masses, no organomegaly.    Extremities: Trace edema, no clubbing or cyanosis    ROS  Review of Systems  Constitutional: Negative for chills, fever and malaise/fatigue.   HENT: Negative.    Eyes: Negative.    Cardiovascular: Negative.    Respiratory: Positive for cough and shortness of breath.    Skin: Negative.    Musculoskeletal: Negative.    Gastrointestinal: Negative.    Genitourinary: Negative.    Neurological: Negative.    Psychiatric/Behavioral: Negative.    I reviewed the recent clinical results    Much of this encounter note is an electronic transcription/translation of spoken language to printed text using Dragon Software which might include inadvertent errors in transcription.

## 2022-10-10 VITALS
TEMPERATURE: 98.4 F | BODY MASS INDEX: 39.67 KG/M2 | HEIGHT: 65 IN | OXYGEN SATURATION: 94 % | HEART RATE: 77 BPM | SYSTOLIC BLOOD PRESSURE: 102 MMHG | WEIGHT: 238.1 LBS | DIASTOLIC BLOOD PRESSURE: 65 MMHG | RESPIRATION RATE: 18 BRPM

## 2022-10-10 LAB
ANION GAP SERPL CALCULATED.3IONS-SCNC: 8 MMOL/L (ref 5–15)
BUN SERPL-MCNC: 13 MG/DL (ref 8–23)
BUN/CREAT SERPL: 26.5 (ref 7–25)
CALCIUM SPEC-SCNC: 9.2 MG/DL (ref 8.6–10.5)
CHLORIDE SERPL-SCNC: 93 MMOL/L (ref 98–107)
CO2 SERPL-SCNC: 30 MMOL/L (ref 22–29)
CREAT SERPL-MCNC: 0.49 MG/DL (ref 0.57–1)
EGFRCR SERPLBLD CKD-EPI 2021: 104.1 ML/MIN/1.73
GLUCOSE SERPL-MCNC: 102 MG/DL (ref 65–99)
POTASSIUM SERPL-SCNC: 4.6 MMOL/L (ref 3.5–5.2)
SODIUM SERPL-SCNC: 131 MMOL/L (ref 136–145)

## 2022-10-10 PROCEDURE — 94664 DEMO&/EVAL PT USE INHALER: CPT

## 2022-10-10 PROCEDURE — 97110 THERAPEUTIC EXERCISES: CPT

## 2022-10-10 PROCEDURE — 94799 UNLISTED PULMONARY SVC/PX: CPT

## 2022-10-10 PROCEDURE — 80048 BASIC METABOLIC PNL TOTAL CA: CPT | Performed by: INTERNAL MEDICINE

## 2022-10-10 RX ORDER — AMOXICILLIN 250 MG
1 CAPSULE ORAL 2 TIMES DAILY
Start: 2022-10-10

## 2022-10-10 RX ORDER — ROSUVASTATIN CALCIUM 20 MG/1
20 TABLET, COATED ORAL DAILY
Qty: 30 TABLET | Refills: 0 | Status: SHIPPED | OUTPATIENT
Start: 2022-10-10

## 2022-10-10 RX ORDER — FUROSEMIDE 20 MG/1
20 TABLET ORAL DAILY
Qty: 30 TABLET | Refills: 0 | Status: SHIPPED | OUTPATIENT
Start: 2022-10-11

## 2022-10-10 RX ORDER — SUCRALFATE 1 G/1
1 TABLET ORAL
Qty: 90 TABLET | Refills: 0 | Status: SHIPPED | OUTPATIENT
Start: 2022-10-10

## 2022-10-10 RX ORDER — GUAIFENESIN 600 MG/1
1200 TABLET, EXTENDED RELEASE ORAL EVERY 12 HOURS SCHEDULED
Qty: 20 TABLET | Refills: 0 | Status: SHIPPED | OUTPATIENT
Start: 2022-10-10

## 2022-10-10 RX ORDER — LEVOTHYROXINE SODIUM 0.03 MG/1
25 TABLET ORAL
Qty: 30 TABLET | Refills: 0 | Status: SHIPPED | OUTPATIENT
Start: 2022-10-11

## 2022-10-10 RX ORDER — PANTOPRAZOLE SODIUM 40 MG/1
40 TABLET, DELAYED RELEASE ORAL
Qty: 30 TABLET | Refills: 0 | Status: SHIPPED | OUTPATIENT
Start: 2022-10-11

## 2022-10-10 RX ORDER — SERTRALINE HYDROCHLORIDE 100 MG/1
200 TABLET, FILM COATED ORAL DAILY
Qty: 30 TABLET | Refills: 0 | Status: SHIPPED | OUTPATIENT
Start: 2022-10-10

## 2022-10-10 RX ORDER — SODIUM CHLORIDE 1000 MG
2 TABLET, SOLUBLE MISCELLANEOUS
Qty: 30 TABLET | Refills: 0 | Status: SHIPPED | OUTPATIENT
Start: 2022-10-10

## 2022-10-10 RX ORDER — OXCARBAZEPINE 150 MG/1
150 TABLET, FILM COATED ORAL 2 TIMES DAILY
Qty: 60 TABLET | Refills: 0 | Status: SHIPPED | OUTPATIENT
Start: 2022-10-10

## 2022-10-10 RX ORDER — RISPERIDONE 0.25 MG/1
0.25 TABLET ORAL NIGHTLY
Qty: 30 TABLET | Refills: 0 | Status: SHIPPED | OUTPATIENT
Start: 2022-10-10

## 2022-10-10 RX ADMIN — BISACODYL 10 MG: 10 SUPPOSITORY RECTAL at 07:53

## 2022-10-10 RX ADMIN — SENNOSIDES AND DOCUSATE SODIUM 1 TABLET: 50; 8.6 TABLET ORAL at 07:52

## 2022-10-10 RX ADMIN — SUCRALFATE 1 G: 1 TABLET ORAL at 12:06

## 2022-10-10 RX ADMIN — GUAIFENESIN 1200 MG: 600 TABLET, EXTENDED RELEASE ORAL at 07:52

## 2022-10-10 RX ADMIN — Medication 10 ML: at 07:53

## 2022-10-10 RX ADMIN — PANTOPRAZOLE SODIUM 40 MG: 40 TABLET, DELAYED RELEASE ORAL at 05:40

## 2022-10-10 RX ADMIN — SODIUM CHLORIDE 2 G: 1 TABLET ORAL at 07:52

## 2022-10-10 RX ADMIN — SERTRALINE 200 MG: 100 TABLET, FILM COATED ORAL at 07:52

## 2022-10-10 RX ADMIN — SODIUM CHLORIDE 2 G: 1 TABLET ORAL at 12:06

## 2022-10-10 RX ADMIN — METHYLNALTREXONE BROMIDE 450 MG: 150 TABLET ORAL at 07:52

## 2022-10-10 RX ADMIN — OXCARBAZEPINE 150 MG: 150 TABLET, FILM COATED ORAL at 07:53

## 2022-10-10 RX ADMIN — SUCRALFATE 1 G: 1 TABLET ORAL at 07:52

## 2022-10-10 RX ADMIN — LEVOTHYROXINE SODIUM 25 MCG: 25 TABLET ORAL at 05:41

## 2022-10-10 RX ADMIN — BUDESONIDE 0.5 MG: 0.5 INHALANT RESPIRATORY (INHALATION) at 11:21

## 2022-10-10 RX ADMIN — FUROSEMIDE 20 MG: 20 TABLET ORAL at 07:52

## 2022-10-10 RX ADMIN — CLOTRIMAZOLE AND BETAMETHASONE DIPROPIONATE 1 APPLICATION: 10; .5 CREAM TOPICAL at 07:53

## 2022-10-10 NOTE — DISCHARGE SUMMARY
[facility]  DISCHARGE SUMMARY    Patient Name: Morgan Quick  : 1955  MRN: [id number]    Date of Admission: 2022  Date of Discharge: 10/10/2022  Primary Care Physician: Wayne Saavedra MD      Presenting Problem:   Nausea [R11.0]  Hypoxia [R09.02]  Elevated d-dimer [R79.89]  Duodenitis [K29.80]  Abdominal pain, unspecified abdominal location [R10.9]  Urinary tract infection with hematuria, site unspecified [N39.0, R31.9]  Dyspnea, unspecified type [R06.00]    Active and Resolved Hospital Problems:  Active Hospital Problems    Diagnosis POA   • **Abdominal pain, unspecified abdominal location [R10.9] Yes   • Bipolar 1 disorder (HCC) [F31.9] Unknown   • High cholesterol [E78.00] Unknown   • Anorexia [R63.0] Yes   • Nausea [R11.0] Yes   • Morbidly obese (HCC) [E66.01] Yes   • Essential hypertension [I10] Yes      Resolved Hospital Problems   No resolved problems to display.         Hospital Course     Hospital Course:  Morgan Quick is a 66 y.o. female hx of breast Ca, s/p mastectomy and radiation 2019, who presented to [facility] on 2022 complaining of abdominal pain. For week she has had some abdominal discomfort.  She has not been able to keep anything down or eat.  CT findings as noted below, incidental finding noted with liver lesion, outpatient MRI recommended.  GI was consulted and patient on EGD which showed duodenitis, started on PPI and Carafate.  Did have constipation/fecal impaction which improved with enema and bowel regimen.  Also noted to have hyponatremia and hypokalemia, nephrology followed and sodium has improved.  Okay to discharge per nephrology.  PT/OT recommending rehab, patient stable for discharge to rehab today with follow-up with PCP and nephrology as an outpatient.    A/P:     Hyponatremia, improved  -Possibly SIADH  - CT chest noted, echo noted  - renal following, fluid restriction and diuresis as  needed  -Continue salt tabs  -Okay to discharge per nephrology     Hx of breast Cancer  -Continue outpatient f/u     Abdominal pain, nausea - improved  CT>Strandy density in the anterior pararenal space inferior to the  pancreas and adjacent to the horizontal limb of the duodenal C-loop. I  would favor duodenitis versus less likely acute pancreatitis. I would  recommend correlation with pancreatic enzyme levels.  2. There is a hypodense area in the left lobe of the liver near the main  portal vein which may represent a small cyst or cavernous hemangioma. It  was not seen well on the previous exam. The patient reportedly has a  history of left breast cancer so I cannot completely exclude metastatic  Disease.  Pt should have MRI as out pt with and without contrast to evaluate liver lesion.  -Bowel regimen added, rectal suppository and enema if needed  -Had multiple bowel movements     Duodenitis - resolved  -GI s/o,  S/P EGD.  Showed duodenitis on EGD and a benign appearing nodule, that was biopsied.  -Protonix and Carafate.  -lovenox bid  -Diet as tolerated     Constipation/Fecal retention  -Continue bowel regimen, monitor     Hypocalcemia/hypokalemia- replace/monitor     Elevated D dimer:  VQ-scan is negative.     Acute hypoxic respiratory failure   - failed 6 minute walk test, will need home oxygen setup, patient can be weaned down to room air as tolerated at the rehab  -Secondary to fluid overload. CT chest reviewed, cont lasix  -abg reviewed  - Bipap prn, needs outpatient sleep study.  -Pulmonology following     Anemia:  - cont to monitor  -Transfused a unit of blood.  -Appropriate compensation post transfusion.    DISCHARGE Follow Up Recommendations for labs and diagnostics:   Follow-up with PCP and nephrology    Reasons For Change In Medications and Indications for New Medications:  As below    Day of Discharge     Vital Signs:  Temp:  [98.1 °F (36.7 °C)-98.4 °F (36.9 °C)] 98.4 °F (36.9 °C)  Heart Rate:   [69-77] 77  Resp:  [16-18] 18  BP: (102-114)/(65-72) 102/65  Flow (L/min):  [2] 2    Physical Exam:  General: Awake, alert, NAD  Eyes: PERRL, EOMI, conjunctivae are clear  Cardiovascular: Regular rate and rhythm, no murmurs  Respiratory: Clear to auscultation bilaterally, no wheezing or rales, unlabored breathing  Abdomen: Soft, nontender, positive bowel sounds, no guarding  Neurologic: A&O, CN grossly intact, moves all extremities spontaneously  Musculoskeletal: Generalized weakness, no deformities  Skin: Warm, dry, intact        Pertinent  and/or Most Recent Results     LAB RESULTS:      Lab 10/07/22  1008 10/06/22  0812 10/05/22  0648 10/04/22  0446   WBC 8.50 6.60 7.60 7.90   HEMOGLOBIN 8.7* 8.9* 9.1* 9.2*   HEMATOCRIT 25.8* 25.7* 26.9* 27.6*   PLATELETS 273 285 207 200   NEUTROS ABS 7.40* 5.20 5.80 5.85   LYMPHS ABS 0.60* 0.80 1.10  --    MONOS ABS 0.50 0.60 0.60  --    EOS ABS 0.00 0.00 0.10 0.16   .0* 103.4* 103.2* 105.9*         Lab 10/10/22  0158 10/09/22  0957 10/09/22  0216 10/08/22  2343 10/08/22  0010 10/07/22  0407   SODIUM 131* 127*  --  128* 127* 126*   POTASSIUM 4.6 4.6 5.4* 5.6* 5.0 4.6   CHLORIDE 93* 90*  --  93* 91* 89*   CO2 30.0* 29.0  --  29.0 28.0 27.0   ANION GAP 8.0 8.0  --  6.0 8.0 10.0   BUN 13 12  --  16 13 10   CREATININE 0.49* 0.47*  --  0.65 0.58 0.54*   EGFR 104.1 105.1  --  97.2 99.9 101.7   GLUCOSE 102* 118*  --  122* 103* 88   CALCIUM 9.2 9.5  --  9.0 9.0 9.2   PHOSPHORUS  --  4.6*  --   --   --   --          Lab 10/09/22  0957   TOTAL PROTEIN 6.6   ALBUMIN 3.50   GLOBULIN 3.1   ALT (SGPT) 13   AST (SGOT) 18   BILIRUBIN 0.3   ALK PHOS 106                     Brief Urine Lab Results  (Last result in the past 365 days)      Color   Clarity   Blood   Leuk Est   Nitrite   Protein   CREAT   Urine HCG        09/24/22 0650 Dark Yellow   Clear   Negative   Moderate (2+)   Negative   Negative               Microbiology Results (last 10 days)     ** No results found for the last 240  hours. **          CT Abdomen Pelvis Without Contrast    Result Date: 9/28/2022  Impression:  1. Resolution of duodenitis compared to the prior study. 2. Approximately 2 cm focus of hypodensity within the liver adjacent to the jessica hepatis is unchanged from the prior exam, but new since 2019. I suspect this may represent a more focal area of fat deposition, superimposed upon background hepatic steatosis. However, in light of the patient's provided history of breast cancer, I would recommend MRI abdomen without and with contrast hepatic imaging protocol, for verification. 3. Development of trace right pleural effusion and mild posterior bibasilar atelectasis. 4. Gastric band device in place. Cholecystectomy. Hysterectomy. 5. Moderate colonic stool burden. No evidence of active bowel inflammation    Electronically Signed By-Emily Gonzalez MD On:9/28/2022 4:30 PM This report was finalized on [id number] by  Emily Gonzalez MD.    CT Abdomen Pelvis Without Contrast    Result Date: 9/24/2022  Impression:  1. Strandy density in the anterior pararenal space inferior to the pancreas and adjacent to the horizontal limb of the duodenal C-loop. I would favor duodenitis versus less likely acute pancreatitis. I would recommend correlation with pancreatic enzyme levels. 2. There is a hypodense area in the left lobe of the liver near the main portal vein which may represent a small cyst or cavernous hemangioma. It was not seen well on the previous exam. The patient reportedly has a history of left breast cancer so I cannot completely exclude metastatic disease. 3. Fecal retention. 4. Additional incidental findings as noted above.  Electronically Signed By-Casa Escalera MD On:9/24/2022 8:54 AM This report was finalized on [id number] by  Casa Escalera MD.    CT Chest Without Contrast Diagnostic    Result Date: 10/3/2022  Impression:  1. There are patchy groundglass opacities in the upper lobes bilaterally, suggesting an  infectious or inflammatory process. 2. Multiple subcentimeter indeterminate pulmonary nodules bilaterally, measuring up to 6 mm. Patient reportedly has a history of breast cancer. Serial CT follow-up recommended. 3. Additional findings as given above.    Electronically Signed By-Cy Rondon MD On:10/3/2022 10:07 AM This report was finalized on [id number] by  Cy Rondon MD.    NM Lung Ventilation Perfusion Aerosol    Result Date: 9/26/2022  Impression: Normal V/Q lung scan.  Electronically Signed By-Emily Gonzalez MD On:9/26/2022 10:44 AM This report was finalized on [id number] by  Emily Gonzalez MD.    XR Chest 1 View    Result Date: 9/27/2022  Impression: Cardiomegaly and central pulmonary vascular congestion.  Electronically Signed By-Ramsey Chen MD On:9/27/2022 5:56 PM This report was finalized on [id number] by  Ramsey Chen MD.    XR Chest 1 View    Result Date: 9/24/2022  Impression: Slightly increased prominence of the pulmonary vascularity with compared to prior Electronically signed by:  Olivia Mccormick D.O.  9/24/2022 5:37 AM    XR Chest 1 View    Result Date: 9/22/2022  Impression: IMPRESSION : Cardiomegaly and mild pulmonary vascular congestion.  Asymmetric opacity may represent asymmetric pulmonary edema, atelectasis or developing pneumonia in the correct clinical setting[  Electronically Signed By-Kush Hardin On:9/22/2022 11:39 PM This report was finalized on [id number] by  Kush Hardin, .    XR Chest PA & Lateral    Result Date: 9/25/2022  Impression: No active disease.  Electronically Signed By-Casa Escalera MD On:9/25/2022 5:50 PM This report was finalized on [id number] by  Casa Escalera MD.              Results for orders placed during the hospital encounter of 09/24/22    Adult Transthoracic Echo Complete W/ Cont if Necessary Per Protocol    Interpretation Summary  · Estimated left ventricular EF was in agreement with the calculated left ventricular EF. Left ventricular  ejection fraction appears to be 56 - 60%. Left ventricular systolic function is normal.  · There is calcification of the aortic valve mainly affecting the non-coronary, left coronary and right coronary cusp(s).  · The study is technically difficult for diagnosis.      Labs Pending at Discharge:      Procedures Performed  Procedure(s):  ESOPHAGOGASTRODUODENOSCOPY with biopsy x 2 areas      Findings:  3 millimeter benign-appearing esophageal nodule at the GE junction biopsied  Moderate duodenitis biopsied     Impression:  Epigastric pain and abnormal CT of the duodenum with evidence of duodenitis on today's exam     Recommendations:  Follow-up biopsy results  Continue PPI and Carafate  Continue bowel regimen  We will see as needed in the hospital        Deonte Wong MD      Date: 9/25/2022    Time: 17:03 EDT      Consults:   Consults     Date and Time Order Name Status Description    10/6/2022  8:43 AM Inpatient Nephrology Consult      9/30/2022  3:43 PM Inpatient Psychiatrist Consult Completed     9/29/2022 10:54 AM Inpatient Pulmonology Consult Completed     9/28/2022 11:50 AM Inpatient Gastroenterology Consult      9/24/2022  9:21 AM Hospitalist (on-call MD unless specified)              Discharge Details        Discharge Medications      New Medications      Instructions Start Date   furosemide 20 MG tablet  Commonly known as: LASIX   20 mg, Oral, Daily   Start Date: October 11, 2022     guaiFENesin 600 MG 12 hr tablet  Commonly known as: MUCINEX   1,200 mg, Oral, Every 12 Hours Scheduled      levothyroxine 25 MCG tablet  Commonly known as: SYNTHROID, LEVOTHROID   25 mcg, Oral, Every Early Morning   Start Date: October 11, 2022     Methylnaltrexone Bromide 150 MG tablet  Commonly known as: RELISTOR   450 mg, Oral, Daily   Start Date: October 11, 2022     pantoprazole 40 MG EC tablet  Commonly known as: PROTONIX   40 mg, Oral, Every Early Morning   Start Date: October 11, 2022     sennosides-docusate 8.6-50 MG  per tablet  Commonly known as: PERICOLACE   1 tablet, Oral, 2 Times Daily      sodium chloride 1 g tablet   2 g, Oral, 3 Times Daily With Meals      sucralfate 1 g tablet  Commonly known as: CARAFATE   1 g, Oral, 4 Times Daily Before Meals & Nightly         Continue These Medications      Instructions Start Date   OXcarbazepine 150 MG tablet  Commonly known as: TRILEPTAL   150 mg, Oral, 2 Times Daily      risperiDONE 0.25 MG tablet  Commonly known as: risperDAL   0.25 mg, Oral, Nightly      rosuvastatin 20 MG tablet  Commonly known as: CRESTOR   20 mg, Oral, Daily      sertraline 100 MG tablet  Commonly known as: ZOLOFT   200 mg, Oral, Daily         Stop These Medications    cefdinir 300 MG capsule  Commonly known as: OMNICEF     lisinopril 20 MG tablet  Commonly known as: PRINIVIL,ZESTRIL            Allergies   Allergen Reactions   • Contrast Dye Shortness Of Breath     Pt states she had trouble breathing when she had contrast in the past.          Discharge Disposition:  Rehab Facility or Unit (DC - External)    Diet:  Hospital:  Diet Order   Procedures   • Diet Regular; Fluid Restriction; 1500cc/day         Discharge Activity:         CODE STATUS:  Code Status and Medical Interventions:   Ordered at: 09/26/22 1430     Level Of Support Discussed With:    Patient     Code Status (Patient has no pulse and is not breathing):    CPR (Attempt to Resuscitate)     Medical Interventions (Patient has pulse or is breathing):    Full Support     Release to patient:    Routine Release         No future appointments.        Time spent on Discharge including face to face service:  35 minutes    Signature:    Electronically signed by Ladonna Redd DO, 10/10/22, 3:01 PM EDT.      Much of this encounter note is an electronic transcription/translation of spoken language to printed text. The electronic translation of spoken language may permit erroneous, or at times, nonsensical words or phrases to be inadvertently transcribed;  Although I have reviewed the note for such errors, some may still exist.

## 2022-10-10 NOTE — THERAPY TREATMENT NOTE
"Subjective: Pt agreeable to therapeutic plan of care.  Cognition: arousal/alertness: Alert and Attentive    Objective:     Bed Mobility: CGA   Functional Transfers: CGA  Functional Ambulation: CGA    Grooming: Supervision  ADL Position: edge of bed sitting  ADL Comments: set-up for brushing hair and washing face    Pt completed HEP below:     R/L shoulder flexion/extension x10  R/L shoulder abduction/adduction x10  R/L scapular elevation/depression x10  R/L elbow flexion/extension x10  R/L wrist flexion/extension x10  R/L shoulder external/internal rotation x10    Vitals: WNL    Pain: 0 VAS  Education: Provided education on importance of mobility and skilled verbal / tactile cueing throughout intervention.     Assessment: Morgan Quick presents with ADL impairments below baseline abilities which indicate the need for continued skilled intervention while inpatient. Pt completed grooming routine with set-up while sitting EOB. Pt completed all functional mobility with CGA and increased time. Pt completed HEP while sitting EOB with verbal and visual cues for proper technique. Tolerating session today without incident. Will continue to follow and progress as tolerated.     Plan/Recommendations:   Pt would benefit from Skilled Rehab placement at discharge from facility.   Pt desires Skilled Rehab placement at discharge. Pt cooperative; agreeable to therapeutic recommendations and plan of care.     Moderate Intensity Therapy recommended post-acute care. This is recommended as therapy feels the patient would require 3-4 days per week and wouldn't tolerate \"3 hour daily\" rehab intensity. SNF would be the preferred choice. If the patient does not agree to SNF, arrange HH or OP depending on home bound status. If patient is medically complex, consider LTACH.       Modified [place]: N/A = No pre-op stroke/TIA    Post-Tx Position: Up in Chair, Alarms activated and Call light and personal items within reach  PPE: mask, gloves " and eye protection

## 2022-10-10 NOTE — PROGRESS NOTES
"PULMONARY CRITICAL CARE PROGRESS  NOTE      PATIENT IDENTIFICATION:  Name: Morgan Quick  MRN: [id number]  :  1955     Age: 66 y.o.  Sex: female    DATE OF Note:  10/10/2022   Referring Physician: No admitting provider for patient encounter.                  Subjective:   Feeling better, no new issue, no SOB, no chest pain, no nausea or vomiting, no change in bowel habit, no dysuria,  no new  skin rash or itching.      Objective:  tMax 24 hrs: Temp (24hrs), Av.1 °F (36.7 °C), Min:98 °F (36.7 °C), Max:98.3 °F (36.8 °C)      Vitals Ranges:   Temp:  [98 °F (36.7 °C)-98.3 °F (36.8 °C)] 98.1 °F (36.7 °C)  Heart Rate:  [69-81] 70  Resp:  [17-20] 17  BP: (111-120)/(69-72) 112/72    Intake and Output Last 3 Shifts:   I/O last 3 completed shifts:  In: 1250 [P.O.:1200; IV Piggyback:50]  Out: 500 [Urine:500]    Exam:  /72   Pulse 70   Temp 98.1 °F (36.7 °C) (Oral)   Resp 17   Ht 165.1 cm (65\")   Wt 108 kg (238 lb 1.6 oz)   LMP  (LMP Unknown)   SpO2 96%   BMI 39.62 kg/m²     General Appearance:     HEENT:  Normocephalic, without obvious abnormality. Conjunctivae/corneas clear.  Normal external ear canals. Nares normal, no drainage     Neck:  Supple, symmetrical, trachea midline. No JVD.  Lungs /Chest wall:   Bilateral basal rhonchi, respirations unlabored, symmetrical wall movement.     Heart:  Regular rate and rhythm, systolic murmur PMI left sternal border  Abdomen: Soft, nontender, no masses, no organomegaly.    Extremities: Trace edema, no clubbing or cyanosis        Medications:    Current Facility-Administered Medications:   •  bisacodyl (DULCOLAX) suppository 10 mg, 10 mg, Rectal, Daily, Ladonna Redd, DO, 10 mg at 10/10/22 0753  •  budesonide (PULMICORT) nebulizer solution 0.5 mg, 0.5 mg, Nebulization, BID - RT, Maureen Olivares APRN, 0.5 mg at 10/09/22 0707  •  clotrimazole-betamethasone (LOTRISONE) 1-0.05 % cream 1 application, 1 application, Topical, Q12H, Deonte Wong MD, 1 " application at 10/10/22 0753  •  furosemide (LASIX) tablet 20 mg, 20 mg, Oral, Daily, Ivonne Ham APRN, 20 mg at 10/10/22 0752  •  guaiFENesin (MUCINEX) 12 hr tablet 1,200 mg, 1,200 mg, Oral, Q12H, Ford Orellana MD, 1,200 mg at 10/10/22 0752  •  ipratropium-albuterol (DUO-NEB) nebulizer solution 3 mL, 3 mL, Nebulization, Q4H PRN, Gerardo Gant MD  •  levothyroxine (SYNTHROID, LEVOTHROID) tablet 25 mcg, 25 mcg, Oral, Q AM, DrawMarc MD, 25 mcg at 10/10/22 0541  •  melatonin tablet 5 mg, 5 mg, Oral, Nightly PRN, Deonte Wong MD, 5 mg at 10/08/22 2029  •  Methylnaltrexone Bromide (RELISTOR) tablet 450 mg, 450 mg, Oral, Daily, Mayela Lopez APRN, 450 mg at 10/10/22 0752  •  nitroglycerin (NITROSTAT) SL tablet 0.4 mg, 0.4 mg, Sublingual, Q5 Min PRN, Deonte Wong MD  •  ondansetron (ZOFRAN) tablet 4 mg, 4 mg, Oral, Q6H PRN, 4 mg at 10/05/22 1530 **OR** ondansetron (ZOFRAN) injection 4 mg, 4 mg, Intravenous, Q6H PRN, Deonte Wong MD, 4 mg at 10/03/22 1807  •  OXcarbazepine (TRILEPTAL) tablet 150 mg, 150 mg, Oral, BID, Deonte Wong MD, 150 mg at 10/10/22 0753  •  oxyCODONE (ROXICODONE) immediate release tablet 5 mg, 5 mg, Oral, Q4H PRN, Ector Morales DO, 5 mg at 10/09/22 0509  •  pantoprazole (PROTONIX) EC tablet 40 mg, 40 mg, Oral, Q AM, Megan Castelan DO, 40 mg at 10/10/22 0540  •  risperiDONE (risperDAL) tablet 0.25 mg, 0.25 mg, Oral, Nightly, Deonte Wong MD, 0.25 mg at 10/09/22 2025  •  rosuvastatin (CRESTOR) tablet 20 mg, 20 mg, Oral, Nightly, Deonte Wong MD, 20 mg at 10/09/22 2025  •  sennosides-docusate (PERICOLACE) 8.6-50 MG per tablet 1 tablet, 1 tablet, Oral, BID, Ladonna Redd DO, 1 tablet at 10/10/22 0752  •  sertraline (ZOLOFT) tablet 200 mg, 200 mg, Oral, Daily, Deonte Wong MD, 200 mg at 10/10/22 0752  •  [COMPLETED] Insert peripheral IV, , , Once **AND** sodium chloride 0.9 % flush 10 mL, 10 mL, Intravenous, PRN, Deonte Wong MD  •  sodium  chloride 0.9 % flush 10 mL, 10 mL, Intravenous, Q12H, Deonte Wong MD, 10 mL at 10/10/22 0753  •  sodium chloride 0.9 % flush 10 mL, 10 mL, Intravenous, PRN, Deonte Wong MD, 10 mL at 10/03/22 0028  •  sodium chloride tablet 2 g, 2 g, Oral, TID With Meals, Liyah Ramirez MD, 2 g at 10/10/22 0752  •  sucralfate (CARAFATE) tablet 1 g, 1 g, Oral, 4x Daily AC & at Bedtime, Deonte Wong MD, 1 g at 10/10/22 0752    Data Review:  All labs (24hrs):   Recent Results (from the past 24 hour(s))   Basic Metabolic Panel    Collection Time: 10/10/22  1:58 AM    Specimen: Blood   Result Value Ref Range    Glucose 102 (H) 65 - 99 mg/dL    BUN 13 8 - 23 mg/dL    Creatinine 0.49 (L) 0.57 - 1.00 mg/dL    Sodium 131 (L) 136 - 145 mmol/L    Potassium 4.6 3.5 - 5.2 mmol/L    Chloride 93 (L) 98 - 107 mmol/L    CO2 30.0 (H) 22.0 - 29.0 mmol/L    Calcium 9.2 8.6 - 10.5 mg/dL    BUN/Creatinine Ratio 26.5 (H) 7.0 - 25.0    Anion Gap 8.0 5.0 - 15.0 mmol/L    eGFR 104.1 >60.0 mL/min/1.73        Imaging:  Adult Transthoracic Echo Complete W/ Cont if Necessary Per Protocol  · Estimated left ventricular EF was in agreement with the calculated left   ventricular EF. Left ventricular ejection fraction appears to be 56 - 60%.   Left ventricular systolic function is normal.  · There is calcification of the aortic valve mainly affecting the   non-coronary, left coronary and right coronary cusp(s).  · The study is technically difficult for diagnosis.          ASSESSMENT:  Bilateral lower lobe atelectasis  Pleural effusion  Obstructive sleep apnea  Alveolar hypoventilation  Abdominal pain, unspecified abdominal location    Morbidly obese (HCC)    Essential hypertension    Bipolar 1 disorder (HCC)    High cholesterol    Anorexia    Nausea     PLAN:  Encouraged to use I-S flutter valve  PT OT    Bronchodilator  Inhaled corticosteroids  Electrolytes/ glycemic control  DVT and GI prophylaxis.    Total Critical care time in direct medical  management (   ) minutes. This time specifically excludes time spent performing procedures.  Lisa Delarosa MD. D, ABSM.     10/10/2022  11:17 EDT

## 2022-10-10 NOTE — PLAN OF CARE
"Goal Outcome Evaluation:         Assessment: Morgan Quick presents with ADL impairments below baseline abilities which indicate the need for continued skilled intervention while inpatient. Pt completed grooming routine with set-up while sitting EOB. Pt completed all functional mobility with CGA and increased time. Pt completed HEP while sitting EOB with verbal and visual cues for proper technique. Tolerating session today without incident. Will continue to follow and progress as tolerated.     Plan/Recommendations:   Pt would benefit from Skilled Rehab placement at discharge from facility.   Pt desires Skilled Rehab placement at discharge. Pt cooperative; agreeable to therapeutic recommendations and plan of care.     Moderate Intensity Therapy recommended post-acute care. This is recommended as therapy feels the patient would require 3-4 days per week and wouldn't tolerate \"3 hour daily\" rehab intensity. SNF would be the preferred choice. If the patient does not agree to SNF, arrange HH or OP depending on home bound status. If patient is medically complex, consider LTACH.         "

## 2022-10-10 NOTE — PROGRESS NOTES
[facility]     Progress Note    Patient Name: Morgan Quick  : 1955  MRN: [id number]  Primary Care Physician:  Wayne Saavedra MD  Date of admission: 2022  Service date and time: 10/10/22 13:46 EDT     Subjective   Subjective     Chief Complaint: abdominal pain       Doing well this morning, having a bowel movement.  Denies any other complaints.  Awaiting rehab placement.    Pertinent positives as noted in HPI/subjective.  All other systems were reviewed and are negative.    Objective   Objective     Vitals:   Temp:  [98 °F (36.7 °C)-98.3 °F (36.8 °C)] 98.1 °F (36.7 °C)  Heart Rate:  [69-81] 76  Resp:  [16-20] 16  BP: (111-120)/(69-72) 112/72  Flow (L/min):  [2-4] 2  Physical Exam   General: Awake, alert, NAD  Eyes: PERRL, EOMI, conjunctivae are clear  Cardiovascular: Regular rate and rhythm, no murmurs  Respiratory: Clear to auscultation bilaterally, no wheezing or rales, unlabored breathing  Abdomen: Soft, nontender, positive bowel sounds, no guarding  Neurologic: A&O, CN grossly intact, moves all extremities spontaneously  Musculoskeletal: Generalized weakness, no deformities  Skin: Warm, dry, intact      Result Review    Result Review:  I have personally reviewed the results from the time of this admission to 10/10/2022 13:46 EDT and agree with these findings:  [x]  Laboratory list / accordion  []  Microbiology  []  Radiology  []  EKG/Telemetry   []  Cardiology/Vascular   []  Pathology  []  Old records  []  Other:        Assessment & Plan   Assessment / Plan       Active Hospital Problems:  Active Hospital Problems    Diagnosis    • **Abdominal pain, unspecified abdominal location    • Bipolar 1 disorder (HCC)    • High cholesterol    • Anorexia    • Nausea    • Morbidly obese (HCC)    • Essential hypertension    Physical Deconditioning    Plan:      Hyponatremia, improved  -Possibly SIADH  - CT chest noted, echo noted  - renal following, fluid restriction and diuresis as needed  -Continue  salt tabs  -Okay to discharge per nephrology    Hx of breast Cancer  -Continue outpatient f/u    Abdominal pain, nausea - improved  CT>Strandy density in the anterior pararenal space inferior to the  pancreas and adjacent to the horizontal limb of the duodenal C-loop. I  would favor duodenitis versus less likely acute pancreatitis. I would  recommend correlation with pancreatic enzyme levels.  2. There is a hypodense area in the left lobe of the liver near the main  portal vein which may represent a small cyst or cavernous hemangioma. It  was not seen well on the previous exam. The patient reportedly has a  history of left breast cancer so I cannot completely exclude metastatic  Disease.  Pt should have MRI as out pt with and without contrast to evaluate liver lesion.  -Bowel regimen added, rectal suppository and enema if needed  -Had multiple bowel movements     Duodenitis - resolved  -GI s/o,  S/P EGD.  Showed duodenitis on EGD and a benign appearing nodule, that was biopsied.  -Protonix and Carafate.  -lovenox bid  -Diet as tolerated     Constipation/Fecal retention  -Continue bowel regimen, monitor     Hypocalcemia/hypokalemia- replace/monitor     Elevated D dimer:  VQ-scan is negative.     Acute hypoxic respiratory failure   - failed 6 minute walk test, will need home oxygen setup, patient can be weaned down to room air as tolerated at the rehab  -Secondary to fluid overload. CT chest reviewed, cont lasix  -abg reviewed  - Bipap prn, needs outpatient sleep study.  -Pulmonology following     Anemia:  - cont to monitor  -Transfused a unit of blood.  -Appropriate compensation post transfusion.    Physical Deconditioning - PT/OT, needs SNF, medically stable for discharge    DVT prophylaxis:  Mechanical DVT prophylaxis orders are present.    CODE STATUS:   Level Of Support Discussed With: Patient  Code Status (Patient has no pulse and is not breathing): CPR (Attempt to Resuscitate)  Medical Interventions (Patient  has pulse or is breathing): Full Support  Release to patient: Routine Release    Disposition: Medically stable for discharge.  Awaiting placement.

## 2022-10-10 NOTE — CASE MANAGEMENT/SOCIAL WORK
Continued Stay Note  DANIELA Damico     Patient Name: Morgan Quick  MRN: [id number]  Today's Date: 10/10/2022    Admit Date: 9/24/2022    Plan: DC PLAN: [facility] accepted, Pending Precert.   Discharge Plan     Row Name 10/10/22 1303       Plan    Plan DC PLAN: [facility] accepted, Precert Approved 10/10    Patient/Family in Agreement with Plan yes    Plan Comments Reached out to Fadia with [facility] at [phone] regarding precert, states it is still pending. WIll check again and let case management know. MD updated. Will follow closely. Possible DC today if precert approved.   Received Message from Fadia with [facility], states Precert approved and patient can transport today 10/10  Notified Dr. Redd regarding approved Precert.  [facility] to come transport patient            IMM 10/10 per casemangement        Expected Discharge Date and Time     Expected Discharge Date Expected Discharge Time    Oct 11, 2022           Brittany Guillaume RN     Office phone: [phone]  Cell phone: [phone]

## 2022-10-10 NOTE — PLAN OF CARE
Goal Outcome Evaluation:  Plan of Care Reviewed With: patient        Progress: no change  Outcome Evaluation: Pt remains on O2 at 2L/NC.  Continues with fluid restriction.  Potassium normal today at 4.6 and sodium is at 131. No complaints voiced.  Spouse at bedside.  Has rested quietly between care.

## 2022-10-10 NOTE — PROGRESS NOTES
PROGRESS NOTE      Patient Name: Morgan Quick  : 1955  MRN: [id number]  Primary Care Physician: Wayne Saavedra MD  Date of admission: 2022    Patient Care Team:  Wayne Saavedra MD as PCP - General (Internal Medicine)        Subjective   Subjective:     Patient is feeling better no new complaints  Sodium slowly improving,    Review of systems:  All other review of system unremarkable      Allergies:    Allergies   Allergen Reactions   • Contrast Dye Shortness Of Breath     Pt states she had trouble breathing when she had contrast in the past.        Objective   Exam:     Vital Signs  Temp:  [98 °F (36.7 °C)-98.3 °F (36.8 °C)] 98.1 °F (36.7 °C)  Heart Rate:  [69-81] 76  Resp:  [16-20] 16  BP: (111-120)/(69-72) 112/72  SpO2:  [91 %-98 %] 98 %  on  Flow (L/min):  [2-4] 2;   Device (Oxygen Therapy): nasal cannula  Body mass index is 39.62 kg/m².    General: Middle-age  female in no acute distress.    Head:      Normocephalic and atraumatic.    Eyes:      PERRL/EOM intact, conjunctiva and sclera clear with out nystagmus.    Neck:      No masses, thyromegaly,  trachea central with normal respiratory effort   Lungs:    Clear bilaterally to auscultation.    Heart:      Regular rate and rhythm, no murmur no gallop  Abd:        Soft, nontender, not distended, bowel sounds positive, no shifting dullness   Pulses:   Pulses palpable  Extr:        No cyanosis or clubbing--no significant edema.    Neuro:    No focal deficits.   alert oriented x3  Skin:       Intact without lesions or rashes.    Psych:    Alert and cooperative; normal mood and affect; .      Results Review:  I have personally reviewed most recent Data :  CBC    Results from last 7 days   Lab Units 10/07/22  1008 10/06/22  0812 10/05/22  0648 10/04/22  0446   WBC 10*3/mm3 8.50 6.60 7.60 7.90   HEMOGLOBIN g/dL 8.7* 8.9* 9.1* 9.2*   PLATELETS 10*3/mm3 273 285 207 200     CMP   Results from last 7 days   Lab Units 10/10/22  0158 10/09/22  0957  10/09/22  0216 10/08/22  2343 10/08/22  0010 10/07/22  0407 10/06/22  0812 10/05/22  1043   SODIUM mmol/L 131* 127*  --  128* 127* 126* 125* 120*   POTASSIUM mmol/L 4.6 4.6 5.4* 5.6* 5.0 4.6 4.8 4.6   CHLORIDE mmol/L 93* 90*  --  93* 91* 89* 89* 84*   CO2 mmol/L 30.0* 29.0  --  29.0 28.0 27.0 29.0 29.0   BUN mg/dL 13 12  --  16 13 10 6* 9   CREATININE mg/dL 0.49* 0.47*  --  0.65 0.58 0.54* 0.47* 0.52*   GLUCOSE mg/dL 102* 118*  --  122* 103* 88 119* 120*   ALBUMIN g/dL  --  3.50  --   --   --   --   --   --    BILIRUBIN mg/dL  --  0.3  --   --   --   --   --   --    ALK PHOS U/L  --  106  --   --   --   --   --   --    AST (SGOT) U/L  --  18  --   --   --   --   --   --    ALT (SGPT) U/L  --  13  --   --   --   --   --   --      ABG      No radiology results for the last day    Results for orders placed during the hospital encounter of 09/24/22    Adult Transthoracic Echo Complete W/ Cont if Necessary Per Protocol    Interpretation Summary  · Estimated left ventricular EF was in agreement with the calculated left ventricular EF. Left ventricular ejection fraction appears to be 56 - 60%. Left ventricular systolic function is normal.  · There is calcification of the aortic valve mainly affecting the non-coronary, left coronary and right coronary cusp(s).  · The study is technically difficult for diagnosis.    Scheduled Meds:bisacodyl, 10 mg, Rectal, Daily  budesonide, 0.5 mg, Nebulization, BID - RT  clotrimazole-betamethasone, 1 application, Topical, Q12H  furosemide, 20 mg, Oral, Daily  guaiFENesin, 1,200 mg, Oral, Q12H  levothyroxine, 25 mcg, Oral, Q AM  Methylnaltrexone Bromide, 450 mg, Oral, Daily  OXcarbazepine, 150 mg, Oral, BID  pantoprazole, 40 mg, Oral, Q AM  risperiDONE, 0.25 mg, Oral, Nightly  rosuvastatin, 20 mg, Oral, Nightly  senna-docusate sodium, 1 tablet, Oral, BID  sertraline, 200 mg, Oral, Daily  sodium chloride, 10 mL, Intravenous, Q12H  sodium chloride, 2 g, Oral, TID With Meals  sucralfate, 1 g,  Oral, 4x Daily AC & at Bedtime      Continuous Infusions:   PRN Meds:•  ipratropium-albuterol  •  melatonin  •  nitroglycerin  •  ondansetron **OR** ondansetron  •  oxyCODONE  •  [COMPLETED] Insert peripheral IV **AND** sodium chloride  •  sodium chloride    Assessment & Plan   Assessment and Plan:         Abdominal pain, unspecified abdominal location    Morbidly obese (HCC)    Essential hypertension    Bipolar 1 disorder (HCC)    High cholesterol    Anorexia    Nausea    ASSESSMENT:  · Hyponatremia: Etiology might be related to SSRI but urine sodium is less than 20 with urine osmolality of 1.63 that is against SIADH but it do correlate with increased free water intake in the presence of SSRI and may be small roll placed by lisinopril  · Bipolar disorder  · History of hypertension  · Hyperlipidemia  · Echo reviewed, EF 56 to 60%,           Plan:      · Hyponatremia, initial work-up, cortisol level 18.9, adequate, TSH was 6, urine sodium less than 20, urine osmolality 233,  · Uric acid checked, around 4.5,  · Patient sodium level is stable at this time continue sodium chloride 2 g 3 times a day  · Had decreased lisinopril yesterday, hold for now. Due to high k, as well as bp, on softer side,  Resume again, once k is stable and bp stable. Remains on statin too, check ck, phos.   · Fluid restriction.   · I/o monitoring, fair diuresis yesterday.   · Okay to be discharged when okay with primary  · Renal clinic follow-up in 6 to 8 weeks  · Supplement with Ensure or boost, for better nutritional support,  · because of the use of SSRI underlying lung issues there is a possibility patient may have some SIADH although urine sodium and urine osmolality is not on the high side   · Repeat labs tomorrow morning  · Thank you for letting me participate    •         Liyah ZarateCentral [facility] kidney consultant  [phone]

## 2022-10-11 NOTE — CASE MANAGEMENT/SOCIAL WORK
Case Management Discharge Note      Final Note: SNF ( [facility])         Selected Continued Care - Discharged on 10/10/2022 Admission date: 9/24/2022 - Discharge disposition: Rehab Facility or Unit (DC - External)    Destination Coordination complete.    Service Provider Selected Services Address Phone Fax Patient Preferred    [facility] [address] [facility] [place] [address] [phone] --               Transportation Services  Private: Car ([facility] provided transport)    Final Discharge Disposition Code: 03 - skilled nursing facility (SNF)

## 2023-12-12 NOTE — PROGRESS NOTES
"Subjective   Morgan Quick is a 64 y.o. female.       HPI   Pt. Is here today with c/o lower back pain.  She says she has hx. of a bugling lumbar disc from a fall while roller skating 30 years ago.  She mentions several times that she used to take hydrocodone for this and saw a Dr. Hopson.  She says she does not take that now.   She feels she aggravated the pain over the weekend by lifting a heavy object.  She felt pain in the lower back as soon as she lifted.  She says it was \"difficult to straighten up\".  She says she has taken nothing otc for pain and has not tried heat or ice.  Nabumetone is on her medication list but pt. says she hasn't been taking this.  She mentions having trouble with sleep because of pain.    She denies any numbness or tingling.  Denies any bowel or bladder concerns.   She requests hydrocodone for pain.      The following portions of the patient's history were reviewed and updated as appropriate: allergies, current medications, past family history, past medical history, past social history, past surgical history and problem list.    Review of Systems   Constitutional: Negative for activity change, appetite change, chills, diaphoresis, fatigue, fever, unexpected weight gain and unexpected weight loss.   Respiratory: Negative for cough, shortness of breath and wheezing.    Cardiovascular: Negative for chest pain, palpitations and leg swelling.   Gastrointestinal: Negative for constipation, diarrhea, nausea and vomiting.   Genitourinary: Negative for difficulty urinating, dysuria, flank pain, frequency, hematuria and urgency.   Musculoskeletal: Positive for back pain (lower).   Neurological: Negative for weakness and numbness.   Psychiatric/Behavioral: Negative for depressed mood. The patient is not nervous/anxious.        Objective   Physical Exam   Constitutional: She is oriented to person, place, and time. She appears well-developed and well-nourished. No distress.   Cardiovascular: " Specialty Medication Service    Date: 12/12/2023  Patient's Name: Caprice Monreal YOB: 1964            _____________________________________________________________________________________________    Caprice Monreal is a 61 y.o. male enrolled in the Specialty Medication Service. We received a refill request for Otezla on 12/12/2023. Original Rx was written for 12 fills on 06/07/2023. CLINICAL PHARMACY NOTE - SPECIALTY MEDICATION SERVICE      Caprice Monreal is a 61 y.o.  male enrolled in the Specialty Medication Service. Patient does have a signed CPA on file under Dr. Luis Gordon, but was established with valid CPA under Dr. My Rob (06/09/2023). Chart reviewed and is compliant with office visit. Does need to get new CMP to assess renal function and patient stated he would take care of it. No significant concerns noted. Next [facility] visit scheduled/anticipated for 01/29/2024 with Dr. Luis Gordon. Will send request to Dr. Luis Gordon today to see if can approve prior to visit. No orders of the defined types were placed in this encounter. 12/27/23 - Update: order not needed, specialist provided new prescription. No further action needed at this time.      Abdulkadir Cortez PharmD [address] [place] [address]  Ambulatory Clinical Pharmacist  Specialty Medication Services  Phone: [phone] option #4  Fax: [phone]        Thank you,    Abdulkadir Cortez [address] [place] [address]      Requested Prescriptions     Pending Prescriptions Disp Refills    Apremilast (OTEZLA) 30 MG TABS 60 tablet 1     Sig: Take 1 tablet by mouth 2 times daily      For Pharmacy Admin Tracking Only    Program: [facility]  CPA in place:  No  Recommendation Provided To: Provider: 1 via Note to Provider  Intervention Detail: Refill(s) Provided  Intervention Accepted By: Provider: 0    Time Spent (min): 15 Normal rate, regular rhythm, normal heart sounds and intact distal pulses.   No murmur heard.  Pulmonary/Chest: Effort normal and breath sounds normal. No respiratory distress. She has no wheezes. She exhibits no tenderness.   Abdominal: Soft. Bowel sounds are normal. She exhibits no distension. There is no tenderness.   Musculoskeletal: She exhibits tenderness (mild lumbosacral muscle tenderness; no vertebral bulge or deformity on exam.  ROM is limited with pain.  Able to stand, bear weight and walk. ). She exhibits no edema or deformity.   Neurological: She is alert and oriented to person, place, and time.   Skin: Skin is warm and dry. Capillary refill takes less than 2 seconds. No erythema.   Psychiatric: She has a normal mood and affect.   Vitals reviewed.        Assessment/Plan   Morgan was seen today for back pain.    Diagnoses and all orders for this visit:    Chronic bilateral low back pain without sciatica  Comments:  Xray of l-spine  Given tizanidine PRN muscle spasm  Refilled nabumetone PRN pain; discussed no refill for hydrocodone  Heat/ice and rest  Gentle stretches  Orders:  -     XR Spine Lumbar 2 or 3 View; Future  -     TiZANidine (ZANAFLEX) 4 MG capsule; Take 1 capsule by mouth 3 (Three) Times a Day As Needed for Muscle Spasms.  -     nabumetone (RELAFEN) 500 MG tablet; Take 1 tablet by mouth Every 12 (Twelve) Hours. Take with food!

## 2023-12-27 ENCOUNTER — HOSPITAL ENCOUNTER (INPATIENT)
Facility: HOSPITAL | Age: 68
LOS: 4 days | Discharge: NURSING, REHAB OR LONG-TERM CARE | DRG: 516 | End: 2024-01-05
Attending: EMERGENCY MEDICINE | Admitting: EMERGENCY MEDICINE
Payer: MEDICARE

## 2023-12-27 ENCOUNTER — APPOINTMENT (OUTPATIENT)
Dept: CT IMAGING | Facility: HOSPITAL | Age: 68
DRG: 516 | End: 2023-12-27
Payer: MEDICARE
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
## 2023-12-27 DIAGNOSIS — S30.0XXA CONTUSION OF COCCYX, INITIAL ENCOUNTER: ICD-10-CM

## 2023-12-27 DIAGNOSIS — S32.040A COMPRESSION FRACTURE OF L4 LUMBAR VERTEBRA, CLOSED, INITIAL ENCOUNTER: ICD-10-CM

## 2023-12-27 DIAGNOSIS — M54.50 ACUTE MIDLINE LOW BACK PAIN WITHOUT SCIATICA: ICD-10-CM

## 2023-12-27 LAB
HOLD SPECIMEN: NORMAL
HOLD SPECIMEN: NORMAL
WHOLE BLOOD HOLD COAG: NORMAL
WHOLE BLOOD HOLD SPECIMEN: NORMAL

## 2023-12-27 PROCEDURE — 25010000002 ONDANSETRON PER 1 MG: Performed by: NURSE PRACTITIONER

## 2023-12-27 PROCEDURE — G0378 HOSPITAL OBSERVATION PER HR: HCPCS

## 2023-12-27 PROCEDURE — 97162 PT EVAL MOD COMPLEX 30 MIN: CPT

## 2023-12-27 PROCEDURE — 74176 CT ABD & PELVIS W/O CONTRAST: CPT

## 2023-12-27 PROCEDURE — 25010000002 MORPHINE PER 10 MG: Performed by: NURSE PRACTITIONER

## 2023-12-27 PROCEDURE — 99285 EMERGENCY DEPT VISIT HI MDM: CPT

## 2023-12-27 PROCEDURE — 25010000002 MORPHINE PER 10 MG: Performed by: PHYSICIAN ASSISTANT

## 2023-12-27 RX ORDER — SUCRALFATE 1 G/1
1 TABLET ORAL
Status: DISCONTINUED | OUTPATIENT
Start: 2023-12-27 | End: 2023-12-28

## 2023-12-27 RX ORDER — HYDROCODONE BITARTRATE AND ACETAMINOPHEN 10; 325 MG/1; MG/1
1 TABLET ORAL 4 TIMES DAILY
COMMUNITY

## 2023-12-27 RX ORDER — BISACODYL 10 MG
10 SUPPOSITORY, RECTAL RECTAL DAILY PRN
Status: DISCONTINUED | OUTPATIENT
Start: 2023-12-27 | End: 2024-01-01

## 2023-12-27 RX ORDER — POTASSIUM CHLORIDE 750 MG/1
10 CAPSULE, EXTENDED RELEASE ORAL DAILY
COMMUNITY

## 2023-12-27 RX ORDER — ALUMINA, MAGNESIA, AND SIMETHICONE 2400; 2400; 240 MG/30ML; MG/30ML; MG/30ML
15 SUSPENSION ORAL EVERY 6 HOURS PRN
Status: DISCONTINUED | OUTPATIENT
Start: 2023-12-27 | End: 2024-01-05 | Stop reason: HOSPADM

## 2023-12-27 RX ORDER — HYDROCODONE BITARTRATE AND ACETAMINOPHEN 7.5; 325 MG/1; MG/1
1 TABLET ORAL EVERY 6 HOURS PRN
Status: DISCONTINUED | OUTPATIENT
Start: 2023-12-27 | End: 2023-12-28

## 2023-12-27 RX ORDER — GUAIFENESIN 600 MG/1
1200 TABLET, EXTENDED RELEASE ORAL EVERY 12 HOURS SCHEDULED
Status: DISCONTINUED | OUTPATIENT
Start: 2023-12-27 | End: 2024-01-05 | Stop reason: HOSPADM

## 2023-12-27 RX ORDER — TRAZODONE HYDROCHLORIDE 50 MG/1
25 TABLET ORAL NIGHTLY
Status: DISCONTINUED | OUTPATIENT
Start: 2023-12-28 | End: 2024-01-05 | Stop reason: HOSPADM

## 2023-12-27 RX ORDER — PREDNISONE 20 MG/1
40 TABLET ORAL
Status: DISCONTINUED | OUTPATIENT
Start: 2023-12-28 | End: 2023-12-30

## 2023-12-27 RX ORDER — ACETAMINOPHEN 325 MG/1
650 TABLET ORAL EVERY 4 HOURS PRN
Status: DISCONTINUED | OUTPATIENT
Start: 2023-12-27 | End: 2024-01-02

## 2023-12-27 RX ORDER — ONDANSETRON 2 MG/ML
4 INJECTION INTRAMUSCULAR; INTRAVENOUS ONCE
Status: COMPLETED | OUTPATIENT
Start: 2023-12-27 | End: 2023-12-27

## 2023-12-27 RX ORDER — PANTOPRAZOLE SODIUM 40 MG/1
40 TABLET, DELAYED RELEASE ORAL EVERY MORNING
Status: DISCONTINUED | OUTPATIENT
Start: 2023-12-28 | End: 2024-01-05 | Stop reason: HOSPADM

## 2023-12-27 RX ORDER — SODIUM CHLORIDE 9 MG/ML
40 INJECTION, SOLUTION INTRAVENOUS AS NEEDED
Status: DISCONTINUED | OUTPATIENT
Start: 2023-12-27 | End: 2024-01-05 | Stop reason: HOSPADM

## 2023-12-27 RX ORDER — OXCARBAZEPINE 150 MG/1
150 TABLET, FILM COATED ORAL 2 TIMES DAILY
Status: DISCONTINUED | OUTPATIENT
Start: 2023-12-27 | End: 2024-01-05 | Stop reason: HOSPADM

## 2023-12-27 RX ORDER — SODIUM CHLORIDE 0.9 % (FLUSH) 0.9 %
10 SYRINGE (ML) INJECTION AS NEEDED
Status: DISCONTINUED | OUTPATIENT
Start: 2023-12-27 | End: 2024-01-05 | Stop reason: HOSPADM

## 2023-12-27 RX ORDER — BISACODYL 5 MG/1
5 TABLET, DELAYED RELEASE ORAL DAILY PRN
Status: DISCONTINUED | OUTPATIENT
Start: 2023-12-27 | End: 2024-01-01

## 2023-12-27 RX ORDER — LUBIPROSTONE 24 UG/1
24 CAPSULE ORAL 2 TIMES DAILY
COMMUNITY

## 2023-12-27 RX ORDER — SODIUM CHLORIDE 0.9 % (FLUSH) 0.9 %
10 SYRINGE (ML) INJECTION EVERY 12 HOURS SCHEDULED
Status: DISCONTINUED | OUTPATIENT
Start: 2023-12-27 | End: 2024-01-05 | Stop reason: HOSPADM

## 2023-12-27 RX ORDER — AMOXICILLIN 250 MG
2 CAPSULE ORAL 2 TIMES DAILY
Status: DISCONTINUED | OUTPATIENT
Start: 2023-12-27 | End: 2024-01-01

## 2023-12-27 RX ORDER — FUROSEMIDE 20 MG/1
20 TABLET ORAL DAILY
Status: DISCONTINUED | OUTPATIENT
Start: 2023-12-27 | End: 2024-01-05 | Stop reason: HOSPADM

## 2023-12-27 RX ORDER — TRAZODONE HYDROCHLORIDE 50 MG/1
25 TABLET ORAL NIGHTLY
COMMUNITY

## 2023-12-27 RX ORDER — POLYETHYLENE GLYCOL 3350 17 G/17G
17 POWDER, FOR SOLUTION ORAL DAILY PRN
Status: DISCONTINUED | OUTPATIENT
Start: 2023-12-27 | End: 2024-01-01

## 2023-12-27 RX ORDER — SODIUM CHLORIDE 1 G/1
2 TABLET ORAL
Status: DISCONTINUED | OUTPATIENT
Start: 2023-12-27 | End: 2024-01-05 | Stop reason: HOSPADM

## 2023-12-27 RX ORDER — TRAZODONE HYDROCHLORIDE 50 MG/1
25 TABLET ORAL ONCE
Status: COMPLETED | OUTPATIENT
Start: 2023-12-27 | End: 2023-12-27

## 2023-12-27 RX ORDER — METHYLPREDNISOLONE 4 MG/1
TABLET ORAL
COMMUNITY
Start: 2023-12-25 | End: 2024-01-05 | Stop reason: HOSPADM

## 2023-12-27 RX ORDER — ONDANSETRON 2 MG/ML
4 INJECTION INTRAMUSCULAR; INTRAVENOUS EVERY 6 HOURS PRN
Status: DISCONTINUED | OUTPATIENT
Start: 2023-12-27 | End: 2024-01-05 | Stop reason: HOSPADM

## 2023-12-27 RX ORDER — RISPERIDONE 0.25 MG/1
0.25 TABLET ORAL NIGHTLY
Status: DISCONTINUED | OUTPATIENT
Start: 2023-12-27 | End: 2024-01-05 | Stop reason: HOSPADM

## 2023-12-27 RX ORDER — ONDANSETRON 4 MG/1
4 TABLET, FILM COATED ORAL EVERY 6 HOURS PRN
Status: DISCONTINUED | OUTPATIENT
Start: 2023-12-27 | End: 2024-01-05 | Stop reason: HOSPADM

## 2023-12-27 RX ORDER — ROSUVASTATIN CALCIUM 10 MG/1
30 TABLET, COATED ORAL DAILY
COMMUNITY

## 2023-12-27 RX ORDER — LIDOCAINE 50 MG/G
1 PATCH TOPICAL
Status: DISCONTINUED | OUTPATIENT
Start: 2023-12-27 | End: 2024-01-05 | Stop reason: HOSPADM

## 2023-12-27 RX ORDER — ROSUVASTATIN CALCIUM 10 MG/1
20 TABLET, COATED ORAL DAILY
Status: DISCONTINUED | OUTPATIENT
Start: 2023-12-27 | End: 2023-12-28

## 2023-12-27 RX ORDER — LEVOTHYROXINE SODIUM 0.03 MG/1
25 TABLET ORAL
Status: DISCONTINUED | OUTPATIENT
Start: 2023-12-28 | End: 2024-01-05 | Stop reason: HOSPADM

## 2023-12-27 RX ORDER — SERTRALINE HYDROCHLORIDE 100 MG/1
200 TABLET, FILM COATED ORAL DAILY
Status: DISCONTINUED | OUTPATIENT
Start: 2023-12-27 | End: 2024-01-05 | Stop reason: HOSPADM

## 2023-12-27 RX ADMIN — LIDOCAINE 1 PATCH: 50 PATCH CUTANEOUS at 16:49

## 2023-12-27 RX ADMIN — MORPHINE SULFATE 4 MG: 4 INJECTION, SOLUTION INTRAMUSCULAR; INTRAVENOUS at 21:44

## 2023-12-27 RX ADMIN — SODIUM CHLORIDE 2 G: 1 TABLET ORAL at 20:14

## 2023-12-27 RX ADMIN — GUAIFENESIN 1200 MG: 600 TABLET, MULTILAYER, EXTENDED RELEASE ORAL at 20:14

## 2023-12-27 RX ADMIN — TRAZODONE HYDROCHLORIDE 25 MG: 50 TABLET ORAL at 21:43

## 2023-12-27 RX ADMIN — FUROSEMIDE 20 MG: 20 TABLET ORAL at 20:14

## 2023-12-27 RX ADMIN — Medication 10 ML: at 12:54

## 2023-12-27 RX ADMIN — Medication 10 ML: at 20:18

## 2023-12-27 RX ADMIN — ONDANSETRON 4 MG: 2 INJECTION INTRAMUSCULAR; INTRAVENOUS at 13:22

## 2023-12-27 RX ADMIN — OXCARBAZEPINE 150 MG: 150 TABLET, FILM COATED ORAL at 20:14

## 2023-12-27 RX ADMIN — MORPHINE SULFATE 4 MG: 4 INJECTION, SOLUTION INTRAMUSCULAR; INTRAVENOUS at 13:22

## 2023-12-27 RX ADMIN — SERTRALINE 200 MG: 100 TABLET, FILM COATED ORAL at 20:14

## 2023-12-27 RX ADMIN — HYDROCODONE BITARTRATE AND ACETAMINOPHEN 1 TABLET: 7.5; 325 TABLET ORAL at 17:59

## 2023-12-27 RX ADMIN — MORPHINE SULFATE 4 MG: 4 INJECTION, SOLUTION INTRAMUSCULAR; INTRAVENOUS at 16:49

## 2023-12-27 RX ADMIN — RISPERIDONE 0.25 MG: 0.25 TABLET, FILM COATED ORAL at 20:14

## 2023-12-27 NOTE — THERAPY EVALUATION
Patient Name: Morgan Quick  : 1955    MRN: [id number]                              Today's Date: 2023       Admit Date: 2023    Visit Dx:     ICD-10-CM ICD-9-CM   1. Fall, initial encounter  W19.XXXA E888.9   2. Contusion of coccyx, initial encounter  S30.0XXA 922.32   3. Acute midline low back pain without sciatica  M54.50 724.2     Patient Active Problem List   Diagnosis    Morbidly obese    Essential hypertension    Malignant neoplasm of overlapping sites of left breast in female, estrogen receptor positive    Primary cancer of left female breast    Abdominal pain, unspecified abdominal location    Bipolar 1 disorder    High cholesterol    Anorexia    Nausea    Low back pain     Past Medical History:   Diagnosis Date    Bipolar 1 disorder     Breast cancer     Cancer     Disease of thyroid gland     High cholesterol     Hypertension      Past Surgical History:   Procedure Laterality Date    ABDOMINAL SURGERY      APPENDECTOMY      ENDOSCOPY N/A 2022    Procedure: ESOPHAGOGASTRODUODENOSCOPY with biopsy x 2 areas;  Surgeon: Deonte Wong MD;  Location: [facility] ENDOSCOPY;  Service: Gastroenterology;  Laterality: N/A;  post: gastritis, duodinitis, nodule,     HYSTERECTOMY      MASTECTOMY      L side      General Information       Row Name 23 1606          Physical Therapy Time and Intention    Document Type evaluation  -OD     Mode of Treatment physical therapy  -OD       Row Name 23 1606          General Information    Patient Profile Reviewed yes  -OD     Prior Level of Function independent:;min assist:  Pt lives at [facility] with her spouse. Pt typically indep with ADLs and mobility using RW, sometimes receives Tate from her spouse with transfers.  -OD     Existing Precautions/Restrictions fall  -OD     Barriers to Rehab medically complex;ineffective coping  -OD       Row Name 23 1606          Living Environment    People in Home spouse  -OD       Row Name  12/27/23 1606          Cognition    Orientation Status (Cognition) oriented x 4  -OD       Row Name 12/27/23 1606          Safety Issues, Functional Mobility    Impairments Affecting Function (Mobility) pain;strength;endurance/activity tolerance  -OD               User Key  (r) = Recorded By, (t) = Taken By, (c) = Cosigned By      Initials Name Provider Type    OD Syl Grigsby, PT Physical Therapist                   Mobility       Row Name 12/27/23 1607          Bed Mobility    Bed Mobility rolling left;rolling right  -OD     Rolling Left [place] (Bed Mobility) modified independence  -OD     Rolling Right [place] (Bed Mobility) modified independence  -OD     Comment, (Bed Mobility) Pt guarded during bed mobility d/t severe pain.  -OD               User Key  (r) = Recorded By, (t) = Taken By, (c) = Cosigned By      Initials Name Provider Type    OD Syl Grigsby, PT Physical Therapist                   Obj/Interventions       Row Name 12/27/23 1607          Range of Motion Comprehensive    Comment, General Range of Motion Unable to formally assess ROM d/t patient's pain and request to perform bed mobiltiy on her own.  -OD       Row Name 12/27/23 1607          Strength Comprehensive (MMT)    Comment, General Manual Muscle Testing (MMT) Assessment Unable to formally assess MMT d/t patient's pain and request to perform bed mobiltiy on her own.  -OD               User Key  (r) = Recorded By, (t) = Taken By, (c) = Cosigned By      Initials Name Provider Type    OD Syl Grigsby, PT Physical Therapist                   Goals/Plan       Row Name 12/27/23 1618          Bed Mobility Goal 1 (PT)    Activity/Assistive Device (Bed Mobility Goal 1, PT) bed mobility activities, all  -OD     [place] Level/Cues Needed (Bed Mobility Goal 1, PT) independent  -OD     Time Frame (Bed Mobility Goal 1, PT) long term goal (LTG);2 weeks  -OD       Row Name 12/27/23 1618          Transfer Goal 1 (PT)    Activity/Assistive  Device (Transfer Goal 1, PT) transfers, all;walker, rolling  -OD     [place] Level/Cues Needed (Transfer Goal 1, PT) modified independence  -OD     Time Frame (Transfer Goal 1, PT) long term goal (LTG);2 weeks  -OD       Row Name 12/27/23 1618          Gait Training Goal 1 (PT)    Activity/Assistive Device (Gait Training Goal 1, PT) gait (walking locomotion);assistive device use;walker, rolling  -OD     [place] Level (Gait Training Goal 1, PT) modified independence  -OD     Distance (Gait Training Goal 1, PT) 50 feet  -OD     Time Frame (Gait Training Goal 1, PT) long term goal (LTG);2 weeks  -OD       Row Name 12/27/23 1618          Therapy Assessment/Plan (PT)    Planned Therapy Interventions (PT) balance training;bed mobility training;gait training;home exercise program;neuromuscular re-education;postural re-education;transfer training;ROM (range of motion);strengthening;stretching;patient/family education  -OD               User Key  (r) = Recorded By, (t) = Taken By, (c) = Cosigned By      Initials Name Provider Type    OD Syl Grigsby, PT Physical Therapist                   Clinical Impression       Row Name 12/27/23 1608          Pain    Pretreatment Pain Rating 10/10  -OD     Posttreatment Pain Rating 10/10  -OD     Pain Location - Side/Orientation Right  -OD     Pain Location lower  -OD     Pain Location - back;extremity  -OD       Row Name 12/27/23 1604          Plan of Care Review    Plan of Care Reviewed With patient  -OD     Progress no change  -OD     Outcome Evaluation Morgan Quick is a 69 y/o F who presents to [facility] after a fall accompanied by severe low back pain. X-ray and CT (-) for acute abnormalities. Pt reports she was attempting to stand up with her RW and spouse assist, when her back gave out and she fell back into the chair. Pt reported significant R-sided LBP with referral down RLE to her foot. At baseline, pt is indep-Tate with ADLs and mobility using RW, living at [facility] with her spouse. Pt currently unable to complete mobility or allow assist for positioning / assessing d/t severe pain. Pt demo valsalva maneuver several times causing drop in her O2 saturation when her pain would exacerbate. PT will follow up tomorrow when her pain is controlled to assess safety to return to [facility] as well as provide positioning/strengthening maneuvers to help reduce pain.  -OD       Row Name 12/27/23 1608          Therapy Assessment/Plan (PT)    Rehab Potential (PT) good, to achieve stated therapy goals  -OD     Criteria for Skilled Interventions Met (PT) yes;meets criteria  -OD     Therapy Frequency (PT) 5 times/wk  -OD     Predicted Duration of Therapy Intervention (PT) until d/c  -OD       Row Name 12/27/23 1608          Vital Signs    Pre SpO2 (%) 91  -OD     O2 Delivery Pre Treatment nasal cannula  3L  -OD     Intra SpO2 (%) 87  -OD     O2 Delivery Intra Treatment nasal cannula  -OD     Post SpO2 (%) 92  -OD     O2 Delivery Post Treatment nasal cannula  -OD     Pre Patient Position Side Lying  -OD     Intra Patient Position Supine  -OD     Post Patient Position Supine  -OD       Row Name 12/27/23 1608          Positioning and Restraints    Pre-Treatment Position in bed  -OD     Post Treatment Position bed  -OD     In Bed notified nsg;supine;with family/caregiver  -OD               User Key  (r) = Recorded By, (t) = Taken By, (c) = Cosigned By      Initials Name Provider Type    OD Syl Grigsby, PT Physical Therapist                   Outcome Measures       Row Name 12/27/23 1613          How much help from another person do you currently need...    Turning from your back to your side while in flat bed without using bedrails? 3  -OD     Moving from lying on back to sitting on the side of a flat bed without bedrails? 3  -OD     Moving to and from a bed to a chair (including a wheelchair)? 3  -OD     Standing up from a chair using your arms (e.g., wheelchair, bedside chair)? 3  -OD      Climbing 3-5 steps with a railing? 2  -OD     To walk in hospital room? 3  -OD     AM-PAC 6 Clicks Score (PT) 17  -OD     Highest Level of Mobility Goal 5 --> Static standing  -OD       Row Name 12/27/23 1619          Functional Assessment    Outcome Measure Options AM-PAC 6 Clicks Basic Mobility (PT)  -OD               User Key  (r) = Recorded By, (t) = Taken By, (c) = Cosigned By      Initials Name Provider Type    OD Syl Grigsby, PT Physical Therapist                                 Physical Therapy Education       Title: PT OT SLP Therapies (Done)       Topic: Physical Therapy (Done)       Point: Mobility training (Done)       Learning Progress Summary             Patient Acceptance, E, VU by OD at 12/27/2023 1619                         Point: Home exercise program (Done)       Learning Progress Summary             Patient Acceptance, E, VU by OD at 12/27/2023 1619                         Point: Body mechanics (Done)       Learning Progress Summary             Patient Acceptance, E, VU by OD at 12/27/2023 1619                         Point: Precautions (Done)       Learning Progress Summary             Patient Acceptance, E, VU by OD at 12/27/2023 1619                                         User Key       Initials Effective Dates Name Provider Type Discipline    OD 05/11/23 -  Syl Grigbsy, PT Physical Therapist PT                  PT Recommendation and Plan  Planned Therapy Interventions (PT): balance training, bed mobility training, gait training, home exercise program, neuromuscular re-education, postural re-education, transfer training, ROM (range of motion), strengthening, stretching, patient/family education  Plan of Care Reviewed With: patient  Progress: no change  Outcome Evaluation: Morgan Quick is a 69 y/o F who presents to [facility] after a fall accompanied by severe low back pain. X-ray and CT (-) for acute abnormalities. Pt reports she was attempting to stand up with her RW and spouse assist,  when her back gave out and she fell back into the chair. Pt reported significant R-sided LBP with referral down RLE to her foot. At baseline, pt is indep-Tate with ADLs and mobility using RW, living at [facility] with her spouse. Pt currently unable to complete mobility or allow assist for positioning / assessing d/t severe pain. Pt demo valsalva maneuver several times causing drop in her O2 saturation when her pain would exacerbate. PT will follow up tomorrow when her pain is controlled to assess safety to return to [facility] as well as provide positioning/strengthening maneuvers to help reduce pain.     Time Calculation:         PT Charges       Row Name 12/27/23 1620             Time Calculation    Start Time 1445  -OD      Stop Time 1515  -OD      Time Calculation (min) 30 min  -OD      PT Received On 12/27/23  -OD      PT - Next Appointment 12/28/23  -OD      PT Goal Re-Cert Due Date 01/10/24  -OD         Time Calculation- PT    Total Timed Code Minutes- PT 0 minute(s)  -OD                User Key  (r) = Recorded By, (t) = Taken By, (c) = Cosigned By      Initials Name Provider Type    OD Syl Grigsby, PT Physical Therapist                  Therapy Charges for Today       Code Description Service Date Service Provider Modifiers Qty    41122372506 HC PT EVAL MOD COMPLEXITY 4 12/27/2023 Syl Grigsby, PT GP 1            PT G-Codes  Outcome Measure Options: AM-PAC 6 Clicks Basic Mobility (PT)  AM-PAC 6 Clicks Score (PT): 17  PT Discharge Summary  Anticipated Discharge Disposition (PT): assisted living    Syl Grigsby PT  12/27/2023

## 2023-12-27 NOTE — ED NOTES
Nursing report ED to floor  Morgan Quick  68 y.o.  female    HPI:   Chief Complaint   Patient presents with    Back Pain       Admitting doctor:   Sathish Seals MD    Admitting diagnosis:   The primary encounter diagnosis was Fall, initial encounter. Diagnoses of Contusion of coccyx, initial encounter and Acute midline low back pain without sciatica were also pertinent to this visit.    Code status:   Current Code Status       Date Active Code Status Order ID Comments User Context       12/27/2023 1629 CPR (Attempt to Resuscitate) [id number]  Gloria Lacy PA-C ED        Question Answer    Code Status (Patient has no pulse and is not breathing) CPR (Attempt to Resuscitate)    Medical Interventions (Patient has pulse or is breathing) Full Support                    Allergies:   Contrast dye (echo or unknown ct/mr)    Isolation:  No active isolations     Fall Risk:  Fall Risk Assessment was completed, and patient is at high risk for falls.   Predictive Model Details         11 (Low) Factor Value    Calculated 12/27/2023 17:25 Age 68    Risk of Fall Model Musculoskeletal Assessment WDL     Active Peripheral IV Present     Imaging order in this encounter Present     Respiratory Rate 18     Skin Assessment WDL     Financial Class Other     Magnesium not on file     Number of Distinct Medication Classes administered 4     Diastolic BP 61     Drug Use No     Yasmany Scale not on file     Number of administrations of Analgesic Narcotics 2     Peripheral Vascular Assessment WDL     Calcium not on file     Gastrointestinal Assessment WDL     Albumin not on file     Cardiac Assessment WDL     Days after Admission 0.221     Total Bilirubin not on file     Chloride not on file     Potassium not on file     Creatinine not on file     ALT not on file         Weight:       12/27/23  1203   Weight: 101 kg (223 lb)       Intake and Output  No intake or output data in the 24 hours ending 12/27/23 1725    Diet:        Most  "recent vitals:   Vitals:    12/27/23 1203 12/27/23 1657   BP: 180/74 (!) 192/61   BP Location: Left arm Left arm   Patient Position: Lying Sitting   Pulse: 74 62   Resp: 17 18   Temp: 98 °F (36.7 °C)    TempSrc: Oral    SpO2: 94% 92%   Weight: 101 kg (223 lb)    Height: 165.1 cm (65\")        Active LDAs/IV Access:   Lines, Drains & Airways       Active LDAs       Name Placement date Placement time Site Days    Peripheral IV 12/27/23 1216 Left Antecubital 12/27/23  1216  Antecubital  less than 1                    Skin Condition:   Skin Assessments (last day)       None             Labs (abnormal labs have a star):   Labs Reviewed   RAINBOW DRAW    Narrative:     The following orders were created for panel order [place] Draw.  Procedure                               Abnormality         Status                     ---------                               -----------         ------                     Green Top (Gel)[[id number]]                                  Final result               Lavender Top[[id number]]                                     Final result               Gold Top - SST[[id number]]                                   Final result               Light Blue Top[[id number]]                                   Final result                 Please view results for these tests on the individual orders.   GREEN TOP   LAVENDER TOP   GOLD TOP - SST   LIGHT BLUE TOP       LOC: Person, Place, Time, and Situation    Telemetry:  Observation Unit    Cardiac Monitoring Ordered: yes    EKG:   No orders to display       Medications Given in the ED:   Medications   sodium chloride 0.9 % flush 10 mL (10 mL Intravenous Given 12/27/23 1254)   HYDROcodone-acetaminophen (NORCO) 7.5-325 MG per tablet 1 tablet (has no administration in time range)   morphine injection 4 mg (has no administration in time range)   predniSONE (DELTASONE) tablet 40 mg (has no administration in time range)   lidocaine (LIDODERM) 5 % 1 patch (1 patch " Transdermal Medication Applied 12/27/23 1649)   ondansetron (ZOFRAN) injection 4 mg (4 mg Intravenous Given 12/27/23 1322)   morphine injection 4 mg (4 mg Intravenous Given 12/27/23 1322)   morphine injection 4 mg (4 mg Intravenous Given 12/27/23 1649)       Imaging results:  CT Abdomen Pelvis Without Contrast    Result Date: 12/27/2023  Impression: No acute findings in the abdomen or pelvis. No acute osseous injury. Electronically Signed: Kasia Paul MD  12/27/2023 12:51 PM [facility]  Workstation ID: [id number]     Social issues:   Social History     Socioeconomic History    Marital status:    Tobacco Use    Smoking status: Never    Smokeless tobacco: Never   Substance and Sexual Activity    Alcohol use: No    Drug use: No    Sexual activity: Defer       NIH Stroke Scale:  Interval: (not recorded)  1a. Level of Consciousness: (not recorded)  1b. LOC Questions: (not recorded)  1c. LOC Commands: (not recorded)  2. Best Gaze: (not recorded)  3. Visual: (not recorded)  4. Facial Palsy: (not recorded)  5a. Motor Arm, Left: (not recorded)  5b. Motor Arm, Right: (not recorded)  6a. Motor Leg, Left: (not recorded)  6b. Motor Leg, Right: (not recorded)  7. Limb Ataxia: (not recorded)  8. Sensory: (not recorded)  9. Best Language: (not recorded)  10. Dysarthria: (not recorded)  11. Extinction and Inattention (formerly Neglect): (not recorded)    Total (NIH Stroke Scale): (not recorded)     Additional notable assessment information:     Nursing report ED to floor:  Report given to RN taken room 227    Rufino Dumont LPN   12/27/23 17:25 EST Nursing report ED to floor  Morgan Quick  68 y.o.  female    HPI:   Chief Complaint   Patient presents with    Back Pain       Admitting doctor:   Sathish Seals MD    Admitting diagnosis:   The primary encounter diagnosis was Fall, initial encounter. Diagnoses of Contusion of coccyx, initial encounter and Acute midline low back pain without sciatica were also pertinent to this  "visit.    Code status:   Current Code Status       Date Active Code Status Order ID Comments User Context       12/27/2023 1629 CPR (Attempt to Resuscitate) [id number]  Gloria Lacy PA-C ED        Question Answer    Code Status (Patient has no pulse and is not breathing) CPR (Attempt to Resuscitate)    Medical Interventions (Patient has pulse or is breathing) Full Support                    Allergies:   Contrast dye (echo or unknown ct/mr)    Isolation:  No active isolations     Fall Risk:  Fall Risk Assessment was completed, and patient is at high risk for falls.   Predictive Model Details         11 (Low) Factor Value    Calculated 12/27/2023 17:25 Age 68    Risk of Fall Model Musculoskeletal Assessment WDL     Active Peripheral IV Present     Imaging order in this encounter Present     Respiratory Rate 18     Skin Assessment WDL     Financial Class Other     Magnesium not on file     Number of Distinct Medication Classes administered 4     Diastolic BP 61     Drug Use No     Yasmany Scale not on file     Number of administrations of Analgesic Narcotics 2     Peripheral Vascular Assessment WDL     Calcium not on file     Gastrointestinal Assessment WDL     Albumin not on file     Cardiac Assessment WDL     Days after Admission 0.221     Total Bilirubin not on file     Chloride not on file     Potassium not on file     Creatinine not on file     ALT not on file         Weight:       12/27/23  1203   Weight: 101 kg (223 lb)       Intake and Output  No intake or output data in the 24 hours ending 12/27/23 1725    Diet:        Most recent vitals:   Vitals:    12/27/23 1203 12/27/23 1657   BP: 180/74 (!) 192/61   BP Location: Left arm Left arm   Patient Position: Lying Sitting   Pulse: 74 62   Resp: 17 18   Temp: 98 °F (36.7 °C)    TempSrc: Oral    SpO2: 94% 92%   Weight: 101 kg (223 lb)    Height: 165.1 cm (65\")        Active LDAs/IV Access:   Lines, Drains & Airways       Active LDAs       Name Placement date " Placement time Site Days    Peripheral IV 12/27/23 1216 Left Antecubital 12/27/23  1216  Antecubital  less than 1                    Skin Condition:   Skin Assessments (last day)       None             Labs (abnormal labs have a star):   Labs Reviewed   RAINBOW DRAW    Narrative:     The following orders were created for panel order [place] Draw.  Procedure                               Abnormality         Status                     ---------                               -----------         ------                     Green Top (Gel)[[id number]]                                  Final result               Lavender Top[[id number]]                                     Final result               Gold Top - SST[[id number]]                                   Final result               Light Blue Top[[id number]]                                   Final result                 Please view results for these tests on the individual orders.   GREEN TOP   LAVENDER TOP   GOLD TOP - SST   LIGHT BLUE TOP       LOC: Person, Place, Time, and Situation    Telemetry:  Observation Unit    Cardiac Monitoring Ordered: yes    EKG:   No orders to display       Medications Given in the ED:   Medications   sodium chloride 0.9 % flush 10 mL (10 mL Intravenous Given 12/27/23 1254)   HYDROcodone-acetaminophen (NORCO) 7.5-325 MG per tablet 1 tablet (has no administration in time range)   morphine injection 4 mg (has no administration in time range)   predniSONE (DELTASONE) tablet 40 mg (has no administration in time range)   lidocaine (LIDODERM) 5 % 1 patch (1 patch Transdermal Medication Applied 12/27/23 1649)   ondansetron (ZOFRAN) injection 4 mg (4 mg Intravenous Given 12/27/23 1322)   morphine injection 4 mg (4 mg Intravenous Given 12/27/23 1322)   morphine injection 4 mg (4 mg Intravenous Given 12/27/23 1649)       Imaging results:  CT Abdomen Pelvis Without Contrast    Result Date: 12/27/2023  Impression: No acute findings in the abdomen or  pelvis. No acute osseous injury. Electronically Signed: Kasia Paul MD  12/27/2023 12:51 PM CST  Workstation ID: [id number]     Social issues:   Social History     Socioeconomic History    Marital status:    Tobacco Use    Smoking status: Never    Smokeless tobacco: Never   Substance and Sexual Activity    Alcohol use: No    Drug use: No    Sexual activity: Defer       NIH Stroke Scale:  Interval: (not recorded)  1a. Level of Consciousness: (not recorded)  1b. LOC Questions: (not recorded)  1c. LOC Commands: (not recorded)  2. Best Gaze: (not recorded)  3. Visual: (not recorded)  4. Facial Palsy: (not recorded)  5a. Motor Arm, Left: (not recorded)  5b. Motor Arm, Right: (not recorded)  6a. Motor Leg, Left: (not recorded)  6b. Motor Leg, Right: (not recorded)  7. Limb Ataxia: (not recorded)  8. Sensory: (not recorded)  9. Best Language: (not recorded)  10. Dysarthria: (not recorded)  11. Extinction and Inattention (formerly Neglect): (not recorded)    Total (NIH Stroke Scale): (not recorded)     Additional notable assessment information:     Nursing report ED to floor:  Report given to RN taken room 227    Rufino Dumont LPN   12/27/23 17:25 EST

## 2023-12-27 NOTE — ED PROVIDER NOTES
Subjective   History of Present Illness  Patient is a 68-year-old white female with history of hypertension, thyroid disorder, high cholesterol, chronic pain presents today by EMS from assisted living facility with reports of a fall and pain in her lower back.  She states she was trying to get up from her chair to her walker when she lost her balance and fell backwards and her buttocks landed against the chair.  She denies striking her head or having LOC.  She complains of pain in her lower back and tailbone area.  Pain is worse with certain movements.  She denies any numbness tingling or weakness in either lower extremity.  She denies any bowel or bladder dysfunction or saddle anesthesia.  No fever.  She denies any other injuries.      Review of Systems   Constitutional:  Negative for fever.   Genitourinary:  Negative for difficulty urinating.   Musculoskeletal:  Positive for back pain. Negative for neck pain.   Neurological:  Negative for weakness, numbness and headaches.       Past Medical History:   Diagnosis Date    Bipolar 1 disorder     Breast cancer     Cancer     Disease of thyroid gland     High cholesterol     Hypertension        Allergies   Allergen Reactions    Contrast Dye (Echo Or Unknown Ct/Mr) Shortness Of Breath     Pt states she had trouble breathing when she had contrast in the past.        Past Surgical History:   Procedure Laterality Date    ABDOMINAL SURGERY      APPENDECTOMY      ENDOSCOPY N/A 9/25/2022    Procedure: ESOPHAGOGASTRODUODENOSCOPY with biopsy x 2 areas;  Surgeon: Deonte Wong MD;  Location: [facility] ENDOSCOPY;  Service: Gastroenterology;  Laterality: N/A;  post: gastritis, duodinitis, nodule,     HYSTERECTOMY      MASTECTOMY      L side       Family History   Problem Relation Age of Onset    Cancer Mother        Social History     Socioeconomic History    Marital status:    Tobacco Use    Smoking status: Never    Smokeless tobacco: Never   Substance and Sexual Activity     Alcohol use: No    Drug use: No    Sexual activity: Defer           Objective   Physical Exam  Vital signs and triage nurse note reviewed.  Constitutional: Awake, alert; well-developed and well-nourished. No acute distress is noted.  Obese.  HEENT: Normocephalic, atraumatic; pupils are PERRL with intact EOM; oropharynx is pink and moist without exudate or erythema.  No drooling or pooling of oral secretions.  Neck: Supple, full range of motion without pain; no cervical lymphadenopathy. Normal phonation.  Cardiovascular: Regular rate and rhythm, normal S1-S2.  No murmur noted.  Pulmonary: Respiratory effort regular nonlabored, breath sounds clear to auscultation all fields.  Abdomen: Soft, nontender, nondistended with normoactive bowel sounds; no rebound or guarding.  Musculoskeletal: Independent range of motion of all extremities with no palpable tenderness or edema.  There is tenderness to the midline lumbar spine and sacrum without crepitus or step-off noted.  Good range of motion of both lower extremities.  There is good sensation of both lower extremities.  There is no overlying erythema or ecchymosis.  Neuro: Alert oriented x3, speech is clear and appropriate, GCS 15.    Skin: Flesh tone, warm, dry, intact; no erythematous or petechial rash or lesion.    Procedures           ED Course      Labs Reviewed   RAINBOW DRAW    Narrative:     The following orders were created for panel order [place] Draw.  Procedure                               Abnormality         Status                     ---------                               -----------         ------                     Green Top (Gel)[[id number]]                                  Final result               Lavender Top[[id number]]                                     Final result               Gold Top - SST[[id number]]                                   Final result               Light Blue Top[[id number]]                                   Final result                  Please view results for these tests on the individual orders.   GREEN TOP   LAVENDER TOP   GOLD TOP - SST   LIGHT BLUE TOP     CT Abdomen Pelvis Without Contrast    Result Date: 12/27/2023  Impression: No acute findings in the abdomen or pelvis. No acute osseous injury. Electronically Signed: Kasia Paul MD  12/27/2023 12:51 PM CST  Workstation ID: [id number]   Medications   sodium chloride 0.9 % flush 10 mL (10 mL Intravenous Given 12/27/23 1254)   ondansetron (ZOFRAN) injection 4 mg (4 mg Intravenous Given 12/27/23 1322)   morphine injection 4 mg (4 mg Intravenous Given 12/27/23 1322)                                            Medical Decision Making  Patient presents today with the above complaint.    She had the above exam evaluation.  IV was established.  CT was obtained.  She was given morphine for pain.    Workup: CT shows no acute findings in the abdomen or pelvis.  No acute osseous injury.    PT was consulted but patient stated she was in too much pain to allow PT to evaluate her.  Patient was given an additional dose of morphine but states that she still having too much pain to get up.  She is moving both lower extremities without difficulty.  She remains neurovascularly intact.    Findings were discussed with her.  She will be placed in observation unit for pain control and PT consultation.    Problems Addressed:  Acute midline low back pain without sciatica: complicated acute illness or injury  Contusion of coccyx, initial encounter: complicated acute illness or injury  Fall, initial encounter: complicated acute illness or injury    Amount and/or Complexity of Data Reviewed  Radiology: ordered.    Risk  Prescription drug management.  Decision regarding hospitalization.        Final diagnoses:   Fall, initial encounter   Contusion of coccyx, initial encounter   Acute midline low back pain without sciatica       ED Disposition  ED Disposition       ED Disposition   Decision to Admit    Condition    --    Comment   --               No follow-up provider specified.       Medication List      No changes were made to your prescriptions during this visit.            Jenny Dill, NIDIA  12/27/23 1554       Jenny Dill, NIDIA  12/27/23 1629

## 2023-12-27 NOTE — PLAN OF CARE
Goal Outcome Evaluation:  Plan of Care Reviewed With: patient        Progress: no change  Outcome Evaluation: Morgan Quick is a 69 y/o F who presents to [facility] after a fall accompanied by severe low back pain. X-ray and CT (-) for acute abnormalities. Pt reports she was attempting to stand up with her RW and spouse assist, when her back gave out and she fell back into the chair. Pt reported significant R-sided LBP with referral down RLE to her foot. At baseline, pt is indep-Tate with ADLs and mobility using RW, living at [facility] with her spouse. Pt currently unable to complete mobility or allow assist for positioning / assessing d/t severe pain. Pt demo valsalva maneuver several times causing drop in her O2 saturation when her pain would exacerbate. PT will follow up tomorrow when her pain is controlled to assess safety to return to [facility] as well as provide positioning/strengthening maneuvers to help reduce pain.      Anticipated Discharge Disposition (PT): assisted living

## 2023-12-28 LAB
ANION GAP SERPL CALCULATED.3IONS-SCNC: 10 MMOL/L (ref 5–15)
BASOPHILS # BLD AUTO: 0 10*3/MM3 (ref 0–0.2)
BASOPHILS NFR BLD AUTO: 0.4 % (ref 0–1.5)
BUN SERPL-MCNC: 10 MG/DL (ref 8–23)
BUN/CREAT SERPL: 19.6 (ref 7–25)
CALCIUM SPEC-SCNC: 9 MG/DL (ref 8.6–10.5)
CHLORIDE SERPL-SCNC: 99 MMOL/L (ref 98–107)
CO2 SERPL-SCNC: 32 MMOL/L (ref 22–29)
CREAT SERPL-MCNC: 0.51 MG/DL (ref 0.57–1)
DEPRECATED RDW RBC AUTO: 55.1 FL (ref 37–54)
EGFRCR SERPLBLD CKD-EPI 2021: 101.8 ML/MIN/1.73
EOSINOPHIL # BLD AUTO: 0.1 10*3/MM3 (ref 0–0.4)
EOSINOPHIL NFR BLD AUTO: 0.7 % (ref 0.3–6.2)
ERYTHROCYTE [DISTWIDTH] IN BLOOD BY AUTOMATED COUNT: 16.5 % (ref 12.3–15.4)
GLUCOSE SERPL-MCNC: 90 MG/DL (ref 65–99)
HCT VFR BLD AUTO: 37.1 % (ref 34–46.6)
HGB BLD-MCNC: 12 G/DL (ref 12–15.9)
LYMPHOCYTES # BLD AUTO: 0.7 10*3/MM3 (ref 0.7–3.1)
LYMPHOCYTES NFR BLD AUTO: 8.7 % (ref 19.6–45.3)
MCH RBC QN AUTO: 29.4 PG (ref 26.6–33)
MCHC RBC AUTO-ENTMCNC: 32.3 G/DL (ref 31.5–35.7)
MCV RBC AUTO: 91.2 FL (ref 79–97)
MONOCYTES # BLD AUTO: 0.7 10*3/MM3 (ref 0.1–0.9)
MONOCYTES NFR BLD AUTO: 7.9 % (ref 5–12)
NEUTROPHILS NFR BLD AUTO: 6.8 10*3/MM3 (ref 1.7–7)
NEUTROPHILS NFR BLD AUTO: 82.3 % (ref 42.7–76)
NRBC BLD AUTO-RTO: 0 /100 WBC (ref 0–0.2)
PLATELET # BLD AUTO: 252 10*3/MM3 (ref 140–450)
PMV BLD AUTO: 7.5 FL (ref 6–12)
POTASSIUM SERPL-SCNC: 3.8 MMOL/L (ref 3.5–5.2)
RBC # BLD AUTO: 4.07 10*6/MM3 (ref 3.77–5.28)
SODIUM SERPL-SCNC: 141 MMOL/L (ref 136–145)
WBC NRBC COR # BLD AUTO: 8.2 10*3/MM3 (ref 3.4–10.8)

## 2023-12-28 PROCEDURE — 25010000002 MORPHINE PER 10 MG: Performed by: PHYSICIAN ASSISTANT

## 2023-12-28 PROCEDURE — 25010000002 ONDANSETRON PER 1 MG: Performed by: PHYSICIAN ASSISTANT

## 2023-12-28 PROCEDURE — 85025 COMPLETE CBC W/AUTO DIFF WBC: CPT | Performed by: PHYSICIAN ASSISTANT

## 2023-12-28 PROCEDURE — 63710000001 PREDNISONE PER 1 MG: Performed by: PHYSICIAN ASSISTANT

## 2023-12-28 PROCEDURE — G0378 HOSPITAL OBSERVATION PER HR: HCPCS

## 2023-12-28 PROCEDURE — 80048 BASIC METABOLIC PNL TOTAL CA: CPT | Performed by: PHYSICIAN ASSISTANT

## 2023-12-28 PROCEDURE — 97530 THERAPEUTIC ACTIVITIES: CPT

## 2023-12-28 RX ORDER — LUBIPROSTONE 24 UG/1
24 CAPSULE ORAL 2 TIMES DAILY
Status: DISCONTINUED | OUTPATIENT
Start: 2023-12-28 | End: 2024-01-05 | Stop reason: HOSPADM

## 2023-12-28 RX ORDER — POTASSIUM CHLORIDE 750 MG/1
10 TABLET, FILM COATED, EXTENDED RELEASE ORAL DAILY
Status: DISCONTINUED | OUTPATIENT
Start: 2023-12-28 | End: 2024-01-05 | Stop reason: HOSPADM

## 2023-12-28 RX ORDER — ROSUVASTATIN CALCIUM 10 MG/1
30 TABLET, COATED ORAL NIGHTLY
Status: DISCONTINUED | OUTPATIENT
Start: 2023-12-28 | End: 2024-01-05 | Stop reason: HOSPADM

## 2023-12-28 RX ORDER — HYDROCODONE BITARTRATE AND ACETAMINOPHEN 10; 325 MG/1; MG/1
1 TABLET ORAL EVERY 6 HOURS PRN
Status: DISCONTINUED | OUTPATIENT
Start: 2023-12-28 | End: 2024-01-02

## 2023-12-28 RX ADMIN — LEVOTHYROXINE SODIUM 25 MCG: 0.03 TABLET ORAL at 06:11

## 2023-12-28 RX ADMIN — LUBIPROSTONE 24 MCG: 24 CAPSULE, GELATIN COATED ORAL at 09:01

## 2023-12-28 RX ADMIN — GUAIFENESIN 1200 MG: 600 TABLET, MULTILAYER, EXTENDED RELEASE ORAL at 21:41

## 2023-12-28 RX ADMIN — POTASSIUM CHLORIDE 10 MEQ: 750 TABLET, EXTENDED RELEASE ORAL at 09:01

## 2023-12-28 RX ADMIN — SODIUM CHLORIDE 2 G: 1 TABLET ORAL at 12:21

## 2023-12-28 RX ADMIN — HYDROCODONE BITARTRATE AND ACETAMINOPHEN 1 TABLET: 10; 325 TABLET ORAL at 21:41

## 2023-12-28 RX ADMIN — HYDROCODONE BITARTRATE AND ACETAMINOPHEN 1 TABLET: 7.5; 325 TABLET ORAL at 00:24

## 2023-12-28 RX ADMIN — SODIUM CHLORIDE 2 G: 1 TABLET ORAL at 17:25

## 2023-12-28 RX ADMIN — TRAZODONE HYDROCHLORIDE 25 MG: 50 TABLET ORAL at 21:55

## 2023-12-28 RX ADMIN — SERTRALINE 200 MG: 100 TABLET, FILM COATED ORAL at 09:01

## 2023-12-28 RX ADMIN — Medication 10 ML: at 21:41

## 2023-12-28 RX ADMIN — PANTOPRAZOLE SODIUM 40 MG: 40 TABLET, DELAYED RELEASE ORAL at 09:03

## 2023-12-28 RX ADMIN — FUROSEMIDE 20 MG: 20 TABLET ORAL at 09:18

## 2023-12-28 RX ADMIN — Medication 10 ML: at 09:03

## 2023-12-28 RX ADMIN — OXCARBAZEPINE 150 MG: 150 TABLET, FILM COATED ORAL at 21:41

## 2023-12-28 RX ADMIN — ROSUVASTATIN 30 MG: 10 TABLET, FILM COATED ORAL at 21:41

## 2023-12-28 RX ADMIN — RISPERIDONE 0.25 MG: 0.25 TABLET, FILM COATED ORAL at 21:41

## 2023-12-28 RX ADMIN — HYDROCODONE BITARTRATE AND ACETAMINOPHEN 1 TABLET: 10; 325 TABLET ORAL at 15:26

## 2023-12-28 RX ADMIN — ONDANSETRON 4 MG: 2 INJECTION INTRAMUSCULAR; INTRAVENOUS at 22:27

## 2023-12-28 RX ADMIN — Medication 10 ML: at 07:21

## 2023-12-28 RX ADMIN — LIDOCAINE 1 PATCH: 50 PATCH CUTANEOUS at 09:04

## 2023-12-28 RX ADMIN — SENNOSIDES AND DOCUSATE SODIUM 2 TABLET: 50; 8.6 TABLET ORAL at 09:03

## 2023-12-28 RX ADMIN — GUAIFENESIN 1200 MG: 600 TABLET, MULTILAYER, EXTENDED RELEASE ORAL at 09:01

## 2023-12-28 RX ADMIN — LUBIPROSTONE 24 MCG: 24 CAPSULE, GELATIN COATED ORAL at 21:41

## 2023-12-28 RX ADMIN — MORPHINE SULFATE 4 MG: 4 INJECTION, SOLUTION INTRAMUSCULAR; INTRAVENOUS at 02:02

## 2023-12-28 RX ADMIN — SODIUM CHLORIDE 2 G: 1 TABLET ORAL at 09:01

## 2023-12-28 RX ADMIN — OXCARBAZEPINE 150 MG: 150 TABLET, FILM COATED ORAL at 09:01

## 2023-12-28 RX ADMIN — MORPHINE SULFATE 4 MG: 4 INJECTION, SOLUTION INTRAMUSCULAR; INTRAVENOUS at 07:21

## 2023-12-28 RX ADMIN — PREDNISONE 40 MG: 20 TABLET ORAL at 09:01

## 2023-12-28 RX ADMIN — HYDROCODONE BITARTRATE AND ACETAMINOPHEN 1 TABLET: 7.5; 325 TABLET ORAL at 06:11

## 2023-12-28 NOTE — PLAN OF CARE
Goal Outcome Evaluation:   A/O x4 remains laying flat on back while in bed refuses to sit up, states too much pain. Educated on skin breakdown potential and need to move muscles. Pt states will turn self side to side throughout day. Continues with pain management and noted effective.

## 2023-12-28 NOTE — DISCHARGE PLACEMENT REQUEST
"Jerrod Wasserman (68 y.o. Female)       Date of Birth   1955    Social Security Number       Address   [address] [place] [facility]    Home Phone   [phone]    MRN   [id number]       [employer]   [employer]    Marital Status                               Admission Date   12/27/23    Admission Type   Emergency    Admitting Provider   Sathish Seals MD    Attending Provider   Sathish Seals MD    Department, Room/Bed   [facility] OBSERVATION, 227/1       Discharge Date       Discharge Disposition       Discharge Destination                                 Attending Provider: Sathish Seals MD    Allergies: Contrast Dye (Echo Or Unknown Ct/mr)    Isolation: None   Infection: None   Code Status: CPR    Ht: 165.1 cm (65\")   Wt: 112 kg (246 lb 11.1 oz)    Admission Cmt: None   Principal Problem: Low back pain [M54.50]                   Active Insurance as of 12/27/2023       Primary Coverage       Payor Plan Insurance Group Employer/Plan Group    ANTHEM MEDICARE REPLACEMENT ANTHEM MEDICARE ADVANTAGE [id number]       Payor Plan Address Payor Plan Phone Number Payor Plan Fax Number Effective Dates    [address] [phone]  7/1/2019 - None Entered    [facility] [address]         Subscriber Name Subscriber Birth Date Member ID       JERROD WASSERMAN 1955 [id number]               Secondary Coverage       Payor Plan Insurance Group Employer/Plan Group    [place]        Payor Plan Address Payor Plan Phone Number Payor Plan Fax Number Effective Dates    [address]   7/24/2019 - None Entered    [place] [address]         Subscriber Name Subscriber Birth Date Member ID       JERROD WASSERMAN 1955 [id number]                     Emergency Contacts        (Rel.) Home Phone Work Phone Mobile Phone    ELIELJERI (Spouse) [phone] -- [phone]    STEVIEJJ SIMS (Daughter) -- -- [phone]      "         Insurance Information                  Replaced by [facility] MEDICARE REPLACEMENT/Replaced by [facility] MEDICARE ADVANTAGE Phone: [phone]    Subscriber: Morgan Quick Subscriber#: [id number]    Group#: [id number] Precert#: --        [place] Phone: --    Subscriber: Morgan Quick Subscriber#: [id number]    Group#: -- Precert#: --

## 2023-12-28 NOTE — THERAPY TREATMENT NOTE
"Subjective: Pt agreeable to therapeutic plan of care.    Objective:     Bed mobility - Mod-A with rolling; increased time; mobility limited by pain; DEBBIE rolling for hygiene / linen change.   Transfers - N/A or Not attempted.  Ambulation -  N/A or Not attempted.      Vitals: WNL    Pain: 8 VAS   Location: low back; DEBBIE knees  Intervention for pain: Repositioned, RN provided medication, RN notified, and Therapeutic Presence    Education: Provided education on the importance of mobility in the acute care setting, Verbal/Tactile Cues, and Transfer Training    Assessment: Morgan Quick presents with functional mobility impairments which indicate the need for skilled intervention. Pt's mobility continues to be severely limited by back pain - due to significant functional decline, follow-up recommendation modified to SNF. Will continue to follow and progress as tolerated.     Plan/Recommendations:   If medically appropriate, Moderate Intensity Therapy recommended post-acute care. This is recommended as therapy feels the patient would require 3-4 days per week and wouldn't tolerate \"3 hour daily\" rehab intensity. SNF would be the preferred choice. If the patient does not agree to SNF, arrange HH or OP depending on home bound status. If patient is medically complex, consider LTACH. Pt requires rolling walker at discharge.     Pt desires Skilled Rehab placement at discharge. Pt cooperative; agreeable to therapeutic recommendations and plan of care.     Post-Tx Position: Supine with HOB Elevated, Alarms activated, and Call light and personal items within reach  PPE: gloves, surgical mask, and gown   "

## 2023-12-28 NOTE — CASE MANAGEMENT/SOCIAL WORK
Discharge Planning Assessment  [facility]     Patient Name: Morgan Quick  MRN: [id number]  Today's Date: 12/28/2023    Admit Date: 12/27/2023    Plan: From [facility] but believes she may need SNF prior to return. PT and OT evals pending. Referred to [facility]; acceptance pending.  Will need precert and PASRR   Discharge Needs Assessment       Row Name 12/28/23 1236       Living Environment    People in Home spouse    Current Living Arrangements assisted living facility    Duration at Residence 1 year    Potentially Unsafe Housing Conditions none    Primary Care Provided by self    Provides Primary Care For no one, unable/limited ability to care for self    Family Caregiver if Needed none    Quality of Family Relationships supportive;involved    Able to Return to Prior Arrangements yes    Living Arrangement Comments lives at [facility] but thinks she may need SNF before return. Therapy evals pending       Resource/Environmental Concerns    Transportation Concerns none       Transition Planning    Patient/Family Anticipates Transition to inpatient rehabilitation facility    Patient/Family Anticipated Services at Transition skilled nursing    Transportation Anticipated family or friend will provide       Discharge Needs Assessment    Readmission Within the Last 30 Days no previous admission in last 30 days    Equipment Currently Used at Home wheelchair;walker, rolling    Concerns to be Addressed discharge planning    Anticipated Changes Related to Illness inability to care for self                   Discharge Plan       Row Name 12/28/23 1239       Plan    Plan From [facility] but believes she may need SNF prior to return. PT and OT evals pending. Referred to [facility]; acceptance pending.  Will need precert and PASRR    Patient/Family in Agreement with Plan yes    Plan Comments Barriers: Immobility. Pain management. CM met with Mrs. Quick who is alert and oriented and stated she lives  at [facility]. She uses a wheelchair or rolling walker and has had new rollator walker ordered. She has oxygen with concentrator and portable tanks through [facility] and uses intermittently. She reported that she is independent with self care and the [facility] do all meds, meals and housekeeping.  She was at [facility] last year and believes she will need to return there before going  back home. CM explained need for OT eval and precert. PASRR will need to be completed and SW informed. CM made referral to Lisa with [facility] and added to Epic                  Continued Care and Services - Admitted Since 12/27/2023       Destination       Service Provider Request Status Selected Services Address Phone Fax Patient Preferred    [facility] Pending - Request Sent N/A [address] [facility] [place] [address] [phone] --                     Demographic Summary       Row Name 12/28/23 1234       General Information    Admission Type observation    Arrived From emergency department    Required Notices Provided Observation Status Notice    Referral Source admission list    Reason for Consult discharge planning    Preferred Language English       Contact Information    Permission Granted to Share Info With                    Functional Status       Row Name 12/28/23 1234       Functional Status    Usual Activity Tolerance moderate    Current Activity Tolerance poor       Functional Status, IADL    Medications assistive person    Meal Preparation completely dependent    Housekeeping completely dependent    Shopping completely dependent    IADL Comments lives at [facility] and they assist her                          Maintained distance greater than six feet and spent less than 15 minutes in the room.     Marivel LEE,RN Case Manager  [facility]  Phone: [phone]- [phone] cell- [phone]

## 2023-12-28 NOTE — PLAN OF CARE
"Assessment: Morgan Qiuck presents with functional mobility impairments which indicate the need for skilled intervention. Pt's mobility continues to be severely limited by back pain - due to significant functional decline, follow-up recommendation modified to SNF. Will continue to follow and progress as tolerated.     Plan/Recommendations:   If medically appropriate, Moderate Intensity Therapy recommended post-acute care. This is recommended as therapy feels the patient would require 3-4 days per week and wouldn't tolerate \"3 hour daily\" rehab intensity. SNF would be the preferred choice. If the patient does not agree to SNF, arrange HH or OP depending on home bound status. If patient is medically complex, consider LTACH. Pt requires rolling walker at discharge.     Pt desires Skilled Rehab placement at discharge. Pt cooperative; agreeable to therapeutic recommendations and plan of care.                   Anticipated Discharge Disposition (PT): skilled nursing facility  "

## 2023-12-28 NOTE — H&P
[facility] H&P    Patient Name: Morgan Quick  : 1955  MRN: [id number]  Primary Care Physician: Wayne Saavedra MD  Date of admission: 2023     Patient Care Team:  Wayne Saavedra MD as PCP - General (Internal Medicine)          Subjective   History Present Illness     Chief Complaint:   Chief Complaint   Patient presents with    Back Pain     Back pain    Back Pain  Pertinent negatives include no bladder incontinence, numbness or paresthesias.        68-year-old white female with history of hypertension, thyroid disorder, high cholesterol, chronic pain presents today by EMS from assisted living facility with reports of a fall and pain in her lower back.  She states she was trying to get up from her chair to her walker when she lost her balance and fell backwards and her buttocks landed against the chair.  She denies striking her head or having LOC.  She complains of pain in her lower back and tailbone area.  Pain is worse with certain movements.  She denies any numbness tingling or weakness in either lower extremity.  She denies any bowel or bladder dysfunction or saddle anesthesia.  No fever.  She denies any other injuries.       Observation 23  Pt concurs with er hpi. Pt states she has no paraesthesias, no loss of bladder or bowel control. Pt has seen ortho spine at [place] and mri scheduled for chronic disc disease on Tuesday. Pt given pain medication and awaiting pt and ot eval. Pt fell from standing to chair on buttocks.       Review of Systems   Musculoskeletal:  Positive for back pain.   Genitourinary:  Negative for bladder incontinence.   Neurological:  Negative for focal weakness, numbness and paresthesias.       Personal History     Past Medical History:   Past Medical History:   Diagnosis Date    Bipolar 1 disorder     Breast cancer     Cancer     Disease of thyroid gland     High cholesterol     Hypertension        Surgical History:      Past Surgical History:    Procedure Laterality Date    ABDOMINAL SURGERY      APPENDECTOMY      ENDOSCOPY N/A 9/25/2022    Procedure: ESOPHAGOGASTRODUODENOSCOPY with biopsy x 2 areas;  Surgeon: Deonte Wong MD;  Location: [facility] ENDOSCOPY;  Service: Gastroenterology;  Laterality: N/A;  post: gastritis, duodinitis, nodule,     HYSTERECTOMY      MASTECTOMY      L side           Family History: family history includes Cancer in her mother. Otherwise pertinent FHx was reviewed and unremarkable.     Social History:  reports that she has never smoked. She has never been exposed to tobacco smoke. She has never used smokeless tobacco. She reports that she does not drink alcohol and does not use drugs.      Medications:  Prior to Admission medications    Medication Sig Start Date End Date Taking? Authorizing Provider   furosemide (LASIX) 20 MG tablet Take 1 tablet by mouth Daily. 10/11/22  Yes Ladonna Redd DO   guaiFENesin (MUCINEX) 600 MG 12 hr tablet Take 2 tablets by mouth Every 12 (Twelve) Hours. 10/10/22  Yes Ladonna Redd DO   HYDROcodone-acetaminophen (NORCO)  MG per tablet Take 1 tablet by mouth 4 (Four) Times a Day.   Yes Brennon Serrano MD   levothyroxine (SYNTHROID, LEVOTHROID) 25 MCG tablet Take 1 tablet by mouth Every Morning. 10/11/22  Yes Ladonna Redd DO   lubiprostone (AMITIZA) 24 MCG capsule Take 1 capsule by mouth 2 (Two) Times a Day.   Yes Brennon Serrano MD   methylPREDNISolone (MEDROL) 4 MG tablet Take as directed 12/25/23 12/30/23 Yes Brennon Serrano MD   OXcarbazepine (TRILEPTAL) 150 MG tablet Take 1 tablet by mouth 2 (Two) Times a Day. 10/10/22  Yes Ladonna Redd DO   pantoprazole (PROTONIX) 40 MG EC tablet Take 1 tablet by mouth Every Morning. 10/11/22  Yes Ladonna Redd DO   potassium chloride (MICRO-K) 10 MEQ CR capsule Take 1 capsule by mouth Daily.   Yes Brennon Serrano MD   risperiDONE (risperDAL) 0.25 MG tablet Take 1 tablet by mouth Every Night. 10/10/22  Yes Tramaine  DO Ladonna   rosuvastatin (CRESTOR) 10 MG tablet Take 3 tablets by mouth Daily.   Yes Provider, MD Brennon   sertraline (ZOLOFT) 100 MG tablet Take 2 tablets by mouth Daily. 10/10/22  Yes Ladonna Redd DO   sodium chloride 1 g tablet Take 2 tablets by mouth 3 (Three) Times a Day With Meals. 10/10/22  Yes Ladonna Redd DO   traZODone (DESYREL) 50 MG tablet Take 0.5 tablets by mouth Every Night.   Yes Provider, MD Brennon       Allergies:    Allergies   Allergen Reactions    Contrast Dye (Echo Or Unknown Ct/Mr) Shortness Of Breath     Pt states she had trouble breathing when she had contrast in the past.        Objective   Objective     Vital Signs  Temp:  [97.9 °F (36.6 °C)-98.6 °F (37 °C)] 97.9 °F (36.6 °C)  Heart Rate:  [57-74] 62  Resp:  [16-20] 18  BP: (133-192)/(53-80) 152/66  SpO2:  [91 %-94 %] 92 %  on  Flow (L/min):  [2-3] 3;   Device (Oxygen Therapy): nasal cannula  Body mass index is 41.05 kg/m².    Physical Exam  Constitutional:       Appearance: She is obese.   Cardiovascular:      Rate and Rhythm: Normal rate and regular rhythm.      Pulses: Normal pulses.      Heart sounds: Normal heart sounds.   Pulmonary:      Effort: Pulmonary effort is normal.      Breath sounds: Normal breath sounds.   Musculoskeletal:      Comments: Pain with rom   Skin:     General: Skin is warm and dry.   Neurological:      General: No focal deficit present.      Mental Status: She is alert and oriented to person, place, and time.   Psychiatric:         Mood and Affect: Mood normal.         Behavior: Behavior normal.           Results Review:  I have personally reviewed most recent lab results and radiology images and interpretations and agree with findings, most notably: cbc, bmp, xr spine and ct abd.    Results from last 7 days   Lab Units 12/28/23  0359   WBC 10*3/mm3 8.20   HEMOGLOBIN g/dL 12.0   HEMATOCRIT % 37.1   PLATELETS 10*3/mm3 252     Results from last 7 days   Lab Units 12/28/23  0359   SODIUM mmol/L  141   POTASSIUM mmol/L 3.8   CHLORIDE mmol/L 99   CO2 mmol/L 32.0*   BUN mg/dL 10   CREATININE mg/dL 0.51*   GLUCOSE mg/dL 90   CALCIUM mg/dL 9.0     Estimated Creatinine Clearance: 131.7 mL/min (A) (by C-G formula based on SCr of 0.51 mg/dL (L)).  Brief Urine Lab Results       None            Microbiology Results (last 10 days)       ** No results found for the last 240 hours. **            ECG/EMG Results (most recent)       None                Results for orders placed during the hospital encounter of 09/24/22    Adult Transthoracic Echo Complete W/ Cont if Necessary Per Protocol    Interpretation Summary  · Estimated left ventricular EF was in agreement with the calculated left ventricular EF. Left ventricular ejection fraction appears to be 56 - 60%. Left ventricular systolic function is normal.  · There is calcification of the aortic valve mainly affecting the non-coronary, left coronary and right coronary cusp(s).  · The study is technically difficult for diagnosis.      CT Abdomen Pelvis Without Contrast    Result Date: 12/27/2023  Impression: No acute findings in the abdomen or pelvis. No acute osseous injury. Electronically Signed: Kasia Paul MD  12/27/2023 12:51 PM CST  Workstation ID: [id number]       Estimated Creatinine Clearance: 131.7 mL/min (A) (by C-G formula based on SCr of 0.51 mg/dL (L)).    Assessment & Plan   Assessment/Plan       Active Hospital Problems    Diagnosis  POA    **Low back pain [M54.50]  Yes      Resolved Hospital Problems   No resolved problems to display.       Low back pain s/p fall  - cbc and bmp are unremarkable  - xr spine lumbar 12/22/23 reviewed and showing mild degenerative disc disease   - ct abd reviewed and showing a Schmorl's node invagination along the superior endplate of L3 which appears unchanged. New Schmorl's node invagination is noted at L4 and L5. No evidence for acute osseous injury in the lumbar spine. The coccyx appears intact.   - pain medication  -  pt and ot consult  - pt should need 30 days or less of skilled nursing       Hypothyroidism  - cont synthroid    Bipolar disorder  - cont trileptal, risperidone, zoloft    Hx of chf  - cont lasix and potassium replacement    HPL  - cont statin      VTE Prophylaxis -   Mechanical Order History:        Ordered        12/27/23 1838  Place Sequential Compression Device  Once            12/27/23 1838  Maintain Sequential Compression Device  Continuous                          Pharmalogical Order History:       None            CODE STATUS:    Code Status and Medical Interventions:   Ordered at: 12/27/23 1629     Code Status (Patient has no pulse and is not breathing):    CPR (Attempt to Resuscitate)     Medical Interventions (Patient has pulse or is breathing):    Full Support       This patient has been examined wearing personal protective equipment.     I discussed the patient's findings and my recommendations with patient and nursing staff.      Signature:Electronically signed by Gloria Lacy PA-C, 12/28/23, 10:49 AM EST.

## 2023-12-29 LAB
ANION GAP SERPL CALCULATED.3IONS-SCNC: 7 MMOL/L (ref 5–15)
BASOPHILS # BLD AUTO: 0 10*3/MM3 (ref 0–0.2)
BASOPHILS NFR BLD AUTO: 0.5 % (ref 0–1.5)
BUN SERPL-MCNC: 9 MG/DL (ref 8–23)
BUN/CREAT SERPL: 21.4 (ref 7–25)
CALCIUM SPEC-SCNC: 9.5 MG/DL (ref 8.6–10.5)
CHLORIDE SERPL-SCNC: 97 MMOL/L (ref 98–107)
CO2 SERPL-SCNC: 32 MMOL/L (ref 22–29)
CREAT SERPL-MCNC: 0.42 MG/DL (ref 0.57–1)
DEPRECATED RDW RBC AUTO: 52.1 FL (ref 37–54)
EGFRCR SERPLBLD CKD-EPI 2021: 106.7 ML/MIN/1.73
EOSINOPHIL # BLD AUTO: 0 10*3/MM3 (ref 0–0.4)
EOSINOPHIL NFR BLD AUTO: 0.4 % (ref 0.3–6.2)
ERYTHROCYTE [DISTWIDTH] IN BLOOD BY AUTOMATED COUNT: 16.2 % (ref 12.3–15.4)
GLUCOSE SERPL-MCNC: 120 MG/DL (ref 65–99)
HCT VFR BLD AUTO: 37.1 % (ref 34–46.6)
HGB BLD-MCNC: 12.1 G/DL (ref 12–15.9)
LYMPHOCYTES # BLD AUTO: 0.8 10*3/MM3 (ref 0.7–3.1)
LYMPHOCYTES NFR BLD AUTO: 8.3 % (ref 19.6–45.3)
MCH RBC QN AUTO: 29.9 PG (ref 26.6–33)
MCHC RBC AUTO-ENTMCNC: 32.6 G/DL (ref 31.5–35.7)
MCV RBC AUTO: 91.7 FL (ref 79–97)
MONOCYTES # BLD AUTO: 0.8 10*3/MM3 (ref 0.1–0.9)
MONOCYTES NFR BLD AUTO: 8.8 % (ref 5–12)
NEUTROPHILS NFR BLD AUTO: 7.5 10*3/MM3 (ref 1.7–7)
NEUTROPHILS NFR BLD AUTO: 82 % (ref 42.7–76)
NRBC BLD AUTO-RTO: 0 /100 WBC (ref 0–0.2)
PLATELET # BLD AUTO: 284 10*3/MM3 (ref 140–450)
PMV BLD AUTO: 7.3 FL (ref 6–12)
POTASSIUM SERPL-SCNC: 4 MMOL/L (ref 3.5–5.2)
RBC # BLD AUTO: 4.05 10*6/MM3 (ref 3.77–5.28)
SODIUM SERPL-SCNC: 136 MMOL/L (ref 136–145)
WBC NRBC COR # BLD AUTO: 9.2 10*3/MM3 (ref 3.4–10.8)

## 2023-12-29 PROCEDURE — 97530 THERAPEUTIC ACTIVITIES: CPT

## 2023-12-29 PROCEDURE — 25010000002 ONDANSETRON PER 1 MG: Performed by: PHYSICIAN ASSISTANT

## 2023-12-29 PROCEDURE — 80048 BASIC METABOLIC PNL TOTAL CA: CPT | Performed by: PHYSICIAN ASSISTANT

## 2023-12-29 PROCEDURE — 63710000001 PREDNISONE PER 1 MG: Performed by: PHYSICIAN ASSISTANT

## 2023-12-29 PROCEDURE — 25010000002 MORPHINE PER 10 MG: Performed by: PHYSICIAN ASSISTANT

## 2023-12-29 PROCEDURE — G0378 HOSPITAL OBSERVATION PER HR: HCPCS

## 2023-12-29 PROCEDURE — 97166 OT EVAL MOD COMPLEX 45 MIN: CPT

## 2023-12-29 PROCEDURE — 85025 COMPLETE CBC W/AUTO DIFF WBC: CPT | Performed by: PHYSICIAN ASSISTANT

## 2023-12-29 RX ORDER — CYCLOBENZAPRINE HCL 10 MG
10 TABLET ORAL 3 TIMES DAILY
Status: DISCONTINUED | OUTPATIENT
Start: 2023-12-29 | End: 2024-01-05 | Stop reason: HOSPADM

## 2023-12-29 RX ORDER — HYDRALAZINE HYDROCHLORIDE 20 MG/ML
10 INJECTION INTRAMUSCULAR; INTRAVENOUS EVERY 6 HOURS PRN
Status: DISCONTINUED | OUTPATIENT
Start: 2023-12-29 | End: 2024-01-05 | Stop reason: HOSPADM

## 2023-12-29 RX ADMIN — LUBIPROSTONE 24 MCG: 24 CAPSULE, GELATIN COATED ORAL at 09:54

## 2023-12-29 RX ADMIN — SODIUM CHLORIDE 2 G: 1 TABLET ORAL at 17:41

## 2023-12-29 RX ADMIN — SENNOSIDES AND DOCUSATE SODIUM 2 TABLET: 50; 8.6 TABLET ORAL at 21:43

## 2023-12-29 RX ADMIN — Medication 10 ML: at 09:57

## 2023-12-29 RX ADMIN — HYDROCODONE BITARTRATE AND ACETAMINOPHEN 1 TABLET: 10; 325 TABLET ORAL at 05:22

## 2023-12-29 RX ADMIN — CYCLOBENZAPRINE 10 MG: 10 TABLET, FILM COATED ORAL at 17:41

## 2023-12-29 RX ADMIN — MORPHINE SULFATE 4 MG: 4 INJECTION, SOLUTION INTRAMUSCULAR; INTRAVENOUS at 09:57

## 2023-12-29 RX ADMIN — POTASSIUM CHLORIDE 10 MEQ: 750 TABLET, EXTENDED RELEASE ORAL at 09:54

## 2023-12-29 RX ADMIN — GUAIFENESIN 1200 MG: 600 TABLET, MULTILAYER, EXTENDED RELEASE ORAL at 09:54

## 2023-12-29 RX ADMIN — LUBIPROSTONE 24 MCG: 24 CAPSULE, GELATIN COATED ORAL at 21:44

## 2023-12-29 RX ADMIN — TRAZODONE HYDROCHLORIDE 25 MG: 50 TABLET ORAL at 21:42

## 2023-12-29 RX ADMIN — PANTOPRAZOLE SODIUM 40 MG: 40 TABLET, DELAYED RELEASE ORAL at 09:54

## 2023-12-29 RX ADMIN — LIDOCAINE 1 PATCH: 50 PATCH CUTANEOUS at 09:46

## 2023-12-29 RX ADMIN — PREDNISONE 40 MG: 20 TABLET ORAL at 09:54

## 2023-12-29 RX ADMIN — GUAIFENESIN 1200 MG: 600 TABLET, MULTILAYER, EXTENDED RELEASE ORAL at 21:44

## 2023-12-29 RX ADMIN — FUROSEMIDE 20 MG: 20 TABLET ORAL at 09:54

## 2023-12-29 RX ADMIN — CYCLOBENZAPRINE 10 MG: 10 TABLET, FILM COATED ORAL at 21:42

## 2023-12-29 RX ADMIN — OXCARBAZEPINE 150 MG: 150 TABLET, FILM COATED ORAL at 09:54

## 2023-12-29 RX ADMIN — Medication 10 ML: at 21:44

## 2023-12-29 RX ADMIN — Medication 10 ML: at 09:54

## 2023-12-29 RX ADMIN — RISPERIDONE 0.25 MG: 0.25 TABLET, FILM COATED ORAL at 21:42

## 2023-12-29 RX ADMIN — SERTRALINE 200 MG: 100 TABLET, FILM COATED ORAL at 09:54

## 2023-12-29 RX ADMIN — OXCARBAZEPINE 150 MG: 150 TABLET, FILM COATED ORAL at 21:44

## 2023-12-29 RX ADMIN — CYCLOBENZAPRINE 10 MG: 10 TABLET, FILM COATED ORAL at 12:08

## 2023-12-29 RX ADMIN — LEVOTHYROXINE SODIUM 25 MCG: 0.03 TABLET ORAL at 05:22

## 2023-12-29 RX ADMIN — SODIUM CHLORIDE 2 G: 1 TABLET ORAL at 09:54

## 2023-12-29 RX ADMIN — SENNOSIDES AND DOCUSATE SODIUM 2 TABLET: 50; 8.6 TABLET ORAL at 09:54

## 2023-12-29 RX ADMIN — ONDANSETRON 4 MG: 2 INJECTION INTRAMUSCULAR; INTRAVENOUS at 05:22

## 2023-12-29 RX ADMIN — SODIUM CHLORIDE 2 G: 1 TABLET ORAL at 12:08

## 2023-12-29 NOTE — PROGRESS NOTES
[facility]    Patient Name: Morgan Quick  : 1955  MRN: [id number]  Primary Care Physician: Wayne Saavedra MD  Date of admission: 2023     Patient Care Team:  Wayne Saavedra MD as PCP - General (Internal Medicine)          Subjective   History Present Illness     Chief Complaint:   Chief Complaint   Patient presents with    Back Pain     Back pain    History of Present Illness    68-year-old white female with history of hypertension, thyroid disorder, high cholesterol, chronic pain presents today by EMS from assisted living facility with reports of a fall and pain in her lower back.  She states she was trying to get up from her chair to her walker when she lost her balance and fell backwards and her buttocks landed against the chair.  She denies striking her head or having LOC.  She complains of pain in her lower back and tailbone area.  Pain is worse with certain movements.  She denies any numbness tingling or weakness in either lower extremity.  She denies any bowel or bladder dysfunction or saddle anesthesia.  No fever.  She denies any other injuries.        Observation 23  Pt concurs with er hpi. Pt states she has no paraesthesias, no loss of bladder or bowel control. Pt has seen ortho spine at [place] and mri scheduled for chronic disc disease on Tuesday. Pt given pain medication and awaiting pt and ot eval. Pt fell from standing to chair on buttocks.        Observation 23  Pt and ot consulted and recommends snf. Awaiting precert    ROS    Musculoskeletal:  Positive for back pain.      Musculoskeletal:  Positive for back pain.   Genitourinary:  Negative for bladder incontinence.   Neurological:  Negative for focal weakness, numbness and paresthesi      Personal History     Past Medical History:   Past Medical History:   Diagnosis Date    Bipolar 1 disorder     Breast cancer     Cancer     Disease of thyroid gland     High cholesterol     Hypertension         Surgical History:      Past Surgical History:   Procedure Laterality Date    ABDOMINAL SURGERY      APPENDECTOMY      ENDOSCOPY N/A 9/25/2022    Procedure: ESOPHAGOGASTRODUODENOSCOPY with biopsy x 2 areas;  Surgeon: Deonte Wong MD;  Location: [facility] ENDOSCOPY;  Service: Gastroenterology;  Laterality: N/A;  post: gastritis, duodinitis, nodule,     HYSTERECTOMY      MASTECTOMY      L side           Family History: family history includes Cancer in her mother. Otherwise pertinent FHx was reviewed and unremarkable.     Social History:  reports that she has never smoked. She has never been exposed to tobacco smoke. She has never used smokeless tobacco. She reports that she does not drink alcohol and does not use drugs.      Medications:  Prior to Admission medications    Medication Sig Start Date End Date Taking? Authorizing Provider   furosemide (LASIX) 20 MG tablet Take 1 tablet by mouth Daily. 10/11/22  Yes Ladonna Redd DO   guaiFENesin (MUCINEX) 600 MG 12 hr tablet Take 2 tablets by mouth Every 12 (Twelve) Hours. 10/10/22  Yes Ladonna Redd DO   HYDROcodone-acetaminophen (NORCO)  MG per tablet Take 1 tablet by mouth 4 (Four) Times a Day.   Yes Brennon Serrano MD   levothyroxine (SYNTHROID, LEVOTHROID) 25 MCG tablet Take 1 tablet by mouth Every Morning. 10/11/22  Yes Ladonna Redd DO   lubiprostone (AMITIZA) 24 MCG capsule Take 1 capsule by mouth 2 (Two) Times a Day.   Yes Brennon Serrano MD   methylPREDNISolone (MEDROL) 4 MG tablet Take as directed 12/25/23 12/30/23 Yes Brennon Serrano MD   OXcarbazepine (TRILEPTAL) 150 MG tablet Take 1 tablet by mouth 2 (Two) Times a Day. 10/10/22  Yes Ladonna Redd DO   pantoprazole (PROTONIX) 40 MG EC tablet Take 1 tablet by mouth Every Morning. 10/11/22  Yes Ladonna Redd DO   potassium chloride (MICRO-K) 10 MEQ CR capsule Take 1 capsule by mouth Daily.   Yes Brennon Serrano MD   risperiDONE (risperDAL) 0.25 MG tablet Take 1  tablet by mouth Every Night. 10/10/22  Yes Ladonna Redd, DO   rosuvastatin (CRESTOR) 10 MG tablet Take 3 tablets by mouth Daily.   Yes ProviderBrennon MD   sertraline (ZOLOFT) 100 MG tablet Take 2 tablets by mouth Daily. 10/10/22  Yes Ladonna Redd DO   sodium chloride 1 g tablet Take 2 tablets by mouth 3 (Three) Times a Day With Meals. 10/10/22  Yes Ladonna Redd DO   traZODone (DESYREL) 50 MG tablet Take 0.5 tablets by mouth Every Night.   Yes Provider, MD Brennon       Allergies:    Allergies   Allergen Reactions    Contrast Dye (Echo Or Unknown Ct/Mr) Shortness Of Breath     Pt states she had trouble breathing when she had contrast in the past.        Objective   Objective     Vital Signs  Temp:  [97.9 °F (36.6 °C)-98.6 °F (37 °C)] 98.6 °F (37 °C)  Heart Rate:  [60-66] 66  Resp:  [14-18] 18  BP: (147-173)/(63-75) 156/66  SpO2:  [90 %-94 %] 90 %  on  Flow (L/min):  [3] 3;   Device (Oxygen Therapy): nasal cannula  Body mass index is 41.05 kg/m².    Physical Exam    Comments: Pt refuses to move. When finally coerced, pt moves with some pain but no tenderness to midline back         Constitutional:       Appearance: She is obese.   Cardiovascular:      Rate and Rhythm: Normal rate and regular rhythm.      Pulses: Normal pulses.      Heart sounds: Normal heart sounds.   Pulmonary:      Effort: Pulmonary effort is normal.      Breath sounds: Normal breath sounds.   Musculoskeletal:      Comments: Pain with rom   Skin:     General: Skin is warm and dry.   Neurological:      General: No focal deficit present.      Mental Status: She is alert and oriented to person, place, and time.   Psychiatric:         Mood and Affect: Mood normal.         Behavior: Behavior normal.      Emotional Behavior:    wnl       Debilities:   None      Results Review:  I have personally reviewed most recent lab results and radiology images and interpretations and agree with findings, most notably: cbc, cmp, xr spine, ct  abd.    Results from last 7 days   Lab Units 12/29/23  0410   WBC 10*3/mm3 9.20   HEMOGLOBIN g/dL 12.1   HEMATOCRIT % 37.1   PLATELETS 10*3/mm3 284     Results from last 7 days   Lab Units 12/29/23  0410   SODIUM mmol/L 136   POTASSIUM mmol/L 4.0   CHLORIDE mmol/L 97*   CO2 mmol/L 32.0*   BUN mg/dL 9   CREATININE mg/dL 0.42*   GLUCOSE mg/dL 120*   CALCIUM mg/dL 9.5     Estimated Creatinine Clearance: 159.9 mL/min (A) (by C-G formula based on SCr of 0.42 mg/dL (L)).  Brief Urine Lab Results       None            Microbiology Results (last 10 days)       ** No results found for the last 240 hours. **            ECG/EMG Results (most recent)       None                Results for orders placed during the hospital encounter of 09/24/22    Adult Transthoracic Echo Complete W/ Cont if Necessary Per Protocol    Interpretation Summary  · Estimated left ventricular EF was in agreement with the calculated left ventricular EF. Left ventricular ejection fraction appears to be 56 - 60%. Left ventricular systolic function is normal.  · There is calcification of the aortic valve mainly affecting the non-coronary, left coronary and right coronary cusp(s).  · The study is technically difficult for diagnosis.      CT Abdomen Pelvis Without Contrast    Result Date: 12/27/2023  Impression: No acute findings in the abdomen or pelvis. No acute osseous injury. Electronically Signed: Kasia Paul MD  12/27/2023 12:51 PM CST  Workstation ID: [id number]       Estimated Creatinine Clearance: 159.9 mL/min (A) (by C-G formula based on SCr of 0.42 mg/dL (L)).    Assessment & Plan   Assessment/Plan       Active Hospital Problems    Diagnosis  POA    **Low back pain [M54.50]  Yes      Resolved Hospital Problems   No resolved problems to display.     Low back pain s/p fall  - cbc and bmp are unremarkable  - xr spine lumbar 12/22/23 reviewed and showing mild degenerative disc disease   - ct abd reviewed and showing a Schmorl's node invagination  along the superior endplate of L3 which appears unchanged. New Schmorl's node invagination is noted at L4 and L5. No evidence for acute osseous injury in the lumbar spine. The coccyx appears intact.   - pain medication, muscle relaxers, steroids  - pt and ot consult and recommend snf  - pt should need 30 days or less of skilled nursing         Hypothyroidism  - cont synthroid     Bipolar disorder  - cont trileptal, risperidone, zoloft     Hx of chf  - cont lasix and potassium replacement     HPL  - cont statin            VTE Prophylaxis -   Mechanical Order History:        Ordered        12/27/23 1838  Place Sequential Compression Device  Once            12/27/23 1838  Maintain Sequential Compression Device  Continuous                          Pharmalogical Order History:       None            CODE STATUS:    Code Status and Medical Interventions:   Ordered at: 12/27/23 1629     Code Status (Patient has no pulse and is not breathing):    CPR (Attempt to Resuscitate)     Medical Interventions (Patient has pulse or is breathing):    Full Support       This patient has been examined wearing personal protective equipment.     I discussed the patient's findings and my recommendations with patient and nursing staff.      Signature:Electronically signed by Gloria Lacy PA-C, 12/29/23, 3:34 PM EST.

## 2023-12-29 NOTE — PLAN OF CARE
Goal Outcome Evaluation:          Morgan Quick presents with functional mobility impairments which indicate the need for skilled intervention. Pt able to perform log roll towards L side with significantly increased time and encouragement to sit eob. Pt able to sit eob ~10 minutes with mod-max A to maintain sitting balance. Pt c/o increased lumbar pain requesting to return supine. Tolerating session today without incident. Will continue to follow and progress as tolerated.

## 2023-12-29 NOTE — THERAPY TREATMENT NOTE
"Subjective: Pt agreeable to therapeutic plan of care.    Objective:     Bed mobility - Max-A and Assist x 2 via log roll  Transfers - N/A or Not attempted.  Ambulation -  N/A or Not attempted.    Vitals: Hypertensive; 190/87 seated eob    Pain: 8 VAS   Location: Lumbar  Intervention for pain: Repositioned, RN provided medication, Increased Activity, and Therapeutic Presence    Education: Provided education on the importance of mobility in the acute care setting, Verbal/Tactile Cues, Transfer Training, and Energy conservation strategies    Assessment: Morgan Quick presents with functional mobility impairments which indicate the need for skilled intervention. Pt able to perform log roll towards L side with significantly increased time and encouragement to sit eob. Pt able to sit eob ~10 minutes with mod-max A to maintain sitting balance. Pt c/o increased lumbar pain requesting to return supine. Tolerating session today without incident. Will continue to follow and progress as tolerated.     Plan/Recommendations:   If medically appropriate, Moderate Intensity Therapy recommended post-acute care. This is recommended as therapy feels the patient would require 3-4 days per week and wouldn't tolerate \"3 hour daily\" rehab intensity. SNF would be the preferred choice. If the patient does not agree to SNF, arrange HH or OP depending on home bound status. If patient is medically complex, consider LTACH. Pt requires no DME at discharge.     Pt desires Skilled Rehab placement at discharge. Pt cooperative; agreeable to therapeutic recommendations and plan of care.         Basic Mobility 6-click:  Rollin = Total, A lot = 2, A little = 3; 4 = None  Supine>Sit:   1 = Total, A lot = 2, A little = 3; 4 = None   Sit>Stand with arms:  1 = Total, A lot = 2, A little = 3; 4 = None  Bed>Chair:   1 = Total, A lot = 2, A little = 3; 4 = None  Ambulate in room:  1 = Total, A lot = 2, A little = 3; 4 = None  3-5 Steps with " railin = Total, A lot = 2, A little = 3; 4 = None  Score: 8    Modified [place]: N/A = No pre-op stroke/TIA    Post-Tx Position: Alarms activated and Call light and personal items within reach; supine in bed  PPE: gloves and surgical mask

## 2023-12-29 NOTE — THERAPY EVALUATION
Patient Name: Morgan Quick  : 1955    MRN: [id number]                              Today's Date: 2023       Admit Date: 2023    Visit Dx:     ICD-10-CM ICD-9-CM   1. Fall, initial encounter  W19.XXXA E888.9   2. Contusion of coccyx, initial encounter  S30.0XXA 922.32   3. Acute midline low back pain without sciatica  M54.50 724.2     Patient Active Problem List   Diagnosis    Morbidly obese    Essential hypertension    Malignant neoplasm of overlapping sites of left breast in female, estrogen receptor positive    Primary cancer of left female breast    Abdominal pain, unspecified abdominal location    Bipolar 1 disorder    High cholesterol    Anorexia    Nausea    Low back pain     Past Medical History:   Diagnosis Date    Bipolar 1 disorder     Breast cancer     Cancer     Disease of thyroid gland     High cholesterol     Hypertension      Past Surgical History:   Procedure Laterality Date    ABDOMINAL SURGERY      APPENDECTOMY      ENDOSCOPY N/A 2022    Procedure: ESOPHAGOGASTRODUODENOSCOPY with biopsy x 2 areas;  Surgeon: Deonte Wong MD;  Location: [facility] ENDOSCOPY;  Service: Gastroenterology;  Laterality: N/A;  post: gastritis, duodinitis, nodule,     HYSTERECTOMY      MASTECTOMY      L side      General Information       Row Name 23 1107          OT Time and Intention    Document Type evaluation  -MS     Mode of Treatment occupational therapy  -MS       Row Name 23 1107          General Information    Patient Profile Reviewed yes  -MS     Prior Level of Function independent:;min assist:;ADL's;transfer  -MS     Existing Precautions/Restrictions fall;oxygen therapy device and L/min  currently 3L O2, 2.5-3L home O2  -MS     Barriers to Rehab medically complex;ineffective coping;previous functional deficit  -MS       Row Name 23 1107          Occupational Profile    Reason for Services/Referral (Occupational Profile) Pt is a 69 y/o F admitted to [facility] 23  with c/o LBP s/p fall. XR spine (-) fx. CT A/P indicates no acute findings. Contusion of coccyx (+). PMHx significant for HTN, hx breast cancer L breast, bipolar 1 disorder, and HTN. At baseline, pt [place] in [place], typically (I) with ADLs, occasional assist from spouse with transfers, (I) with medication management, household management and meal prep. Pt typically utilizes RW for household mobility, wc for long distance, unable to propel self. Pt reports typically sleeping in recliner. Pt wears 2.5-3L home O2.  -MS     Environmental Supports and Barriers (Occupational Profile) supportive spouse  -MS       Row Name 12/29/23 1107          Living Environment    People in Home spouse;other (see comments)  nursing home  -MS       Row Name 12/29/23 1107          Cognition    Orientation Status (Cognition) oriented x 4  -MS       Row Name 12/29/23 1107          Safety Issues, Functional Mobility    Safety Issues Affecting Function (Mobility) judgment;problem-solving  -MS     Impairments Affecting Function (Mobility) balance;endurance/activity tolerance;pain;strength  -MS     Comment, Safety Issues/Impairments (Mobility) resistive to movement  -MS               User Key  (r) = Recorded By, (t) = Taken By, (c) = Cosigned By      Initials Name Provider Type    MS Belem Simms OT Occupational Therapist                     Mobility/ADL's       Row Name 12/29/23 110          Bed Mobility    Bed Mobility supine-sit;sit-supine  -MS     Supine-Sit [place] (Bed Mobility) 2 person assist;maximum assist (25% patient effort)  -MS     Sit-Supine [place] (Bed Mobility) 2 person assist;maximum assist (25% patient effort)  -MS     Bed Mobility, Safety Issues decreased use of legs for bridging/pushing;decreased use of arms for pushing/pulling;impaired trunk control for bed mobility  -MS     Comment, (Bed Mobility) guarded and resistive to movement, requiring increased asisist  -MS       Row Name 12/29/23 1103           Transfers    Transfers sit-stand transfer  -MS     Comment, (Transfers) delcined transfers this date  -MS       Row Name 12/29/23 1108          Activities of Daily Living    BADL Assessment/Intervention toileting  -MS       Row Name 12/29/23 1108          Toileting Assessment/Training    Position (Toileting) supine  -MS     Comment, (Toileting) per nsg pt has required alexandra pan and external cath d/t significant pain limiting mobility  -MS               User Key  (r) = Recorded By, (t) = Taken By, (c) = Cosigned By      Initials Name Provider Type    Belem Cormier OT Occupational Therapist                   Obj/Interventions       Row Name 12/29/23 1110          Sensory Assessment (Somatosensory)    Sensory Assessment (Somatosensory) UE sensation intact  -MS       Row Name 12/29/23 1110          Vision Assessment/Intervention    Visual Impairment/Limitations WNL  -MS       Row Name 12/29/23 1110          Range of Motion Comprehensive    Comment, General Range of Motion unable to formally assess BUE d/t pt being guarded and significant pain, appears WFL  -MS       Row Name 12/29/23 1110          Strength Comprehensive (MMT)    Comment, General Manual Muscle Testing (MMT) Assessment unable to formally assess BUE d/t pain, appears 4-/5  -MS       Row Name 12/29/23 1110          Balance    Balance Assessment sitting static balance  -MS     Static Sitting Balance contact guard;minimal assist  -MS     Position, Sitting Balance unsupported;sitting edge of bed  -MS     Comment, Balance initially requiring min A, however once in seated position for extended time, pt able to begin to relax and utilzie UE and trunk control, requiring CGA  -MS               User Key  (r) = Recorded By, (t) = Taken By, (c) = Cosigned By      Initials Name Provider Type    Belem Cormier OT Occupational Therapist                   Goals/Plan       Row Name 12/29/23 1123          Bed Mobility Goal 1 (OT)    Activity/Assistive Device (Bed  Mobility Goal 1, OT) bed mobility activities, all  -MS     [place] Level/Cues Needed (Bed Mobility Goal 1, OT) minimum assist (75% or more patient effort)  -MS     Time Frame (Bed Mobility Goal 1, OT) long term goal (LTG);2 weeks  -MS     Progress/Outcomes (Bed Mobility Goal 1, OT) new goal  -MS       Row Name 12/29/23 1123          Transfer Goal 1 (OT)    Activity/Assistive Device (Transfer Goal 1, OT) transfers, all  -MS     [place] Level/Cues Needed (Transfer Goal 1, OT) minimum assist (75% or more patient effort)  -MS     Time Frame (Transfer Goal 1, OT) long term goal (LTG);2 weeks  -MS     Progress/Outcome (Transfer Goal 1, OT) new goal  -MS       Row Name 12/29/23 1123          Toileting Goal 1 (OT)    Activity/Device (Toileting Goal 1, OT) toileting skills, all;commode, bedside without drop arms  -MS     [place] Level/Cues Needed (Toileting Goal 1, OT) minimum assist (75% or more patient effort)  -MS     Time Frame (Toileting Goal 1, OT) long term goal (LTG);2 weeks  -MS     Progress/Outcome (Toileting Goal 1, OT) new goal  -MS       Row Name 12/29/23 1123          Therapy Assessment/Plan (OT)    Planned Therapy Interventions (OT) activity tolerance training;adaptive equipment training;BADL retraining;functional balance retraining;IADL retraining;occupation/activity based interventions;passive ROM/stretching;patient/caregiver education/training;transfer/mobility retraining;strengthening exercise;ROM/therapeutic exercise  -MS               User Key  (r) = Recorded By, (t) = Taken By, (c) = Cosigned By      Initials Name Provider Type    Belem Cormier, OT Occupational Therapist                   Clinical Impression       Row Name 12/29/23 1112          Pain Assessment    Pretreatment Pain Rating 10/10  -MS     Posttreatment Pain Rating 10/10  -MS     Pain Location lower  -MS     Pain Location - back  -MS     Pre/Posttreatment Pain Comment nsg administered pain medication  -MS     Pain  Intervention(s) Repositioned;Emotional support;Medication (See MAR)  -MS       Row Name 12/29/23 1112          Plan of Care Review    Plan of Care Reviewed With patient  -MS     Progress no change  -MS     Outcome Evaluation Pt is a 67 y/o F admitted to [facility] 12/27/23 with c/o LBP s/p fall. XR spine (-) fx. CT A/P indicates no acute findings. Contusion of coccyx (+). PMHx significant for HTN, hx breast cancer L breast, bipolar 1 disorder, and HTN. At baseline, pt [place] in [place], typically (I) with ADLs, occasional assist from spouse with transfers, (I) with medication management, household management and meal prep. Pt typically utilizes RW for household mobility, wc for long distance, unable to propel self. Pt reports typically sleeping in recliner. Pt wears 2.5-3L home O2. This date, pt A&Ox4 on 3L O2 and in supine upon arrival. Pt declined any movement upon arrival, however agreeable with max encouragement. Pt requires max A x2 for supine <>sit, tolerates sitting edge of bed with min A progressing to CGA ~10 minutes. Pt c/o decrease in LBP sitting edge of bed, however fatigued, returned to supine. Declines HOB elevated, despite education on importance including increase diaphagmatic breathing, prevention of developing PNA, deconditioning, and pressure relief. Pt verbalized understanding however continues to decline. Pt declines further progression of activity this date, therefore unable to assess functional transfers. Pt far below baseline and is not safe to dc home, would benefit from SNF when medically approrpiate. OT will coodinate with [facility] staff regarding pain medication to optimize mobility in future sessions.  -MS       Row Name 12/29/23 1112          Therapy Assessment/Plan (OT)    Rehab Potential (OT) good, to achieve stated therapy goals  -MS     Criteria for Skilled Therapeutic Interventions Met (OT) yes;meets criteria;skilled treatment is necessary  -MS     Therapy Frequency (OT) 3 times/wk   -MS     Predicted Duration of Therapy Intervention (OT) until d/c  -MS       Row Name 12/29/23 1112          Therapy Plan Review/Discharge Plan (OT)    Anticipated Discharge Disposition (OT) skilled nursing facility  -MS       Row Name 12/29/23 1112          Vital Signs    Pre Systolic BP Rehab 171  -MS     Pre Treatment Diastolic BP 83  -MS     Post Systolic BP Rehab 190  -MS     Post Treatment Diastolic BP 87  -MS     Pre SpO2 (%) 92  -MS     O2 Delivery Pre Treatment nasal cannula  3L  -MS     O2 Delivery Intra Treatment nasal cannula  3L  -MS     Post SpO2 (%) 100  -MS     O2 Delivery Post Treatment nasal cannula  3L  -MS     Pre Patient Position Supine  -MS     Intra Patient Position Sitting  -MS     Post Patient Position Supine  -MS     Recovery Time hypertensive  -MS       Row Name 12/29/23 1112          Positioning and Restraints    Pre-Treatment Position in bed  -MS     Post Treatment Position bed  -MS     In Bed notified nsg;supine;call light within reach;encouraged to call for assist;exit alarm on  -MS               User Key  (r) = Recorded By, (t) = Taken By, (c) = Cosigned By      Initials Name Provider Type    Belem Cormier, OT Occupational Therapist                   Outcome Measures       Row Name 12/29/23 1125          How much help from another is currently needed...    Putting on and taking off regular lower body clothing? 1  -MS     Bathing (including washing, rinsing, and drying) 1  -MS     Toileting (which includes using toilet bed pan or urinal) 1  -MS     Putting on and taking off regular upper body clothing 1  -MS     Taking care of personal grooming (such as brushing teeth) 2  -MS     Eating meals 2  -MS     AM-PAC 6 Clicks Score (OT) 8  -MS       Row Name 12/29/23 1125          Functional Assessment    Outcome Measure Options AM-PAC 6 Clicks Daily Activity (OT)  -MS               User Key  (r) = Recorded By, (t) = Taken By, (c) = Cosigned By      Initials Name Provider Type    MS  Belem Simms, NANCY Occupational Therapist                    Occupational Therapy Education       Title: PT OT SLP Therapies (Done)       Topic: Occupational Therapy (Done)       Point: ADL training (Done)       Description:   Instruct learner(s) on proper safety adaptation and remediation techniques during self care or transfers.   Instruct in proper use of assistive devices.                  Learning Progress Summary             Patient Acceptance, E,TB, VU by MS at 12/29/2023 1126                         Point: Precautions (Done)       Description:   Instruct learner(s) on prescribed precautions during self-care and functional transfers.                  Learning Progress Summary             Patient Acceptance, E,TB, VU by MS at 12/29/2023 1126                         Point: Body mechanics (Done)       Description:   Instruct learner(s) on proper positioning and spine alignment during self-care, functional mobility activities and/or exercises.                  Learning Progress Summary             Patient Acceptance, E,TB, VU by MS at 12/29/2023 1126                                         User Key       Initials Effective Dates Name Provider Type Discipline    MS 07/13/22 -  Belem Simms OT Occupational Therapist OT                  OT Recommendation and Plan  Planned Therapy Interventions (OT): activity tolerance training, adaptive equipment training, BADL retraining, functional balance retraining, IADL retraining, occupation/activity based interventions, passive ROM/stretching, patient/caregiver education/training, transfer/mobility retraining, strengthening exercise, ROM/therapeutic exercise  Therapy Frequency (OT): 3 times/wk  Plan of Care Review  Plan of Care Reviewed With: patient  Progress: no change  Outcome Evaluation: Pt is a 67 y/o F admitted to [facility] 12/27/23 with c/o LBP s/p fall. XR spine (-) fx. CT A/P indicates no acute findings. Contusion of coccyx (+). PMHx significant for HTN, hx breast  cancer L breast, bipolar 1 disorder, and HTN. At baseline, pt resides with spouse in [place], typically (I) with ADLs, occasional assist from spouse with transfers, (I) with medication management, household management and meal prep. Pt typically utilizes RW for household mobility, wc for long distance, unable to propel self. Pt reports typically sleeping in recliner. Pt wears 2.5-3L home O2. This date, pt A&Ox4 on 3L O2 and in supine upon arrival. Pt declined any movement upon arrival, however agreeable with max encouragement. Pt requires max A x2 for supine <>sit, tolerates sitting edge of bed with min A progressing to CGA ~10 minutes. Pt c/o decrease in LBP sitting edge of bed, however fatigued, returned to supine. Declines HOB elevated, despite education on importance including increase diaphagmatic breathing, prevention of developing PNA, deconditioning, and pressure relief. Pt verbalized understanding however continues to decline. Pt declines further progression of activity this date, therefore unable to assess functional transfers. Pt far below baseline and is not safe to dc home, would benefit from SNF when medically approrpiate. OT will coodinate with [facility] staff regarding pain medication to optimize mobility in future sessions.     Time Calculation:                   Belem Simms OT  12/29/2023

## 2023-12-29 NOTE — CASE MANAGEMENT/SOCIAL WORK
Continued Stay Note  [facility]     Patient Name: Morgan Quick  MRN: [id number]  Today's Date: 12/29/2023    Admit Date: 12/27/2023    Plan: From [facility]. Referred to [facility]; accepted. PASRR completed. Precert  started 12/29   Discharge Plan       Row Name 12/29/23 1438       Plan    Plan From [facility]. Referred to [facility]; accepted. PASRR completed. Precert  started 12/29    Plan Comments Barriers: Pending precert for  [facility]. CMA submitted precert. Lisa with [facility] accepted with ready bed when precert obtained. Will need ambulance transport                    Phone communication or documentation only - no physical contact with patient or family.   Marivel LEE,RN Case Manager  [facility]  Phone: Desk- [phone] cell- [phone]

## 2023-12-29 NOTE — DISCHARGE PLACEMENT REQUEST
"Jerrod Quick (68 y.o. Female)       Date of Birth   1955    Social Security Number       Address   [address] [place] [facility]    Home Phone   [phone]    MRN   [id number]       [employer]    Marital Status                               Admission Date   12/27/23    Admission Type   Emergency    Admitting Provider   Sathish Seals MD    Attending Provider   Sathish Seals MD    Department, Room/Bed   [facility] OBSERVATION, 227/1       Discharge Date       Discharge Disposition       Discharge Destination                                 Attending Provider: aSthish Seals MD    Allergies: Contrast Dye (Echo Or Unknown Ct/mr)    Isolation: None   Infection: None   Code Status: CPR    Ht: 165.1 cm (65\")   Wt: 112 kg (246 lb 11.1 oz)    Admission Cmt: None   Principal Problem: Low back pain [M54.50]                   Active Insurance as of 12/27/2023       Primary Coverage       Payor Plan Insurance Group Employer/Plan Group    ANTHEM MEDICARE REPLACEMENT ANTHEM MEDICARE ADVANTAGE [id number]       Payor Plan Address Payor Plan Phone Number Payor Plan Fax Number Effective Dates    [address] [phone]  7/1/2019 - None Entered    [facility] [address]         Subscriber Name Subscriber Birth Date Member ID       JERROD QUICK 1955 [id number]               Secondary Coverage       Payor Plan Insurance Group Employer/Plan Group    [place]        Payor Plan Address Payor Plan Phone Number Payor Plan Fax Number Effective Dates    [address]   7/24/2019 - None Entered    [place] [address]         Subscriber Name Subscriber Birth Date Member ID       JERROD QUICK 1955 [id number]                     Emergency Contacts        (Rel.) Home Phone Work Phone Mobile Phone    ELIELJERI (Spouse) [phone] -- [phone]    FamiliaArsen (Sister) -- -- [phone]      "            History & Physical        Gloria Lacy PA-C at 23 1049          [facility] H&P    Patient Name: Morgan Quick  : 1955  MRN: [id number]  Primary Care Physician: Wayne Saavedra MD  Date of admission: 2023     Patient Care Team:  Wayne Saavedra MD as PCP - General (Internal Medicine)          Subjective  History Present Illness     Chief Complaint:   Chief Complaint   Patient presents with    Back Pain     Back pain    Back Pain  Pertinent negatives include no bladder incontinence, numbness or paresthesias.        68-year-old white female with history of hypertension, thyroid disorder, high cholesterol, chronic pain presents today by EMS from assisted living facility with reports of a fall and pain in her lower back.  She states she was trying to get up from her chair to her walker when she lost her balance and fell backwards and her buttocks landed against the chair.  She denies striking her head or having LOC.  She complains of pain in her lower back and tailbone area.  Pain is worse with certain movements.  She denies any numbness tingling or weakness in either lower extremity.  She denies any bowel or bladder dysfunction or saddle anesthesia.  No fever.  She denies any other injuries.       Observation 23  Pt concurs with er hpi. Pt states she has no paraesthesias, no loss of bladder or bowel control. Pt has seen ortho spine at [place] and mri scheduled for chronic disc disease on Tuesday. Pt given pain medication and awaiting pt and ot eval. Pt fell from standing to chair on buttocks.       Review of Systems   Musculoskeletal:  Positive for back pain.   Genitourinary:  Negative for bladder incontinence.   Neurological:  Negative for focal weakness, numbness and paresthesias.       Personal History     Past Medical History:   Past Medical History:   Diagnosis Date    Bipolar 1 disorder     Breast cancer     Cancer     Disease of thyroid gland     High  cholesterol     Hypertension        Surgical History:      Past Surgical History:   Procedure Laterality Date    ABDOMINAL SURGERY      APPENDECTOMY      ENDOSCOPY N/A 9/25/2022    Procedure: ESOPHAGOGASTRODUODENOSCOPY with biopsy x 2 areas;  Surgeon: Deonte Wong MD;  Location: [facility] ENDOSCOPY;  Service: Gastroenterology;  Laterality: N/A;  post: gastritis, duodinitis, nodule,     HYSTERECTOMY      MASTECTOMY      L side           Family History: family history includes Cancer in her mother. Otherwise pertinent FHx was reviewed and unremarkable.     Social History:  reports that she has never smoked. She has never been exposed to tobacco smoke. She has never used smokeless tobacco. She reports that she does not drink alcohol and does not use drugs.      Medications:  Prior to Admission medications    Medication Sig Start Date End Date Taking? Authorizing Provider   furosemide (LASIX) 20 MG tablet Take 1 tablet by mouth Daily. 10/11/22  Yes Ladonna Redd DO   guaiFENesin (MUCINEX) 600 MG 12 hr tablet Take 2 tablets by mouth Every 12 (Twelve) Hours. 10/10/22  Yes Ladonna Redd DO   HYDROcodone-acetaminophen (NORCO)  MG per tablet Take 1 tablet by mouth 4 (Four) Times a Day.   Yes Brennon Serrano MD   levothyroxine (SYNTHROID, LEVOTHROID) 25 MCG tablet Take 1 tablet by mouth Every Morning. 10/11/22  Yes Ladonna Redd DO   lubiprostone (AMITIZA) 24 MCG capsule Take 1 capsule by mouth 2 (Two) Times a Day.   Yes Brennon Serrano MD   methylPREDNISolone (MEDROL) 4 MG tablet Take as directed 12/25/23 12/30/23 Yes Brennon Serrano MD   OXcarbazepine (TRILEPTAL) 150 MG tablet Take 1 tablet by mouth 2 (Two) Times a Day. 10/10/22  Yes Ladonna Redd DO   pantoprazole (PROTONIX) 40 MG EC tablet Take 1 tablet by mouth Every Morning. 10/11/22  Yes Ladonna Redd DO   potassium chloride (MICRO-K) 10 MEQ CR capsule Take 1 capsule by mouth Daily.   Yes Brennon Serrano MD   risperiDONE  (risperDAL) 0.25 MG tablet Take 1 tablet by mouth Every Night. 10/10/22  Yes Ladonna Redd DO   rosuvastatin (CRESTOR) 10 MG tablet Take 3 tablets by mouth Daily.   Yes ProviderBrennon MD   sertraline (ZOLOFT) 100 MG tablet Take 2 tablets by mouth Daily. 10/10/22  Yes Ladonna Redd DO   sodium chloride 1 g tablet Take 2 tablets by mouth 3 (Three) Times a Day With Meals. 10/10/22  Yes Ladonna Redd DO   traZODone (DESYREL) 50 MG tablet Take 0.5 tablets by mouth Every Night.   Yes Provider, MD Brennon       Allergies:    Allergies   Allergen Reactions    Contrast Dye (Echo Or Unknown Ct/Mr) Shortness Of Breath     Pt states she had trouble breathing when she had contrast in the past.        Objective  Objective     Vital Signs  Temp:  [97.9 °F (36.6 °C)-98.6 °F (37 °C)] 97.9 °F (36.6 °C)  Heart Rate:  [57-74] 62  Resp:  [16-20] 18  BP: (133-192)/(53-80) 152/66  SpO2:  [91 %-94 %] 92 %  on  Flow (L/min):  [2-3] 3;   Device (Oxygen Therapy): nasal cannula  Body mass index is 41.05 kg/m².    Physical Exam  Constitutional:       Appearance: She is obese.   Cardiovascular:      Rate and Rhythm: Normal rate and regular rhythm.      Pulses: Normal pulses.      Heart sounds: Normal heart sounds.   Pulmonary:      Effort: Pulmonary effort is normal.      Breath sounds: Normal breath sounds.   Musculoskeletal:      Comments: Pain with rom   Skin:     General: Skin is warm and dry.   Neurological:      General: No focal deficit present.      Mental Status: She is alert and oriented to person, place, and time.   Psychiatric:         Mood and Affect: Mood normal.         Behavior: Behavior normal.           Results Review:  I have personally reviewed most recent lab results and radiology images and interpretations and agree with findings, most notably: cbc, bmp, xr spine and ct abd.    Results from last 7 days   Lab Units 12/28/23  0359   WBC 10*3/mm3 8.20   HEMOGLOBIN g/dL 12.0   HEMATOCRIT % 37.1   PLATELETS  10*3/mm3 252     Results from last 7 days   Lab Units 12/28/23  0359   SODIUM mmol/L 141   POTASSIUM mmol/L 3.8   CHLORIDE mmol/L 99   CO2 mmol/L 32.0*   BUN mg/dL 10   CREATININE mg/dL 0.51*   GLUCOSE mg/dL 90   CALCIUM mg/dL 9.0     Estimated Creatinine Clearance: 131.7 mL/min (A) (by C-G formula based on SCr of 0.51 mg/dL (L)).  Brief Urine Lab Results       None            Microbiology Results (last 10 days)       ** No results found for the last 240 hours. **            ECG/EMG Results (most recent)       None                Results for orders placed during the hospital encounter of 09/24/22    Adult Transthoracic Echo Complete W/ Cont if Necessary Per Protocol    Interpretation Summary  · Estimated left ventricular EF was in agreement with the calculated left ventricular EF. Left ventricular ejection fraction appears to be 56 - 60%. Left ventricular systolic function is normal.  · There is calcification of the aortic valve mainly affecting the non-coronary, left coronary and right coronary cusp(s).  · The study is technically difficult for diagnosis.      CT Abdomen Pelvis Without Contrast    Result Date: 12/27/2023  Impression: No acute findings in the abdomen or pelvis. No acute osseous injury. Electronically Signed: Kasia Paul MD  12/27/2023 12:51 PM CST  Workstation ID: [id number]       Estimated Creatinine Clearance: 131.7 mL/min (A) (by C-G formula based on SCr of 0.51 mg/dL (L)).    Assessment & Plan  Assessment/Plan       Active Hospital Problems    Diagnosis  POA    **Low back pain [M54.50]  Yes      Resolved Hospital Problems   No resolved problems to display.       Low back pain s/p fall  - cbc and bmp are unremarkable  - xr spine lumbar 12/22/23 reviewed and showing mild degenerative disc disease   - ct abd reviewed and showing a Schmorl's node invagination along the superior endplate of L3 which appears unchanged. New Schmorl's node invagination is noted at L4 and L5. No evidence for acute  osseous injury in the lumbar spine. The coccyx appears intact.   - pain medication  - pt and ot consult  - pt should need 30 days or less of skilled nursing       Hypothyroidism  - cont synthroid    Bipolar disorder  - cont trileptal, risperidone, zoloft    Hx of chf  - cont lasix and potassium replacement    HPL  - cont statin      VTE Prophylaxis -   Mechanical Order History:        Ordered        12/27/23 1838  Place Sequential Compression Device  Once            12/27/23 1838  Maintain Sequential Compression Device  Continuous                          Pharmalogical Order History:       None            CODE STATUS:    Code Status and Medical Interventions:   Ordered at: 12/27/23 1629     Code Status (Patient has no pulse and is not breathing):    CPR (Attempt to Resuscitate)     Medical Interventions (Patient has pulse or is breathing):    Full Support       This patient has been examined wearing personal protective equipment.     I discussed the patient's findings and my recommendations with patient and nursing staff.      Signature:Electronically signed by Gloria Lacy PA-C, 12/28/23, 10:49 AM EST.             Electronically signed by Gloria Lacy PA-C at 12/28/23 1352       Operative/Procedure Notes (all)    No notes of this type exist for this encounter.       Physician Progress Notes (all)    No notes of this type exist for this encounter.       Consult Notes (all)    No notes of this type exist for this encounter.          Physical Therapy Notes (last 24 hours)        Makeda Pacheco at 12/28/23 5826  Version 1 of 1         Subjective: Pt agreeable to therapeutic plan of care.    Objective:     Bed mobility - Mod-A with rolling; increased time; mobility limited by pain; DEBBIE rolling for hygiene / linen change.   Transfers - N/A or Not attempted.  Ambulation -  N/A or Not attempted.      Vitals: WNL    Pain: 8 VAS   Location: low back; DEBBIE knees  Intervention for pain: Repositioned, RN provided  "medication, RN notified, and Therapeutic Presence    Education: Provided education on the importance of mobility in the acute care setting, Verbal/Tactile Cues, and Transfer Training    Assessment: Morgan Quick presents with functional mobility impairments which indicate the need for skilled intervention. Pt's mobility continues to be severely limited by back pain - due to significant functional decline, follow-up recommendation modified to SNF. Will continue to follow and progress as tolerated.     Plan/Recommendations:   If medically appropriate, Moderate Intensity Therapy recommended post-acute care. This is recommended as therapy feels the patient would require 3-4 days per week and wouldn't tolerate \"3 hour daily\" rehab intensity. SNF would be the preferred choice. If the patient does not agree to SNF, arrange HH or OP depending on home bound status. If patient is medically complex, consider LTACH. Pt requires rolling walker at discharge.     Pt desires Skilled Rehab placement at discharge. Pt cooperative; agreeable to therapeutic recommendations and plan of care.     Post-Tx Position: Supine with HOB Elevated, Alarms activated, and Call light and personal items within reach  PPE: gloves, surgical mask, and gown     Electronically signed by Makeda Pacheco at 12/28/23 1602       Makeda Pacheco at 12/28/23 1602  Version 1 of 1         Assessment: Morgan Quick presents with functional mobility impairments which indicate the need for skilled intervention. Pt's mobility continues to be severely limited by back pain - due to significant functional decline, follow-up recommendation modified to SNF. Will continue to follow and progress as tolerated.     Plan/Recommendations:   If medically appropriate, Moderate Intensity Therapy recommended post-acute care. This is recommended as therapy feels the patient would require 3-4 days per week and wouldn't tolerate \"3 hour daily\" rehab intensity. SNF would " "be the preferred choice. If the patient does not agree to SNF, arrange HH or OP depending on home bound status. If patient is medically complex, consider LTACH. Pt requires rolling walker at discharge.     Pt desires Skilled Rehab placement at discharge. Pt cooperative; agreeable to therapeutic recommendations and plan of care.                   Anticipated Discharge Disposition (PT): skilled nursing facility    Electronically signed by Makeda Pacheoc at 12/28/23 4583       José Luis Estrada PTA at 12/29/23 1653  Version 1 of 1         Subjective: Pt agreeable to therapeutic plan of care.    Objective:     Bed mobility - Max-A and Assist x 2 via log roll  Transfers - N/A or Not attempted.  Ambulation -  N/A or Not attempted.    Vitals: Hypertensive; 190/87 seated eob    Pain: 8 VAS   Location: Lumbar  Intervention for pain: Repositioned, RN provided medication, Increased Activity, and Therapeutic Presence    Education: Provided education on the importance of mobility in the acute care setting, Verbal/Tactile Cues, Transfer Training, and Energy conservation strategies    Assessment: Morgan Quick presents with functional mobility impairments which indicate the need for skilled intervention. Pt able to perform log roll towards L side with significantly increased time and encouragement to sit eob. Pt able to sit eob ~10 minutes with mod-max A to maintain sitting balance. Pt c/o increased lumbar pain requesting to return supine. Tolerating session today without incident. Will continue to follow and progress as tolerated.     Plan/Recommendations:   If medically appropriate, Moderate Intensity Therapy recommended post-acute care. This is recommended as therapy feels the patient would require 3-4 days per week and wouldn't tolerate \"3 hour daily\" rehab intensity. SNF would be the preferred choice. If the patient does not agree to SNF, arrange HH or OP depending on home bound status. If patient is medically complex, " consider LTACH. Pt requires no DME at discharge.     Pt desires Skilled Rehab placement at discharge. Pt cooperative; agreeable to therapeutic recommendations and plan of care.         Basic Mobility 6-click:  Rollin = Total, A lot = 2, A little = 3; 4 = None  Supine>Sit:   1 = Total, A lot = 2, A little = 3; 4 = None   Sit>Stand with arms:  1 = Total, A lot = 2, A little = 3; 4 = None  Bed>Chair:   1 = Total, A lot = 2, A little = 3; 4 = None  Ambulate in room:  1 = Total, A lot = 2, A little = 3; 4 = None  3-5 Steps with railin = Total, A lot = 2, A little = 3; 4 = None  Score: 8    Modified Kevin: N/A = No pre-op stroke/TIA    Post-Tx Position: Alarms activated and Call light and personal items within reach; supine in bed  PPE: gloves and surgical mask        Electronically signed by José Luis Estrada PTA at 23 1115       José Luis Estrada PTA at 23 1115  Version 1 of 1         Goal Outcome Evaluation:          Morgan Quick presents with functional mobility impairments which indicate the need for skilled intervention. Pt able to perform log roll towards L side with significantly increased time and encouragement to sit eob. Pt able to sit eob ~10 minutes with mod-max A to maintain sitting balance. Pt c/o increased lumbar pain requesting to return supine. Tolerating session today without incident. Will continue to follow and progress as tolerated.                  Electronically signed by José Luis Estrada PTA at 23 1116          Occupational Therapy Notes (last 24 hours)        Belem Simms OT at 23 1126          Patient Name: Morgan Quick  : 1955    MRN: [id number]                              Today's Date: 2023       Admit Date: 2023    Visit Dx:     ICD-10-CM ICD-9-CM   1. Fall, initial encounter  W19.XXXA E888.9   2. Contusion of coccyx, initial encounter  S30.0XXA 922.32   3. Acute midline low back pain without sciatica  M54.50 724.2     Patient  Active Problem List   Diagnosis    Morbidly obese    Essential hypertension    Malignant neoplasm of overlapping sites of left breast in female, estrogen receptor positive    Primary cancer of left female breast    Abdominal pain, unspecified abdominal location    Bipolar 1 disorder    High cholesterol    Anorexia    Nausea    Low back pain     Past Medical History:   Diagnosis Date    Bipolar 1 disorder     Breast cancer     Cancer     Disease of thyroid gland     High cholesterol     Hypertension      Past Surgical History:   Procedure Laterality Date    ABDOMINAL SURGERY      APPENDECTOMY      ENDOSCOPY N/A 9/25/2022    Procedure: ESOPHAGOGASTRODUODENOSCOPY with biopsy x 2 areas;  Surgeon: Deonte Wong MD;  Location: [facility] ENDOSCOPY;  Service: Gastroenterology;  Laterality: N/A;  post: gastritis, duodinitis, nodule,     HYSTERECTOMY      MASTECTOMY      L side      General Information       Row Name 12/29/23 1107          OT Time and Intention    Document Type evaluation  -MS     Mode of Treatment occupational therapy  -MS       Row Name 12/29/23 1107          General Information    Patient Profile Reviewed yes  -MS     Prior Level of Function independent:;min assist:;ADL's;transfer  -MS     Existing Precautions/Restrictions fall;oxygen therapy device and L/min  currently 3L O2, 2.5-3L home O2  -MS     Barriers to Rehab medically complex;ineffective coping;previous functional deficit  -MS       Row Name 12/29/23 1107          Occupational Profile    Reason for Services/Referral (Occupational Profile) Pt is a 69 y/o F admitted [facility] with c/o LBP s/p fall. XR spine (-) fx. CT A/P indicates no acute findings. Contusion of coccyx (+). PMHx significant for HTN, hx breast cancer L breast, bipolar 1 disorder, and HTN. At baseline, pt resides with spouse in [facility], typically (I) with ADLs, occasional assist from spouse with transfers, (I) with medication management, household management and meal prep. Pt  typically utilizes RW for household mobility, wc for long distance, unable to propel self. Pt reports typically sleeping in recliner. Pt wears 2.5-3L home O2.  -MS     Environmental Supports and Barriers (Occupational Profile) supportive spouse  -MS       Row Name 12/29/23 1107          Living Environment    People in Home spouse;other (see comments)  FPC  -MS       Row Name 12/29/23 1107          Cognition    Orientation Status (Cognition) oriented x 4  -MS       Row Name 12/29/23 1107          Safety Issues, Functional Mobility    Safety Issues Affecting Function (Mobility) judgment;problem-solving  -MS     Impairments Affecting Function (Mobility) balance;endurance/activity tolerance;pain;strength  -MS     Comment, Safety Issues/Impairments (Mobility) resistive to movement  -MS               User Key  (r) = Recorded By, (t) = Taken By, (c) = Cosigned By      Initials Name Provider Type    MS Belem Simms OT Occupational Therapist                     Mobility/ADL's       Row Name 12/29/23 1108          Bed Mobility    Bed Mobility supine-sit;sit-supine  -MS     Supine-Sit [place] (Bed Mobility) 2 person assist;maximum assist (25% patient effort)  -MS     Sit-Supine [place] (Bed Mobility) 2 person assist;maximum assist (25% patient effort)  -MS     Bed Mobility, Safety Issues decreased use of legs for bridging/pushing;decreased use of arms for pushing/pulling;impaired trunk control for bed mobility  -MS     Comment, (Bed Mobility) guarded and resistive to movement, requiring increased asisist  -MS       Row Name 12/29/23 1108          Transfers    Transfers sit-stand transfer  -MS     Comment, (Transfers) delcined transfers this date  -MS       Row Name 12/29/23 1108          Activities of Daily Living    BADL Assessment/Intervention toileting  -MS       Row Name 12/29/23 1108          Toileting Assessment/Training    Position (Toileting) supine  -MS     Comment, (Toileting) per nsg pt has required alexandra  pan and external cath d/t significant pain limiting mobility  -MS               User Key  (r) = Recorded By, (t) = Taken By, (c) = Cosigned By      Initials Name Provider Type    Belem Cormier OT Occupational Therapist                   Obj/Interventions       Row Name 12/29/23 1110          Sensory Assessment (Somatosensory)    Sensory Assessment (Somatosensory) UE sensation intact  -MS       Row Name 12/29/23 1110          Vision Assessment/Intervention    Visual Impairment/Limitations WNL  -MS       Row Name 12/29/23 1110          Range of Motion Comprehensive    Comment, General Range of Motion unable to formally assess BUE d/t pt being guarded and significant pain, appears WFL  -MS       Row Name 12/29/23 1110          Strength Comprehensive (MMT)    Comment, General Manual Muscle Testing (MMT) Assessment unable to formally assess BUE d/t pain, appears 4-/5  -MS       Row Name 12/29/23 1110          Balance    Balance Assessment sitting static balance  -MS     Static Sitting Balance contact guard;minimal assist  -MS     Position, Sitting Balance unsupported;sitting edge of bed  -MS     Comment, Balance initially requiring min A, however once in seated position for extended time, pt able to begin to relax and utilzie UE and trunk control, requiring CGA  -MS               User Key  (r) = Recorded By, (t) = Taken By, (c) = Cosigned By      Initials Name Provider Type    Belem Cormier OT Occupational Therapist                   Goals/Plan       Row Name 12/29/23 1123          Bed Mobility Goal 1 (OT)    Activity/Assistive Device (Bed Mobility Goal 1, OT) bed mobility activities, all  -MS     [place] Level/Cues Needed (Bed Mobility Goal 1, OT) minimum assist (75% or more patient effort)  -MS     Time Frame (Bed Mobility Goal 1, OT) long term goal (LTG);2 weeks  -MS     Progress/Outcomes (Bed Mobility Goal 1, OT) new goal  -MS       Row Name 12/29/23 1123          Transfer Goal 1 (OT)     Activity/Assistive Device (Transfer Goal 1, OT) transfers, all  -MS     [place] Level/Cues Needed (Transfer Goal 1, OT) minimum assist (75% or more patient effort)  -MS     Time Frame (Transfer Goal 1, OT) long term goal (LTG);2 weeks  -MS     Progress/Outcome (Transfer Goal 1, OT) new goal  -MS       Row Name 12/29/23 1123          Toileting Goal 1 (OT)    Activity/Device (Toileting Goal 1, OT) toileting skills, all;commode, bedside without drop arms  -MS     [place] Level/Cues Needed (Toileting Goal 1, OT) minimum assist (75% or more patient effort)  -MS     Time Frame (Toileting Goal 1, OT) long term goal (LTG);2 weeks  -MS     Progress/Outcome (Toileting Goal 1, OT) new goal  -MS       Row Name 12/29/23 1123          Therapy Assessment/Plan (OT)    Planned Therapy Interventions (OT) activity tolerance training;adaptive equipment training;BADL retraining;functional balance retraining;IADL retraining;occupation/activity based interventions;passive ROM/stretching;patient/caregiver education/training;transfer/mobility retraining;strengthening exercise;ROM/therapeutic exercise  -MS               User Key  (r) = Recorded By, (t) = Taken By, (c) = Cosigned By      Initials Name Provider Type    MS Belem Simms, OT Occupational Therapist                   Clinical Impression       Row Name 12/29/23 1112          Pain Assessment    Pretreatment Pain Rating 10/10  -MS     Posttreatment Pain Rating 10/10  -MS     Pain Location lower  -MS     Pain Location - back  -MS     Pre/Posttreatment Pain Comment nsg administered pain medication  -MS     Pain Intervention(s) Repositioned;Emotional support;Medication (See MAR)  -MS       Row Name 12/29/23 1112          Plan of Care Review    Plan of Care Reviewed With patient  -MS     Progress no change  -MS     Outcome Evaluation Pt is a 69 y/o F admitted to [facility] 12/27/23 with c/o LBP s/p fall. XR spine (-) fx. CT A/P indicates no acute findings. Contusion of coccyx (+).  PMHx significant for HTN, hx breast cancer L breast, bipolar 1 disorder, and HTN. At baseline, pt resides with spouse in [facility], typically (I) with ADLs, occasional assist from spouse with transfers, (I) with medication management, household management and meal prep. Pt typically utilizes RW for household mobility, wc for long distance, unable to propel self. Pt reports typically sleeping in recliner. Pt wears 2.5-3L home O2. This date, pt A&Ox4 on 3L O2 and in supine upon arrival. Pt declined any movement upon arrival, however agreeable with max encouragement. Pt requires max A x2 for supine <>sit, tolerates sitting edge of bed with min A progressing to CGA ~10 minutes. Pt c/o decrease in LBP sitting edge of bed, however fatigued, returned to supine. Declines HOB elevated, despite education on importance including increase diaphagmatic breathing, prevention of developing PNA, deconditioning, and pressure relief. Pt verbalized understanding however continues to decline. Pt declines further progression of activity this date, therefore unable to assess functional transfers. Pt far below baseline and is not safe to dc home, would benefit from SNF when medically approrpiate. OT will coodinate with ns staff regarding pain medication to optimize mobility in future sessions.  -MS       Row Name 12/29/23 1112          Therapy Assessment/Plan (OT)    Rehab Potential (OT) good, to achieve stated therapy goals  -MS     Criteria for Skilled Therapeutic Interventions Met (OT) yes;meets criteria;skilled treatment is necessary  -MS     Therapy Frequency (OT) 3 times/wk  -MS     Predicted Duration of Therapy Intervention (OT) until d/c  -MS       Row Name 12/29/23 1112          Therapy Plan Review/Discharge Plan (OT)    Anticipated Discharge Disposition (OT) skilled nursing facility  -MS       Row Name 12/29/23 1112          Vital Signs    Pre Systolic BP Rehab 171  -MS     Pre Treatment Diastolic BP 83  -MS     Post Systolic BP  Rehab 190  -MS     Post Treatment Diastolic BP 87  -MS     Pre SpO2 (%) 92  -MS     O2 Delivery Pre Treatment nasal cannula  3L  -MS     O2 Delivery Intra Treatment nasal cannula  3L  -MS     Post SpO2 (%) 100  -MS     O2 Delivery Post Treatment nasal cannula  3L  -MS     Pre Patient Position Supine  -MS     Intra Patient Position Sitting  -MS     Post Patient Position Supine  -MS     Recovery Time hypertensive  -MS       Row Name 12/29/23 1112          Positioning and Restraints    Pre-Treatment Position in bed  -MS     Post Treatment Position bed  -MS     In Bed notified nsg;supine;call light within reach;encouraged to call for assist;exit alarm on  -MS               User Key  (r) = Recorded By, (t) = Taken By, (c) = Cosigned By      Initials Name Provider Type    Belem Cormier OT Occupational Therapist                   Outcome Measures       Row Name 12/29/23 1125          How much help from another is currently needed...    Putting on and taking off regular lower body clothing? 1  -MS     Bathing (including washing, rinsing, and drying) 1  -MS     Toileting (which includes using toilet bed pan or urinal) 1  -MS     Putting on and taking off regular upper body clothing 1  -MS     Taking care of personal grooming (such as brushing teeth) 2  -MS     Eating meals 2  -MS     AM-PAC 6 Clicks Score (OT) 8  -MS       Row Name 12/29/23 1125          Functional Assessment    Outcome Measure Options AM-PAC 6 Clicks Daily Activity (OT)  -MS               User Key  (r) = Recorded By, (t) = Taken By, (c) = Cosigned By      Initials Name Provider Type    Belem Cormier OT Occupational Therapist                    Occupational Therapy Education       Title: PT OT SLP Therapies (Done)       Topic: Occupational Therapy (Done)       Point: ADL training (Done)       Description:   Instruct learner(s) on proper safety adaptation and remediation techniques during self care or transfers.   Instruct in proper use of assistive  devices.                  Learning Progress Summary             Patient Acceptance, E,TB, VU by MS at 12/29/2023 1126                         Point: Precautions (Done)       Description:   Instruct learner(s) on prescribed precautions during self-care and functional transfers.                  Learning Progress Summary             Patient Acceptance, E,TB, VU by MS at 12/29/2023 1126                         Point: Body mechanics (Done)       Description:   Instruct learner(s) on proper positioning and spine alignment during self-care, functional mobility activities and/or exercises.                  Learning Progress Summary             Patient Acceptance, E,TB, VU by MS at 12/29/2023 1126                                         User Key       Initials Effective Dates Name Provider Type Discipline    MS 07/13/22 -  Belem Simms, OT Occupational Therapist OT                  OT Recommendation and Plan  Planned Therapy Interventions (OT): activity tolerance training, adaptive equipment training, BADL retraining, functional balance retraining, IADL retraining, occupation/activity based interventions, passive ROM/stretching, patient/caregiver education/training, transfer/mobility retraining, strengthening exercise, ROM/therapeutic exercise  Therapy Frequency (OT): 3 times/wk  Plan of Care Review  Plan of Care Reviewed With: patient  Progress: no change  Outcome Evaluation: Pt is a 67 y/o F admitted to [facility] 12/27/23 with c/o LBP s/p fall. XR spine (-) fx. CT A/P indicates no acute findings. Contusion of coccyx (+). PMHx significant for HTN, hx breast cancer L breast, bipolar 1 disorder, and HTN. At baseline, pt resides with spouse in [facility], typically (I) with ADLs, occasional assist from spouse with transfers, (I) with medication management, household management and meal prep. Pt typically utilizes RW for household mobility, wc for long distance, unable to propel self. Pt reports typically sleeping in recliner. Pt wears  2.5-3L home O2. This date, pt A&Ox4 on 3L O2 and in supine upon arrival. Pt declined any movement upon arrival, however agreeable with max encouragement. Pt requires max A x2 for supine <>sit, tolerates sitting edge of bed with min A progressing to CGA ~10 minutes. Pt c/o decrease in LBP sitting edge of bed, however fatigued, returned to supine. Declines HOB elevated, despite education on importance including increase diaphagmatic breathing, prevention of developing PNA, deconditioning, and pressure relief. Pt verbalized understanding however continues to decline. Pt declines further progression of activity this date, therefore unable to assess functional transfers. Pt far below baseline and is not safe to dc home, would benefit from SNF when medically approrpiate. OT will coodinate with [facility] staff regarding pain medication to optimize mobility in future sessions.     Time Calculation:                   Belem Simms OT  12/29/2023    Electronically signed by Belem Simms OT at 12/29/23 1126       Belem Simms OT at 12/29/23 1126          Goal Outcome Evaluation:  Plan of Care Reviewed With: patient        Progress: no change  Outcome Evaluation: Pt is a 69 y/o F admitted to [facility] 12/27/23 with c/o LBP s/p fall. XR spine (-) fx. CT A/P indicates no acute findings. Contusion of coccyx (+). PMHx significant for HTN, hx breast cancer L breast, bipolar 1 disorder, and HTN. At baseline, pt [place], typically (I) with ADLs, occasional assist from spouse with transfers, (I) with medication management, household management and meal prep. Pt typically utilizes RW for household mobility, wc for long distance, unable to propel self. Pt reports typically sleeping in recliner. Pt wears 2.5-3L home O2. This date, pt A&Ox4 on 3L O2 and in supine upon arrival. Pt declined any movement upon arrival, however agreeable with max encouragement. Pt requires max A x2 for supine <>sit, tolerates sitting edge of bed  with min A progressing to CGA ~10 minutes. Pt c/o decrease in LBP sitting edge of bed, however fatigued, returned to supine. Declines HOB elevated, despite education on importance including increase diaphagmatic breathing, prevention of developing PNA, deconditioning, and pressure relief. Pt verbalized understanding however continues to decline. Pt declines further progression of activity this date, therefore unable to assess functional transfers. Pt far below baseline and is not safe to dc home, would benefit from SNF when medically approrpiate. OT will coodinate with [facility] staff regarding pain medication to optimize mobility in future sessions.      Anticipated Discharge Disposition (OT): skilled nursing facility    Electronically signed by Belem Simms OT at 23 1126       Speech Language Pathology Notes (most recent note)    No notes exist for this encounter.       Syl Grigsby, PT   Physical Therapist  Specialty:  Physical Therapy  Therapy Evaluation      Signed  Date of Service:  23  Creation Time:  23     Signed        Expand All Collapse All  Patient Name: Morgan Quick               : 1955                      MRN: [id number]                              Today's Date: 2023                                 Admit Date: 2023                      Visit Dx:   Visit Diagnosis       ICD-10-CM ICD-9-CM   1. Fall, initial encounter  W19.XXXA E888.9   2. Contusion of coccyx, initial encounter  S30.0XXA 922.32   3. Acute midline low back pain without sciatica  M54.50 724.2         Problem List       Patient Active Problem List   Diagnosis    Morbidly obese    Essential hypertension    Malignant neoplasm of overlapping sites of left breast in female, estrogen receptor positive    Primary cancer of left female breast    Abdominal pain, unspecified abdominal location    Bipolar 1 disorder    High cholesterol    Anorexia    Nausea    Low back pain         Medical  History        Past Medical History:   Diagnosis Date    Bipolar 1 disorder      Breast cancer      Cancer      Disease of thyroid gland      High cholesterol      Hypertension           Surgical History         Past Surgical History:   Procedure Laterality Date    ABDOMINAL SURGERY        APPENDECTOMY        ENDOSCOPY N/A 9/25/2022     Procedure: ESOPHAGOGASTRODUODENOSCOPY with biopsy x 2 areas;  Surgeon: Deonte Wong MD;  Location: [facility] ENDOSCOPY;  Service: Gastroenterology;  Laterality: N/A;  post: gastritis, duodinitis, nodule,     HYSTERECTOMY        MASTECTOMY         L side           General Information         Row Name 12/27/23 1606                 Physical Therapy Time and Intention     Document Type evaluation  -OD       Mode of Treatment physical therapy  -OD          Row Name 12/27/23 1606                 General Information     Patient Profile Reviewed yes  -OD       Prior Level of Function independent:;min assist:  Pt lives at [facility] with her spouse. Pt typically indep with ADLs and mobility using RW, sometimes receives Tate from her spouse with transfers.  -OD       Existing Precautions/Restrictions fall  -OD       Barriers to Rehab medically complex;ineffective coping  -OD          Row Name 12/27/23 1606                 Living Environment     People in Home spouse  -OD          Row Name 12/27/23 1606                 Cognition     Orientation Status (Cognition) oriented x 4  -OD          Row Name 12/27/23 1606                 Safety Issues, Functional Mobility     Impairments Affecting Function (Mobility) pain;strength;endurance/activity tolerance  -OD                       User Key  (r) = Recorded By, (t) = Taken By, (c) = Cosigned By        Initials Name Provider Type     OD Syl Grigsby PT Physical Therapist                            Mobility         Row Name 12/27/23 1607                 Bed Mobility     Bed Mobility rolling left;rolling right  -OD       Rolling Left [place]  (Bed Mobility) modified independence  -OD       Rolling Right Bath (Bed Mobility) modified independence  -OD       Comment, (Bed Mobility) Pt guarded during bed mobility d/t severe pain.  -OD                       User Key  (r) = Recorded By, (t) = Taken By, (c) = Cosigned By        Initials Name Provider Type     Syl Salazar, ALO Physical Therapist                            Obj/Interventions         Row Name 12/27/23 1607                 Range of Motion Comprehensive     Comment, General Range of Motion Unable to formally assess ROM d/t patient's pain and request to perform bed mobiltiy on her own.  -OD          Row Name 12/27/23 1607                 Strength Comprehensive (MMT)     Comment, General Manual Muscle Testing (MMT) Assessment Unable to formally assess MMT d/t patient's pain and request to perform bed mobiltiy on her own.  -OD                       User Key  (r) = Recorded By, (t) = Taken By, (c) = Cosigned By        Initials Name Provider Type     Syl Salazar, PT Physical Therapist                            Goals/Plan         Row Name 12/27/23 1618                 Bed Mobility Goal 1 (PT)     Activity/Assistive Device (Bed Mobility Goal 1, PT) bed mobility activities, all  -OD       Bath Level/Cues Needed (Bed Mobility Goal 1, PT) independent  -OD       Time Frame (Bed Mobility Goal 1, PT) long term goal (LTG);2 weeks  -OD          Row Name 12/27/23 1618                 Transfer Goal 1 (PT)     Activity/Assistive Device (Transfer Goal 1, PT) transfers, all;walker, rolling  -OD       Bath Level/Cues Needed (Transfer Goal 1, PT) modified independence  -OD       Time Frame (Transfer Goal 1, PT) long term goal (LTG);2 weeks  -OD          Row Name 12/27/23 1618                 Gait Training Goal 1 (PT)     Activity/Assistive Device (Gait Training Goal 1, PT) gait (walking locomotion);assistive device use;walker, rolling  -OD       Bath Level (Gait Training Goal 1,  PT) modified independence  -OD       Distance (Gait Training Goal 1, PT) 50 feet  -OD       Time Frame (Gait Training Goal 1, PT) long term goal (LTG);2 weeks  -OD          Row Name 12/27/23 8386                 Therapy Assessment/Plan (PT)     Planned Therapy Interventions (PT) balance training;bed mobility training;gait training;home exercise program;neuromuscular re-education;postural re-education;transfer training;ROM (range of motion);strengthening;stretching;patient/family education  -OD                    User Key  (r) = Recorded By, (t) = Taken By, (c) = Cosigned By        Initials Name Provider Type     OD Syl Grigsby, PT Physical Therapist                         Clinical Impression         Row Name 12/27/23 1604                 Pain     Pretreatment Pain Rating 10/10  -OD       Posttreatment Pain Rating 10/10  -OD       Pain Location - Side/Orientation Right  -OD       Pain Location lower  -OD       Pain Location - back;extremity  -OD          Row Name 12/27/23 160                 Plan of Care Review     Plan of Care Reviewed With patient  -OD       Progress no change  -OD       Outcome Evaluation Morgan Quick is a 69 y/o F who presents to [facility] after a fall accompanied by severe low back pain. X-ray and CT (-) for acute abnormalities. Pt reports she was attempting to stand up with her RW and spouse assist, when her back gave out and she fell back into the chair. Pt reported significant R-sided LBP with referral down RLE to her foot. At baseline, pt is indep-Tate with ADLs and mobility using RW, living at [facility] with her spouse. Pt currently unable to complete mobility or allow assist for positioning / assessing d/t severe pain. Pt demo valsalva maneuver several times causing drop in her O2 saturation when her pain would exacerbate. PT will follow up tomorrow when her pain is controlled to assess safety to return to [facility] as well as provide positioning/strengthening maneuvers to help reduce  pain.  -OD          Row Name 12/27/23 1608                 Therapy Assessment/Plan (PT)     Rehab Potential (PT) good, to achieve stated therapy goals  -OD       Criteria for Skilled Interventions Met (PT) yes;meets criteria  -OD       Therapy Frequency (PT) 5 times/wk  -OD       Predicted Duration of Therapy Intervention (PT) until d/c  -OD          Row Name 12/27/23 1608                 Vital Signs     Pre SpO2 (%) 91  -OD       O2 Delivery Pre Treatment nasal cannula  3L  -OD       Intra SpO2 (%) 87  -OD       O2 Delivery Intra Treatment nasal cannula  -OD       Post SpO2 (%) 92  -OD       O2 Delivery Post Treatment nasal cannula  -OD       Pre Patient Position Side Lying  -OD       Intra Patient Position Supine  -OD       Post Patient Position Supine  -OD          Row Name 12/27/23 1608                 Positioning and Restraints     Pre-Treatment Position in bed  -OD       Post Treatment Position bed  -OD       In Bed notified nsg;supine;with family/caregiver  -OD                       User Key  (r) = Recorded By, (t) = Taken By, (c) = Cosigned By        Initials Name Provider Type     OD Syl Grigsby, PT Physical Therapist                            Outcome Measures         Row Name 12/27/23 1619                 How much help from another person do you currently need...     Turning from your back to your side while in flat bed without using bedrails? 3  -OD       Moving from lying on back to sitting on the side of a flat bed without bedrails? 3  -OD       Moving to and from a bed to a chair (including a wheelchair)? 3  -OD       Standing up from a chair using your arms (e.g., wheelchair, bedside chair)? 3  -OD       Climbing 3-5 steps with a railing? 2  -OD       To walk in hospital room? 3  -OD       AM-PAC 6 Clicks Score (PT) 17  -OD       Highest Level of Mobility Goal 5 --> Static standing  -OD          Row Name 12/27/23 1619                 Functional Assessment     Outcome Measure Options AM-PAC 6  Clicks Basic Mobility (PT)  -OD                       User Key  (r) = Recorded By, (t) = Taken By, (c) = Cosigned By        Initials Name Provider Type     OD Syl Grigsby, ALO Physical Therapist                          Physical Therapy Education            Title: PT OT SLP Therapies (Done)         Topic: Physical Therapy (Done)         Point: Mobility training (Done)         Learning Progress Summary               Patient Acceptance, E, VU by OD at 12/27/2023 1619                               Point: Home exercise program (Done)         Learning Progress Summary               Patient Acceptance, E, VU by OD at 12/27/2023 1619                               Point: Body mechanics (Done)         Learning Progress Summary               Patient Acceptance, E, VU by OD at 12/27/2023 1619                               Point: Precautions (Done)         Learning Progress Summary               Patient Acceptance, E, VU by OD at 12/27/2023 1619                                                   User Key         Initials Effective Dates Name Provider Type Discipline     OD 05/11/23 -  Syl Grigsby PT Physical Therapist PT                          PT Recommendation and Plan  Planned Therapy Interventions (PT): balance training, bed mobility training, gait training, home exercise program, neuromuscular re-education, postural re-education, transfer training, ROM (range of motion), strengthening, stretching, patient/family education  Plan of Care Reviewed With: patient  Progress: no change  Outcome Evaluation: Morgan Quick is a 69 y/o F who presents to [facility] after a fall accompanied by severe low back pain. X-ray and CT (-) for acute abnormalities. Pt reports she was attempting to stand up with her RW and spouse assist, when her back gave out and she fell back into the chair. Pt reported significant R-sided LBP with referral down RLE to her foot. At baseline, pt is indep-Tate with ADLs and mobility using RW, living at  [facility] with her spouse. Pt currently unable to complete mobility or allow assist for positioning / assessing d/t severe pain. Pt demo valsalva maneuver several times causing drop in her O2 saturation when her pain would exacerbate. PT will follow up tomorrow when her pain is controlled to assess safety to return to [facility] as well as provide positioning/strengthening maneuvers to help reduce pain.      Time Calculation:        PT Charges         Row Name 12/27/23 1620                       Time Calculation     Start Time 1445  -OD         Stop Time 1515  -OD         Time Calculation (min) 30 min  -OD         PT Received On 12/27/23  -OD         PT - Next Appointment 12/28/23  -OD         PT Goal Re-Cert Due Date 01/10/24  -OD                 Time Calculation- PT     Total Timed Code Minutes- PT 0 minute(s)  -OD                      User Key  (r) = Recorded By, (t) = Taken By, (c) = Cosigned By        Initials Name Provider Type     OD Syl Grigsby, PT Physical Therapist                       Therapy Charges for Today         Code Description Service Date Service Provider Modifiers Qty     [id number] HC PT EVAL MOD COMPLEXITY 4 12/27/2023 Syl Grigsby, PT GP 1                PT G-Codes  Outcome Measure Options: AM-PAC 6 Clicks Basic Mobility (PT)  AM-PAC 6 Clicks Score (PT): 17  PT Discharge Summary  Anticipated Discharge Disposition (PT): assisted living     Syl Grigsby, ALO                   12/27/2023

## 2023-12-29 NOTE — PLAN OF CARE
Goal Outcome Evaluation:  Plan of Care Reviewed With: patient        Progress: no change  Outcome Evaluation: Pt is a 67 y/o F admitted to [facility] 12/27/23 with c/o LBP s/p fall. XR spine (-) fx. CT A/P indicates no acute findings. Contusion of coccyx (+). PMHx significant for HTN, hx breast cancer L breast, bipolar 1 disorder, and HTN. At baseline, [place], typically (I) with ADLs, occasional assist from spouse with transfers, (I) with medication management, household management and meal prep. Pt typically utilizes RW for household mobility, wc for long distance, unable to propel self. Pt reports typically sleeping in recliner. Pt wears 2.5-3L home O2. This date, pt A&Ox4 on 3L O2 and in supine upon arrival. Pt declined any movement upon arrival, however agreeable with max encouragement. Pt requires max A x2 for supine <>sit, tolerates sitting edge of bed with min A progressing to CGA ~10 minutes. Pt c/o decrease in LBP sitting edge of bed, however fatigued, returned to supine. Declines HOB elevated, despite education on importance including increase diaphagmatic breathing, prevention of developing PNA, deconditioning, and pressure relief. Pt verbalized understanding however continues to decline. Pt declines further progression of activity this date, therefore unable to assess functional transfers. Pt far below baseline and is not safe to dc home, would benefit from SNF when medically approrpiate. OT will coodinate with [facility] staff regarding pain medication to optimize mobility in future sessions.      Anticipated Discharge Disposition (OT): skilled nursing facility

## 2023-12-30 LAB
ANION GAP SERPL CALCULATED.3IONS-SCNC: 9 MMOL/L (ref 5–15)
BASOPHILS # BLD AUTO: 0 10*3/MM3 (ref 0–0.2)
BASOPHILS NFR BLD AUTO: 0.4 % (ref 0–1.5)
BUN SERPL-MCNC: 12 MG/DL (ref 8–23)
BUN/CREAT SERPL: 27.3 (ref 7–25)
CALCIUM SPEC-SCNC: 9.7 MG/DL (ref 8.6–10.5)
CHLORIDE SERPL-SCNC: 97 MMOL/L (ref 98–107)
CO2 SERPL-SCNC: 32 MMOL/L (ref 22–29)
CREAT SERPL-MCNC: 0.44 MG/DL (ref 0.57–1)
DEPRECATED RDW RBC AUTO: 50.8 FL (ref 37–54)
EGFRCR SERPLBLD CKD-EPI 2021: 105.5 ML/MIN/1.73
EOSINOPHIL # BLD AUTO: 0.1 10*3/MM3 (ref 0–0.4)
EOSINOPHIL NFR BLD AUTO: 0.7 % (ref 0.3–6.2)
ERYTHROCYTE [DISTWIDTH] IN BLOOD BY AUTOMATED COUNT: 15.9 % (ref 12.3–15.4)
GLUCOSE SERPL-MCNC: 89 MG/DL (ref 65–99)
HCT VFR BLD AUTO: 40.4 % (ref 34–46.6)
HGB BLD-MCNC: 12.8 G/DL (ref 12–15.9)
LYMPHOCYTES # BLD AUTO: 1.1 10*3/MM3 (ref 0.7–3.1)
LYMPHOCYTES NFR BLD AUTO: 10.6 % (ref 19.6–45.3)
MCH RBC QN AUTO: 29.3 PG (ref 26.6–33)
MCHC RBC AUTO-ENTMCNC: 31.7 G/DL (ref 31.5–35.7)
MCV RBC AUTO: 92.5 FL (ref 79–97)
MONOCYTES # BLD AUTO: 0.9 10*3/MM3 (ref 0.1–0.9)
MONOCYTES NFR BLD AUTO: 9.2 % (ref 5–12)
NEUTROPHILS NFR BLD AUTO: 79.1 % (ref 42.7–76)
NEUTROPHILS NFR BLD AUTO: 8 10*3/MM3 (ref 1.7–7)
NRBC BLD AUTO-RTO: 0.1 /100 WBC (ref 0–0.2)
PLATELET # BLD AUTO: 294 10*3/MM3 (ref 140–450)
PMV BLD AUTO: 7.7 FL (ref 6–12)
POTASSIUM SERPL-SCNC: 3.8 MMOL/L (ref 3.5–5.2)
RBC # BLD AUTO: 4.37 10*6/MM3 (ref 3.77–5.28)
SODIUM SERPL-SCNC: 138 MMOL/L (ref 136–145)
WBC NRBC COR # BLD AUTO: 10.1 10*3/MM3 (ref 3.4–10.8)

## 2023-12-30 PROCEDURE — 80048 BASIC METABOLIC PNL TOTAL CA: CPT | Performed by: PHYSICIAN ASSISTANT

## 2023-12-30 PROCEDURE — 63710000001 PREDNISONE PER 1 MG: Performed by: PHYSICIAN ASSISTANT

## 2023-12-30 PROCEDURE — G0378 HOSPITAL OBSERVATION PER HR: HCPCS

## 2023-12-30 PROCEDURE — 85025 COMPLETE CBC W/AUTO DIFF WBC: CPT | Performed by: PHYSICIAN ASSISTANT

## 2023-12-30 RX ORDER — PREDNISONE 20 MG/1
20 TABLET ORAL
Status: DISCONTINUED | OUTPATIENT
Start: 2023-12-31 | End: 2024-01-05 | Stop reason: HOSPADM

## 2023-12-30 RX ADMIN — OXCARBAZEPINE 150 MG: 150 TABLET, FILM COATED ORAL at 09:23

## 2023-12-30 RX ADMIN — SENNOSIDES AND DOCUSATE SODIUM 2 TABLET: 50; 8.6 TABLET ORAL at 21:14

## 2023-12-30 RX ADMIN — OXCARBAZEPINE 150 MG: 150 TABLET, FILM COATED ORAL at 21:15

## 2023-12-30 RX ADMIN — LEVOTHYROXINE SODIUM 25 MCG: 0.03 TABLET ORAL at 05:36

## 2023-12-30 RX ADMIN — CYCLOBENZAPRINE 10 MG: 10 TABLET, FILM COATED ORAL at 09:23

## 2023-12-30 RX ADMIN — TRAZODONE HYDROCHLORIDE 25 MG: 50 TABLET ORAL at 21:15

## 2023-12-30 RX ADMIN — Medication 10 ML: at 09:26

## 2023-12-30 RX ADMIN — PREDNISONE 40 MG: 20 TABLET ORAL at 09:23

## 2023-12-30 RX ADMIN — HYDROCODONE BITARTRATE AND ACETAMINOPHEN 1 TABLET: 10; 325 TABLET ORAL at 17:35

## 2023-12-30 RX ADMIN — LUBIPROSTONE 24 MCG: 24 CAPSULE, GELATIN COATED ORAL at 21:14

## 2023-12-30 RX ADMIN — LUBIPROSTONE 24 MCG: 24 CAPSULE, GELATIN COATED ORAL at 09:24

## 2023-12-30 RX ADMIN — PANTOPRAZOLE SODIUM 40 MG: 40 TABLET, DELAYED RELEASE ORAL at 09:23

## 2023-12-30 RX ADMIN — HYDROCODONE BITARTRATE AND ACETAMINOPHEN 1 TABLET: 10; 325 TABLET ORAL at 04:12

## 2023-12-30 RX ADMIN — SODIUM CHLORIDE 2 G: 1 TABLET ORAL at 09:23

## 2023-12-30 RX ADMIN — GUAIFENESIN 1200 MG: 600 TABLET, MULTILAYER, EXTENDED RELEASE ORAL at 21:14

## 2023-12-30 RX ADMIN — FUROSEMIDE 20 MG: 20 TABLET ORAL at 09:48

## 2023-12-30 RX ADMIN — POTASSIUM CHLORIDE 10 MEQ: 750 TABLET, EXTENDED RELEASE ORAL at 09:23

## 2023-12-30 RX ADMIN — CYCLOBENZAPRINE 10 MG: 10 TABLET, FILM COATED ORAL at 21:15

## 2023-12-30 RX ADMIN — CYCLOBENZAPRINE 10 MG: 10 TABLET, FILM COATED ORAL at 17:35

## 2023-12-30 RX ADMIN — SENNOSIDES AND DOCUSATE SODIUM 2 TABLET: 50; 8.6 TABLET ORAL at 09:23

## 2023-12-30 RX ADMIN — Medication 10 ML: at 21:19

## 2023-12-30 RX ADMIN — SODIUM CHLORIDE 2 G: 1 TABLET ORAL at 17:35

## 2023-12-30 RX ADMIN — HYDROCODONE BITARTRATE AND ACETAMINOPHEN 1 TABLET: 10; 325 TABLET ORAL at 11:31

## 2023-12-30 RX ADMIN — SERTRALINE 200 MG: 100 TABLET, FILM COATED ORAL at 09:23

## 2023-12-30 RX ADMIN — ROSUVASTATIN 30 MG: 10 TABLET, FILM COATED ORAL at 21:14

## 2023-12-30 RX ADMIN — RISPERIDONE 0.25 MG: 0.25 TABLET, FILM COATED ORAL at 21:14

## 2023-12-30 RX ADMIN — SODIUM CHLORIDE 2 G: 1 TABLET ORAL at 11:31

## 2023-12-30 RX ADMIN — LIDOCAINE 1 PATCH: 50 PATCH CUTANEOUS at 09:24

## 2023-12-30 RX ADMIN — GUAIFENESIN 1200 MG: 600 TABLET, MULTILAYER, EXTENDED RELEASE ORAL at 09:23

## 2023-12-30 NOTE — CASE MANAGEMENT/SOCIAL WORK
Continued Stay Note  DANIELA Damioc     Patient Name: Morgan Quick  MRN: [id number]  Today's Date: 12/30/2023    Admit Date: 12/27/2023    Plan: From [facility]. Referred to [facility]; accepted. PASRR completed. Precert  started 12/29   Discharge Plan       Row Name 12/30/23 1348       Plan    Plan Comments DC barriers: precert pending

## 2023-12-30 NOTE — PROGRESS NOTES
[facility]    Patient Name: Morgan Quick  : 1955  MRN: [id number]  Primary Care Physician: Wayne Saavedra MD  Date of admission: 2023     Patient Care Team:  Wayne Saavedra MD as PCP - General (Internal Medicine)          Subjective   History Present Illness     Chief Complaint:   Chief Complaint   Patient presents with    Back Pain     Back pain    History of Present Illness    68-year-old white female with history of hypertension, thyroid disorder, high cholesterol, chronic pain presents today by EMS from assisted living facility with reports of a fall and pain in her lower back.  She states she was trying to get up from her chair to her walker when she lost her balance and fell backwards and her buttocks landed against the chair.  She denies striking her head or having LOC.  She complains of pain in her lower back and tailbone area.  Pain is worse with certain movements.  She denies any numbness tingling or weakness in either lower extremity.  She denies any bowel or bladder dysfunction or saddle anesthesia.  No fever.  She denies any other injuries.        Observation 23  Pt concurs with er hpi. Pt states she has no paraesthesias, no loss of bladder or bowel control. Pt has seen ortho spine at [place] and mri scheduled for chronic disc disease on Tuesday. Pt given pain medication and awaiting pt and ot eval. Pt fell from standing to chair on buttocks.         Observation 23  Pt and ot consulted and recommends snf. Awaiting precert    Observation 23  Awaiting placement at snf. PT/OT following.    ROS    Musculoskeletal:  Positive for back pain.   Genitourinary:  Negative for bladder incontinence.   Neurological:  Negative for focal weakness, numbness and paresthesi      Personal History     Past Medical History:   Past Medical History:   Diagnosis Date    Bipolar 1 disorder     Breast cancer     Cancer     Disease of thyroid gland     High  cholesterol     Hypertension        Surgical History:      Past Surgical History:   Procedure Laterality Date    ABDOMINAL SURGERY      APPENDECTOMY      ENDOSCOPY N/A 9/25/2022    Procedure: ESOPHAGOGASTRODUODENOSCOPY with biopsy x 2 areas;  Surgeon: Deonte Wong MD;  Location: [facility] ENDOSCOPY;  Service: Gastroenterology;  Laterality: N/A;  post: gastritis, duodinitis, nodule,     HYSTERECTOMY      MASTECTOMY      L side           Family History: family history includes Cancer in her mother. Otherwise pertinent FHx was reviewed and unremarkable.     Social History:  reports that she has never smoked. She has never been exposed to tobacco smoke. She has never used smokeless tobacco. She reports that she does not drink alcohol and does not use drugs.      Medications:  Prior to Admission medications    Medication Sig Start Date End Date Taking? Authorizing Provider   furosemide (LASIX) 20 MG tablet Take 1 tablet by mouth Daily. 10/11/22  Yes Ladonna Redd DO   guaiFENesin (MUCINEX) 600 MG 12 hr tablet Take 2 tablets by mouth Every 12 (Twelve) Hours. 10/10/22  Yes Ladonna Redd DO   HYDROcodone-acetaminophen (NORCO)  MG per tablet Take 1 tablet by mouth 4 (Four) Times a Day.   Yes Brennon Serrano MD   levothyroxine (SYNTHROID, LEVOTHROID) 25 MCG tablet Take 1 tablet by mouth Every Morning. 10/11/22  Yes Ladonna Redd DO   lubiprostone (AMITIZA) 24 MCG capsule Take 1 capsule by mouth 2 (Two) Times a Day.   Yes Brennon Serrano MD   methylPREDNISolone (MEDROL) 4 MG tablet Take as directed 12/25/23 12/30/23 Yes Brennon Serrano MD   OXcarbazepine (TRILEPTAL) 150 MG tablet Take 1 tablet by mouth 2 (Two) Times a Day. 10/10/22  Yes Ladonna Redd DO   pantoprazole (PROTONIX) 40 MG EC tablet Take 1 tablet by mouth Every Morning. 10/11/22  Yes Ladonna Redd DO   potassium chloride (MICRO-K) 10 MEQ CR capsule Take 1 capsule by mouth Daily.   Yes Brennon Serrano MD   risperiDONE  (risperDAL) 0.25 MG tablet Take 1 tablet by mouth Every Night. 10/10/22  Yes Ladonna Redd DO   rosuvastatin (CRESTOR) 10 MG tablet Take 3 tablets by mouth Daily.   Yes ProviderBrennon MD   sertraline (ZOLOFT) 100 MG tablet Take 2 tablets by mouth Daily. 10/10/22  Yes Ladonna Redd DO   sodium chloride 1 g tablet Take 2 tablets by mouth 3 (Three) Times a Day With Meals. 10/10/22  Yes Ladonna Redd DO   traZODone (DESYREL) 50 MG tablet Take 0.5 tablets by mouth Every Night.   Yes Provider, MD Brennon       Allergies:    Allergies   Allergen Reactions    Contrast Dye (Echo Or Unknown Ct/Mr) Shortness Of Breath     Pt states she had trouble breathing when she had contrast in the past.        Objective   Objective     Vital Signs  Temp:  [98 °F (36.7 °C)-98.4 °F (36.9 °C)] 98 °F (36.7 °C)  Heart Rate:  [59-64] 59  Resp:  [14-20] 14  BP: (161-183)/(70-85) 161/70  SpO2:  [92 %-96 %] 95 %  on  Flow (L/min):  [3] 3;   Device (Oxygen Therapy): nasal cannula  Body mass index is 41.05 kg/m².    Physical Exam    Constitutional:       Appearance: She is obese.   Cardiovascular:      Rate and Rhythm: Normal rate and regular rhythm.      Pulses: Normal pulses.      Heart sounds: Normal heart sounds.   Pulmonary:      Effort: Pulmonary effort is normal.      Breath sounds: Normal breath sounds.   Musculoskeletal:      Comments: Pain with rom   Skin:     General: Skin is warm and dry.   Neurological:      General: No focal deficit present.      Mental Status: She is alert and oriented to person, place, and time.   Psychiatric:         Mood and Affect: Mood normal.         Behavior: Behavior normal.      Emotional Behavior:    wnl       Debilities:   None    Results Review:  I have personally reviewed most recent lab results and radiology images and interpretations and agree with findings, most notably: cbc, cmp, xr spine, ct abd.    Results from last 7 days   Lab Units 12/30/23  0408   WBC 10*3/mm3 10.10   HEMOGLOBIN  g/dL 12.8   HEMATOCRIT % 40.4   PLATELETS 10*3/mm3 294     Results from last 7 days   Lab Units 12/30/23  0408   SODIUM mmol/L 138   POTASSIUM mmol/L 3.8   CHLORIDE mmol/L 97*   CO2 mmol/L 32.0*   BUN mg/dL 12   CREATININE mg/dL 0.44*   GLUCOSE mg/dL 89   CALCIUM mg/dL 9.7     Estimated Creatinine Clearance: 152.6 mL/min (A) (by C-G formula based on SCr of 0.44 mg/dL (L)).  Brief Urine Lab Results       None            Microbiology Results (last 10 days)       ** No results found for the last 240 hours. **            ECG/EMG Results (most recent)       None                Results for orders placed during the hospital encounter of 09/24/22    Adult Transthoracic Echo Complete W/ Cont if Necessary Per Protocol    Interpretation Summary  · Estimated left ventricular EF was in agreement with the calculated left ventricular EF. Left ventricular ejection fraction appears to be 56 - 60%. Left ventricular systolic function is normal.  · There is calcification of the aortic valve mainly affecting the non-coronary, left coronary and right coronary cusp(s).  · The study is technically difficult for diagnosis.      CT Abdomen Pelvis Without Contrast    Result Date: 12/27/2023  Impression: No acute findings in the abdomen or pelvis. No acute osseous injury. Electronically Signed: Kasia Paul MD  12/27/2023 12:51 PM CST  Workstation ID: [id number]       Estimated Creatinine Clearance: 152.6 mL/min (A) (by C-G formula based on SCr of 0.44 mg/dL (L)).    Assessment & Plan   Assessment/Plan       Active Hospital Problems    Diagnosis  POA    **Low back pain [M54.50]  Yes      Resolved Hospital Problems   No resolved problems to display.     Low back pain s/p fall  - cbc and bmp are unremarkable  - xr spine lumbar 12/22/23 reviewed and showing mild degenerative disc disease   - ct abd reviewed and showing a Schmorl's node invagination along the superior endplate of L3 which appears unchanged. New Schmorl's node invagination is  noted at L4 and L5. No evidence for acute osseous injury in the lumbar spine. The coccyx appears intact.   - pain medication, muscle relaxers, steroids  - pt and ot consult and recommend snf  - pt should need 30 days or less of skilled nursing         Hypothyroidism  - cont synthroid     Bipolar disorder  - cont trileptal, risperidone, zoloft     Hx of chf  - cont lasix and potassium replacement     HPL  - cont statin       VTE Prophylaxis -   Mechanical Order History:        Ordered        12/27/23 1838  Place Sequential Compression Device  Once            12/27/23 1838  Maintain Sequential Compression Device  Continuous                          Pharmalogical Order History:       None            CODE STATUS:    Code Status and Medical Interventions:   Ordered at: 12/27/23 1629     Code Status (Patient has no pulse and is not breathing):    CPR (Attempt to Resuscitate)     Medical Interventions (Patient has pulse or is breathing):    Full Support       This patient has been examined wearing personal protective equipment.     I discussed the patient's findings and my recommendations with patient and nursing staff.      Signature:Electronically signed by Gloria Lacy PA-C, 12/30/23, 1:34 PM EST.

## 2023-12-31 PROCEDURE — G0378 HOSPITAL OBSERVATION PER HR: HCPCS

## 2023-12-31 PROCEDURE — 25010000002 MORPHINE PER 10 MG: Performed by: PHYSICIAN ASSISTANT

## 2023-12-31 PROCEDURE — 63710000001 PREDNISONE PER 1 MG: Performed by: PHYSICIAN ASSISTANT

## 2023-12-31 RX ADMIN — Medication 10 ML: at 20:11

## 2023-12-31 RX ADMIN — PREDNISONE 20 MG: 20 TABLET ORAL at 09:21

## 2023-12-31 RX ADMIN — PANTOPRAZOLE SODIUM 40 MG: 40 TABLET, DELAYED RELEASE ORAL at 06:28

## 2023-12-31 RX ADMIN — HYDROCODONE BITARTRATE AND ACETAMINOPHEN 1 TABLET: 10; 325 TABLET ORAL at 09:21

## 2023-12-31 RX ADMIN — CYCLOBENZAPRINE 10 MG: 10 TABLET, FILM COATED ORAL at 17:33

## 2023-12-31 RX ADMIN — TRAZODONE HYDROCHLORIDE 25 MG: 50 TABLET ORAL at 20:11

## 2023-12-31 RX ADMIN — Medication 10 ML: at 09:20

## 2023-12-31 RX ADMIN — SERTRALINE 200 MG: 100 TABLET, FILM COATED ORAL at 09:21

## 2023-12-31 RX ADMIN — CYCLOBENZAPRINE 10 MG: 10 TABLET, FILM COATED ORAL at 09:21

## 2023-12-31 RX ADMIN — OXCARBAZEPINE 150 MG: 150 TABLET, FILM COATED ORAL at 09:21

## 2023-12-31 RX ADMIN — ROSUVASTATIN 30 MG: 10 TABLET, FILM COATED ORAL at 20:11

## 2023-12-31 RX ADMIN — HYDROCODONE BITARTRATE AND ACETAMINOPHEN 1 TABLET: 10; 325 TABLET ORAL at 17:33

## 2023-12-31 RX ADMIN — SODIUM CHLORIDE 2 G: 1 TABLET ORAL at 17:33

## 2023-12-31 RX ADMIN — SENNOSIDES AND DOCUSATE SODIUM 2 TABLET: 50; 8.6 TABLET ORAL at 09:20

## 2023-12-31 RX ADMIN — LEVOTHYROXINE SODIUM 25 MCG: 0.03 TABLET ORAL at 06:28

## 2023-12-31 RX ADMIN — OXCARBAZEPINE 150 MG: 150 TABLET, FILM COATED ORAL at 20:10

## 2023-12-31 RX ADMIN — CYCLOBENZAPRINE 10 MG: 10 TABLET, FILM COATED ORAL at 20:10

## 2023-12-31 RX ADMIN — LUBIPROSTONE 24 MCG: 24 CAPSULE, GELATIN COATED ORAL at 09:20

## 2023-12-31 RX ADMIN — GUAIFENESIN 1200 MG: 600 TABLET, MULTILAYER, EXTENDED RELEASE ORAL at 09:20

## 2023-12-31 RX ADMIN — FUROSEMIDE 20 MG: 20 TABLET ORAL at 09:21

## 2023-12-31 RX ADMIN — LUBIPROSTONE 24 MCG: 24 CAPSULE, GELATIN COATED ORAL at 20:10

## 2023-12-31 RX ADMIN — SODIUM CHLORIDE 2 G: 1 TABLET ORAL at 09:20

## 2023-12-31 RX ADMIN — RISPERIDONE 0.25 MG: 0.25 TABLET, FILM COATED ORAL at 20:10

## 2023-12-31 RX ADMIN — GUAIFENESIN 1200 MG: 600 TABLET, MULTILAYER, EXTENDED RELEASE ORAL at 20:10

## 2023-12-31 RX ADMIN — POTASSIUM CHLORIDE 10 MEQ: 750 TABLET, EXTENDED RELEASE ORAL at 09:21

## 2023-12-31 RX ADMIN — MORPHINE SULFATE 4 MG: 4 INJECTION, SOLUTION INTRAMUSCULAR; INTRAVENOUS at 05:47

## 2023-12-31 NOTE — PROGRESS NOTES
[facility] Observation Unit Progress Note    Patient Name: Morgan Quick  : 1955  MRN: [id number]  Primary Care Physician: Wayne Saavedra MD  Date of admission: 2023     Patient Care Team:  Wayne Saavedra MD as PCP - General (Internal Medicine)          Subjective   History Present Illness     Chief Complaint:   Chief Complaint   Patient presents with    Back Pain     Back Pain     Back Pain      ED 2023  68-year-old white female with history of hypertension, thyroid disorder, high cholesterol, chronic pain presents today by EMS from assisted living facility with reports of a fall and pain in her lower back.  She states she was trying to get up from her chair to her walker when she lost her balance and fell backwards and her buttocks landed against the chair.  She denies striking her head or having LOC.  She complains of pain in her lower back and tailbone area.  Pain is worse with certain movements.  She denies any numbness tingling or weakness in either lower extremity.  She denies any bowel or bladder dysfunction or saddle anesthesia.  No fever.  She denies any other injuries.        Observation 23  Pt concurs with er hpi. Pt states she has no paraesthesias, no loss of bladder or bowel control. Pt has seen ortho spine at [place] and mri scheduled for chronic disc disease on Tuesday. Pt given pain medication and awaiting pt and ot eval. Pt fell from standing to chair on buttocks.         Observation 23  Pt and ot consulted and recommends snf. Awaiting precert     Observation 23  Awaiting placement at snf. PT/OT following.    Observation 23  Patient rates back pain 8/10 relieved with laying flat. Medically ready for discharge. Per UR/CM, Precert remains pending. PA advisor recommended keeping patient under obs status.     Review of Systems   Musculoskeletal:  Positive for back pain.      Musculoskeletal:  Positive for back pain.   Genitourinary:  Negative for bladder  incontinence.   Neurological:  Negative for focal weakness, numbness and paresthesi         Personal History     Past Medical History:   Past Medical History:   Diagnosis Date    Bipolar 1 disorder     Breast cancer     Cancer     Disease of thyroid gland     High cholesterol     Hypertension        Surgical History:      Past Surgical History:   Procedure Laterality Date    ABDOMINAL SURGERY      APPENDECTOMY      ENDOSCOPY N/A 9/25/2022    Procedure: ESOPHAGOGASTRODUODENOSCOPY with biopsy x 2 areas;  Surgeon: Deonte Wong MD;  Location: [facility] ENDOSCOPY;  Service: Gastroenterology;  Laterality: N/A;  post: gastritis, duodinitis, nodule,     HYSTERECTOMY      MASTECTOMY      L side           Family History: family history includes Cancer in her mother. Otherwise pertinent FHx was reviewed and unremarkable.     Social History:  reports that she has never smoked. She has never been exposed to tobacco smoke. She has never used smokeless tobacco. She reports that she does not drink alcohol and does not use drugs.      Medications:  Prior to Admission medications    Medication Sig Start Date End Date Taking? Authorizing Provider   furosemide (LASIX) 20 MG tablet Take 1 tablet by mouth Daily. 10/11/22  Yes Ladonna Redd DO   guaiFENesin (MUCINEX) 600 MG 12 hr tablet Take 2 tablets by mouth Every 12 (Twelve) Hours. 10/10/22  Yes Ladonna Redd DO   HYDROcodone-acetaminophen (NORCO)  MG per tablet Take 1 tablet by mouth 4 (Four) Times a Day.   Yes Brennon Serrano MD   levothyroxine (SYNTHROID, LEVOTHROID) 25 MCG tablet Take 1 tablet by mouth Every Morning. 10/11/22  Yes Ladonna Redd DO   lubiprostone (AMITIZA) 24 MCG capsule Take 1 capsule by mouth 2 (Two) Times a Day.   Yes Brennon Serrano MD   methylPREDNISolone (MEDROL) 4 MG tablet Take as directed 12/25/23 12/30/23 Yes Brennon Serrano MD   OXcarbazepine (TRILEPTAL) 150 MG tablet Take 1 tablet by mouth 2 (Two) Times a Day. 10/10/22  Yes  Ladonna Redd DO   pantoprazole (PROTONIX) 40 MG EC tablet Take 1 tablet by mouth Every Morning. 10/11/22  Yes Ladonna Redd DO   potassium chloride (MICRO-K) 10 MEQ CR capsule Take 1 capsule by mouth Daily.   Yes ProviderBrennon MD   risperiDONE (risperDAL) 0.25 MG tablet Take 1 tablet by mouth Every Night. 10/10/22  Yes Ladonna Redd DO   rosuvastatin (CRESTOR) 10 MG tablet Take 3 tablets by mouth Daily.   Yes ProviderBrennon MD   sertraline (ZOLOFT) 100 MG tablet Take 2 tablets by mouth Daily. 10/10/22  Yes Ladonna Redd DO   sodium chloride 1 g tablet Take 2 tablets by mouth 3 (Three) Times a Day With Meals. 10/10/22  Yes Ladonna Redd DO   traZODone (DESYREL) 50 MG tablet Take 0.5 tablets by mouth Every Night.   Yes ProviderBrennon MD       Allergies:    Allergies   Allergen Reactions    Contrast Dye (Echo Or Unknown Ct/Mr) Shortness Of Breath     Pt states she had trouble breathing when she had contrast in the past.        Objective   Objective     Vital Signs  Temp:  [97.6 °F (36.4 °C)] 97.6 °F (36.4 °C)  Heart Rate:  [57-62] 57  Resp:  [16-19] 18  BP: (129-167)/(56-70) 129/61  SpO2:  [93 %-100 %] 100 %  on  Flow (L/min):  [3] 3;   Device (Oxygen Therapy): nasal cannula  Body mass index is 41.05 kg/m².    Physical Exam  Vitals and nursing note reviewed.   Constitutional:       Appearance: Normal appearance. She is obese.   HENT:      Head: Normocephalic and atraumatic.      Right Ear: External ear normal.      Left Ear: External ear normal.      Nose: Nose normal.      Mouth/Throat:      Mouth: Mucous membranes are moist.      Pharynx: Oropharynx is clear.   Eyes:      Extraocular Movements: Extraocular movements intact.   Cardiovascular:      Rate and Rhythm: Normal rate and regular rhythm.      Pulses: Normal pulses.      Heart sounds: Normal heart sounds.   Pulmonary:      Effort: Pulmonary effort is normal.      Breath sounds: Normal breath sounds.   Abdominal:      General:  Abdomen is flat. Bowel sounds are normal.      Palpations: Abdomen is soft.   Musculoskeletal:         General: Normal range of motion.      Cervical back: Normal range of motion.   Skin:     General: Skin is warm.   Neurological:      General: No focal deficit present.      Mental Status: She is alert and oriented to person, place, and time.      Motor: Weakness present.   Psychiatric:         Mood and Affect: Mood normal.         Behavior: Behavior normal.           Results Review:  I have personally reviewed most recent lab results and radiology images and interpretations and agree with findings, most notably:  cbc, cmp, xr spine, ct abd     Results from last 7 days   Lab Units 12/30/23  0408   WBC 10*3/mm3 10.10   HEMOGLOBIN g/dL 12.8   HEMATOCRIT % 40.4   PLATELETS 10*3/mm3 294     Results from last 7 days   Lab Units 12/30/23  0408   SODIUM mmol/L 138   POTASSIUM mmol/L 3.8   CHLORIDE mmol/L 97*   CO2 mmol/L 32.0*   BUN mg/dL 12   CREATININE mg/dL 0.44*   GLUCOSE mg/dL 89   CALCIUM mg/dL 9.7     Estimated Creatinine Clearance: 152.6 mL/min (A) (by C-G formula based on SCr of 0.44 mg/dL (L)).  Brief Urine Lab Results       None            Microbiology Results (last 10 days)       ** No results found for the last 240 hours. **            ECG/EMG Results (most recent)       None                Results for orders placed during the hospital encounter of 09/24/22    Adult Transthoracic Echo Complete W/ Cont if Necessary Per Protocol    Interpretation Summary  · Estimated left ventricular EF was in agreement with the calculated left ventricular EF. Left ventricular ejection fraction appears to be 56 - 60%. Left ventricular systolic function is normal.  · There is calcification of the aortic valve mainly affecting the non-coronary, left coronary and right coronary cusp(s).  · The study is technically difficult for diagnosis.      CT Abdomen Pelvis Without Contrast    Result Date: 12/27/2023  Impression: No acute  findings in the abdomen or pelvis. No acute osseous injury. Electronically Signed: Kasia Paul MD  12/27/2023 12:51 PM CST  Workstation ID: [id number]       Estimated Creatinine Clearance: 152.6 mL/min (A) (by C-G formula based on SCr of 0.44 mg/dL (L)).    Assessment & Plan   Assessment/Plan       Active Hospital Problems    Diagnosis  POA    **Low back pain [M54.50]  Yes      Resolved Hospital Problems   No resolved problems to display.       Low back pain s/p fall  - cbc and bmp are unremarkable  - xr spine lumbar 12/22/23 reviewed and showing mild degenerative disc disease   - ct abd reviewed and showing a Schmorl's node invagination along the superior endplate of L3 which appears unchanged. New Schmorl's node invagination is noted at L4 and L5. No evidence for acute osseous injury in the lumbar spine. The coccyx appears intact.   - pain medication, muscle relaxers, steroids  - pt and ot consult and recommend snf  - pt should need 30 days or less of skilled nursing   -Per CM/UR notes, precert still pending.   -Patient remains obs as she is medically cleared for discharge and just waiting on precert approval        Hypothyroidism  - cont synthroid     Bipolar disorder  - cont trileptal, risperidone, zoloft     Hx of chf  - cont lasix and potassium replacement     HPL  - cont statin    Obesity  -BMI 41.05  -Lifestyle modifications          VTE Prophylaxis -   Mechanical Order History:        Ordered        12/27/23 1838  Place Sequential Compression Device  Once            12/27/23 1838  Maintain Sequential Compression Device  Continuous                          Pharmalogical Order History:       None            CODE STATUS:    Code Status and Medical Interventions:   Ordered at: 12/27/23 9402     Code Status (Patient has no pulse and is not breathing):    CPR (Attempt to Resuscitate)     Medical Interventions (Patient has pulse or is breathing):    Full Support       This patient has been examined wearing  personal protective equipment.     I discussed the patient's findings and my recommendations with patient and nursing staff.      Signature:Electronically signed by NIDIA Cadena, 12/31/23, 10:14 AM EST.

## 2023-12-31 NOTE — CASE MANAGEMENT/SOCIAL WORK
Continued Stay Note  DANIELA Damico     Patient Name: Morgan Quick  MRN: [id number]  Today's Date: 12/31/2023    Admit Date: 12/27/2023    Plan: From [facility]. Referred to [facility]; accepted. PASRR completed. Precert  started 12/29   Discharge Plan       Row Name 12/31/23 1018       Plan    Plan Comments DC barriers: Precert still pending. URCM verified this am that precert is still pending.

## 2023-12-31 NOTE — PLAN OF CARE
Goal Outcome Evaluation:               Pt to d/c to [facility] tomorrow. Precert still pending.Pt receiving pain meds, muscle relaxants, and steroids. Pt has been laying flat on back during this shift, pt refuses self or bed to be moved or to get herself or bed cleaned up. A&Ox4. 20 gauge in left arm S/L. On external catheter. Pt on 3 L nasal cannula.

## 2023-12-31 NOTE — PLAN OF CARE
Problem: Fall Injury Risk  Goal: Absence of Fall and Fall-Related Injury  12/31/2023 0614 by Estela Núñez RN  Outcome: Ongoing, Progressing  12/31/2023 0614 by Estela Núñez RN  Outcome: Ongoing, Progressing  Intervention: Identify and Manage Contributors  Recent Flowsheet Documentation  Taken 12/30/2023 2000 by Estela Núñez RN  Medication Review/Management: medications reviewed  Intervention: Promote Injury-Free Environment  Recent Flowsheet Documentation  Taken 12/31/2023 0400 by Estela Núñez RN  Safety Promotion/Fall Prevention: safety round/check completed  Taken 12/31/2023 0200 by Estela Núñez RN  Safety Promotion/Fall Prevention: safety round/check completed  Taken 12/31/2023 0000 by Estela Núñez RN  Safety Promotion/Fall Prevention: safety round/check completed  Taken 12/30/2023 2200 by Estela Núñez RN  Safety Promotion/Fall Prevention: safety round/check completed  Taken 12/30/2023 2000 by Estela Núñez RN  Safety Promotion/Fall Prevention: safety round/check completed     Problem: Pain Acute  Goal: Acceptable Pain Control and Functional Ability  12/31/2023 0614 by Estela Núñez RN  Outcome: Ongoing, Progressing  12/31/2023 0614 by Estela Núñez RN  Outcome: Ongoing, Progressing  Intervention: Prevent or Manage Pain  Recent Flowsheet Documentation  Taken 12/30/2023 2000 by Estela Núñez RN  Medication Review/Management: medications reviewed  Intervention: Optimize Psychosocial Wellbeing  Recent Flowsheet Documentation  Taken 12/30/2023 2000 by Estela Núñez RN  Supportive Measures: active listening utilized  Diversional Activities: television  Goal: Acceptable Pain Control and Functional Ability  12/31/2023 0614 by Esetla Núñez RN  Outcome: Ongoing, Progressing  12/31/2023 0614 by Estela Núñez RN  Outcome: Ongoing, Progressing  Intervention: Prevent or Manage Pain  Recent Flowsheet Documentation  Taken 12/30/2023 2000 by [place]  Estela R, RN  Medication Review/Management: medications reviewed  Intervention: Optimize Psychosocial Wellbeing  Recent Flowsheet Documentation  Taken 12/30/2023 2000 by Estela Núñez RN  Supportive Measures: active listening utilized  Diversional Activities: television     Problem: Skin Injury Risk Increased  Goal: Skin Health and Integrity  12/31/2023 0614 by Estela Núñez RN  Outcome: Ongoing, Progressing  12/31/2023 0614 by Estela Núñez RN  Outcome: Ongoing, Progressing  Intervention: Optimize Skin Protection  Recent Flowsheet Documentation  Taken 12/30/2023 2000 by Estela Núñez RN  Pressure Reduction Techniques: frequent weight shift encouraged  Head of Bed (HOB) Positioning: (Pt request) HOB flat  Skin Protection: adhesive use limited     Problem: Asthma Comorbidity  Goal: Maintenance of Asthma Control  12/31/2023 0614 by Estela Núñez RN  Outcome: Ongoing, Progressing  12/31/2023 0614 by Estela Núñez RN  Outcome: Ongoing, Progressing  Intervention: Maintain Asthma Symptom Control  Recent Flowsheet Documentation  Taken 12/30/2023 2000 by Estela Núñez RN  Medication Review/Management: medications reviewed     Problem: Behavioral Health Comorbidity  Goal: Maintenance of Behavioral Health Symptom Control  12/31/2023 0614 by Estela Núñez RN  Outcome: Ongoing, Progressing  12/31/2023 0614 by Estela Núñez RN  Outcome: Ongoing, Progressing  Intervention: Maintain Behavioral Health Symptom Control  Recent Flowsheet Documentation  Taken 12/30/2023 2000 by Estela Núñez RN  Medication Review/Management: medications reviewed     Problem: COPD (Chronic Obstructive Pulmonary Disease) Comorbidity  Goal: Maintenance of COPD Symptom Control  12/31/2023 0614 by Estela Núñez RN  Outcome: Ongoing, Progressing  12/31/2023 0614 by Estela Núñez RN  Outcome: Ongoing, Progressing  Intervention: Maintain COPD-Symptom Control  Recent Flowsheet Documentation  Taken  12/30/2023 2000 by Estela Núñez RN  Supportive Measures: active listening utilized  Medication Review/Management: medications reviewed     Problem: Diabetes Comorbidity  Goal: Blood Glucose Level Within Targeted Range  12/31/2023 0614 by Estela Núñez RN  Outcome: Ongoing, Progressing  12/31/2023 0614 by Estela Núñez RN  Outcome: Ongoing, Progressing     Problem: Heart Failure Comorbidity  Goal: Maintenance of Heart Failure Symptom Control  12/31/2023 0614 by Estela Núñez RN  Outcome: Ongoing, Progressing  12/31/2023 0614 by Estela Núñez RN  Outcome: Ongoing, Progressing  Intervention: Maintain Heart Failure-Management  Recent Flowsheet Documentation  Taken 12/30/2023 2000 by Estela Núñez RN  Medication Review/Management: medications reviewed     Problem: Hypertension Comorbidity  Goal: Blood Pressure in Desired Range  12/31/2023 0614 by Estela Núñez RN  Outcome: Ongoing, Progressing  12/31/2023 0614 by Estela Núñez RN  Outcome: Ongoing, Progressing  Intervention: Maintain Blood Pressure Management  Recent Flowsheet Documentation  Taken 12/30/2023 2000 by Estela Núñez RN  Medication Review/Management: medications reviewed     Problem: Obstructive Sleep Apnea Risk or Actual Comorbidity Management  Goal: Unobstructed Breathing During Sleep  12/31/2023 0614 by Estela Núñez RN  Outcome: Ongoing, Progressing  12/31/2023 0614 by Estela Núñez RN  Outcome: Ongoing, Progressing     Problem: Osteoarthritis Comorbidity  Goal: Maintenance of Osteoarthritis Symptom Control  12/31/2023 0614 by Estela Núñez RN  Outcome: Ongoing, Progressing  12/31/2023 0614 by Estela Núñez RN  Outcome: Ongoing, Progressing  Intervention: Maintain Osteoarthritis Symptom Control  Recent Flowsheet Documentation  Taken 12/30/2023 2000 by Estela Núñez RN  Activity Management: bedrest  Medication Review/Management: medications reviewed     Problem: Pain Chronic  (Persistent) (Comorbidity Management)  Goal: Acceptable Pain Control and Functional Ability  12/31/2023 0614 by Estela Núñez RN  Outcome: Ongoing, Progressing  12/31/2023 0614 by Estela Núñez RN  Outcome: Ongoing, Progressing  Intervention: Manage Persistent Pain  Recent Flowsheet Documentation  Taken 12/30/2023 2000 by Estela Núñez RN  Medication Review/Management: medications reviewed  Intervention: Optimize Psychosocial Wellbeing  Recent Flowsheet Documentation  Taken 12/30/2023 2000 by Estela Núñez RN  Supportive Measures: active listening utilized  Diversional Activities: television     Problem: Seizure Disorder Comorbidity  Goal: Maintenance of Seizure Control  12/31/2023 0614 by Estela Núñez RN  Outcome: Ongoing, Progressing  12/31/2023 0614 by Estela Núñez RN  Outcome: Ongoing, Progressing   Goal Outcome Evaluation:            Pillows currently in use, patient complained of back pain 8/10 (Morphine given @0547). Will continue plan of care.

## 2024-01-01 PROBLEM — M54.9 BACK PAIN: Status: ACTIVE | Noted: 2024-01-01

## 2024-01-01 PROCEDURE — 25010000002 MORPHINE PER 10 MG: Performed by: PHYSICIAN ASSISTANT

## 2024-01-01 PROCEDURE — 63710000001 PREDNISONE PER 1 MG: Performed by: PHYSICIAN ASSISTANT

## 2024-01-01 RX ORDER — POLYETHYLENE GLYCOL 3350 17 G/17G
17 POWDER, FOR SOLUTION ORAL 2 TIMES DAILY
Status: DISCONTINUED | OUTPATIENT
Start: 2024-01-01 | End: 2024-01-05 | Stop reason: HOSPADM

## 2024-01-01 RX ORDER — CELECOXIB 200 MG/1
200 CAPSULE ORAL EVERY 12 HOURS SCHEDULED
Status: DISCONTINUED | OUTPATIENT
Start: 2024-01-01 | End: 2024-01-01

## 2024-01-01 RX ORDER — AMOXICILLIN 250 MG
2 CAPSULE ORAL 2 TIMES DAILY
Status: DISCONTINUED | OUTPATIENT
Start: 2024-01-01 | End: 2024-01-05 | Stop reason: HOSPADM

## 2024-01-01 RX ORDER — OXYCODONE HYDROCHLORIDE 5 MG/1
5 TABLET ORAL EVERY 4 HOURS PRN
Status: DISCONTINUED | OUTPATIENT
Start: 2024-01-01 | End: 2024-01-02

## 2024-01-01 RX ADMIN — LUBIPROSTONE 24 MCG: 24 CAPSULE, GELATIN COATED ORAL at 09:50

## 2024-01-01 RX ADMIN — MORPHINE SULFATE 4 MG: 4 INJECTION, SOLUTION INTRAMUSCULAR; INTRAVENOUS at 09:58

## 2024-01-01 RX ADMIN — CYCLOBENZAPRINE 10 MG: 10 TABLET, FILM COATED ORAL at 20:26

## 2024-01-01 RX ADMIN — Medication 10 ML: at 09:51

## 2024-01-01 RX ADMIN — CYCLOBENZAPRINE 10 MG: 10 TABLET, FILM COATED ORAL at 18:18

## 2024-01-01 RX ADMIN — LIDOCAINE 1 PATCH: 50 PATCH CUTANEOUS at 09:38

## 2024-01-01 RX ADMIN — OXCARBAZEPINE 150 MG: 150 TABLET, FILM COATED ORAL at 20:26

## 2024-01-01 RX ADMIN — SENNOSIDES AND DOCUSATE SODIUM 2 TABLET: 50; 8.6 TABLET ORAL at 09:49

## 2024-01-01 RX ADMIN — TRAZODONE HYDROCHLORIDE 25 MG: 50 TABLET ORAL at 20:26

## 2024-01-01 RX ADMIN — RISPERIDONE 0.25 MG: 0.25 TABLET, FILM COATED ORAL at 20:26

## 2024-01-01 RX ADMIN — Medication 10 ML: at 20:27

## 2024-01-01 RX ADMIN — POTASSIUM CHLORIDE 10 MEQ: 750 TABLET, EXTENDED RELEASE ORAL at 09:50

## 2024-01-01 RX ADMIN — SODIUM CHLORIDE 2 G: 1 TABLET ORAL at 18:18

## 2024-01-01 RX ADMIN — SERTRALINE 200 MG: 100 TABLET, FILM COATED ORAL at 09:50

## 2024-01-01 RX ADMIN — LUBIPROSTONE 24 MCG: 24 CAPSULE, GELATIN COATED ORAL at 20:26

## 2024-01-01 RX ADMIN — HYDROCODONE BITARTRATE AND ACETAMINOPHEN 1 TABLET: 10; 325 TABLET ORAL at 06:07

## 2024-01-01 RX ADMIN — SODIUM CHLORIDE 2 G: 1 TABLET ORAL at 09:50

## 2024-01-01 RX ADMIN — GUAIFENESIN 1200 MG: 600 TABLET, MULTILAYER, EXTENDED RELEASE ORAL at 20:26

## 2024-01-01 RX ADMIN — ROSUVASTATIN 30 MG: 10 TABLET, FILM COATED ORAL at 20:25

## 2024-01-01 RX ADMIN — SODIUM CHLORIDE 2 G: 1 TABLET ORAL at 14:35

## 2024-01-01 RX ADMIN — LEVOTHYROXINE SODIUM 25 MCG: 0.03 TABLET ORAL at 06:07

## 2024-01-01 RX ADMIN — POLYETHYLENE GLYCOL 3350 17 G: 17 POWDER, FOR SOLUTION ORAL at 20:25

## 2024-01-01 RX ADMIN — SENNOSIDES AND DOCUSATE SODIUM 2 TABLET: 50; 8.6 TABLET ORAL at 20:26

## 2024-01-01 RX ADMIN — FUROSEMIDE 20 MG: 20 TABLET ORAL at 09:50

## 2024-01-01 RX ADMIN — PANTOPRAZOLE SODIUM 40 MG: 40 TABLET, DELAYED RELEASE ORAL at 06:07

## 2024-01-01 RX ADMIN — OXCARBAZEPINE 150 MG: 150 TABLET, FILM COATED ORAL at 09:50

## 2024-01-01 RX ADMIN — CYCLOBENZAPRINE 10 MG: 10 TABLET, FILM COATED ORAL at 09:49

## 2024-01-01 RX ADMIN — PREDNISONE 20 MG: 20 TABLET ORAL at 09:50

## 2024-01-01 RX ADMIN — GUAIFENESIN 1200 MG: 600 TABLET, MULTILAYER, EXTENDED RELEASE ORAL at 09:50

## 2024-01-01 NOTE — PROGRESS NOTES
The patient continues to complain of ongoing lower back pain. This pain has been present for years but has worsened over the course of the last few months. The patient has not done any physical therapy for her back. She was scheduled to have an MRI done but has not completed it as of yet. The patient denies any bowel incontinence. She has no radiation of pain or numbness down her leg. The patient has no fevers, chills, sweats. No chest pain, no SOB. Since being in the hospital she has been treated with flexeril, prednisone and a lidoderm patch without relief of her symptoms. The patient has not found anything to relieve her symptoms related to this pain.  The patient has not been walking well for some period of time. I have reviewed the previous notes related to the patients hospitalization    Physical exam - alert, oriented, appears in pain when moved  Neck - supple with no JVD or carotid bruits  Heart - regular rate and rhythm  Lungs - CTA, no wheeze and no ronchi  Abdomen - soft, nontender, NABS, No G/R/R  Extremities - no edema  Neuro - alert, oriented and able to follow commands. She has pain in her legs which prohibits a full exam    Labs reviewed are normal during the hospitalization    Lower back pain - the patient only has mild DJD present on her Xray. The patient has severe back spasms present on physical exam. The patient has been waiting on an MRI for some period of time. Secondary to that there is no point in waiting further to complete this to rule out any underlying pathology that may have been missed on previous imaging. The patient in the meantime will be treated with a multi modality approach with tylenol 650mg P.O Q6hrs, Flexeril 10mg P.O TID, Prednisone 40mg P.O Qday.  lidoderm patch, Oxycodone as needed for pain. I would prefer not to give IV narcotics as this can be addictive in nature. I would prefer only a short course of therapy. We talked at length about the importance of stretching  exercises which she has not done in many years. Further recommendations based on the results of the MRI scan.   Constipation - the patient is currently taking senna-s two tablets p.o BID. I have added Mirilax BID to the regiment. Dulcolox suppository to be given now. Relistor of equivalent drug will be used as well.   Case discussed in detail with patient and her sister who is present at bedside.       Low back pain s/p fall  - cbc and bmp are unremarkable  - xr spine lumbar 12/22/23 reviewed and showing mild degenerative disc disease   - ct abd reviewed and showing a Schmorl's node invagination along the superior endplate of L3 which appears unchanged. New Schmorl's node invagination is noted at L4 and L5. No evidence for acute osseous injury in the lumbar spine. The coccyx appears intact.      Hypothyroidism  - cont synthroid     Bipolar disorder  - cont trileptal, risperidone, zoloft     Hx of chf  - cont lasix and potassium replacement     HPL  - cont statin     Obesity  -BMI 41.05  -Lifestyle modifications

## 2024-01-01 NOTE — PLAN OF CARE
Goal Outcome Evaluation:            Pt complaining of 8/10 lower back pain (see MAR for treatment). Patient currently lying flat, with 3 L oxygen via nasal cannula. Patient to be D/C to [facility] today. Will continue plan of care.    Problem: Fall Injury Risk  Goal: Absence of Fall and Fall-Related Injury  1/1/2024 0632 by Estela Núñez RN  Outcome: Ongoing, Progressing  1/1/2024 0603 by Estela Núñez RN  Outcome: Ongoing, Progressing  1/1/2024 0603 by Estela Núñez RN  Outcome: Ongoing, Progressing  Intervention: Identify and Manage Contributors  Recent Flowsheet Documentation  Taken 12/31/2023 2000 by Estela Núñez RN  Medication Review/Management: medications reviewed  Intervention: Promote Injury-Free Environment  Recent Flowsheet Documentation  Taken 1/1/2024 0607 by Estela Núñez RN  Safety Promotion/Fall Prevention: safety round/check completed  Taken 1/1/2024 0400 by Estela Núñez RN  Safety Promotion/Fall Prevention: safety round/check completed  Taken 1/1/2024 0200 by Estela Núñez RN  Safety Promotion/Fall Prevention: safety round/check completed  Taken 1/1/2024 0000 by Estela Núñez RN  Safety Promotion/Fall Prevention: safety round/check completed  Taken 12/31/2023 2200 by Estela Núñez RN  Safety Promotion/Fall Prevention: safety round/check completed  Taken 12/31/2023 2000 by Estela Núñez RN  Safety Promotion/Fall Prevention: safety round/check completed     Problem: Pain Acute  Goal: Acceptable Pain Control and Functional Ability  1/1/2024 0632 by Estela Núñez RN  Outcome: Ongoing, Progressing  1/1/2024 0603 by Estela Núñez RN  Outcome: Ongoing, Progressing  1/1/2024 0603 by Estela Núñez RN  Outcome: Ongoing, Progressing  Intervention: Prevent or Manage Pain  Recent Flowsheet Documentation  Taken 12/31/2023 2000 by Estela Núñez RN  Sensory Stimulation Regulation: lighting decreased  Medication Review/Management:  medications reviewed  Intervention: Optimize Psychosocial Wellbeing  Recent Flowsheet Documentation  Taken 12/31/2023 2000 by Estela Núñez RN  Supportive Measures: active listening utilized  Diversional Activities: television  Goal: Acceptable Pain Control and Functional Ability  1/1/2024 0632 by Estela Núñez RN  Outcome: Ongoing, Progressing  1/1/2024 0603 by Estela Núñez RN  Outcome: Ongoing, Progressing  1/1/2024 0603 by Estela Núñez RN  Outcome: Ongoing, Progressing  Intervention: Prevent or Manage Pain  Recent Flowsheet Documentation  Taken 12/31/2023 2000 by Estela Núñez RN  Sensory Stimulation Regulation: lighting decreased  Medication Review/Management: medications reviewed  Intervention: Optimize Psychosocial Wellbeing  Recent Flowsheet Documentation  Taken 12/31/2023 2000 by Estela Núñez RN  Supportive Measures: active listening utilized  Diversional Activities: television     Problem: Skin Injury Risk Increased  Goal: Skin Health and Integrity  1/1/2024 0632 by Estela Núñez RN  Outcome: Ongoing, Progressing  1/1/2024 0603 by Estela Núñez RN  Outcome: Ongoing, Progressing  1/1/2024 0603 by Estela Núñez RN  Outcome: Ongoing, Progressing  Intervention: Optimize Skin Protection  Recent Flowsheet Documentation  Taken 12/31/2023 2000 by Estela Núñez RN  Pressure Reduction Techniques: frequent weight shift encouraged  Skin Protection: adhesive use limited     Problem: Asthma Comorbidity  Goal: Maintenance of Asthma Control  1/1/2024 0632 by Estela Núñez RN  Outcome: Ongoing, Progressing  1/1/2024 0603 by Estela Núñez RN  Outcome: Ongoing, Progressing  1/1/2024 0603 by Estela Núñez RN  Outcome: Ongoing, Progressing  Intervention: Maintain Asthma Symptom Control  Recent Flowsheet Documentation  Taken 12/31/2023 2000 by Estela Núñez RN  Medication Review/Management: medications reviewed     Problem: Behavioral Health  Comorbidity  Goal: Maintenance of Behavioral Health Symptom Control  1/1/2024 0632 by Estela Núñez RN  Outcome: Ongoing, Progressing  1/1/2024 0603 by Estela Núñez RN  Outcome: Ongoing, Progressing  1/1/2024 0603 by Estela Núñez RN  Outcome: Ongoing, Progressing  Intervention: Maintain Behavioral Health Symptom Control  Recent Flowsheet Documentation  Taken 12/31/2023 2000 by Estela Núñez RN  Medication Review/Management: medications reviewed     Problem: COPD (Chronic Obstructive Pulmonary Disease) Comorbidity  Goal: Maintenance of COPD Symptom Control  1/1/2024 0632 by Estela Núñez RN  Outcome: Ongoing, Progressing  1/1/2024 0603 by Estela Núñez RN  Outcome: Ongoing, Progressing  1/1/2024 0603 by Estela Núñez RN  Outcome: Ongoing, Progressing  Intervention: Maintain COPD-Symptom Control  Recent Flowsheet Documentation  Taken 12/31/2023 2000 by Estela Núñez RN  Supportive Measures: active listening utilized  Medication Review/Management: medications reviewed     Problem: Diabetes Comorbidity  Goal: Blood Glucose Level Within Targeted Range  1/1/2024 0632 by Estela Núñez RN  Outcome: Ongoing, Progressing  1/1/2024 0603 by Estela Núñez RN  Outcome: Ongoing, Progressing  1/1/2024 0603 by Estela Núñez RN  Outcome: Ongoing, Progressing     Problem: Heart Failure Comorbidity  Goal: Maintenance of Heart Failure Symptom Control  1/1/2024 0632 by Estela Núñez RN  Outcome: Ongoing, Progressing  1/1/2024 0603 by Estela Núñez RN  Outcome: Ongoing, Progressing  1/1/2024 0603 by Estela Núñez RN  Outcome: Ongoing, Progressing  Intervention: Maintain Heart Failure-Management  Recent Flowsheet Documentation  Taken 12/31/2023 2000 by Estela Núñez RN  Medication Review/Management: medications reviewed     Problem: Hypertension Comorbidity  Goal: Blood Pressure in Desired Range  1/1/2024 0632 by Estela Núñez RN  Outcome: Ongoing,  Progressing  1/1/2024 0603 by Estela Núñez RN  Outcome: Ongoing, Progressing  1/1/2024 0603 by Estela Núñez RN  Outcome: Ongoing, Progressing  Intervention: Maintain Blood Pressure Management  Recent Flowsheet Documentation  Taken 12/31/2023 2000 by Estela Núñez RN  Medication Review/Management: medications reviewed     Problem: Obstructive Sleep Apnea Risk or Actual Comorbidity Management  Goal: Unobstructed Breathing During Sleep  1/1/2024 0632 by Estela Núñez RN  Outcome: Ongoing, Progressing  1/1/2024 0603 by Estela Núñez RN  Outcome: Ongoing, Progressing  1/1/2024 0603 by Estela Núñez RN  Outcome: Ongoing, Progressing     Problem: Osteoarthritis Comorbidity  Goal: Maintenance of Osteoarthritis Symptom Control  1/1/2024 0632 by Estela Núñez RN  Outcome: Ongoing, Progressing  1/1/2024 0603 by Estela Núñez RN  Outcome: Ongoing, Progressing  1/1/2024 0603 by Estela Núñez RN  Outcome: Ongoing, Progressing  Intervention: Maintain Osteoarthritis Symptom Control  Recent Flowsheet Documentation  Taken 12/31/2023 2000 by Estela Núñez RN  Medication Review/Management: medications reviewed     Problem: Pain Chronic (Persistent) (Comorbidity Management)  Goal: Acceptable Pain Control and Functional Ability  1/1/2024 0632 by Estela Núñez RN  Outcome: Ongoing, Progressing  1/1/2024 0603 by Estela Núñez RN  Outcome: Ongoing, Progressing  1/1/2024 0603 by Estela Núñez RN  Outcome: Ongoing, Progressing  Intervention: Manage Persistent Pain  Recent Flowsheet Documentation  Taken 12/31/2023 2000 by Estela Núñez RN  Medication Review/Management: medications reviewed  Intervention: Optimize Psychosocial Wellbeing  Recent Flowsheet Documentation  Taken 12/31/2023 2000 by Estela Núñez RN  Supportive Measures: active listening utilized  Diversional Activities: television     Problem: Seizure Disorder Comorbidity  Goal: Maintenance of Seizure  Control  1/1/2024 0632 by Estela Núñez RN  Outcome: Ongoing, Progressing  1/1/2024 0603 by Estela Núñez RN  Outcome: Ongoing, Progressing  1/1/2024 0603 by Estela Núñez, LORI  Outcome: Ongoing, Progressing

## 2024-01-01 NOTE — PROGRESS NOTES
[facility] Observation Unit Progress Note    Patient Name: Morgan Quick  : 1955  MRN: [id number]  Primary Care Physician: Wayne Saavedra MD  Date of admission: 2023     Patient Care Team:  Wayne Saavedra MD as PCP - General (Internal Medicine)          Subjective   History Present Illness     Chief Complaint:   Chief Complaint   Patient presents with    Back Pain     Back Pain     History of Present Illness    ED 2023  68-year-old white female with history of hypertension, thyroid disorder, high cholesterol, chronic pain presents today by EMS from assisted living facility with reports of a fall and pain in her lower back.  She states she was trying to get up from her chair to her walker when she lost her balance and fell backwards and her buttocks landed against the chair.  She denies striking her head or having LOC.  She complains of pain in her lower back and tailbone area.  Pain is worse with certain movements.  She denies any numbness tingling or weakness in either lower extremity.  She denies any bowel or bladder dysfunction or saddle anesthesia.  No fever.  She denies any other injuries.        Observation 23  Pt concurs with er hpi. Pt states she has no paraesthesias, no loss of bladder or bowel control. Pt has seen ortho spine at [facility] and mri scheduled for chronic disc disease on Tuesday. Pt given pain medication and awaiting pt and ot eval. Pt fell from standing to chair on buttocks.         Observation 23  Pt and ot consulted and recommends snf. Awaiting precert     Observation 23  Awaiting placement at snf. PT/OT following.     Observation 23  Patient rates back pain 8/10 relieved with laying flat. Medically ready for discharge. Per UR/CM, Precert remains pending. PA advisor recommended keeping patient under obs status.         Observation 2024  Patient denies any acute distress or complaints. Laying in bed flat. Denies any back pain currently stating  "\"as long as I don't move\". Still waiting on precert. Due to holiday, precert still pending. Patient is otherwise medically cleared for discharge when accepted at SNF. Per UR, she does meet inpatient criteria due to requiring iv pain medicine for back pain. Will consult the hospitalist per UR's recommendations.    ROS  Musculoskeletal:  Positive for back pain.   Genitourinary:  Negative for bladder incontinence.   Neurological:  Negative for focal weakness, numbness and paresthesias          Personal History     Past Medical History:   Past Medical History:   Diagnosis Date    Bipolar 1 disorder     Breast cancer     Cancer     Disease of thyroid gland     High cholesterol     Hypertension        Surgical History:      Past Surgical History:   Procedure Laterality Date    ABDOMINAL SURGERY      APPENDECTOMY      ENDOSCOPY N/A 9/25/2022    Procedure: ESOPHAGOGASTRODUODENOSCOPY with biopsy x 2 areas;  Surgeon: Deonte Wong MD;  Location: [facility] ENDOSCOPY;  Service: Gastroenterology;  Laterality: N/A;  post: gastritis, duodinitis, nodule,     HYSTERECTOMY      MASTECTOMY      L side           Family History: family history includes Cancer in her mother. Otherwise pertinent FHx was reviewed and unremarkable.     Social History:  reports that she has never smoked. She has never been exposed to tobacco smoke. She has never used smokeless tobacco. She reports that she does not drink alcohol and does not use drugs.      Medications:  Prior to Admission medications    Medication Sig Start Date End Date Taking? Authorizing Provider   furosemide (LASIX) 20 MG tablet Take 1 tablet by mouth Daily. 10/11/22  Yes Ladonna Redd, DO   guaiFENesin (MUCINEX) 600 MG 12 hr tablet Take 2 tablets by mouth Every 12 (Twelve) Hours. 10/10/22  Yes Ladonna Redd, DO   HYDROcodone-acetaminophen (NORCO)  MG per tablet Take 1 tablet by mouth 4 (Four) Times a Day.   Yes Provider, MD Brennon   levothyroxine (SYNTHROID, LEVOTHROID) 25 " MCG tablet Take 1 tablet by mouth Every Morning. 10/11/22  Yes Ladonna Redd DO   lubiprostone (AMITIZA) 24 MCG capsule Take 1 capsule by mouth 2 (Two) Times a Day.   Yes Brennon Serrano MD   OXcarbazepine (TRILEPTAL) 150 MG tablet Take 1 tablet by mouth 2 (Two) Times a Day. 10/10/22  Yes Ladonna Redd DO   pantoprazole (PROTONIX) 40 MG EC tablet Take 1 tablet by mouth Every Morning. 10/11/22  Yes Ladonna Redd DO   potassium chloride (MICRO-K) 10 MEQ CR capsule Take 1 capsule by mouth Daily.   Yes Brennon Serrano MD   risperiDONE (risperDAL) 0.25 MG tablet Take 1 tablet by mouth Every Night. 10/10/22  Yes Ladonna Redd DO   rosuvastatin (CRESTOR) 10 MG tablet Take 3 tablets by mouth Daily.   Yes Brennon Serrano MD   sertraline (ZOLOFT) 100 MG tablet Take 2 tablets by mouth Daily. 10/10/22  Yes Ladonna Redd DO   sodium chloride 1 g tablet Take 2 tablets by mouth 3 (Three) Times a Day With Meals. 10/10/22  Yes Ladonna Redd DO   traZODone (DESYREL) 50 MG tablet Take 0.5 tablets by mouth Every Night.   Yes Brennon Serrano MD       Allergies:    Allergies   Allergen Reactions    Contrast Dye (Echo Or Unknown Ct/Mr) Shortness Of Breath     Pt states she had trouble breathing when she had contrast in the past.        Objective   Objective     Vital Signs  Temp:  [97.8 °F (36.6 °C)-98.1 °F (36.7 °C)] 97.9 °F (36.6 °C)  Heart Rate:  [63-71] 65  Resp:  [15-18] 16  BP: (145-173)/(66-84) 173/74  SpO2:  [92 %-95 %] 94 %  on  Flow (L/min):  [3] 3;   Device (Oxygen Therapy): nasal cannula  Body mass index is 41.05 kg/m².    Physical Exam    Physical Exam  Vitals and nursing note reviewed.   Constitutional:       Appearance: Normal appearance. She is obese.   HENT:      Head: Normocephalic and atraumatic.      Right Ear: External ear normal.      Left Ear: External ear normal.      Nose: Nose normal.      Mouth/Throat:      Mouth: Mucous membranes are moist.      Pharynx: Oropharynx is clear.    Eyes:      Extraocular Movements: Extraocular movements intact.   Cardiovascular:      Rate and Rhythm: Normal rate and regular rhythm.      Pulses: Normal pulses.      Heart sounds: Normal heart sounds.   Pulmonary:      Effort: Pulmonary effort is normal.      Breath sounds: Normal breath sounds.   Abdominal:      General: Abdomen is flat. Bowel sounds are normal.      Palpations: Abdomen is soft.   Musculoskeletal:         General: Normal range of motion.      Cervical back: Normal range of motion.   Skin:     General: Skin is warm.   Neurological:      General: No focal deficit present.      Mental Status: She is alert and oriented to person, place, and time.      Motor: Weakness present.   Psychiatric:         Mood and Affect: Mood normal.         Behavior: Behavior normal.     Results Review:  I have personally reviewed most recent lab results and radiology images and interpretations and agree with findings, most notably: cbc, cmp, xr spine, ct abd  .    Results from last 7 days   Lab Units 12/30/23  0408   WBC 10*3/mm3 10.10   HEMOGLOBIN g/dL 12.8   HEMATOCRIT % 40.4   PLATELETS 10*3/mm3 294     Results from last 7 days   Lab Units 12/30/23  0408   SODIUM mmol/L 138   POTASSIUM mmol/L 3.8   CHLORIDE mmol/L 97*   CO2 mmol/L 32.0*   BUN mg/dL 12   CREATININE mg/dL 0.44*   GLUCOSE mg/dL 89   CALCIUM mg/dL 9.7     Estimated Creatinine Clearance: 152.6 mL/min (A) (by C-G formula based on SCr of 0.44 mg/dL (L)).  Brief Urine Lab Results       None            Microbiology Results (last 10 days)       ** No results found for the last 240 hours. **            ECG/EMG Results (most recent)       None                Results for orders placed during the hospital encounter of 09/24/22    Adult Transthoracic Echo Complete W/ Cont if Necessary Per Protocol    Interpretation Summary  · Estimated left ventricular EF was in agreement with the calculated left ventricular EF. Left ventricular ejection fraction appears to be 56  - 60%. Left ventricular systolic function is normal.  · There is calcification of the aortic valve mainly affecting the non-coronary, left coronary and right coronary cusp(s).  · The study is technically difficult for diagnosis.      CT Abdomen Pelvis Without Contrast    Result Date: 12/27/2023  Impression: No acute findings in the abdomen or pelvis. No acute osseous injury. Electronically Signed: Kasia Paul MD  12/27/2023 12:51 PM CST  Workstation ID: [id number]       Estimated Creatinine Clearance: 152.6 mL/min (A) (by C-G formula based on SCr of 0.44 mg/dL (L)).    Assessment & Plan   Assessment/Plan       Active Hospital Problems    Diagnosis  POA    **Low back pain [M54.50]  Yes      Resolved Hospital Problems   No resolved problems to display.         Low back pain s/p fall  - cbc and bmp are unremarkable  - xr spine lumbar 12/22/23 reviewed and showing mild degenerative disc disease   - ct abd reviewed and showing a Schmorl's node invagination along the superior endplate of L3 which appears unchanged. New Schmorl's node invagination is noted at L4 and L5. No evidence for acute osseous injury in the lumbar spine. The coccyx appears intact.   - pain medication, muscle relaxers, steroids  - pt and ot consult and recommend snf  - pt should need 30 days or less of skilled nursing   -Per CM/UR notes, precert still pending.   -Patient remained obs as she is medically cleared for discharge and just waiting on precert approval  -Today, she met criteria for inpatient per UR  -Will consult the hospitalist per UR's recommendations        Hypothyroidism  - cont synthroid     Bipolar disorder  - cont trileptal, risperidone, zoloft     Hx of chf  - cont lasix and potassium replacement     HPL  - cont statin     Obesity  -BMI 41.05  -Lifestyle modifications          VTE Prophylaxis -   Mechanical Order History:        Ordered        12/27/23 1838  Place Sequential Compression Device  Once            12/27/23 1838   Maintain Sequential Compression Device  Continuous                          Pharmalogical Order History:       None            CODE STATUS:    Code Status and Medical Interventions:   Ordered at: 12/27/23 1629     Code Status (Patient has no pulse and is not breathing):    CPR (Attempt to Resuscitate)     Medical Interventions (Patient has pulse or is breathing):    Full Support       This patient has been examined wearing personal protective equipment.     I discussed the patient's findings and my recommendations with patient and nursing staff.      Signature:Electronically signed by NIDIA Cadena, 01/01/24, 9:14 AM EST.

## 2024-01-02 ENCOUNTER — APPOINTMENT (OUTPATIENT)
Dept: MRI IMAGING | Facility: HOSPITAL | Age: 69
DRG: 516 | End: 2024-01-02
Payer: MEDICARE

## 2024-01-02 PROCEDURE — 72148 MRI LUMBAR SPINE W/O DYE: CPT

## 2024-01-02 PROCEDURE — 63710000001 PREDNISONE PER 1 MG: Performed by: PHYSICIAN ASSISTANT

## 2024-01-02 RX ORDER — ACETAMINOPHEN 325 MG/1
650 TABLET ORAL EVERY 6 HOURS SCHEDULED
Status: DISCONTINUED | OUTPATIENT
Start: 2024-01-02 | End: 2024-01-05 | Stop reason: HOSPADM

## 2024-01-02 RX ORDER — OXYCODONE HYDROCHLORIDE 5 MG/1
10 TABLET ORAL EVERY 4 HOURS PRN
Status: DISCONTINUED | OUTPATIENT
Start: 2024-01-02 | End: 2024-01-05 | Stop reason: HOSPADM

## 2024-01-02 RX ORDER — BISACODYL 10 MG
10 SUPPOSITORY, RECTAL RECTAL DAILY
Status: DISCONTINUED | OUTPATIENT
Start: 2024-01-02 | End: 2024-01-05 | Stop reason: HOSPADM

## 2024-01-02 RX ADMIN — GUAIFENESIN 1200 MG: 600 TABLET, MULTILAYER, EXTENDED RELEASE ORAL at 09:19

## 2024-01-02 RX ADMIN — OXYCODONE HYDROCHLORIDE 10 MG: 5 TABLET ORAL at 09:30

## 2024-01-02 RX ADMIN — NALOXEGOL OXALATE 25 MG: 25 TABLET, FILM COATED ORAL at 05:43

## 2024-01-02 RX ADMIN — CYCLOBENZAPRINE 10 MG: 10 TABLET, FILM COATED ORAL at 09:19

## 2024-01-02 RX ADMIN — ROSUVASTATIN 30 MG: 10 TABLET, FILM COATED ORAL at 20:45

## 2024-01-02 RX ADMIN — TRAZODONE HYDROCHLORIDE 25 MG: 50 TABLET ORAL at 20:45

## 2024-01-02 RX ADMIN — POLYETHYLENE GLYCOL 3350 17 G: 17 POWDER, FOR SOLUTION ORAL at 09:19

## 2024-01-02 RX ADMIN — SENNOSIDES AND DOCUSATE SODIUM 2 TABLET: 50; 8.6 TABLET ORAL at 09:18

## 2024-01-02 RX ADMIN — SODIUM CHLORIDE 2 G: 1 TABLET ORAL at 17:47

## 2024-01-02 RX ADMIN — GUAIFENESIN 1200 MG: 600 TABLET, MULTILAYER, EXTENDED RELEASE ORAL at 20:45

## 2024-01-02 RX ADMIN — OXCARBAZEPINE 150 MG: 150 TABLET, FILM COATED ORAL at 20:45

## 2024-01-02 RX ADMIN — CYCLOBENZAPRINE 10 MG: 10 TABLET, FILM COATED ORAL at 20:45

## 2024-01-02 RX ADMIN — SODIUM CHLORIDE 2 G: 1 TABLET ORAL at 09:19

## 2024-01-02 RX ADMIN — LUBIPROSTONE 24 MCG: 24 CAPSULE, GELATIN COATED ORAL at 09:19

## 2024-01-02 RX ADMIN — OXCARBAZEPINE 150 MG: 150 TABLET, FILM COATED ORAL at 09:19

## 2024-01-02 RX ADMIN — LUBIPROSTONE 24 MCG: 24 CAPSULE, GELATIN COATED ORAL at 20:45

## 2024-01-02 RX ADMIN — SENNOSIDES AND DOCUSATE SODIUM 2 TABLET: 50; 8.6 TABLET ORAL at 20:45

## 2024-01-02 RX ADMIN — OXYCODONE HYDROCHLORIDE 10 MG: 5 TABLET ORAL at 20:45

## 2024-01-02 RX ADMIN — FUROSEMIDE 20 MG: 20 TABLET ORAL at 09:19

## 2024-01-02 RX ADMIN — LIDOCAINE 1 PATCH: 50 PATCH CUTANEOUS at 09:19

## 2024-01-02 RX ADMIN — BISACODYL 10 MG: 10 SUPPOSITORY RECTAL at 09:19

## 2024-01-02 RX ADMIN — SODIUM CHLORIDE 2 G: 1 TABLET ORAL at 12:26

## 2024-01-02 RX ADMIN — Medication 10 ML: at 20:46

## 2024-01-02 RX ADMIN — RISPERIDONE 0.25 MG: 0.25 TABLET, FILM COATED ORAL at 20:45

## 2024-01-02 RX ADMIN — ACETAMINOPHEN 650 MG: 325 TABLET, FILM COATED ORAL at 09:19

## 2024-01-02 RX ADMIN — PANTOPRAZOLE SODIUM 40 MG: 40 TABLET, DELAYED RELEASE ORAL at 05:43

## 2024-01-02 RX ADMIN — PREDNISONE 20 MG: 20 TABLET ORAL at 09:19

## 2024-01-02 RX ADMIN — Medication 10 ML: at 09:20

## 2024-01-02 RX ADMIN — LEVOTHYROXINE SODIUM 25 MCG: 0.03 TABLET ORAL at 05:43

## 2024-01-02 RX ADMIN — POTASSIUM CHLORIDE 10 MEQ: 750 TABLET, EXTENDED RELEASE ORAL at 09:19

## 2024-01-02 RX ADMIN — POLYETHYLENE GLYCOL 3350 17 G: 17 POWDER, FOR SOLUTION ORAL at 20:45

## 2024-01-02 RX ADMIN — ACETAMINOPHEN 650 MG: 325 TABLET, FILM COATED ORAL at 17:47

## 2024-01-02 RX ADMIN — SERTRALINE 200 MG: 100 TABLET, FILM COATED ORAL at 09:30

## 2024-01-02 RX ADMIN — CYCLOBENZAPRINE 10 MG: 10 TABLET, FILM COATED ORAL at 15:38

## 2024-01-02 NOTE — CASE MANAGEMENT/SOCIAL WORK
Continued Stay Note  [facility]     Patient Name: Morgan Quick  MRN: [id number]  Today's Date: 1/2/2024    Admit Date: 12/27/2023    Plan: From [facility]. Referred to [facility]; accepted. PASRR completed. Precert  started 12/29   Discharge Plan       Row Name 01/02/24 1333       Plan    Plan Comments Barriers: SNF precert pending but MRI today showed fractures and may need Kyphoplasty so PT unable to work with today for updated notes. CM will follow                    Maintained distance greater than six feet and spent less than 15 minutes in the room.     Marivel DUPREEN,RN Case Manager  [facility]  Phone: Desk- [phone] cell- [phone]

## 2024-01-02 NOTE — PROGRESS NOTES
The patient continues to complain of lower back pain. She states that the medication changes  yesterday did not help. She has not gotten out of bed or done the stretching exercises we discussed yesterday. No nausea or vomiting. The patient is passing gas. Still has not had a bowel movement. No chest pain or SOB. No fevers, chills, sweats. The patient looks about the same today.     Vitals reviewed and are stable.     Physical exam - alert, oriented, appears in pain when moved  Neck - supple with no JVD or carotid bruits  Heart - regular rate and rhythm  Lungs - CTA, no wheeze and no ronchi  Abdomen - soft, nontender, NABS, No G/R/R  Extremities - no edema  Neuro - alert, oriented and able to follow commands. She has pain in her legs which prohibits a full exam     Labs reviewed are normal during the hospitalization     Lower back pain - the patient only has mild DJD present on her Xray. The patient has severe back spasms present on physical exam. The patient has been waiting on an MRI for some period of time. Secondary to that there is no point in waiting further to complete this to rule out any underlying pathology that may have been missed on previous imaging. The patient in the meantime will be treated with a multi modality approach with tylenol 650mg P.O Q6hrs, Flexeril 10mg P.O TID, Prednisone 40mg P.O Qday.  lidoderm patch, Oxycodone as needed for pain. I would prefer not to give IV narcotics as this can be addictive in nature. I would prefer only a short course of therapy. We talked at length about the importance of stretching exercises which she has not done in many years. Further recommendations based on the results of the MRI scan.   Constipation - the patient is currently taking senna-s two tablets p.o BID. I have added Mirilax BID to the regiment. Dulcolox suppository to be given now. Relistor of equivalent drug will be used as well.   Case discussed in detail with patient and her sister who is present  at bedside    1/2/2024 - patient is not better today. The patients MRI is done with results below.       1/2/2024 -     Findings:  There are 5 nonrib-bearing lumbar-type vertebral bodies. Acute/subacute mild superior plate compression fracture at L2 with 25% height loss. No retropulsed fracture fragment. Acute/subacute moderate 50% central compression fracture at L4. No retropulsed   fracture fragment. There is chronic height loss at the superior plate of L3 related to Schmorl's node without superimposed fracture. Chronic mild superior plate compression fracture at L5.     Visualized portions of the sacrum are intact. Kidneys without hydronephrosis. Abdominal aorta without aneurysm. No diffuse marrow replacing process. Marrow edema related to fracture noted throughout the L4 vertebral body and at the superior plate of L2.   Posterior spinous processes and transverse spinous processes are intact.     T12-L1: Negative for canal stenosis. Facet joints normally aligned. No canal or foraminal stenosis.     L1-2: Mild disc bulge with marginal osteophyte formation. Negative for canal stenosis. No foraminal stenosis.     L2-3: Mild circumferential disc bulge. Mild facet arthritis and ligamentum flavum thickening. Mild narrowing at the left and right lateral recess without high-grade central canal stenosis. Mild bilateral foraminal stenosis.     L3-4: Minimal bulging of the disc. Mild facet arthritis and ligamentum flavum thickening. Negative for canal or foraminal stenosis.     L4-5: Mild circumferential disc bulge. Moderate facet arthritis with mild ligamentum flavum thickening. Negative for canal stenosis. Mild bilateral foraminal stenosis.     L5-S1: Mild circumferential disc bulge with tiny superimposed central protrusion. Slight narrowing of the right lateral recess without high-grade central canal stenosis. Facet joints normally aligned. Negative for foraminal stenosis.     IMPRESSION:  Impression:  1. Acute/subacute  25% compression fracture at L2.  2. Acute/subacute 50% compression fracture at L4.  3. No retropulsed fracture fragment or significant canal stenosis.  4. Chronic height loss at L3 related to remote fracture and/or Schmorl's node. Chronic mild compression fracture at L5.  5. Lumbar degenerative findings above.    As above. The patient is already on multimodality approach to pain. Continue lidoderm patch apply Qday. I have increased the oxycodone to 10mg P.O Q6hrs. Systemic steroids have been started. Will discuss with pain management about any interventional pain that maybe beneficial to her. These fractures have likely there for sometime based on her history.  These fractures are very likely the cause of her issues. I will give a suppository now for her constipation. If that does not work then golytely would be the next option as it has been sometime since she has had a bowel movement. Discussed with patient and family in detail

## 2024-01-02 NOTE — PLAN OF CARE
Problem: Fall Injury Risk  Goal: Absence of Fall and Fall-Related Injury  Outcome: Ongoing, Progressing  Intervention: Promote Injury-Free Environment  Recent Flowsheet Documentation  Taken 1/2/2024 0004 by Sherrie Syed RN  Safety Promotion/Fall Prevention: safety round/check completed  Taken 1/1/2024 2246 by Sherrie Syed RN  Safety Promotion/Fall Prevention: safety round/check completed  Taken 1/1/2024 2048 by Sherrie Syed RN  Safety Promotion/Fall Prevention: safety round/check completed     Problem: Pain Acute  Goal: Acceptable Pain Control and Functional Ability  Outcome: Ongoing, Progressing  Intervention: Prevent or Manage Pain  Recent Flowsheet Documentation  Taken 1/1/2024 2048 by Sherrie Syed RN  Sleep/Rest Enhancement:   awakenings minimized   room darkened  Intervention: Optimize Psychosocial Wellbeing  Recent Flowsheet Documentation  Taken 1/1/2024 2048 by Sherrie Syed RN  Supportive Measures: active listening utilized  Diversional Activities:   smartphone   television  Goal: Acceptable Pain Control and Functional Ability  Outcome: Ongoing, Progressing  Intervention: Prevent or Manage Pain  Recent Flowsheet Documentation  Taken 1/1/2024 2048 by Sherrie Syed RN  Sleep/Rest Enhancement:   awakenings minimized   room darkened  Intervention: Optimize Psychosocial Wellbeing  Recent Flowsheet Documentation  Taken 1/1/2024 2048 by Sherrie Syed RN  Supportive Measures: active listening utilized  Diversional Activities:   smartphone   television     Problem: Skin Injury Risk Increased  Goal: Skin Health and Integrity  Outcome: Ongoing, Progressing  Intervention: Optimize Skin Protection  Recent Flowsheet Documentation  Taken 1/1/2024 2048 by Sherrie Syed RN  Pressure Reduction Techniques: frequent weight shift encouraged  Head of Bed (HOB) Positioning: HOB flat  Skin Protection:   adhesive use limited   transparent dressing maintained      Problem: Asthma Comorbidity  Goal: Maintenance of Asthma Control  Outcome: Ongoing, Progressing     Problem: Behavioral Health Comorbidity  Goal: Maintenance of Behavioral Health Symptom Control  Outcome: Ongoing, Progressing     Problem: COPD (Chronic Obstructive Pulmonary Disease) Comorbidity  Goal: Maintenance of COPD Symptom Control  Outcome: Ongoing, Progressing  Intervention: Maintain COPD-Symptom Control  Recent Flowsheet Documentation  Taken 1/1/2024 2048 by Sherrie Syed RN  Supportive Measures: active listening utilized     Problem: Diabetes Comorbidity  Goal: Blood Glucose Level Within Targeted Range  Outcome: Ongoing, Progressing     Problem: Heart Failure Comorbidity  Goal: Maintenance of Heart Failure Symptom Control  Outcome: Ongoing, Progressing     Problem: Hypertension Comorbidity  Goal: Blood Pressure in Desired Range  Outcome: Ongoing, Progressing     Problem: Obstructive Sleep Apnea Risk or Actual Comorbidity Management  Goal: Unobstructed Breathing During Sleep  Outcome: Ongoing, Progressing     Problem: Osteoarthritis Comorbidity  Goal: Maintenance of Osteoarthritis Symptom Control  Outcome: Ongoing, Progressing  Intervention: Maintain Osteoarthritis Symptom Control  Recent Flowsheet Documentation  Taken 1/1/2024 2048 by Sherrie Syed RN  Activity Management:   activity encouraged   bedrest     Problem: Pain Chronic (Persistent) (Comorbidity Management)  Goal: Acceptable Pain Control and Functional Ability  Outcome: Ongoing, Progressing  Intervention: Manage Persistent Pain  Recent Flowsheet Documentation  Taken 1/1/2024 2048 by Sherrie Syed, RN  Sleep/Rest Enhancement:   awakenings minimized   room darkened  Intervention: Optimize Psychosocial Wellbeing  Recent Flowsheet Documentation  Taken 1/1/2024 2048 by Sherrie Syed, RN  Supportive Measures: active listening utilized  Diversional Activities:   smartphone   television     Problem: Seizure Disorder  Comorbidity  Goal: Maintenance of Seizure Control  Outcome: Ongoing, Progressing   Goal Outcome Evaluation:      Patient resting quietly in bed, denies pain or distress, safety maintained call light in reach

## 2024-01-02 NOTE — PLAN OF CARE
Goal Outcome Evaluation:   A/O x4 unable to sit upin bed, laying flat at all times. Patient has difficulty rolling side to side. Remains on purewick. MRI of lumbar done today with FX noted. Consulted pain management. Per pain management patient to need kyphoplasty attempt to be done today or tomorrow

## 2024-01-02 NOTE — SIGNIFICANT NOTE
01/02/24 6092   OTHER   Discipline physical therapist   Rehab Time/Intention   Session Not Performed   (awaiting plan following MRI result prior to continuing PT, will follow up 1/3)   Recommendation   PT - Next Appointment 01/03/24

## 2024-01-03 ENCOUNTER — ANESTHESIA (OUTPATIENT)
Facility: HOSPITAL | Age: 69
End: 2024-01-03
Payer: MEDICARE

## 2024-01-03 ENCOUNTER — ANESTHESIA EVENT (OUTPATIENT)
Facility: HOSPITAL | Age: 69
End: 2024-01-03
Payer: MEDICARE

## 2024-01-03 ENCOUNTER — APPOINTMENT (OUTPATIENT)
Dept: INTERVENTIONAL RADIOLOGY/VASCULAR | Facility: HOSPITAL | Age: 69
DRG: 516 | End: 2024-01-03
Payer: MEDICARE

## 2024-01-03 PROBLEM — S32.040A COMPRESSION FRACTURE OF FOURTH LUMBAR VERTEBRA: Status: ACTIVE | Noted: 2024-01-03

## 2024-01-03 PROBLEM — S32.020A COMPRESSION FRACTURE OF L2 LUMBAR VERTEBRA, CLOSED, INITIAL ENCOUNTER: Status: ACTIVE | Noted: 2024-01-03

## 2024-01-03 PROBLEM — S32.040A COMPRESSION FRACTURE OF L4 LUMBAR VERTEBRA, CLOSED, INITIAL ENCOUNTER: Status: ACTIVE | Noted: 2024-01-03

## 2024-01-03 LAB
APTT PPP: 27.6 SECONDS (ref 24–31)
INR PPP: 1.06 (ref 0.93–1.1)
PROTHROMBIN TIME: 11.5 SECONDS (ref 9.6–11.7)
WHOLE BLOOD HOLD COAG: NORMAL

## 2024-01-03 PROCEDURE — 22515 PERQ VERTEBRAL AUGMENTATION: CPT

## 2024-01-03 PROCEDURE — 85610 PROTHROMBIN TIME: CPT | Performed by: PEDIATRICS

## 2024-01-03 PROCEDURE — 25010000002 ONDANSETRON PER 1 MG: Performed by: RADIOLOGY

## 2024-01-03 PROCEDURE — 85730 THROMBOPLASTIN TIME PARTIAL: CPT | Performed by: PEDIATRICS

## 2024-01-03 PROCEDURE — 25010000002 MORPHINE PER 10 MG: Performed by: PHYSICIAN ASSISTANT

## 2024-01-03 PROCEDURE — 25010000002 LIDOCAINE 1 % SOLUTION: Performed by: RADIOLOGY

## 2024-01-03 PROCEDURE — 99153 MOD SED SAME PHYS/QHP EA: CPT

## 2024-01-03 PROCEDURE — 25010000002 FENTANYL CITRATE (PF) 50 MCG/ML SOLUTION: Performed by: RADIOLOGY

## 2024-01-03 PROCEDURE — 99152 MOD SED SAME PHYS/QHP 5/>YRS: CPT

## 2024-01-03 PROCEDURE — 99221 1ST HOSP IP/OBS SF/LOW 40: CPT | Performed by: STUDENT IN AN ORGANIZED HEALTH CARE EDUCATION/TRAINING PROGRAM

## 2024-01-03 PROCEDURE — 22514 PERQ VERTEBRAL AUGMENTATION: CPT

## 2024-01-03 PROCEDURE — C1713 ANCHOR/SCREW BN/BN,TIS/BN: HCPCS

## 2024-01-03 PROCEDURE — 25010000002 HYDROMORPHONE 1 MG/ML SOLUTION: Performed by: STUDENT IN AN ORGANIZED HEALTH CARE EDUCATION/TRAINING PROGRAM

## 2024-01-03 PROCEDURE — 25010000002 MIDAZOLAM PER 1 MG: Performed by: RADIOLOGY

## 2024-01-03 PROCEDURE — 0QU03JZ SUPPLEMENT LUMBAR VERTEBRA WITH SYNTHETIC SUBSTITUTE, PERCUTANEOUS APPROACH: ICD-10-PCS | Performed by: RADIOLOGY

## 2024-01-03 PROCEDURE — 0QS03ZZ REPOSITION LUMBAR VERTEBRA, PERCUTANEOUS APPROACH: ICD-10-PCS | Performed by: RADIOLOGY

## 2024-01-03 RX ORDER — FENTANYL CITRATE 50 UG/ML
INJECTION, SOLUTION INTRAMUSCULAR; INTRAVENOUS AS NEEDED
Status: COMPLETED | OUTPATIENT
Start: 2024-01-03 | End: 2024-01-03

## 2024-01-03 RX ORDER — ONDANSETRON 2 MG/ML
INJECTION INTRAMUSCULAR; INTRAVENOUS AS NEEDED
Status: COMPLETED | OUTPATIENT
Start: 2024-01-03 | End: 2024-01-03

## 2024-01-03 RX ORDER — LIDOCAINE HYDROCHLORIDE 10 MG/ML
INJECTION, SOLUTION INFILTRATION; PERINEURAL AS NEEDED
Status: COMPLETED | OUTPATIENT
Start: 2024-01-03 | End: 2024-01-03

## 2024-01-03 RX ORDER — MIDAZOLAM HYDROCHLORIDE 1 MG/ML
INJECTION INTRAMUSCULAR; INTRAVENOUS AS NEEDED
Status: COMPLETED | OUTPATIENT
Start: 2024-01-03 | End: 2024-01-03

## 2024-01-03 RX ADMIN — ACETAMINOPHEN 650 MG: 325 TABLET, FILM COATED ORAL at 05:10

## 2024-01-03 RX ADMIN — CYCLOBENZAPRINE 10 MG: 10 TABLET, FILM COATED ORAL at 20:01

## 2024-01-03 RX ADMIN — ONDANSETRON 4 MG: 2 INJECTION INTRAMUSCULAR; INTRAVENOUS at 14:33

## 2024-01-03 RX ADMIN — Medication 10 ML: at 09:00

## 2024-01-03 RX ADMIN — FENTANYL CITRATE 100 MCG: 50 INJECTION, SOLUTION INTRAMUSCULAR; INTRAVENOUS at 14:50

## 2024-01-03 RX ADMIN — LIDOCAINE HYDROCHLORIDE 10 ML: 10 INJECTION, SOLUTION INFILTRATION; PERINEURAL at 14:47

## 2024-01-03 RX ADMIN — FENTANYL CITRATE 100 MCG: 50 INJECTION, SOLUTION INTRAMUSCULAR; INTRAVENOUS at 15:12

## 2024-01-03 RX ADMIN — CYCLOBENZAPRINE 10 MG: 10 TABLET, FILM COATED ORAL at 16:32

## 2024-01-03 RX ADMIN — FENTANYL CITRATE 100 MCG: 50 INJECTION, SOLUTION INTRAMUSCULAR; INTRAVENOUS at 14:56

## 2024-01-03 RX ADMIN — PANTOPRAZOLE SODIUM 40 MG: 40 TABLET, DELAYED RELEASE ORAL at 05:10

## 2024-01-03 RX ADMIN — TRAZODONE HYDROCHLORIDE 25 MG: 50 TABLET ORAL at 20:01

## 2024-01-03 RX ADMIN — RISPERIDONE 0.25 MG: 0.25 TABLET, FILM COATED ORAL at 20:01

## 2024-01-03 RX ADMIN — SODIUM CHLORIDE 2 G: 1 TABLET ORAL at 18:41

## 2024-01-03 RX ADMIN — Medication 10 ML: at 20:06

## 2024-01-03 RX ADMIN — OXYCODONE HYDROCHLORIDE 10 MG: 5 TABLET ORAL at 16:32

## 2024-01-03 RX ADMIN — ROSUVASTATIN 30 MG: 10 TABLET, FILM COATED ORAL at 20:01

## 2024-01-03 RX ADMIN — FENTANYL CITRATE 50 MCG: 50 INJECTION, SOLUTION INTRAMUSCULAR; INTRAVENOUS at 14:38

## 2024-01-03 RX ADMIN — OXCARBAZEPINE 150 MG: 150 TABLET, FILM COATED ORAL at 20:01

## 2024-01-03 RX ADMIN — MIDAZOLAM 1 MG: 1 INJECTION INTRAMUSCULAR; INTRAVENOUS at 14:58

## 2024-01-03 RX ADMIN — MIDAZOLAM 1 MG: 1 INJECTION INTRAMUSCULAR; INTRAVENOUS at 15:11

## 2024-01-03 RX ADMIN — LEVOTHYROXINE SODIUM 25 MCG: 0.03 TABLET ORAL at 05:10

## 2024-01-03 RX ADMIN — LUBIPROSTONE 24 MCG: 24 CAPSULE, GELATIN COATED ORAL at 20:01

## 2024-01-03 RX ADMIN — FENTANYL CITRATE 100 MCG: 50 INJECTION, SOLUTION INTRAMUSCULAR; INTRAVENOUS at 14:32

## 2024-01-03 RX ADMIN — GUAIFENESIN 1200 MG: 600 TABLET, MULTILAYER, EXTENDED RELEASE ORAL at 20:00

## 2024-01-03 RX ADMIN — MIDAZOLAM 0.5 MG: 1 INJECTION INTRAMUSCULAR; INTRAVENOUS at 14:50

## 2024-01-03 RX ADMIN — LIDOCAINE HYDROCHLORIDE 10 ML: 10 INJECTION, SOLUTION INFILTRATION; PERINEURAL at 15:03

## 2024-01-03 RX ADMIN — MIDAZOLAM 0.5 MG: 1 INJECTION INTRAMUSCULAR; INTRAVENOUS at 14:43

## 2024-01-03 RX ADMIN — NALOXEGOL OXALATE 25 MG: 25 TABLET, FILM COATED ORAL at 05:10

## 2024-01-03 RX ADMIN — MIDAZOLAM 1 MG: 1 INJECTION INTRAMUSCULAR; INTRAVENOUS at 14:32

## 2024-01-03 RX ADMIN — ACETAMINOPHEN 650 MG: 325 TABLET, FILM COATED ORAL at 00:42

## 2024-01-03 RX ADMIN — LIDOCAINE HYDROCHLORIDE 10 ML: 10 INJECTION, SOLUTION INFILTRATION; PERINEURAL at 14:54

## 2024-01-03 RX ADMIN — POLYETHYLENE GLYCOL 3350 17 G: 17 POWDER, FOR SOLUTION ORAL at 20:00

## 2024-01-03 RX ADMIN — LIDOCAINE 1 PATCH: 50 PATCH CUTANEOUS at 12:12

## 2024-01-03 RX ADMIN — SENNOSIDES AND DOCUSATE SODIUM 2 TABLET: 50; 8.6 TABLET ORAL at 20:01

## 2024-01-03 RX ADMIN — MORPHINE SULFATE 4 MG: 4 INJECTION, SOLUTION INTRAMUSCULAR; INTRAVENOUS at 17:49

## 2024-01-03 RX ADMIN — HYDROMORPHONE HYDROCHLORIDE 0.5 MG: 1 INJECTION, SOLUTION INTRAMUSCULAR; INTRAVENOUS; SUBCUTANEOUS at 19:57

## 2024-01-03 NOTE — PLAN OF CARE
Problem: Fall Injury Risk  Goal: Absence of Fall and Fall-Related Injury  Outcome: Ongoing, Progressing  Intervention: Identify and Manage Contributors  Recent Flowsheet Documentation  Taken 1/2/2024 2055 by Sherrie Syed RN  Medication Review/Management: medications reviewed  Intervention: Promote Injury-Free Environment  Recent Flowsheet Documentation  Taken 1/2/2024 2055 by Sherrie Syed RN  Safety Promotion/Fall Prevention: safety round/check completed     Problem: Pain Acute  Goal: Acceptable Pain Control and Functional Ability  Outcome: Ongoing, Progressing  Intervention: Prevent or Manage Pain  Recent Flowsheet Documentation  Taken 1/2/2024 2055 by Sherrie Syed RN  Medication Review/Management: medications reviewed  Intervention: Optimize Psychosocial Wellbeing  Recent Flowsheet Documentation  Taken 1/2/2024 2055 by Sherrie Syed RN  Supportive Measures: active listening utilized  Diversional Activities:   television   smartphone  Goal: Acceptable Pain Control and Functional Ability  Outcome: Ongoing, Progressing  Intervention: Prevent or Manage Pain  Recent Flowsheet Documentation  Taken 1/2/2024 2055 by Sherrie Syed RN  Medication Review/Management: medications reviewed  Intervention: Optimize Psychosocial Wellbeing  Recent Flowsheet Documentation  Taken 1/2/2024 2055 by Sherrie Syed RN  Supportive Measures: active listening utilized  Diversional Activities:   television   smartphone     Problem: Skin Injury Risk Increased  Goal: Skin Health and Integrity  Outcome: Ongoing, Progressing  Intervention: Optimize Skin Protection  Recent Flowsheet Documentation  Taken 1/2/2024 2055 by Sherrie Syed RN  Pressure Reduction Techniques:   frequent weight shift encouraged   weight shift assistance provided  Head of Bed (HOB) Positioning: HOB flat  Pressure Reduction Devices: pressure-redistributing mattress utilized  Skin Protection:   adhesive use  limited   transparent dressing maintained     Problem: Asthma Comorbidity  Goal: Maintenance of Asthma Control  Outcome: Ongoing, Progressing  Intervention: Maintain Asthma Symptom Control  Recent Flowsheet Documentation  Taken 1/2/2024 2055 by Sherrie Syed RN  Medication Review/Management: medications reviewed     Problem: Behavioral Health Comorbidity  Goal: Maintenance of Behavioral Health Symptom Control  Outcome: Ongoing, Progressing  Intervention: Maintain Behavioral Health Symptom Control  Recent Flowsheet Documentation  Taken 1/2/2024 2055 by Sherrie Syed RN  Medication Review/Management: medications reviewed     Problem: COPD (Chronic Obstructive Pulmonary Disease) Comorbidity  Goal: Maintenance of COPD Symptom Control  Outcome: Ongoing, Progressing  Intervention: Maintain COPD-Symptom Control  Recent Flowsheet Documentation  Taken 1/2/2024 2055 by Sherrie Syed RN  Supportive Measures: active listening utilized  Medication Review/Management: medications reviewed     Problem: Diabetes Comorbidity  Goal: Blood Glucose Level Within Targeted Range  Outcome: Ongoing, Progressing     Problem: Heart Failure Comorbidity  Goal: Maintenance of Heart Failure Symptom Control  Outcome: Ongoing, Progressing  Intervention: Maintain Heart Failure-Management  Recent Flowsheet Documentation  Taken 1/2/2024 2055 by Sherrie Syed RN  Medication Review/Management: medications reviewed     Problem: Hypertension Comorbidity  Goal: Blood Pressure in Desired Range  Outcome: Ongoing, Progressing  Intervention: Maintain Blood Pressure Management  Recent Flowsheet Documentation  Taken 1/2/2024 2055 by Sherrie Syed RN  Medication Review/Management: medications reviewed     Problem: Obstructive Sleep Apnea Risk or Actual Comorbidity Management  Goal: Unobstructed Breathing During Sleep  Outcome: Ongoing, Progressing     Problem: Osteoarthritis Comorbidity  Goal: Maintenance of Osteoarthritis  Symptom Control  Outcome: Ongoing, Progressing  Intervention: Maintain Osteoarthritis Symptom Control  Recent Flowsheet Documentation  Taken 1/2/2024 2055 by Sherrie Syed RN  Medication Review/Management: medications reviewed     Problem: Pain Chronic (Persistent) (Comorbidity Management)  Goal: Acceptable Pain Control and Functional Ability  Outcome: Ongoing, Progressing  Intervention: Manage Persistent Pain  Recent Flowsheet Documentation  Taken 1/2/2024 2055 by Sherrie Syed, RN  Medication Review/Management: medications reviewed  Intervention: Optimize Psychosocial Wellbeing  Recent Flowsheet Documentation  Taken 1/2/2024 2055 by Sherrie Syed RN  Supportive Measures: active listening utilized  Diversional Activities:   television   smartphone     Problem: Seizure Disorder Comorbidity  Goal: Maintenance of Seizure Control  Outcome: Ongoing, Progressing   Goal Outcome Evaluation:         Patient resting quietly in bed, denies pain or distress at this time, back pain continues, pain controlled with PRN medication, patient continues to lay on bed flat, external catheter care done, bed linens changed,safety maintained call light in reach.

## 2024-01-03 NOTE — PROGRESS NOTES
[facility]     Progress Note    Patient Name: Morgan Quick  : 1955  MRN: [id number]  Primary Care Physician:  Wayne Saavedra MD  Date of admission: 2023  Service date and time: 24 08:49 EST  Subjective   Subjective     Chief Complaint: Back pain    HPI:  Seen and examined this morning.  Patient laying on the bed comfortably.  Patient complaining of lower back pain when she sneezed.  Discussed with the patient kyphoplasty and she agreed to the procedure.    Patient scheduled for kyphoplasty tomorrow.  Will keep her n.p.o. from midnight.    Denies chest pain, shortness of breath, nausea and vomiting.    Disposition: Continue to follow, most likely she will need placement on discharge.        Objective   Objective     Vitals:   Temp:  [97.8 °F (36.6 °C)-98.9 °F (37.2 °C)] 98.3 °F (36.8 °C)  Heart Rate:  [61-72] 63  Resp:  [16] 16  BP: (114-144)/(66-76) 117/66  Flow (L/min):  [3] 3  Physical Exam    Constitutional: Awake, alert   Respiratory: Clear to auscultation bilaterally, nonlabored respirations    Cardiovascular: RRR, no murmurs, rubs, or gallops, palpable pedal pulses bilaterally   Gastrointestinal: Positive bowel sounds, soft, nontender, nondistended   Musculoskeletal: No bilateral ankle edema, no clubbing or cyanosis to extremities   Psychiatric: Appropriate affect, cooperative   Neurologic: Oriented x 3, strength symmetric in all extremities, Cranial Nerves grossly intact to confrontation, speech clear   Skin: No rashes     Result Review    Result Review:  I have personally reviewed the results from the time of this admission to 1/3/2024 08:49 EST and agree with these findings:  [x]  Laboratory list / accordion  []  Microbiology  [x]  Radiology  []  EKG/Telemetry   []  Cardiology/Vascular   []  Pathology  []  Old records  []  Other:  Most notable findings include:   MRI lumbar  Impression:  1. Acute/subacute 25% compression fracture at L2.  2. Acute/subacute 50% compression  fracture at L4.  3. No retropulsed fracture fragment or significant canal stenosis.  4. Chronic height loss at L3 related to remote fracture and/or Schmorl's node. Chronic mild compression fracture at L5.  5. Lumbar degenerative findings above.    Assessment & Plan   Assessment / Plan       Active Hospital Problems:  Active Hospital Problems    Diagnosis     **Low back pain     Compression fracture of L2 lumbar vertebra, closed, initial encounter     Compression fracture of L4 lumbar vertebra, closed, initial encounter     Back pain     Bipolar 1 disorder     High cholesterol     Essential hypertension      Plan:    -Compression fracture of L2, L4 : Patient scheduled for kyphoplasty tomorrow morning.  -Resume home chronic medication.    DVT prophylaxis:  Mechanical DVT prophylaxis orders are present.    CODE STATUS:   Code Status (Patient has no pulse and is not breathing): CPR (Attempt to Resuscitate)  Medical Interventions (Patient has pulse or is breathing): Full Support    Disposition:  I expect patient to be discharged in 48 hours, to rehab.    Vicki Beard MD

## 2024-01-03 NOTE — H&P
[facility]   Interventional Radiology H&P    Patient Name: Morgan Quick  : 1955  MRN: [id number]  Primary Care Physician:  Wayne Saavedra MD  Referring Physician: No Known Provider  Date of admission: 2023    Subjective   Subjective     HPI:  Morgan Quick is a 68 y.o. female with l2 and l4 compression deformities.    Review of Systems:   Constitutional no fever,  no weight loss       Otolaryngeal no difficulty swallowing   Cardiovascular no chest pain   Pulmonary no cough, no sputum production   Gastrointestinal no constipation, no diarrhea                         Personal History       Past Medical/Surgical History:   Past Medical History:   Diagnosis Date    Bipolar 1 disorder     Breast cancer     Cancer     Disease of thyroid gland     High cholesterol     Hypertension      Past Surgical History:   Procedure Laterality Date    ABDOMINAL SURGERY      APPENDECTOMY      ENDOSCOPY N/A 2022    Procedure: ESOPHAGOGASTRODUODENOSCOPY with biopsy x 2 areas;  Surgeon: Deonte Wong MD;  Location: [facility] ENDOSCOPY;  Service: Gastroenterology;  Laterality: N/A;  post: gastritis, duodinitis, nodule,     HYSTERECTOMY      MASTECTOMY      L side       Social History:  reports that she has never smoked. She has never been exposed to tobacco smoke. She has never used smokeless tobacco. She reports that she does not drink alcohol and does not use drugs.    Medications:  Medications Prior to Admission   Medication Sig Dispense Refill Last Dose    furosemide (LASIX) 20 MG tablet Take 1 tablet by mouth Daily. 30 tablet 0     guaiFENesin (MUCINEX) 600 MG 12 hr tablet Take 2 tablets by mouth Every 12 (Twelve) Hours. 20 tablet 0     HYDROcodone-acetaminophen (NORCO)  MG per tablet Take 1 tablet by mouth 4 (Four) Times a Day.       levothyroxine (SYNTHROID, LEVOTHROID) 25 MCG tablet Take 1 tablet by mouth Every Morning. 30 tablet 0     lubiprostone (AMITIZA) 24 MCG capsule Take 1 capsule by  mouth 2 (Two) Times a Day.       [] methylPREDNISolone (MEDROL) 4 MG tablet Take as directed       OXcarbazepine (TRILEPTAL) 150 MG tablet Take 1 tablet by mouth 2 (Two) Times a Day. 60 tablet 0     pantoprazole (PROTONIX) 40 MG EC tablet Take 1 tablet by mouth Every Morning. 30 tablet 0     potassium chloride (MICRO-K) 10 MEQ CR capsule Take 1 capsule by mouth Daily.       risperiDONE (risperDAL) 0.25 MG tablet Take 1 tablet by mouth Every Night. 30 tablet 0     rosuvastatin (CRESTOR) 10 MG tablet Take 3 tablets by mouth Daily.       sertraline (ZOLOFT) 100 MG tablet Take 2 tablets by mouth Daily. 30 tablet 0     sodium chloride 1 g tablet Take 2 tablets by mouth 3 (Three) Times a Day With Meals. 30 tablet 0     traZODone (DESYREL) 50 MG tablet Take 0.5 tablets by mouth Every Night.        Current medications:  acetaminophen, 650 mg, Oral, Q6H  bisacodyl, 10 mg, Rectal, Daily  cyclobenzaprine, 10 mg, Oral, TID  furosemide, 20 mg, Oral, Daily  guaiFENesin, 1,200 mg, Oral, Q12H  levothyroxine, 25 mcg, Oral, Q AM  lidocaine, 1 patch, Transdermal, Q24H  lubiprostone, 24 mcg, Oral, BID  Naloxegol Oxalate, 25 mg, Oral, QAM  OXcarbazepine, 150 mg, Oral, BID  pantoprazole, 40 mg, Oral, QAM  polyethylene glycol, 17 g, Oral, BID  potassium chloride, 10 mEq, Oral, Daily  predniSONE, 20 mg, Oral, Daily With Breakfast  risperiDONE, 0.25 mg, Oral, Nightly  rosuvastatin, 30 mg, Oral, Nightly  senna-docusate sodium, 2 tablet, Oral, BID  sertraline, 200 mg, Oral, Daily  sodium chloride, 10 mL, Intravenous, Q12H  sodium chloride, 2 g, Oral, TID With Meals  traZODone, 25 mg, Oral, Nightly      Current IV drips:       Allergies:  Allergies   Allergen Reactions    Contrast Dye (Echo Or Unknown Ct/Mr) Shortness Of Breath     Pt states she had trouble breathing when she had contrast in the past.        Objective    Objective     Vitals:   Temp:  [97.8 °F (36.6 °C)-98.9 °F (37.2 °C)] 97.8 °F (36.6 °C)  Heart Rate:  [60-78]  "65  Resp:  [16] 16  BP: (114-146)/(66-72) 146/72  Flow (L/min):  [3] 3      Physical Exam:   Constitutional: Awake, alert, No acute distress    Respiratory: Clear to auscultation bilaterally, nonlabored respirations    Cardiovascular: RRR, no murmurs, rubs, or gallops, palpable pedal pulses bilaterally   Gastrointestinal: Positive bowel sounds, soft, nontender, nondistended        ASA SCALE ASSESSMENT:  2-Mild to moderate systemic disease, medically well controlled, with no functional limitation    MALLAMPATI CLASSIFICATION:  2-Able to visualize the soft palate, fauces, uvula. The anterior & posterior tonsilar pillars are hidden by the tongue.       Result Review        Result Review:     No results found for: \"NA\"    No results found for: \"K\"    No results found for: \"CL\"    No results found for: \"PLASMABICARB\"    No results found for: \"BUN\"    No results found for: \"CREATININE\"    No results found for: \"CALCIUM\"        No components found for: \"GLUCOSE.*\"  Results from last 7 days   Lab Units 12/30/23  0408   WBC 10*3/mm3 10.10   HEMOGLOBIN g/dL 12.8   HEMATOCRIT % 40.4   PLATELETS 10*3/mm3 294      Results from last 7 days   Lab Units 01/03/24  0917   INR  1.06           Assessment / Plan     Assesment:   L2 and l4 compression deformities.      Plan:   L2 and l4 kyphoplasties.    The risks and benefits of the procedure were discussed with the patient.    Electronically signed by Blair Murrieta MD, 01/03/24, 2:26 PM EST.   "

## 2024-01-03 NOTE — CONSULTS
[facility]   Consult Note    Patient Name: Morgan Quick  : 1955  MRN: [id number]  Primary Care Physician:  Wayne Saavedra MD  Referring Physician: No Known Provider  Date of admission: 2023    Inpatient Anesthesia Pain Management Consult  Consult performed by: Ronni Atkins MD  Consult ordered by: Rod Daugherty DO  Reason for consult: Intractable low back pain, acute L2, L4 compression fractures        Subjective   Subjective     Reason for Consult/ Chief Complaint: Intractable low back pain, lumbar compression fracture    History of Present Illness  Morgan Quick is a 68 y.o. female who presents to the hospital with intractable low back pain.  She reports that she was trying to stand from a sitting position and felt a popping sensation which caused excruciating pain.  She reports that she had to fall back into her chair due to the severe pain.  She was brought to the hospital where MRI determined that she has an acute L2 and L4 compression fracture.  She also has a chronic L3 healed compression fracture.  She denies any loss of bowel, loss of bladder, or significant numbness or weakness.  She does not have any evidence of retropulsion or stenosis seen on MRI.  She is only getting minimal pain relief despite being on oxycodone as well as acetaminophen.  She is also complaining of significant muscle spasms secondary to the pain and very limited mobility.    Review of Systems   Gastrointestinal:  Positive for constipation.   Musculoskeletal:  Positive for arthralgias, back pain, gait problem and myalgias. Negative for joint swelling, neck pain and neck stiffness.   Neurological:  Negative for weakness and numbness.        Personal History     Past Medical History:   Diagnosis Date    Bipolar 1 disorder     Breast cancer     Cancer     Disease of thyroid gland     High cholesterol     Hypertension        Past Surgical History:   Procedure Laterality Date    ABDOMINAL SURGERY       APPENDECTOMY      ENDOSCOPY N/A 9/25/2022    Procedure: ESOPHAGOGASTRODUODENOSCOPY with biopsy x 2 areas;  Surgeon: Deonte Wong MD;  Location: [facility] ENDOSCOPY;  Service: Gastroenterology;  Laterality: N/A;  post: gastritis, duodinitis, nodule,     HYSTERECTOMY      MASTECTOMY      L side       Family History: family history includes Cancer in her mother. Otherwise pertinent FHx was reviewed and not pertinent to current issue.    Social History:  reports that she has never smoked. She has never been exposed to tobacco smoke. She has never used smokeless tobacco. She reports that she does not drink alcohol and does not use drugs.    Home Medications:   HYDROcodone-acetaminophen, OXcarbazepine, furosemide, guaiFENesin, levothyroxine, lubiprostone, pantoprazole, potassium chloride, risperiDONE, rosuvastatin, sertraline, sodium chloride, and traZODone    Allergies:  Allergies   Allergen Reactions    Contrast Dye (Echo Or Unknown Ct/Mr) Shortness Of Breath     Pt states she had trouble breathing when she had contrast in the past.        Objective    Objective     Vitals:  Temp:  [97.8 °F (36.6 °C)-98.9 °F (37.2 °C)] 98.3 °F (36.8 °C)  Heart Rate:  [61-72] 63  Resp:  [16] 16  BP: (114-144)/(66-76) 117/66  Flow (L/min):  [3] 3    Physical Exam  Vitals reviewed.   Constitutional:       General: She is not in acute distress.     Appearance: Normal appearance. She is obese.   Pulmonary:      Effort: Pulmonary effort is normal. No respiratory distress.   Abdominal:      Palpations: Abdomen is soft.      Tenderness: There is no abdominal tenderness.   Musculoskeletal:      Cervical back: Normal range of motion and neck supple. No tenderness.      Comments: Lumbar spine exam:  1.  Lumbar paraspinal musculature tender to palpation bilaterally  2.  Tender to percussion across the lumbar spine in the distribution of the L2 and L4  3.  Straight leg raise negative bilaterally  4.  Facet loading unable to assess   Neurological:       General: No focal deficit present.      Mental Status: She is alert and oriented to person, place, and time.      Sensory: No sensory deficit.      Motor: No weakness.         Result Review    Result Review:  I have personally reviewed the results from the time of this admission to 1/3/2024 09:38 EST and agree with these findings:  []  Laboratory list / accordion  []  Microbiology  [x]  Radiology  []  EKG/Telemetry   []  Cardiology/Vascular   []  Pathology  []  Old records  []  Other:  Most notable findings include: Acute/subacute L2 and L4 compression fracture without retropulsion      Assessment & Plan   Assessment / Plan     Brief Patient Summary:  Morgan Quick is a 68 y.o. female who presents with intractable low back pain and acute L2 and L4 compression fractures.  Given her uncontrolled pain as well as extremely limited mobility, pain management was consulted for further intervention.  She has a significant history of osteoporosis with previous compression fracture seen on MRI    Active Hospital Problems:  Active Hospital Problems    Diagnosis     **Low back pain     Compression fracture of L2 lumbar vertebra, closed, initial encounter     Compression fracture of L4 lumbar vertebra, closed, initial encounter     Back pain     Bipolar 1 disorder     High cholesterol     Essential hypertension      Plan:   We will plan to take her to the OR this evening for L2 and L4 balloon kyphoplasty with the hopes that we can significantly decrease her back pain and improve her mobility  After kyphoplasty, would recommend fairly aggressive physical therapy and mobilization to begin strengthening the core as well as decrease muscle spasms  Can continue with current pain medication regimen at this time pending her kyphoplasty.  Will be available to make additional recommendations if she continues to have pain after the procedure    Ronni Atkins MD

## 2024-01-03 NOTE — SIGNIFICANT NOTE
01/03/24 0821   OTHER   Discipline physical therapist   Rehab Time/Intention   Session Not Performed unable to treat, medical status change;other (see comments)  (pt has chosen to proceed w/ kyphoplasty; will hold PT until s/p sx)   Therapy Assessment/Plan (PT)   Criteria for Skilled Interventions Met (PT) yes;meets criteria   Recommendation   PT - Next Appointment 01/04/24

## 2024-01-04 PROCEDURE — 97530 THERAPEUTIC ACTIVITIES: CPT

## 2024-01-04 PROCEDURE — 97116 GAIT TRAINING THERAPY: CPT

## 2024-01-04 PROCEDURE — 63710000001 PREDNISONE PER 1 MG: Performed by: PHYSICIAN ASSISTANT

## 2024-01-04 PROCEDURE — 97168 OT RE-EVAL EST PLAN CARE: CPT

## 2024-01-04 PROCEDURE — 97535 SELF CARE MNGMENT TRAINING: CPT

## 2024-01-04 PROCEDURE — 25010000002 MORPHINE PER 10 MG: Performed by: PHYSICIAN ASSISTANT

## 2024-01-04 PROCEDURE — 97164 PT RE-EVAL EST PLAN CARE: CPT

## 2024-01-04 RX ADMIN — ROSUVASTATIN 30 MG: 10 TABLET, FILM COATED ORAL at 20:56

## 2024-01-04 RX ADMIN — LEVOTHYROXINE SODIUM 25 MCG: 0.03 TABLET ORAL at 06:16

## 2024-01-04 RX ADMIN — RISPERIDONE 0.25 MG: 0.25 TABLET, FILM COATED ORAL at 20:56

## 2024-01-04 RX ADMIN — POLYETHYLENE GLYCOL 3350 17 G: 17 POWDER, FOR SOLUTION ORAL at 09:21

## 2024-01-04 RX ADMIN — TRAZODONE HYDROCHLORIDE 25 MG: 50 TABLET ORAL at 20:56

## 2024-01-04 RX ADMIN — OXCARBAZEPINE 150 MG: 150 TABLET, FILM COATED ORAL at 20:57

## 2024-01-04 RX ADMIN — CYCLOBENZAPRINE 10 MG: 10 TABLET, FILM COATED ORAL at 09:21

## 2024-01-04 RX ADMIN — SENNOSIDES AND DOCUSATE SODIUM 2 TABLET: 50; 8.6 TABLET ORAL at 20:56

## 2024-01-04 RX ADMIN — Medication 10 ML: at 01:40

## 2024-01-04 RX ADMIN — FUROSEMIDE 20 MG: 20 TABLET ORAL at 09:21

## 2024-01-04 RX ADMIN — LUBIPROSTONE 24 MCG: 24 CAPSULE, GELATIN COATED ORAL at 09:21

## 2024-01-04 RX ADMIN — MORPHINE SULFATE 4 MG: 4 INJECTION, SOLUTION INTRAMUSCULAR; INTRAVENOUS at 01:40

## 2024-01-04 RX ADMIN — SENNOSIDES AND DOCUSATE SODIUM 2 TABLET: 50; 8.6 TABLET ORAL at 09:21

## 2024-01-04 RX ADMIN — SODIUM CHLORIDE 2 G: 1 TABLET ORAL at 18:39

## 2024-01-04 RX ADMIN — LIDOCAINE 1 PATCH: 50 PATCH CUTANEOUS at 09:20

## 2024-01-04 RX ADMIN — ACETAMINOPHEN 650 MG: 325 TABLET, FILM COATED ORAL at 12:55

## 2024-01-04 RX ADMIN — ACETAMINOPHEN 650 MG: 325 TABLET, FILM COATED ORAL at 18:39

## 2024-01-04 RX ADMIN — MORPHINE SULFATE 4 MG: 4 INJECTION, SOLUTION INTRAMUSCULAR; INTRAVENOUS at 09:20

## 2024-01-04 RX ADMIN — SODIUM CHLORIDE 2 G: 1 TABLET ORAL at 09:21

## 2024-01-04 RX ADMIN — CYCLOBENZAPRINE 10 MG: 10 TABLET, FILM COATED ORAL at 18:39

## 2024-01-04 RX ADMIN — POTASSIUM CHLORIDE 10 MEQ: 750 TABLET, EXTENDED RELEASE ORAL at 09:21

## 2024-01-04 RX ADMIN — SODIUM CHLORIDE 2 G: 1 TABLET ORAL at 12:55

## 2024-01-04 RX ADMIN — CYCLOBENZAPRINE 10 MG: 10 TABLET, FILM COATED ORAL at 20:57

## 2024-01-04 RX ADMIN — ACETAMINOPHEN 650 MG: 325 TABLET, FILM COATED ORAL at 06:16

## 2024-01-04 RX ADMIN — POLYETHYLENE GLYCOL 3350 17 G: 17 POWDER, FOR SOLUTION ORAL at 20:56

## 2024-01-04 RX ADMIN — OXCARBAZEPINE 150 MG: 150 TABLET, FILM COATED ORAL at 09:22

## 2024-01-04 RX ADMIN — GUAIFENESIN 1200 MG: 600 TABLET, MULTILAYER, EXTENDED RELEASE ORAL at 20:56

## 2024-01-04 RX ADMIN — Medication 10 ML: at 20:57

## 2024-01-04 RX ADMIN — SERTRALINE 200 MG: 100 TABLET, FILM COATED ORAL at 09:21

## 2024-01-04 RX ADMIN — PANTOPRAZOLE SODIUM 40 MG: 40 TABLET, DELAYED RELEASE ORAL at 09:21

## 2024-01-04 RX ADMIN — GUAIFENESIN 1200 MG: 600 TABLET, MULTILAYER, EXTENDED RELEASE ORAL at 09:21

## 2024-01-04 RX ADMIN — Medication 10 ML: at 09:20

## 2024-01-04 RX ADMIN — LUBIPROSTONE 24 MCG: 24 CAPSULE, GELATIN COATED ORAL at 20:56

## 2024-01-04 RX ADMIN — NALOXEGOL OXALATE 25 MG: 25 TABLET, FILM COATED ORAL at 09:24

## 2024-01-04 RX ADMIN — PREDNISONE 20 MG: 20 TABLET ORAL at 09:21

## 2024-01-04 NOTE — CASE MANAGEMENT/SOCIAL WORK
Continued Stay Note  [facility]     Patient Name: Morgan Quick  MRN: [id number]  Today's Date: 1/4/2024    Admit Date: 12/27/2023    Plan: From [facility]. Referred to [facility]; accepted. PASRR completed. Precert to be started 1/4. Pharmacy Remedi   Discharge Plan       Row Name 01/04/24 1312       Plan    Plan From [facility]. Referred to [facility]; accepted. PASRR completed. Precert to be started 1/4. Pharmacy Remedi    Plan Comments Barriers: Kyphoplasty yesterday and new PT and OT evals today. CM confirmed with Kinsey at [facility] that they accept and have ready bed. PASRR completed. CM asked NADINE Alvarez to resent precert for SNF request. CM will follow up                      Maintained distance greater than six feet and spent less than 15 minutes in the room.     Marivel LEE,RN Case Manager  [facility]  Phone: Desk- [phone] cell- [phone]

## 2024-01-04 NOTE — PLAN OF CARE
Goal Outcome Evaluation:  Plan of Care Reviewed With: patient           Outcome Evaluation: Re-eval completed this date secondary to pt now s/p kyphoplasty L2 & L4 peformed on 1/3.  Pt completely flat in bed, nsg reports pt has been in this position x 3 days.  Pt on 3L O2, telemetry and Purewick this date.  Pt required max verbal/tactile cues for logroll technqiue for bed mobility.  Max A x 2 for supine to sit and Mod A of 1 for trunk with Max A of 2nd person for BLEs for sit to supine.  Pt resistant to movement and tenses with tactile touch.  Min A x 2 for sit to stand with RW and Min A x 2 for 10 lateral steps to L side with increased time and effort.  Decreased ability to advance LLE with steps secondary to LLE weakness.  Pt with c/o dizziness throughout session and required max verbal cues to keep eyes open.  BP sitting EOB after supine to sit: 145/72.  BP sitting EOB at end of tx:  127/74.  Pt reports decreased pain in back after kyphoplasty sx.  Left pt supine in bed with HOB elevated to 60 degrees with max encouragement to keep HOB elevated throughout the days.  Recommend SNF      Anticipated Discharge Disposition (PT): skilled nursing facility

## 2024-01-04 NOTE — SIGNIFICANT NOTE
Case Management/Social Work    Patient Name:  Morgan Quick  YOB: 1955  MRN: [id number]  Admit Date:  12/27/2023 01/04/24 1506   Post Acute Pre-Cert #2 Documentation   Date Post Acute Pre-Cert Completed 01/04/24   All Clinicals Submitted? Yes   Response Received from Insurance? Approval   Post Acute Pre-Cert Initiated Comment NADINE verified SNF precert approval via [employer] portal. Authorization ID [id number]. Valid 1/4-1/6. Approval letter indexed to media. CM made aware.           Electronically signed by:  Antonio Martínez CMA  01/04/24 15:06 EST    Antonio Martínez  Case Management Associate  [address] [address]  P: [phone]  F: [phone]

## 2024-01-04 NOTE — DISCHARGE PLACEMENT REQUEST
"Jerrod Wasserman (68 y.o. Female)       Date of Birth   1955    Social Security Number       Address   [facility] [place] [facility]    Home Phone   [phone]    MRN   [id number]       [place]   [facility]    Marital Status                               Admission Date   23    Admission Type   Emergency    Admitting Provider   Sathish Seals MD    Attending Provider   Vicki Beard MD    Department, Room/Bed   [facility] OBSERVATION,        Discharge Date       Discharge Disposition       Discharge Destination                                 Attending Provider: Vicki Beard MD    Allergies: Contrast Dye (Echo Or Unknown Ct/mr)    Isolation: None   Infection: None   Code Status: CPR    Ht: 165.1 cm (65\")   Wt: 112 kg (246 lb 11.1 oz)    Admission Cmt: None   Principal Problem: Low back pain [M54.50]                   Active Insurance as of 2023       Primary Coverage       Payor Plan Insurance Group Employer/Plan Group    ANTHEM MEDICARE REPLACEMENT ANTHEM MEDICARE ADVANTAGE [id number]       Payor Plan Address Payor Plan Phone Number Payor Plan Fax Number Effective Dates    [address] [phone]  2019 - None Entered    [facility] [id number]         Subscriber Name Subscriber Birth Date Member ID       JERROD WASSERMAN 1955 [id number]                     Emergency Contacts        (Rel.) Home Phone Work Phone Mobile Phone    JERI JULIAN (Spouse) [phone] -- [phone]    Arsen Barbosa (Sister) -- -- [phone]                 History & Physical        Blair Murrieta MD at 24 [address] [place] [address]   Interventional Radiology H&P    Patient Name: Jerrod Wasserman  : 1955  MRN: [id number]  Primary Care Physician:  Wayne Saavedra MD  Referring Physician: No Known Provider  Date of admission: 2023    Subjective  Subjective     HPI:  Jerrod Wasserman is a 68 y.o. " female with l2 and l4 compression deformities.    Review of Systems:   Constitutional no fever,  no weight loss       Otolaryngeal no difficulty swallowing   Cardiovascular no chest pain   Pulmonary no cough, no sputum production   Gastrointestinal no constipation, no diarrhea                         Personal History       Past Medical/Surgical History:   Past Medical History:   Diagnosis Date    Bipolar 1 disorder     Breast cancer     Cancer     Disease of thyroid gland     High cholesterol     Hypertension      Past Surgical History:   Procedure Laterality Date    ABDOMINAL SURGERY      APPENDECTOMY      ENDOSCOPY N/A 2022    Procedure: ESOPHAGOGASTRODUODENOSCOPY with biopsy x 2 areas;  Surgeon: Deonte Wong MD;  Location: [facility] ENDOSCOPY;  Service: Gastroenterology;  Laterality: N/A;  post: gastritis, duodinitis, nodule,     HYSTERECTOMY      MASTECTOMY      L side       Social History:  reports that she has never smoked. She has never been exposed to tobacco smoke. She has never used smokeless tobacco. She reports that she does not drink alcohol and does not use drugs.    Medications:  Medications Prior to Admission   Medication Sig Dispense Refill Last Dose    furosemide (LASIX) 20 MG tablet Take 1 tablet by mouth Daily. 30 tablet 0     guaiFENesin (MUCINEX) 600 MG 12 hr tablet Take 2 tablets by mouth Every 12 (Twelve) Hours. 20 tablet 0     HYDROcodone-acetaminophen (NORCO)  MG per tablet Take 1 tablet by mouth 4 (Four) Times a Day.       levothyroxine (SYNTHROID, LEVOTHROID) 25 MCG tablet Take 1 tablet by mouth Every Morning. 30 tablet 0     lubiprostone (AMITIZA) 24 MCG capsule Take 1 capsule by mouth 2 (Two) Times a Day.       [] methylPREDNISolone (MEDROL) 4 MG tablet Take as directed       OXcarbazepine (TRILEPTAL) 150 MG tablet Take 1 tablet by mouth 2 (Two) Times a Day. 60 tablet 0     pantoprazole (PROTONIX) 40 MG EC tablet Take 1 tablet by mouth Every Morning. 30 tablet 0      potassium chloride (MICRO-K) 10 MEQ CR capsule Take 1 capsule by mouth Daily.       risperiDONE (risperDAL) 0.25 MG tablet Take 1 tablet by mouth Every Night. 30 tablet 0     rosuvastatin (CRESTOR) 10 MG tablet Take 3 tablets by mouth Daily.       sertraline (ZOLOFT) 100 MG tablet Take 2 tablets by mouth Daily. 30 tablet 0     sodium chloride 1 g tablet Take 2 tablets by mouth 3 (Three) Times a Day With Meals. 30 tablet 0     traZODone (DESYREL) 50 MG tablet Take 0.5 tablets by mouth Every Night.        Current medications:  acetaminophen, 650 mg, Oral, Q6H  bisacodyl, 10 mg, Rectal, Daily  cyclobenzaprine, 10 mg, Oral, TID  furosemide, 20 mg, Oral, Daily  guaiFENesin, 1,200 mg, Oral, Q12H  levothyroxine, 25 mcg, Oral, Q AM  lidocaine, 1 patch, Transdermal, Q24H  lubiprostone, 24 mcg, Oral, BID  Naloxegol Oxalate, 25 mg, Oral, QAM  OXcarbazepine, 150 mg, Oral, BID  pantoprazole, 40 mg, Oral, QAM  polyethylene glycol, 17 g, Oral, BID  potassium chloride, 10 mEq, Oral, Daily  predniSONE, 20 mg, Oral, Daily With Breakfast  risperiDONE, 0.25 mg, Oral, Nightly  rosuvastatin, 30 mg, Oral, Nightly  senna-docusate sodium, 2 tablet, Oral, BID  sertraline, 200 mg, Oral, Daily  sodium chloride, 10 mL, Intravenous, Q12H  sodium chloride, 2 g, Oral, TID With Meals  traZODone, 25 mg, Oral, Nightly      Current IV drips:       Allergies:  Allergies   Allergen Reactions    Contrast Dye (Echo Or Unknown Ct/Mr) Shortness Of Breath     Pt states she had trouble breathing when she had contrast in the past.        Objective   Objective     Vitals:   Temp:  [97.8 °F (36.6 °C)-98.9 °F (37.2 °C)] 97.8 °F (36.6 °C)  Heart Rate:  [60-78] 65  Resp:  [16] 16  BP: (114-146)/(66-72) 146/72  Flow (L/min):  [3] 3      Physical Exam:   Constitutional: Awake, alert, No acute distress    Respiratory: Clear to auscultation bilaterally, nonlabored respirations    Cardiovascular: RRR, no murmurs, rubs, or gallops, palpable pedal pulses  "bilaterally   Gastrointestinal: Positive bowel sounds, soft, nontender, nondistended        ASA SCALE ASSESSMENT:  2-Mild to moderate systemic disease, medically well controlled, with no functional limitation    MALLAMPATI CLASSIFICATION:  2-Able to visualize the soft palate, fauces, uvula. The anterior & posterior tonsilar pillars are hidden by the tongue.       Result Review       Result Review:     No results found for: \"NA\"    No results found for: \"K\"    No results found for: \"CL\"    No results found for: \"PLASMABICARB\"    No results found for: \"BUN\"    No results found for: \"CREATININE\"    No results found for: \"CALCIUM\"        No components found for: \"GLUCOSE.*\"  Results from last 7 days   Lab Units 23  0408   WBC 10*3/mm3 10.10   HEMOGLOBIN g/dL 12.8   HEMATOCRIT % 40.4   PLATELETS 10*3/mm3 294      Results from last 7 days   Lab Units 24  0917   INR  1.06           Assessment / Plan     Assesment:   L2 and l4 compression deformities.      Plan:   L2 and l4 kyphoplasties.    The risks and benefits of the procedure were discussed with the patient.    Electronically signed by Blair Murrieta MD, 24, 2:26 PM EST.     Electronically signed by Blair Murrieta MD at 24 1427       Gloria Lacy PA-C at 23 1049          [facility] H&P    Patient Name: Morgan Quick  : 1955  MRN: [id number]  Primary Care Physician: Wayne Saavedra MD  Date of admission: 2023     Patient Care Team:  Wayne Saavedra MD as PCP - General (Internal Medicine)          Subjective  History Present Illness     Chief Complaint:   Chief Complaint   Patient presents with    Back Pain     Back pain    Back Pain  Pertinent negatives include no bladder incontinence, numbness or paresthesias.        68-year-old white female with history of hypertension, thyroid disorder, high cholesterol, chronic pain presents today by EMS from assisted living facility with reports of a fall and pain " in her lower back.  She states she was trying to get up from her chair to her walker when she lost her balance and fell backwards and her buttocks landed against the chair.  She denies striking her head or having LOC.  She complains of pain in her lower back and tailbone area.  Pain is worse with certain movements.  She denies any numbness tingling or weakness in either lower extremity.  She denies any bowel or bladder dysfunction or saddle anesthesia.  No fever.  She denies any other injuries.       Observation 12/28/23  Pt concurs with er hpi. Pt states she has no paraesthesias, no loss of bladder or bowel control. Pt has seen ortho spine at [place] and mri scheduled for chronic disc disease on Tuesday. Pt given pain medication and awaiting pt and ot eval. Pt fell from standing to chair on buttocks.       Review of Systems   Musculoskeletal:  Positive for back pain.   Genitourinary:  Negative for bladder incontinence.   Neurological:  Negative for focal weakness, numbness and paresthesias.       Personal History     Past Medical History:   Past Medical History:   Diagnosis Date    Bipolar 1 disorder     Breast cancer     Cancer     Disease of thyroid gland     High cholesterol     Hypertension        Surgical History:      Past Surgical History:   Procedure Laterality Date    ABDOMINAL SURGERY      APPENDECTOMY      ENDOSCOPY N/A 9/25/2022    Procedure: ESOPHAGOGASTRODUODENOSCOPY with biopsy x 2 areas;  Surgeon: Deonte Wong MD;  Location: [facility] ENDOSCOPY;  Service: Gastroenterology;  Laterality: N/A;  post: gastritis, duodinitis, nodule,     HYSTERECTOMY      MASTECTOMY      L side           Family History: family history includes Cancer in her mother. Otherwise pertinent FHx was reviewed and unremarkable.     Social History:  reports that she has never smoked. She has never been exposed to tobacco smoke. She has never used smokeless tobacco. She reports that she does not drink alcohol and does not use  drugs.      Medications:  Prior to Admission medications    Medication Sig Start Date End Date Taking? Authorizing Provider   furosemide (LASIX) 20 MG tablet Take 1 tablet by mouth Daily. 10/11/22  Yes Ladonna Redd DO   guaiFENesin (MUCINEX) 600 MG 12 hr tablet Take 2 tablets by mouth Every 12 (Twelve) Hours. 10/10/22  Yes Ladonna Redd DO   HYDROcodone-acetaminophen (NORCO)  MG per tablet Take 1 tablet by mouth 4 (Four) Times a Day.   Yes Brennon Serrano MD   levothyroxine (SYNTHROID, LEVOTHROID) 25 MCG tablet Take 1 tablet by mouth Every Morning. 10/11/22  Yes Ladonna Redd DO   lubiprostone (AMITIZA) 24 MCG capsule Take 1 capsule by mouth 2 (Two) Times a Day.   Yes Brennon Serrano MD   methylPREDNISolone (MEDROL) 4 MG tablet Take as directed 12/25/23 12/30/23 Yes Brennon Serrano MD   OXcarbazepine (TRILEPTAL) 150 MG tablet Take 1 tablet by mouth 2 (Two) Times a Day. 10/10/22  Yes Ladonna Redd DO   pantoprazole (PROTONIX) 40 MG EC tablet Take 1 tablet by mouth Every Morning. 10/11/22  Yes Ladonna Redd DO   potassium chloride (MICRO-K) 10 MEQ CR capsule Take 1 capsule by mouth Daily.   Yes Brennon Serrano MD   risperiDONE (risperDAL) 0.25 MG tablet Take 1 tablet by mouth Every Night. 10/10/22  Yes Ladonna Redd DO   rosuvastatin (CRESTOR) 10 MG tablet Take 3 tablets by mouth Daily.   Yes Brennon Serrano MD   sertraline (ZOLOFT) 100 MG tablet Take 2 tablets by mouth Daily. 10/10/22  Yes Ladonna Redd DO   sodium chloride 1 g tablet Take 2 tablets by mouth 3 (Three) Times a Day With Meals. 10/10/22  Yes Ladonna Redd DO   traZODone (DESYREL) 50 MG tablet Take 0.5 tablets by mouth Every Night.   Yes Brennon Serrano MD       Allergies:    Allergies   Allergen Reactions    Contrast Dye (Echo Or Unknown Ct/Mr) Shortness Of Breath     Pt states she had trouble breathing when she had contrast in the past.        Objective  Objective     Vital Signs  Temp:  [97.9  °F (36.6 °C)-98.6 °F (37 °C)] 97.9 °F (36.6 °C)  Heart Rate:  [57-74] 62  Resp:  [16-20] 18  BP: (133-192)/(53-80) 152/66  SpO2:  [91 %-94 %] 92 %  on  Flow (L/min):  [2-3] 3;   Device (Oxygen Therapy): nasal cannula  Body mass index is 41.05 kg/m².    Physical Exam  Constitutional:       Appearance: She is obese.   Cardiovascular:      Rate and Rhythm: Normal rate and regular rhythm.      Pulses: Normal pulses.      Heart sounds: Normal heart sounds.   Pulmonary:      Effort: Pulmonary effort is normal.      Breath sounds: Normal breath sounds.   Musculoskeletal:      Comments: Pain with rom   Skin:     General: Skin is warm and dry.   Neurological:      General: No focal deficit present.      Mental Status: She is alert and oriented to person, place, and time.   Psychiatric:         Mood and Affect: Mood normal.         Behavior: Behavior normal.           Results Review:  I have personally reviewed most recent lab results and radiology images and interpretations and agree with findings, most notably: cbc, bmp, xr spine and ct abd.    Results from last 7 days   Lab Units 12/28/23  0359   WBC 10*3/mm3 8.20   HEMOGLOBIN g/dL 12.0   HEMATOCRIT % 37.1   PLATELETS 10*3/mm3 252     Results from last 7 days   Lab Units 12/28/23  0359   SODIUM mmol/L 141   POTASSIUM mmol/L 3.8   CHLORIDE mmol/L 99   CO2 mmol/L 32.0*   BUN mg/dL 10   CREATININE mg/dL 0.51*   GLUCOSE mg/dL 90   CALCIUM mg/dL 9.0     Estimated Creatinine Clearance: 131.7 mL/min (A) (by C-G formula based on SCr of 0.51 mg/dL (L)).  Brief Urine Lab Results       None            Microbiology Results (last 10 days)       ** No results found for the last 240 hours. **            ECG/EMG Results (most recent)       None                Results for orders placed during the hospital encounter of 09/24/22    Adult Transthoracic Echo Complete W/ Cont if Necessary Per Protocol    Interpretation Summary  · Estimated left ventricular EF was in agreement with the  calculated left ventricular EF. Left ventricular ejection fraction appears to be 56 - 60%. Left ventricular systolic function is normal.  · There is calcification of the aortic valve mainly affecting the non-coronary, left coronary and right coronary cusp(s).  · The study is technically difficult for diagnosis.      CT Abdomen Pelvis Without Contrast    Result Date: 12/27/2023  Impression: No acute findings in the abdomen or pelvis. No acute osseous injury. Electronically Signed: Kasia Paul MD  12/27/2023 12:51 PM CST  Workstation ID: [id number]       Estimated Creatinine Clearance: 131.7 mL/min (A) (by C-G formula based on SCr of 0.51 mg/dL (L)).    Assessment & Plan  Assessment/Plan       Active Hospital Problems    Diagnosis  POA    **Low back pain [M54.50]  Yes      Resolved Hospital Problems   No resolved problems to display.       Low back pain s/p fall  - cbc and bmp are unremarkable  - xr spine lumbar 12/22/23 reviewed and showing mild degenerative disc disease   - ct abd reviewed and showing a Schmorl's node invagination along the superior endplate of L3 which appears unchanged. New Schmorl's node invagination is noted at L4 and L5. No evidence for acute osseous injury in the lumbar spine. The coccyx appears intact.   - pain medication  - pt and ot consult  - pt should need 30 days or less of skilled nursing       Hypothyroidism  - cont synthroid    Bipolar disorder  - cont trileptal, risperidone, zoloft    Hx of chf  - cont lasix and potassium replacement    HPL  - cont statin      VTE Prophylaxis -   Mechanical Order History:        Ordered        12/27/23 1838  Place Sequential Compression Device  Once            12/27/23 1838  Maintain Sequential Compression Device  Continuous                          Pharmalogical Order History:       None            CODE STATUS:    Code Status and Medical Interventions:   Ordered at: 12/27/23 3800     Code Status (Patient has no pulse and is not breathing):    CPR  (Attempt to Resuscitate)     Medical Interventions (Patient has pulse or is breathing):    Full Support       This patient has been examined wearing personal protective equipment.     I discussed the patient's findings and my recommendations with patient and nursing staff.      Signature:Electronically signed by Gloria Lacy PA-C, 23, 10:49 AM EST.             Electronically signed by Gloria Lacy PA-C at 23 1352       Operative/Procedure Notes (all)    No notes of this type exist for this encounter.          Physician Progress Notes (last 48 hours)        Vicki Beard MD at 24 1225           [facility]     Progress Note    Patient Name: Morgan Quick  : 1955  MRN: [id number]  Primary Care Physician:  Wayne Saavedra MD  Date of admission: 2023  Service date and time: 24 12:25 EST  Subjective   Subjective     Chief Complaint: Back pain    HPI:    Patient laying on the bed comfortably.    Patient stated still have back pain however improved after kyphoplasty.    S/P catheter plasty yesterday.    Denies chest pain, shortness of breath, nausea and vomiting.    Disposition: Awaiting placement        Objective   Objective     Vitals:   Temp:  [97.4 °F (36.3 °C)-98.5 °F (36.9 °C)] 97.9 °F (36.6 °C)  Heart Rate:  [69-94] 83  Resp:  [8-22] 17  BP: (119-206)/() 138/70  Flow (L/min):  [3] 3  Physical Exam    Constitutional: Awake, alert   Respiratory: Clear to auscultation bilaterally, nonlabored respirations    Cardiovascular: RRR, no murmurs, rubs, or gallops, palpable pedal pulses bilaterally   Gastrointestinal: Positive bowel sounds, soft, nontender, nondistended   Musculoskeletal: No bilateral ankle edema, no clubbing or cyanosis to extremities   Psychiatric: Appropriate affect, cooperative   Neurologic: Oriented x 3, strength symmetric in all extremities, Cranial Nerves grossly intact to confrontation, speech clear   Skin: No rashes     Result Review     Result Review:  I have personally reviewed the results from the time of this admission to 2024 12:25 EST and agree with these findings:  [x]  Laboratory list / accordion  []  Microbiology  [x]  Radiology  []  EKG/Telemetry   []  Cardiology/Vascular   []  Pathology  []  Old records  []  Other:  Most notable findings include:   MRI lumbar  Impression:  1. Acute/subacute 25% compression fracture at L2.  2. Acute/subacute 50% compression fracture at L4.  3. No retropulsed fracture fragment or significant canal stenosis.  4. Chronic height loss at L3 related to remote fracture and/or Schmorl's node. Chronic mild compression fracture at L5.  5. Lumbar degenerative findings above.    Assessment & Plan   Assessment / Plan       Active Hospital Problems:  Active Hospital Problems    Diagnosis     **Low back pain     Compression fracture of L2 lumbar vertebra, closed, initial encounter     Compression fracture of L4 lumbar vertebra, closed, initial encounter     Back pain     Bipolar 1 disorder     High cholesterol     Essential hypertension      Plan:    -Compression fracture of L2, L4 :   - S/P kyphoplasty on   -Resume home chronic medication.    DVT prophylaxis:  Mechanical DVT prophylaxis orders are present.    CODE STATUS:   Code Status (Patient has no pulse and is not breathing): CPR (Attempt to Resuscitate)  Medical Interventions (Patient has pulse or is breathing): Full Support    Disposition: Awaiting placement    Vicki Beard MD     Electronically signed by Vicki Beard MD at 24 1226       Vicki Beard MD at 24 0849           [facility]     Progress Note    Patient Name: Morgan Quick  : 1955  MRN: [id number]  Primary Care Physician:  Wayne Saavedra MD  Date of admission: 2023  Service date and time: 24 08:49 EST  Subjective   Subjective     Chief Complaint: Back pain    HPI:  Seen and examined this morning.  Patient laying on the bed  comfortably.  Patient complaining of lower back pain when she sneezed.  Discussed with the patient kyphoplasty and she agreed to the procedure.    Patient scheduled for kyphoplasty tomorrow.  Will keep her n.p.o. from midnight.    Denies chest pain, shortness of breath, nausea and vomiting.    Disposition: Continue to follow, most likely she will need placement on discharge.        Objective   Objective     Vitals:   Temp:  [97.8 °F (36.6 °C)-98.9 °F (37.2 °C)] 98.3 °F (36.8 °C)  Heart Rate:  [61-72] 63  Resp:  [16] 16  BP: (114-144)/(66-76) 117/66  Flow (L/min):  [3] 3  Physical Exam    Constitutional: Awake, alert   Respiratory: Clear to auscultation bilaterally, nonlabored respirations    Cardiovascular: RRR, no murmurs, rubs, or gallops, palpable pedal pulses bilaterally   Gastrointestinal: Positive bowel sounds, soft, nontender, nondistended   Musculoskeletal: No bilateral ankle edema, no clubbing or cyanosis to extremities   Psychiatric: Appropriate affect, cooperative   Neurologic: Oriented x 3, strength symmetric in all extremities, Cranial Nerves grossly intact to confrontation, speech clear   Skin: No rashes     Result Review    Result Review:  I have personally reviewed the results from the time of this admission to 1/3/2024 08:49 EST and agree with these findings:  [x]  Laboratory list / accordion  []  Microbiology  [x]  Radiology  []  EKG/Telemetry   []  Cardiology/Vascular   []  Pathology  []  Old records  []  Other:  Most notable findings include:   MRI lumbar  Impression:  1. Acute/subacute 25% compression fracture at L2.  2. Acute/subacute 50% compression fracture at L4.  3. No retropulsed fracture fragment or significant canal stenosis.  4. Chronic height loss at L3 related to remote fracture and/or Schmorl's node. Chronic mild compression fracture at L5.  5. Lumbar degenerative findings above.    Assessment & Plan   Assessment / Plan       Active Hospital Problems:  Active Hospital Problems     Diagnosis     **Low back pain     Compression fracture of L2 lumbar vertebra, closed, initial encounter     Compression fracture of L4 lumbar vertebra, closed, initial encounter     Back pain     Bipolar 1 disorder     High cholesterol     Essential hypertension      Plan:    -Compression fracture of L2, L4 : Patient scheduled for kyphoplasty tomorrow morning.  -Resume home chronic medication.    DVT prophylaxis:  Mechanical DVT prophylaxis orders are present.    CODE STATUS:   Code Status (Patient has no pulse and is not breathing): CPR (Attempt to Resuscitate)  Medical Interventions (Patient has pulse or is breathing): Full Support    Disposition:  I expect patient to be discharged in 48 hours, to rehab.    Vicki Beard MD     Electronically signed by Vicki Beard MD at 24 0851          Consult Notes (last 48 hours)        Ronni Atkins MD at 24 0938        Consult Orders    1. Inpatient Anesthesia Pain Management Consult [[id number]] ordered by Rod Daugherty DO                 [facility]   Consult Note    Patient Name: Morgan Quick  : 1955  MRN: [id number]  Primary Care Physician:  Wayne Saavedra MD  Referring Physician: No Known Provider  Date of admission: 2023    Inpatient Anesthesia Pain Management Consult  Consult performed by: Ronni Atkins MD  Consult ordered by: Rod Daugherty DO  Reason for consult: Intractable low back pain, acute L2, L4 compression fractures        Subjective   Subjective     Reason for Consult/ Chief Complaint: Intractable low back pain, lumbar compression fracture    History of Present Illness  Morgan Quick is a 68 y.o. female who presents to the hospital with intractable low back pain.  She reports that she was trying to stand from a sitting position and felt a popping sensation which caused excruciating pain.  She reports that she had to fall back into her chair due to the severe pain.  She was brought to the hospital where  MRI determined that she has an acute L2 and L4 compression fracture.  She also has a chronic L3 healed compression fracture.  She denies any loss of bowel, loss of bladder, or significant numbness or weakness.  She does not have any evidence of retropulsion or stenosis seen on MRI.  She is only getting minimal pain relief despite being on oxycodone as well as acetaminophen.  She is also complaining of significant muscle spasms secondary to the pain and very limited mobility.    Review of Systems   Gastrointestinal:  Positive for constipation.   Musculoskeletal:  Positive for arthralgias, back pain, gait problem and myalgias. Negative for joint swelling, neck pain and neck stiffness.   Neurological:  Negative for weakness and numbness.        Personal History     Past Medical History:   Diagnosis Date    Bipolar 1 disorder     Breast cancer     Cancer     Disease of thyroid gland     High cholesterol     Hypertension        Past Surgical History:   Procedure Laterality Date    ABDOMINAL SURGERY      APPENDECTOMY      ENDOSCOPY N/A 9/25/2022    Procedure: ESOPHAGOGASTRODUODENOSCOPY with biopsy x 2 areas;  Surgeon: Deonte Wong MD;  Location: [facility] ENDOSCOPY;  Service: Gastroenterology;  Laterality: N/A;  post: gastritis, duodinitis, nodule,     HYSTERECTOMY      MASTECTOMY      L side       Family History: family history includes Cancer in her mother. Otherwise pertinent FHx was reviewed and not pertinent to current issue.    Social History:  reports that she has never smoked. She has never been exposed to tobacco smoke. She has never used smokeless tobacco. She reports that she does not drink alcohol and does not use drugs.    Home Medications:   HYDROcodone-acetaminophen, OXcarbazepine, furosemide, guaiFENesin, levothyroxine, lubiprostone, pantoprazole, potassium chloride, risperiDONE, rosuvastatin, sertraline, sodium chloride, and traZODone    Allergies:  Allergies   Allergen Reactions    Contrast Dye (Echo Or  Unknown Ct/Mr) Shortness Of Breath     Pt states she had trouble breathing when she had contrast in the past.        Objective    Objective     Vitals:  Temp:  [97.8 °F (36.6 °C)-98.9 °F (37.2 °C)] 98.3 °F (36.8 °C)  Heart Rate:  [61-72] 63  Resp:  [16] 16  BP: (114-144)/(66-76) 117/66  Flow (L/min):  [3] 3    Physical Exam  Vitals reviewed.   Constitutional:       General: She is not in acute distress.     Appearance: Normal appearance. She is obese.   Pulmonary:      Effort: Pulmonary effort is normal. No respiratory distress.   Abdominal:      Palpations: Abdomen is soft.      Tenderness: There is no abdominal tenderness.   Musculoskeletal:      Cervical back: Normal range of motion and neck supple. No tenderness.      Comments: Lumbar spine exam:  1.  Lumbar paraspinal musculature tender to palpation bilaterally  2.  Tender to percussion across the lumbar spine in the distribution of the L2 and L4  3.  Straight leg raise negative bilaterally  4.  Facet loading unable to assess   Neurological:      General: No focal deficit present.      Mental Status: She is alert and oriented to person, place, and time.      Sensory: No sensory deficit.      Motor: No weakness.         Result Review    Result Review:  I have personally reviewed the results from the time of this admission to 1/3/2024 09:38 EST and agree with these findings:  []  Laboratory list / accordion  []  Microbiology  [x]  Radiology  []  EKG/Telemetry   []  Cardiology/Vascular   []  Pathology  []  Old records  []  Other:  Most notable findings include: Acute/subacute L2 and L4 compression fracture without retropulsion      Assessment & Plan   Assessment / Plan     Brief Patient Summary:  Morgan Quick is a 68 y.o. female who presents with intractable low back pain and acute L2 and L4 compression fractures.  Given her uncontrolled pain as well as extremely limited mobility, pain management was consulted for further intervention.  She has a significant  history of osteoporosis with previous compression fracture seen on MRI    Active Hospital Problems:  Active Hospital Problems    Diagnosis     **Low back pain     Compression fracture of L2 lumbar vertebra, closed, initial encounter     Compression fracture of L4 lumbar vertebra, closed, initial encounter     Back pain     Bipolar 1 disorder     High cholesterol     Essential hypertension      Plan:   We will plan to take her to the OR this evening for L2 and L4 balloon kyphoplasty with the hopes that we can significantly decrease her back pain and improve her mobility  After kyphoplasty, would recommend fairly aggressive physical therapy and mobilization to begin strengthening the core as well as decrease muscle spasms  Can continue with current pain medication regimen at this time pending her kyphoplasty.  Will be available to make additional recommendations if she continues to have pain after the procedure    Ronni Atkins MD      Electronically signed by Ronni Atkins MD at 01/03/24 0976          Physical Therapy Notes (last 24 hours)        Yosef Hicks, PT at 01/04/24 1208  Version 1 of 1         Goal Outcome Evaluation:  Plan of Care Reviewed With: patient           Outcome Evaluation: Re-eval completed this date secondary to pt now s/p kyphoplasty L2 & L4 peformed on 1/3.  Pt completely flat in bed, nsg reports pt has been in this position x 3 days.  Pt on 3L O2, telemetry and Purewick this date.  Pt required max verbal/tactile cues for logroll technqiue for bed mobility.  Max A x 2 for supine to sit and Mod A of 1 for trunk with Max A of 2nd person for BLEs for sit to supine.  Pt resistant to movement and tenses with tactile touch.  Min A x 2 for sit to stand with RW and Min A x 2 for 10 lateral steps to L side with increased time and effort.  Decreased ability to advance LLE with steps secondary to LLE weakness.  Pt with c/o dizziness throughout session and required max verbal cues to keep eyes  open.  BP sitting EOB after supine to sit: 145/72.  BP sitting EOB at end of tx:  127/74.  Pt reports decreased pain in back after kyphoplasty sx.  Left pt supine in bed with HOB elevated to 60 degrees with max encouragement to keep HOB elevated throughout the days.  Recommend SNF      Anticipated Discharge Disposition (PT): skilled nursing facility    Electronically signed by Yosef Hicks, PT at 24 1208       Yosef Hicks, PT at 24 1210  Version 1 of 1         Patient Name: Morgan Quick  : 1955    MRN: [id number]                              Today's Date: 2024       Admit Date: 2023    Visit Dx:     ICD-10-CM ICD-9-CM   1. Fall, initial encounter  W19.XXXA E888.9   2. Contusion of coccyx, initial encounter  S30.0XXA 922.32   3. Acute midline low back pain without sciatica  M54.50 724.2   4. Compression fracture of L4 lumbar vertebra, closed, initial encounter  S32.040A 805.4     Patient Active Problem List   Diagnosis    Morbidly obese    Essential hypertension    Malignant neoplasm of overlapping sites of left breast in female, estrogen receptor positive    Primary cancer of left female breast    Abdominal pain, unspecified abdominal location    Bipolar 1 disorder    High cholesterol    Anorexia    Nausea    Low back pain    Back pain    Compression fracture of L2 lumbar vertebra, closed, initial encounter    Compression fracture of L4 lumbar vertebra, closed, initial encounter     Past Medical History:   Diagnosis Date    Bipolar 1 disorder     Breast cancer     Cancer     Disease of thyroid gland     High cholesterol     Hypertension      Past Surgical History:   Procedure Laterality Date    ABDOMINAL SURGERY      APPENDECTOMY      ENDOSCOPY N/A 2022    Procedure: ESOPHAGOGASTRODUODENOSCOPY with biopsy x 2 areas;  Surgeon: Deonte Wong MD;  Location: [facility] ENDOSCOPY;  Service: Gastroenterology;  Laterality: N/A;  post: gastritis, duodinitis, nodule,      HYSTERECTOMY      MASTECTOMY      L side      General Information       Row Name 01/04/24 1152          Physical Therapy Time and Intention    Document Type re-evaluation  -AM     Mode of Treatment physical therapy  -AM       Row Name 01/04/24 1152          General Information    Patient Profile Reviewed yes  -AM     Existing Precautions/Restrictions fall;spinal;other (see comments)  3L O2  -AM     Barriers to Rehab medically complex;previous functional deficit;ineffective coping  -AM       Row Name 01/04/24 1152          Cognition    Orientation Status (Cognition) oriented x 4  -AM       Row Name 01/04/24 1152          Safety Issues, Functional Mobility    Impairments Affecting Function (Mobility) balance;endurance/activity tolerance;pain;postural/trunk control;strength  -AM     Comment, Safety Issues/Impairments (Mobility) gait belt utilized  -AM               User Key  (r) = Recorded By, (t) = Taken By, (c) = Cosigned By      Initials Name Provider Type    AM Yosef Hicks, PT Physical Therapist                   Mobility       Row Name 01/04/24 1154          Bed Mobility    Bed Mobility supine-sit;sit-supine  -AM     Supine-Sit [place] (Bed Mobility) maximum assist (25% patient effort);2 person assist  -AM     Sit-Supine [place] (Bed Mobility) moderate assist (50% patient effort);maximum assist (25% patient effort);2 person assist  -AM     Assistive Device (Bed Mobility) draw sheet  -AM     Comment, (Bed Mobility) Log roll technique utilized  -AM       Row Name 01/04/24 1154          Sit-Stand Transfer    Sit-Stand [place] (Transfers) minimum assist (75% patient effort);2 person assist  -AM     Assistive Device (Sit-Stand Transfers) walker, front-wheeled  -AM       Row Name 01/04/24 1154          Gait/Stairs (Locomotion)    [place] Level (Gait) minimum assist (75% patient effort);2 person assist  -AM     Assistive Device (Gait) walker, front-wheeled  -AM     Distance in Feet (Gait) 10 side  steps to L  -AM     Deviations/Abnormal Patterns (Gait) base of support, narrow;festinating/shuffling;gait speed decreased  -AM     Left Sided Gait Deviations forward flexed posture  -AM     Comment, (Gait/Stairs) decreased strength L LE lead to decreased ability to advance L LE with steps  -AM               User Key  (r) = Recorded By, (t) = Taken By, (c) = Cosigned By      Initials Name Provider Type    AM Yosef Hicks, ALO Physical Therapist                   Obj/Interventions       Row Name 01/04/24 1155          Range of Motion Comprehensive    General Range of Motion bilateral lower extremity ROM WFL  -AM     Comment, General Range of Motion Defer BUE ROM to OT.  -AM       Row Name 01/04/24 1155          Strength Comprehensive (MMT)    Comment, General Manual Muscle Testing (MMT) Assessment Defer BUE to OT.  LLE: 3+ L hip  4-/5 knee  4/5 ankle.  RLE: 4--/5 throughout  -AM       Row Name 01/04/24 1155          Motor Skills    Motor Skills functional endurance  -AM     Functional Endurance poor  -AM       Row Name 01/04/24 1155          Balance    Balance Assessment sitting static balance;sitting dynamic balance;sit to stand dynamic balance;standing static balance;standing dynamic balance  -AM     Static Sitting Balance standby assist  -AM     Dynamic Sitting Balance contact guard  -AM     Position, Sitting Balance unsupported;sitting edge of bed  -AM     Sit to Stand Dynamic Balance minimal assist;2-person assist  -AM     Static Standing Balance contact guard  -AM     Dynamic Standing Balance minimal assist;2-person assist  -AM     Position/Device Used, Standing Balance supported;walker, front-wheeled  -AM       Row Name 01/04/24 1155          Sensory Assessment (Somatosensory)    Sensory Assessment (Somatosensory) sensation intact  -AM               User Key  (r) = Recorded By, (t) = Taken By, (c) = Cosigned By      Initials Name Provider Type    AM Yosef Hicks, PT Physical Therapist                    Goals/Plan       Row Name 01/04/24 1207          Bed Mobility Goal 1 (PT)    Progress/Outcomes (Bed Mobility Goal 1, PT) goal ongoing  -AM       Row Name 01/04/24 1207          Transfer Goal 1 (PT)    Progress/Outcome (Transfer Goal 1, PT) goal ongoing  -AM       Row Name 01/04/24 1207          Gait Training Goal 1 (PT)    Progress/Outcome (Gait Training Goal 1, PT) goal ongoing  -AM       Row Name 01/04/24 1207          Therapy Assessment/Plan (PT)    Planned Therapy Interventions (PT) balance training;bed mobility training;gait training;strengthening;patient/family education;transfer training;neuromuscular re-education  -AM               User Key  (r) = Recorded By, (t) = Taken By, (c) = Cosigned By      Initials Name Provider Type    AM Yosef Hicks, PT Physical Therapist                   Clinical Impression       Row Name 01/04/24 1159          Pain    Pretreatment Pain Rating 7/10  -AM     Posttreatment Pain Rating 6/10  -AM     Pain Location lower  -AM     Pain Location - back  -AM     Pre/Posttreatment Pain Comment Nsg gave pain medication prior to session  -AM     Pain Intervention(s) Repositioned;Emotional support  -AM       Row Name 01/04/24 1159          Plan of Care Review    Plan of Care Reviewed With patient  -AM     Outcome Evaluation Re-eval completed this date secondary to pt now s/p kyphoplasty L2 & L4 peformed on 1/3.  Pt completely flat in bed, nsg reports pt has been in this position x 3 days.  Pt on 3L O2, telemetry and Purewick this date.  Pt required max verbal/tactile cues for logroll technqiue for bed mobility.  Max A x 2 for supine to sit and Mod A of 1 for trunk with Max A of 2nd person for BLEs for sit to supine.  Pt resistant to movement and tenses with tactile touch.  Min A x 2 for sit to stand with RW and Min A x 2 for 10 lateral steps to L side with increased time and effort.  Decreased ability to advance LLE with steps secondary to LLE weakness.  Pt with c/o dizziness throughout  session and required max verbal cues to keep eyes open.  BP sitting EOB after supine to sit: 145/72.  BP sitting EOB at end of tx:  127/74.  Pt reports decreased pain in back after kyphoplasty sx.  Left pt supine in bed with HOB elevated to 60 degrees with max encouragement to keep HOB elevated throughout the days.  Recommend SNF  -AM       Row Name 01/04/24 1159          Therapy Assessment/Plan (PT)    Patient/Family Therapy Goals Statement (PT) To get back  -AM     Rehab Potential (PT) good, to achieve stated therapy goals  -AM     Criteria for Skilled Interventions Met (PT) yes  -AM     Therapy Frequency (PT) 5 times/wk  -AM     Predicted Duration of Therapy Intervention (PT) until d/c  -AM       Row Name 01/04/24 1159          Vital Signs    Pre Systolic BP Rehab 145  -AM     Pre Treatment Diastolic BP 71  -AM     Intra Systolic BP Rehab 127  -AM     Intra Treatment Diastolic BP 74  -AM     O2 Delivery Pre Treatment nasal cannula  3L O2  -AM     O2 Delivery Intra Treatment nasal cannula  -AM     O2 Delivery Post Treatment nasal cannula  -AM     Pre Patient Position Supine  -AM     Intra Patient Position Standing  -AM     Post Patient Position Supine  -AM       Row Name 01/04/24 1159          Positioning and Restraints    Pre-Treatment Position in bed  -AM     Post Treatment Position bed  -AM     In Bed notified nsg;supine;call light within reach;encouraged to call for assist;exit alarm on  -AM               User Key  (r) = Recorded By, (t) = Taken By, (c) = Cosigned By      Initials Name Provider Type    AM Yosef Hicks, PT Physical Therapist                   Outcome Measures       Row Name 01/04/24 1209          How much help from another person do you currently need...    Turning from your back to your side while in flat bed without using bedrails? 2  -AM     Moving from lying on back to sitting on the side of a flat bed without bedrails? 2  -AM     Moving to and from a bed to a chair (including a  wheelchair)? 2  -AM     Climbing 3-5 steps with a railing? 1  -AM     To walk in hospital room? 2  -AM       Row Name 01/04/24 1208 01/04/24 1112       Functional Assessment    Outcome Measure Options AM-PAC 6 Clicks Basic Mobility (PT)  -AM AM-PAC 6 Clicks Daily Activity (OT)  -LS              User Key  (r) = Recorded By, (t) = Taken By, (c) = Cosigned By      Initials Name Provider Type    AM Yosef Hicks, PT Physical Therapist    Butch Orourke, OT Occupational Therapist                                 Physical Therapy Education       Title: PT OT SLP Therapies (Done)       Topic: Physical Therapy (Done)       Point: Mobility training (Done)       Learning Progress Summary             Patient Acceptance, E, VU by  at 12/31/2023 1114    Acceptance, E, VU by OD at 12/27/2023 1619                         Point: Home exercise program (Done)       Learning Progress Summary             Patient Acceptance, E, VU by  at 12/31/2023 1114    Acceptance, E, VU by OD at 12/27/2023 1619                         Point: Body mechanics (Done)       Learning Progress Summary             Patient Acceptance, E, VU by  at 12/31/2023 1114    Acceptance, E, VU by OD at 12/27/2023 1619                         Point: Precautions (Done)       Learning Progress Summary             Patient Acceptance, E, VU by  at 12/31/2023 1114    Acceptance, E, VU by OD at 12/27/2023 1619                                         User Key       Initials Effective Dates Name Provider Type Discipline     05/11/23 -  Syl Grigsby, PT Physical Therapist PT     07/05/23 -  Neetu Franco, RN Registered Nurse Nurse                  PT Recommendation and Plan  Planned Therapy Interventions (PT): balance training, bed mobility training, gait training, strengthening, patient/family education, transfer training, neuromuscular re-education  Plan of Care Reviewed With: patient  Outcome Evaluation: Re-eval completed this date secondary to pt now s/p  kyphoplasty L2 & L4 peformed on 1/3.  Pt completely flat in bed, nsg reports pt has been in this position x 3 days.  Pt on 3L O2, telemetry and Purewick this date.  Pt required max verbal/tactile cues for logroll technqiue for bed mobility.  Max A x 2 for supine to sit and Mod A of 1 for trunk with Max A of 2nd person for BLEs for sit to supine.  Pt resistant to movement and tenses with tactile touch.  Min A x 2 for sit to stand with RW and Min A x 2 for 10 lateral steps to L side with increased time and effort.  Decreased ability to advance LLE with steps secondary to LLE weakness.  Pt with c/o dizziness throughout session and required max verbal cues to keep eyes open.  BP sitting EOB after supine to sit: 145/72.  BP sitting EOB at end of tx:  127/74.  Pt reports decreased pain in back after kyphoplasty sx.  Left pt supine in bed with HOB elevated to 60 degrees with max encouragement to keep HOB elevated throughout the days.  Recommend SNF     Time Calculation:         PT Charges       Row Name 01/04/24 1208             Time Calculation    Start Time 0926  -AM      Stop Time 1005  -AM      Time Calculation (min) 39 min  -AM      PT Received On 01/04/24  -AM      PT - Next Appointment 01/05/24  -AM      PT Goal Re-Cert Due Date 01/18/24  -AM         Time Calculation- PT    Total Timed Code Minutes- PT 25 minute(s)  -AM                User Key  (r) = Recorded By, (t) = Taken By, (c) = Cosigned By      Initials Name Provider Type    AM Yosef Hicks PT Physical Therapist                  Therapy Charges for Today       Code Description Service Date Service Provider Modifiers Qty    88421938829 HC PT RE-EVAL ESTABLISHED PLAN 2 1/4/2024 Yosef Hicks, PT GP 1    44180804116 HC PT THERAPEUTIC ACT EA 15 MIN 1/4/2024 Yosef Hicks, PT GP 1    35514643698 HC GAIT TRAINING EA 15 MIN 1/4/2024 Yosef Hicks, PT GP 1            PT G-Codes  Outcome Measure Options: AM-PAC 6 Clicks Basic Mobility (PT)  AM-PAC 6 Clicks Score  (PT): 9  AM-PAC 6 Clicks Score (OT): 14  PT Discharge Summary  Anticipated Discharge Disposition (PT): skilled nursing facility    Yosef Hicks, PT  2024      Electronically signed by Yosef Hicks, PT at 24 1210          Occupational Therapy Notes (last 24 hours)        Butch Acevedo, OT at 24 1113          Patient Name: Morgan Quick  : 1955    MRN: [id number]                              Today's Date: 2024       Admit Date: 2023    Visit Dx:     ICD-10-CM ICD-9-CM   1. Fall, initial encounter  W19.XXXA E888.9   2. Contusion of coccyx, initial encounter  S30.0XXA 922.32   3. Acute midline low back pain without sciatica  M54.50 724.2   4. Compression fracture of L4 lumbar vertebra, closed, initial encounter  S32.040A 805.4     Patient Active Problem List   Diagnosis    Morbidly obese    Essential hypertension    Malignant neoplasm of overlapping sites of left breast in female, estrogen receptor positive    Primary cancer of left female breast    Abdominal pain, unspecified abdominal location    Bipolar 1 disorder    High cholesterol    Anorexia    Nausea    Low back pain    Back pain    Compression fracture of L2 lumbar vertebra, closed, initial encounter    Compression fracture of L4 lumbar vertebra, closed, initial encounter     Past Medical History:   Diagnosis Date    Bipolar 1 disorder     Breast cancer     Cancer     Disease of thyroid gland     High cholesterol     Hypertension      Past Surgical History:   Procedure Laterality Date    ABDOMINAL SURGERY      APPENDECTOMY      ENDOSCOPY N/A 2022    Procedure: ESOPHAGOGASTRODUODENOSCOPY with biopsy x 2 areas;  Surgeon: Deonte Wong MD;  Location: [facility] ENDOSCOPY;  Service: Gastroenterology;  Laterality: N/A;  post: gastritis, duodinitis, nodule,     HYSTERECTOMY      MASTECTOMY      L side      General Information       Row Name 24 1022          OT Time and Intention    Document Type re-evaluation  -LS      Mode of Treatment occupational therapy  -       Row Name 01/04/24 1022          General Information    Patient Profile Reviewed yes  -     Existing Precautions/Restrictions fall;oxygen therapy device and L/min;spinal  -LS     Barriers to Rehab medically complex;previous functional deficit;ineffective coping  -       Row Name 01/04/24 1022          Cognition    Orientation Status (Cognition) oriented x 4  -       Row Name 01/04/24 1022          Safety Issues, Functional Mobility    Impairments Affecting Function (Mobility) balance;endurance/activity tolerance;pain;strength;range of motion (ROM);postural/trunk control  -               User Key  (r) = Recorded By, (t) = Taken By, (c) = Cosigned By      Initials Name Provider Type     Butch Acevedo OT Occupational Therapist                     Mobility/ADL's       Row Name 01/04/24 1023          Bed Mobility    Bed Mobility supine-sit;sit-supine  -     Supine-Sit [place] (Bed Mobility) 2 person assist;maximum assist (25% patient effort)  -     Sit-Supine [place] (Bed Mobility) moderate assist (50% patient effort);maximum assist (25% patient effort);2 person assist  -LS     Comment, (Bed Mobility) Pt remains guarded to movement but improved pain reported since kyphoplasty  -       Row Name 01/04/24 1023          Transfers    Transfers sit-stand transfer  -       Row Name 01/04/24 1023          Sit-Stand Transfer    Sit-Stand [place] (Transfers) minimum assist (75% patient effort);2 person assist  -LS     Assistive Device (Sit-Stand Transfers) walker, front-wheeled  -       Row Name 01/04/24 1023          Functional Mobility    Functional Mobility- Ind. Level minimum assist (75% patient effort);2 person assist required  -     Functional Mobility- Device walker, front-wheeled  -LS     Functional Mobility- Comment Able to take lateral steps to left toward the HOB  -       Row Name 01/04/24 1023          Activities of Daily Living     BADL Assessment/Intervention lower body dressing  -       Row Name 01/04/24 1023          Lower Body Dressing Assessment/Training    [place] Level (Lower Body Dressing) don;socks;maximum assist (25% patient effort)  -     Comment, (Lower Body Dressing) Able to assist by straightening knees while sitting EOB  -               User Key  (r) = Recorded By, (t) = Taken By, (c) = Cosigned By      Initials Name Provider Type     Butch Acevedo OT Occupational Therapist                   Obj/Interventions       Row Name 01/04/24 1025          Sensory Assessment (Somatosensory)    Sensory Assessment (Somatosensory) sensation intact  -[facility] Name 01/04/24 1025          Range of Motion Comprehensive    Comment, General Range of Motion BUEs actively 50% AROM at shoulders but no PROM deficits  -       Row Name 01/04/24 1025          Strength Comprehensive (MMT)    Comment, General Manual Muscle Testing (MMT) Assessment BUEs with functional strength, limitations due ot pain and pain avoidance  -       Row Name 01/04/24 1025          Balance    Balance Assessment sitting static balance;sitting dynamic balance;standing static balance;standing dynamic balance  -     Static Sitting Balance standby assist  -     Dynamic Sitting Balance contact guard  -LS     Position, Sitting Balance unsupported;sitting edge of bed  -     Static Standing Balance contact guard  -LS     Dynamic Standing Balance minimal assist;2-person assist  -LS     Position/Device Used, Standing Balance supported;walker, front-wheeled  -               User Key  (r) = Recorded By, (t) = Taken By, (c) = Cosigned By      Initials Name Provider Type     Butch Acevedo OT Occupational Therapist                   Goals/Plan       Row Name 01/04/24 1111          Bed Mobility Goal 1 (OT)    Activity/Assistive Device (Bed Mobility Goal 1, OT) bed mobility activities, all  -LS     [place] Level/Cues Needed (Bed Mobility Goal 1, OT)  minimum assist (75% or more patient effort)  -LS     Time Frame (Bed Mobility Goal 1, OT) long term goal (LTG);2 weeks  -LS     Progress/Outcomes (Bed Mobility Goal 1, OT) goal ongoing  -LS       Row Name 01/04/24 1111          Transfer Goal 1 (OT)    Activity/Assistive Device (Transfer Goal 1, OT) transfers, all  -LS     [place] Level/Cues Needed (Transfer Goal 1, OT) contact guard required  -LS     Time Frame (Transfer Goal 1, OT) long term goal (LTG);2 weeks  -LS     Progress/Outcome (Transfer Goal 1, OT) goal ongoing;goal revised this date  -       Row Name 01/04/24 1111          Toileting Goal 1 (OT)    Activity/Device (Toileting Goal 1, OT) toileting skills, all;commode, bedside without drop arms  -LS     [place] Level/Cues Needed (Toileting Goal 1, OT) minimum assist (75% or more patient effort)  -LS     Time Frame (Toileting Goal 1, OT) long term goal (LTG);2 weeks  -LS     Progress/Outcome (Toileting Goal 1, OT) goal ongoing  -       Row Name 01/04/24 1111          Therapy Assessment/Plan (OT)    Planned Therapy Interventions (OT) activity tolerance training;BADL retraining;functional balance retraining;IADL retraining;occupation/activity based interventions;ROM/therapeutic exercise;strengthening exercise;transfer/mobility retraining;patient/caregiver education/training  -LS               User Key  (r) = Recorded By, (t) = Taken By, (c) = Cosigned By      Initials Name Provider Type    LS Butch Acevedo, OT Occupational Therapist                   Clinical Impression       Row Name 01/04/24 1104          Pain Assessment    Pretreatment Pain Rating 7/10  -LS     Posttreatment Pain Rating 6/10  -LS     Pain Location lower  -LS     Pain Location - back  -LS       Row Name 01/04/24 1104          Plan of Care Review    Plan of Care Reviewed With patient  -LS     Outcome Evaluation Patient re-evaluated this date POD #1 kyphoplasty L2 and L4 vertabrae. She reports an improvement in pain versus prior  to surgery. Pt flat supine in bed on arrival, nurses reporting that she has been in this position x3 days. She was agreeable to therapy this date, however. Pt educated on logrolling techniques for spinal precautions which was then performed with Max A. Max Ax2 required to come to sitting EOB, pt somewhat resistance to movement and tense while being assisted. Once in sitting, she was able to balance with SBA. She required Max A for lower body dressing from EOB, then Min Ax2 to stand with RW and take steps laterally to HOB. Limited this date by dizziness, BP droppipng with change in position but remained at safe levels. Mod-Max Ax2 to return to supine and scoot to HOB. Patient demos pain avoidance throughout treatment, but overall more tolerant to activity versus prior to kyphoplasty. She will require SNF at [place] as she requires assistance for all mobility and self-care. OT will continue to follow.  -       Row Name 01/04/24 1104          Therapy Assessment/Plan (OT)    Rehab Potential (OT) good, to achieve stated therapy goals  -     Criteria for Skilled Therapeutic Interventions Met (OT) yes;skilled treatment is necessary  -     Therapy Frequency (OT) 5 times/wk  -     Predicted Duration of Therapy Intervention (OT) until dc  -       Row Name 01/04/24 1104          Therapy Plan Review/Discharge Plan (OT)    Anticipated Discharge Disposition (OT) skilled nursing facility  -       Row Name 01/04/24 1104          Vital Signs    Pre Systolic BP Rehab 145  -LS     Pre Treatment Diastolic BP 71  -LS     Intra Systolic BP Rehab 127  -LS     Intra Treatment Diastolic BP 74  -LS     O2 Delivery Pre Treatment nasal cannula  -LS     O2 Delivery Intra Treatment nasal cannula  -LS     O2 Delivery Post Treatment nasal cannula  -LS     Pre Patient Position Supine  -LS     Intra Patient Position Standing  -LS     Post Patient Position Supine  -LS       Row Name 01/04/24 1104          Positioning and Restraints     Pre-Treatment Position in bed  -LS     Post Treatment Position bed  -LS     In Bed notified nsg;call light within reach;encouraged to call for assist;exit alarm on;fowlers  -LS               User Key  (r) = Recorded By, (t) = Taken By, (c) = Cosigned By      Initials Name Provider Type    Butch Orourke OT Occupational Therapist                   Outcome Measures       Row Name 01/04/24 1112          How much help from another is currently needed...    Putting on and taking off regular lower body clothing? 2  -LS     Bathing (including washing, rinsing, and drying) 2  -LS     Toileting (which includes using toilet bed pan or urinal) 2  -LS     Putting on and taking off regular upper body clothing 2  -LS     Taking care of personal grooming (such as brushing teeth) 2  -LS     Eating meals 4  -LS     AM-PAC 6 Clicks Score (OT) 14  -LS       Row Name 01/04/24 1112          Functional Assessment    Outcome Measure Options AM-PAC 6 Clicks Daily Activity (OT)  -LS               User Key  (r) = Recorded By, (t) = Taken By, (c) = Cosigned By      Initials Name Provider Type    Butch Orourke OT Occupational Therapist                    Occupational Therapy Education       Title: PT OT SLP Therapies (Done)       Topic: Occupational Therapy (Done)       Point: ADL training (Done)       Description:   Instruct learner(s) on proper safety adaptation and remediation techniques during self care or transfers.   Instruct in proper use of assistive devices.                  Learning Progress Summary             Patient Acceptance, E, VU by  at 12/31/2023 1114    Acceptance, E,TB, VU by MS at 12/29/2023 1126                         Point: Precautions (Done)       Description:   Instruct learner(s) on prescribed precautions during self-care and functional transfers.                  Learning Progress Summary             Patient Acceptance, E, VU by  at 12/31/2023 1114    Acceptance, E,TB, VU by MS at 12/29/2023 1126                          Point: Body mechanics (Done)       Description:   Instruct learner(s) on proper positioning and spine alignment during self-care, functional mobility activities and/or exercises.                  Learning Progress Summary             Patient Acceptance, E, VU by  at 12/31/2023 1114    Acceptance, E,TB, VU by MS at 12/29/2023 1126                                         User Key       Initials Effective Dates Name Provider Type Discipline    MS 07/13/22 -  Belem Simms OT Occupational Therapist OT     07/05/23 -  Neetu Franco RN Registered Nurse Nurse                  OT Recommendation and Plan  Planned Therapy Interventions (OT): activity tolerance training, BADL retraining, functional balance retraining, IADL retraining, occupation/activity based interventions, ROM/therapeutic exercise, strengthening exercise, transfer/mobility retraining, patient/caregiver education/training  Therapy Frequency (OT): 5 times/wk  Plan of Care Review  Plan of Care Reviewed With: patient  Outcome Evaluation: Patient re-evaluated this date POD #1 kyphoplasty L2 and L4 vertabrae. She reports an improvement in pain versus prior to surgery. Pt flat supine in bed on arrival, nurses reporting that she has been in this position x3 days. She was agreeable to therapy this date, however. Pt educated on logrolling techniques for spinal precautions which was then performed with Max A. Max Ax2 required to come to sitting EOB, pt somewhat resistance to movement and tense while being assisted. Once in sitting, she was able to balance with SBA. She required Max A for lower body dressing from EOB, then Min Ax2 to stand with RW and take steps laterally to HOB. Limited this date by dizziness, BP droppipng with change in position but remained at safe levels. Mod-Max Ax2 to return to supine and scoot to HOB. Patient demos pain avoidance throughout treatment, but overall more tolerant to activity versus prior to kyphoplasty. She will  require SNF at [place] as she requires assistance for all mobility and self-care. OT will continue to follow.     Time Calculation:         Time Calculation- OT       Row Name 01/04/24 1113             Time Calculation- OT    OT Start Time 0932  -LS      OT Stop Time 1006  -LS      OT Time Calculation (min) 34 min  -LS      Total Timed Code Minutes- OT 24 minute(s)  -LS      OT Received On 01/04/24  -LS      OT - Next Appointment 01/05/24  -      OT Goal Re-Cert Due Date 01/18/24  -LS         Timed Charges    87695 - OT Therapeutic Activity Minutes 14  -LS      69358 - OT Self Care/Mgmt Minutes 10  -LS         Untimed Charges    OT Eval/Re-eval Minutes 10  -LS         Total Minutes    Timed Charges Total Minutes 24  -LS      Untimed Charges Total Minutes 10  -LS       Total Minutes 34  -LS                User Key  (r) = Recorded By, (t) = Taken By, (c) = Cosigned By      Initials Name Provider Type    LS Butch Acevedo OT Occupational Therapist                  Therapy Charges for Today       Code Description Service Date Service Provider Modifiers Qty    47666429461 HC OT SELF CARE/MGMT/TRAIN EA 15 MIN 1/4/2024 Butch Acevedo OT GO 1    01759186257 HC OT THERAPEUTIC ACT EA 15 MIN 1/4/2024 Butch Acevedo OT GO 1    28626671501 HC OT RE-EVAL 2 1/4/2024 Butch Acevedo OT GO 1                 Butch Acevedo OT  1/4/2024    Electronically signed by Butch Acevedo OT at 01/04/24 1114       Butch Acevedo OT at 01/04/24 1113          Goal Outcome Evaluation:  Plan of Care Reviewed With: patient           Outcome Evaluation: Patient re-evaluated this date POD #1 kyphoplasty L2 and L4 vertabrae. She reports an improvement in pain versus prior to surgery. Pt flat supine in bed on arrival, nurses reporting that she has been in this position x3 days. She was agreeable to therapy this date, however. Pt educated on logrolling techniques for spinal precautions which was then performed with Max A. Max Ax2 required to come to sitting  EOB, pt somewhat resistance to movement and tense while being assisted. Once in sitting, she was able to balance with SBA. She required Max A for lower body dressing from EOB, then Min Ax2 to stand with RW and take steps laterally to HOB. Limited this date by dizziness, BP droppipng with change in position but remained at safe levels. Mod-Max Ax2 to return to supine and scoot to HOB. Patient demos pain avoidance throughout treatment, but overall more tolerant to activity versus prior to kyphoplasty. She will require SNF at [place] as she requires assistance for all mobility and self-care. OT will continue to follow.      Anticipated Discharge Disposition (OT): skilled nursing facility    Electronically signed by Butch Acevedo OT at 24 1113       Speech Language Pathology Notes (most recent note)    No notes exist for this encounter.       Syl Grigsby, PT   Physical Therapist  Specialty:  Physical Therapy  Therapy Evaluation      Signed  Date of Service:  23  Creation Time:  23     Signed        Expand All Collapse All  Patient Name: Morgan Quick               : 1955                      MRN: [id number]                              Today's Date: 2023                                 Admit Date: 2023                      Visit Dx:   Visit Diagnosis       ICD-10-CM ICD-9-CM   1. Fall, initial encounter  W19.XXXA E888.9   2. Contusion of coccyx, initial encounter  S30.0XXA 922.32   3. Acute midline low back pain without sciatica  M54.50 724.2         Problem List       Patient Active Problem List   Diagnosis    Morbidly obese    Essential hypertension    Malignant neoplasm of overlapping sites of left breast in female, estrogen receptor positive    Primary cancer of left female breast    Abdominal pain, unspecified abdominal location    Bipolar 1 disorder    High cholesterol    Anorexia    Nausea    Low back pain         Medical History        Past Medical History:    Diagnosis Date    Bipolar 1 disorder      Breast cancer      Cancer      Disease of thyroid gland      High cholesterol      Hypertension           Surgical History         Past Surgical History:   Procedure Laterality Date    ABDOMINAL SURGERY        APPENDECTOMY        ENDOSCOPY N/A 9/25/2022     Procedure: ESOPHAGOGASTRODUODENOSCOPY with biopsy x 2 areas;  Surgeon: Deonte Wong MD;  Location: [facility] ENDOSCOPY;  Service: Gastroenterology;  Laterality: N/A;  post: gastritis, duodinitis, nodule,     HYSTERECTOMY        MASTECTOMY         L side           General Information         Row Name 12/27/23 1606                 Physical Therapy Time and Intention     Document Type evaluation  -OD       Mode of Treatment physical therapy  -OD          Row Name 12/27/23 1606                 General Information     Patient Profile Reviewed yes  -OD       Prior Level of Function independent:;min assist:  Pt lives at [facility] with her spouse. Pt typically indep with ADLs and mobility using RW, sometimes receives Tate from her spouse with transfers.  -OD       Existing Precautions/Restrictions fall  -OD       Barriers to Rehab medically complex;ineffective coping  -OD          Row Name 12/27/23 1606                 Living Environment     People in Home spouse  -OD          Row Name 12/27/23 1606                 Cognition     Orientation Status (Cognition) oriented x 4  -OD          Row Name 12/27/23 1606                 Safety Issues, Functional Mobility     Impairments Affecting Function (Mobility) pain;strength;endurance/activity tolerance  -OD                       User Key  (r) = Recorded By, (t) = Taken By, (c) = Cosigned By        Initials Name Provider Type     OD Syl Grigsby PT Physical Therapist                            Mobility         Row Name 12/27/23 1607                 Bed Mobility     Bed Mobility rolling left;rolling right  -OD       Rolling Left [place] (Bed Mobility) modified independence   -OD       Rolling Right [place] (Bed Mobility) modified independence  -OD       Comment, (Bed Mobility) Pt guarded during bed mobility d/t severe pain.  -OD                       User Key  (r) = Recorded By, (t) = Taken By, (c) = Cosigned By        Initials Name Provider Type     OD Syl Grigsby, PT Physical Therapist                            Obj/Interventions         Row Name 12/27/23 1607                 Range of Motion Comprehensive     Comment, General Range of Motion Unable to formally assess ROM d/t patient's pain and request to perform bed mobiltiy on her own.  -OD          Row Name 12/27/23 1607                 Strength Comprehensive (MMT)     Comment, General Manual Muscle Testing (MMT) Assessment Unable to formally assess MMT d/t patient's pain and request to perform bed mobiltiy on her own.  -OD                       User Key  (r) = Recorded By, (t) = Taken By, (c) = Cosigned By        Initials Name Provider Type     OD Syl Grigsby, PT Physical Therapist                            Goals/Plan         Row Name 12/27/23 1618                 Bed Mobility Goal 1 (PT)     Activity/Assistive Device (Bed Mobility Goal 1, PT) bed mobility activities, all  -OD       [place] Level/Cues Needed (Bed Mobility Goal 1, PT) independent  -OD       Time Frame (Bed Mobility Goal 1, PT) long term goal (LTG);2 weeks  -OD          Row Name 12/27/23 1618                 Transfer Goal 1 (PT)     Activity/Assistive Device (Transfer Goal 1, PT) transfers, all;walker, rolling  -OD       [place] Level/Cues Needed (Transfer Goal 1, PT) modified independence  -OD       Time Frame (Transfer Goal 1, PT) long term goal (LTG);2 weeks  -OD          Row Name 12/27/23 1618                 Gait Training Goal 1 (PT)     Activity/Assistive Device (Gait Training Goal 1, PT) gait (walking locomotion);assistive device use;walker, rolling  -OD       [place] Level (Gait Training Goal 1, PT) modified independence  -OD        Distance (Gait Training Goal 1, PT) 50 feet  -OD       Time Frame (Gait Training Goal 1, PT) long term goal (LTG);2 weeks  -OD          Row Name 12/27/23 9173                 Therapy Assessment/Plan (PT)     Planned Therapy Interventions (PT) balance training;bed mobility training;gait training;home exercise program;neuromuscular re-education;postural re-education;transfer training;ROM (range of motion);strengthening;stretching;patient/family education  -OD                    User Key  (r) = Recorded By, (t) = Taken By, (c) = Cosigned By        Initials Name Provider Type     OD Syl Grigsby, PT Physical Therapist                         Clinical Impression         Row Name 12/27/23 1603                 Pain     Pretreatment Pain Rating 10/10  -OD       Posttreatment Pain Rating 10/10  -OD       Pain Location - Side/Orientation Right  -OD       Pain Location lower  -OD       Pain Location - back;extremity  -OD          Row Name 12/27/23 1607                 Plan of Care Review     Plan of Care Reviewed With patient  -OD       Progress no change  -OD       Outcome Evaluation Morgan Quick is a 67 y/o F who presents to [facility] after a fall accompanied by severe low back pain. X-ray and CT (-) for acute abnormalities. Pt reports she was attempting to stand up with her RW and spouse assist, when her back gave out and she fell back into the chair. Pt reported significant R-sided LBP with referral down RLE to her foot. At baseline, pt is indep-Tate with ADLs and mobility using RW, living at [facility] with her spouse. Pt currently unable to complete mobility or allow assist for positioning / assessing d/t severe pain. Pt demo valsalva maneuver several times causing drop in her O2 saturation when her pain would exacerbate. PT will follow up tomorrow when her pain is controlled to assess safety to return to [facility] as well as provide positioning/strengthening maneuvers to help reduce pain.  -OD          Row Name 12/27/23  1608                 Therapy Assessment/Plan (PT)     Rehab Potential (PT) good, to achieve stated therapy goals  -OD       Criteria for Skilled Interventions Met (PT) yes;meets criteria  -OD       Therapy Frequency (PT) 5 times/wk  -OD       Predicted Duration of Therapy Intervention (PT) until d/c  -OD          Row Name 12/27/23 1608                 Vital Signs     Pre SpO2 (%) 91  -OD       O2 Delivery Pre Treatment nasal cannula  3L  -OD       Intra SpO2 (%) 87  -OD       O2 Delivery Intra Treatment nasal cannula  -OD       Post SpO2 (%) 92  -OD       O2 Delivery Post Treatment nasal cannula  -OD       Pre Patient Position Side Lying  -OD       Intra Patient Position Supine  -OD       Post Patient Position Supine  -OD          Row Name 12/27/23 1608                 Positioning and Restraints     Pre-Treatment Position in bed  -OD       Post Treatment Position bed  -OD       In Bed notified nsg;supine;with family/caregiver  -OD                       User Key  (r) = Recorded By, (t) = Taken By, (c) = Cosigned By        Initials Name Provider Type     OD Syl Grigsby, PT Physical Therapist                            Outcome Measures         Row Name 12/27/23 1619                 How much help from another person do you currently need...     Turning from your back to your side while in flat bed without using bedrails? 3  -OD       Moving from lying on back to sitting on the side of a flat bed without bedrails? 3  -OD       Moving to and from a bed to a chair (including a wheelchair)? 3  -OD       Standing up from a chair using your arms (e.g., wheelchair, bedside chair)? 3  -OD       Climbing 3-5 steps with a railing? 2  -OD       To walk in hospital room? 3  -OD       AM-PAC 6 Clicks Score (PT) 17  -OD       Highest Level of Mobility Goal 5 --> Static standing  -OD          Row Name 12/27/23 1619                 Functional Assessment     Outcome Measure Options AM-PAC 6 Clicks Basic Mobility (PT)  -OD                        User Key  (r) = Recorded By, (t) = Taken By, (c) = Cosigned By        Initials Name Provider Type     OD Syl Grigsby, ALO Physical Therapist                          Physical Therapy Education            Title: PT OT SLP Therapies (Done)         Topic: Physical Therapy (Done)         Point: Mobility training (Done)         Learning Progress Summary               Patient Acceptance, E, VU by OD at 12/27/2023 1619                               Point: Home exercise program (Done)         Learning Progress Summary               Patient Acceptance, E, VU by OD at 12/27/2023 1619                               Point: Body mechanics (Done)         Learning Progress Summary               Patient Acceptance, E, VU by OD at 12/27/2023 1619                               Point: Precautions (Done)         Learning Progress Summary               Patient Acceptance, E, VU by OD at 12/27/2023 1619                                                   User Key         Initials Effective Dates Name Provider Type Discipline     OD 05/11/23 -  Syl Grigsby PT Physical Therapist PT                          PT Recommendation and Plan  Planned Therapy Interventions (PT): balance training, bed mobility training, gait training, home exercise program, neuromuscular re-education, postural re-education, transfer training, ROM (range of motion), strengthening, stretching, patient/family education  Plan of Care Reviewed With: patient  Progress: no change  Outcome Evaluation: Morgan Quick is a 67 y/o F who presents to [facility] after a fall accompanied by severe low back pain. X-ray and CT (-) for acute abnormalities. Pt reports she was attempting to stand up with her RW and spouse assist, when her back gave out and she fell back into the chair. Pt reported significant R-sided LBP with referral down RLE to her foot. At baseline, pt is indep-Tate with ADLs and mobility using RW, living at [facility] with her spouse. Pt currently unable  to complete mobility or allow assist for positioning / assessing d/t severe pain. Pt demo valsalva maneuver several times causing drop in her O2 saturation when her pain would exacerbate. PT will follow up tomorrow when her pain is controlled to assess safety to return to [facility] as well as provide positioning/strengthening maneuvers to help reduce pain.      Time Calculation:        PT Charges         Row Name 12/27/23 1620                       Time Calculation     Start Time 1445  -OD         Stop Time 1515  -OD         Time Calculation (min) 30 min  -OD         PT Received On 12/27/23  -OD         PT - Next Appointment 12/28/23  -OD         PT Goal Re-Cert Due Date 01/10/24  -OD                 Time Calculation- PT     Total Timed Code Minutes- PT 0 minute(s)  -OD                      User Key  (r) = Recorded By, (t) = Taken By, (c) = Cosigned By        Initials Name Provider Type     OD Syl Grigsby, PT Physical Therapist                       Therapy Charges for Today         Code Description Service Date Service Provider Modifiers Qty     47439578318 HC PT EVAL MOD COMPLEXITY 4 12/27/2023 Syl Grigsby, PT GP 1                PT G-Codes  Outcome Measure Options: AM-PAC 6 Clicks Basic Mobility (PT)  AM-PAC 6 Clicks Score (PT): 17  PT Discharge Summary  Anticipated Discharge Disposition (PT): assisted living     Syl Grigsby PT                   12/27/2023

## 2024-01-04 NOTE — CASE MANAGEMENT/SOCIAL WORK
Continued Stay Note  [facility]     Patient Name: Morgan Quick  MRN: [id number]  Today's Date: 1/4/2024    Admit Date: 12/27/2023    Plan: From [facility]. Referred to [facility] ; accepted. PASRR completed. Precert started 1/04 and approved. Pharmacy Remedi. Will need ambulance transport   Discharge Plan       Row Name 01/04/24 1544       Plan    Plan From [facility]. Referred to [facility] ; accepted. PASRR completed. Precert started 1/04 and approved. Pharmacy Remedi. Will need ambulance transport    Plan Comments NADINE Alvarez informed CM that precert has been approved for [facility]. CM informed Kinsey with [facility], nursing, Mrs. Quick and Dr. Beard for prior authorization.  Probable discharge tomorrow. Will need ambulance transport due to immobility and pain      Row Name 01/04/24 1312       Plan    Plan From [facility]. Referred to [facility]; accepted. PASRR completed. Precert to be started 1/4. Pharmacy Remedi    Plan Comments Barriers: Kyphoplasty yesterday and new PT and OT evals today. CM confirmed with Kinsey at [facility] that they accept and have ready bed. PASRR completed. CM asked NADINE Alvarez to resent precert for SNF request. CM will follow up                             Phone communication or documentation only - no physical contact with patient or family.   Marivel LEE,RN Case Manager  [facility]  Phone: Desk- [phone] cell- [phone]

## 2024-01-04 NOTE — THERAPY RE-EVALUATION
Patient Name: Morgan Quick  : 1955    MRN: [id number]                              Today's Date: 2024       Admit Date: 2023    Visit Dx:     ICD-10-CM ICD-9-CM   1. Fall, initial encounter  W19.XXXA E888.9   2. Contusion of coccyx, initial encounter  S30.0XXA 922.32   3. Acute midline low back pain without sciatica  M54.50 724.2   4. Compression fracture of L4 lumbar vertebra, closed, initial encounter  S32.040A 805.4     Patient Active Problem List   Diagnosis    Morbidly obese    Essential hypertension    Malignant neoplasm of overlapping sites of left breast in female, estrogen receptor positive    Primary cancer of left female breast    Abdominal pain, unspecified abdominal location    Bipolar 1 disorder    High cholesterol    Anorexia    Nausea    Low back pain    Back pain    Compression fracture of L2 lumbar vertebra, closed, initial encounter    Compression fracture of L4 lumbar vertebra, closed, initial encounter     Past Medical History:   Diagnosis Date    Bipolar 1 disorder     Breast cancer     Cancer     Disease of thyroid gland     High cholesterol     Hypertension      Past Surgical History:   Procedure Laterality Date    ABDOMINAL SURGERY      APPENDECTOMY      ENDOSCOPY N/A 2022    Procedure: ESOPHAGOGASTRODUODENOSCOPY with biopsy x 2 areas;  Surgeon: Deonte Wong MD;  Location: [facility] ENDOSCOPY;  Service: Gastroenterology;  Laterality: N/A;  post: gastritis, duodinitis, nodule,     HYSTERECTOMY      MASTECTOMY      L side      General Information       Row Name 24 1152          Physical Therapy Time and Intention    Document Type re-evaluation  -AM     Mode of Treatment physical therapy  -AM       Row Name 24 1152          General Information    Patient Profile Reviewed yes  -AM     Existing Precautions/Restrictions fall;spinal;other (see comments)  3L O2  -AM     Barriers to Rehab medically complex;previous functional deficit;ineffective coping  -AM        Row Name 01/04/24 1152          Cognition    Orientation Status (Cognition) oriented x 4  -AM       Row Name 01/04/24 1152          Safety Issues, Functional Mobility    Impairments Affecting Function (Mobility) balance;endurance/activity tolerance;pain;postural/trunk control;strength  -AM     Comment, Safety Issues/Impairments (Mobility) gait belt utilized  -AM               User Key  (r) = Recorded By, (t) = Taken By, (c) = Cosigned By      Initials Name Provider Type    AM Yosef Hicks PT Physical Therapist                   Mobility       Row Name 01/04/24 1154          Bed Mobility    Bed Mobility supine-sit;sit-supine  -AM     Supine-Sit [place] (Bed Mobility) maximum assist (25% patient effort);2 person assist  -AM     Sit-Supine [place] (Bed Mobility) moderate assist (50% patient effort);maximum assist (25% patient effort);2 person assist  -AM     Assistive Device (Bed Mobility) draw sheet  -AM     Comment, (Bed Mobility) Log roll technique utilized  -AM       Row Name 01/04/24 1154          Sit-Stand Transfer    Sit-Stand [place] (Transfers) minimum assist (75% patient effort);2 person assist  -AM     Assistive Device (Sit-Stand Transfers) walker, front-wheeled  -AM       Row Name 01/04/24 1154          Gait/Stairs (Locomotion)    [place] Level (Gait) minimum assist (75% patient effort);2 person assist  -AM     Assistive Device (Gait) walker, front-wheeled  -AM     Distance in Feet (Gait) 10 side steps to L  -AM     Deviations/Abnormal Patterns (Gait) base of support, narrow;festinating/shuffling;gait speed decreased  -AM     Left Sided Gait Deviations forward flexed posture  -AM     Comment, (Gait/Stairs) decreased strength L LE lead to decreased ability to advance L LE with steps  -AM               User Key  (r) = Recorded By, (t) = Taken By, (c) = Cosigned By      Initials Name Provider Type    AM Yosef Hicks PT Physical Therapist                   Obj/Interventions        [facility] Name 01/04/24 1155          Range of Motion Comprehensive    General Range of Motion bilateral lower extremity ROM WFL  -AM     Comment, General Range of Motion Defer BUE ROM to OT.  -AM       Row Name 01/04/24 1155          Strength Comprehensive (MMT)    Comment, General Manual Muscle Testing (MMT) Assessment Defer BUE to OT.  LLE: 3+ L hip  4-/5 knee  4/5 ankle.  RLE: 4--/5 throughout  -AM       Row Name 01/04/24 1155          Motor Skills    Motor Skills functional endurance  -AM     Functional Endurance poor  -AM       [facility] Name 01/04/24 1155          Balance    Balance Assessment sitting static balance;sitting dynamic balance;sit to stand dynamic balance;standing static balance;standing dynamic balance  -AM     Static Sitting Balance standby assist  -AM     Dynamic Sitting Balance contact guard  -AM     Position, Sitting Balance unsupported;sitting edge of bed  -AM     Sit to Stand Dynamic Balance minimal assist;2-person assist  -AM     Static Standing Balance contact guard  -AM     Dynamic Standing Balance minimal assist;2-person assist  -AM     Position/Device Used, Standing Balance supported;walker, front-wheeled  -AM       [facility] Name 01/04/24 1155          Sensory Assessment (Somatosensory)    Sensory Assessment (Somatosensory) sensation intact  -AM               User Key  (r) = Recorded By, (t) = Taken By, (c) = Cosigned By      Initials Name Provider Type    AM Yosef Hicks, PT Physical Therapist                   Goals/Plan       Row Name 01/04/24 1207          Bed Mobility Goal 1 (PT)    Progress/Outcomes (Bed Mobility Goal 1, PT) goal ongoing  -AM       Row Name 01/04/24 1207          Transfer Goal 1 (PT)    Progress/Outcome (Transfer Goal 1, PT) goal ongoing  -AM       Row Name 01/04/24 1207          Gait Training Goal 1 (PT)    Progress/Outcome (Gait Training Goal 1, PT) goal ongoing  -AM       [facility] Name 01/04/24 1207          Therapy Assessment/Plan (PT)    Planned Therapy Interventions (PT)  balance training;bed mobility training;gait training;strengthening;patient/family education;transfer training;neuromuscular re-education  -AM               User Key  (r) = Recorded By, (t) = Taken By, (c) = Cosigned By      Initials Name Provider Type    AM Yosef Hicks, PT Physical Therapist                   Clinical Impression       Row Name 01/04/24 1159          Pain    Pretreatment Pain Rating 7/10  -AM     Posttreatment Pain Rating 6/10  -AM     Pain Location lower  -AM     Pain Location - back  -AM     Pre/Posttreatment Pain Comment Nsg gave pain medication prior to session  -AM     Pain Intervention(s) Repositioned;Emotional support  -AM       Row Name 01/04/24 1154          Plan of Care Review    Plan of Care Reviewed With patient  -AM     Outcome Evaluation Re-eval completed this date secondary to pt now s/p kyphoplasty L2 & L4 peformed on 1/3.  Pt completely flat in bed, nsg reports pt has been in this position x 3 days.  Pt on 3L O2, telemetry and Purewick this date.  Pt required max verbal/tactile cues for logroll technqiue for bed mobility.  Max A x 2 for supine to sit and Mod A of 1 for trunk with Max A of 2nd person for BLEs for sit to supine.  Pt resistant to movement and tenses with tactile touch.  Min A x 2 for sit to stand with RW and Min A x 2 for 10 lateral steps to L side with increased time and effort.  Decreased ability to advance LLE with steps secondary to LLE weakness.  Pt with c/o dizziness throughout session and required max verbal cues to keep eyes open.  BP sitting EOB after supine to sit: 145/72.  BP sitting EOB at end of tx:  127/74.  Pt reports decreased pain in back after kyphoplasty sx.  Left pt supine in bed with HOB elevated to 60 degrees with max encouragement to keep HOB elevated throughout the days.  Recommend SNF  -AM       Row Name 01/04/24 1150          Therapy Assessment/Plan (PT)    Patient/Family Therapy Goals Statement (PT) To get back  -AM     Rehab Potential (PT)  good, to achieve stated therapy goals  -AM     Criteria for Skilled Interventions Met (PT) yes  -AM     Therapy Frequency (PT) 5 times/wk  -AM     Predicted Duration of Therapy Intervention (PT) until d/c  -AM       Row Name 01/04/24 1159          Vital Signs    Pre Systolic BP Rehab 145  -AM     Pre Treatment Diastolic BP 71  -AM     Intra Systolic BP Rehab 127  -AM     Intra Treatment Diastolic BP 74  -AM     O2 Delivery Pre Treatment nasal cannula  3L O2  -AM     O2 Delivery Intra Treatment nasal cannula  -AM     O2 Delivery Post Treatment nasal cannula  -AM     Pre Patient Position Supine  -AM     Intra Patient Position Standing  -AM     Post Patient Position Supine  -AM       Row Name 01/04/24 1159          Positioning and Restraints    Pre-Treatment Position in bed  -AM     Post Treatment Position bed  -AM     In Bed notified nsg;supine;call light within reach;encouraged to call for assist;exit alarm on  -AM               User Key  (r) = Recorded By, (t) = Taken By, (c) = Cosigned By      Initials Name Provider Type    AM Yosef Hicks, PT Physical Therapist                   Outcome Measures       Row Name 01/04/24 1208          How much help from another person do you currently need...    Turning from your back to your side while in flat bed without using bedrails? 2  -AM     Moving from lying on back to sitting on the side of a flat bed without bedrails? 2  -AM     Moving to and from a bed to a chair (including a wheelchair)? 2  -AM     Climbing 3-5 steps with a railing? 1  -AM     To walk in hospital room? 2  -AM       Row Name 01/04/24 1208 01/04/24 1112       Functional Assessment    Outcome Measure Options AM-PAC 6 Clicks Basic Mobility (PT)  -AM AM-PAC 6 Clicks Daily Activity (OT)  -LS              User Key  (r) = Recorded By, (t) = Taken By, (c) = Cosigned By      Initials Name Provider Type    AM Yosef Hicks, PT Physical Therapist    Butch Orourke OT Occupational Therapist                                  Physical Therapy Education       Title: PT OT SLP Therapies (Done)       Topic: Physical Therapy (Done)       Point: Mobility training (Done)       Learning Progress Summary             Patient Acceptance, E, VU by  at 12/31/2023 1114    Acceptance, E, VU by OD at 12/27/2023 1619                         Point: Home exercise program (Done)       Learning Progress Summary             Patient Acceptance, E, VU by  at 12/31/2023 1114    Acceptance, E, VU by OD at 12/27/2023 1619                         Point: Body mechanics (Done)       Learning Progress Summary             Patient Acceptance, E, VU by  at 12/31/2023 1114    Acceptance, E, VU by OD at 12/27/2023 1619                         Point: Precautions (Done)       Learning Progress Summary             Patient Acceptance, E, VU by  at 12/31/2023 1114    Acceptance, E, VU by OD at 12/27/2023 1619                                         User Key       Initials Effective Dates Name Provider Type Discipline     05/11/23 -  Syl Grigsby, PT Physical Therapist PT     07/05/23 -  Neetu Franco RN Registered Nurse Nurse                  PT Recommendation and Plan  Planned Therapy Interventions (PT): balance training, bed mobility training, gait training, strengthening, patient/family education, transfer training, neuromuscular re-education  Plan of Care Reviewed With: patient  Outcome Evaluation: Re-eval completed this date secondary to pt now s/p kyphoplasty L2 & L4 peformed on 1/3.  Pt completely flat in bed, nsg reports pt has been in this position x 3 days.  Pt on 3L O2, telemetry and Purewick this date.  Pt required max verbal/tactile cues for logroll technqiue for bed mobility.  Max A x 2 for supine to sit and Mod A of 1 for trunk with Max A of 2nd person for BLEs for sit to supine.  Pt resistant to movement and tenses with tactile touch.  Min A x 2 for sit to stand with RW and Min A x 2 for 10 lateral steps to L side with increased time  and effort.  Decreased ability to advance LLE with steps secondary to LLE weakness.  Pt with c/o dizziness throughout session and required max verbal cues to keep eyes open.  BP sitting EOB after supine to sit: 145/72.  BP sitting EOB at end of tx:  127/74.  Pt reports decreased pain in back after kyphoplasty sx.  Left pt supine in bed with HOB elevated to 60 degrees with max encouragement to keep HOB elevated throughout the days.  Recommend SNF     Time Calculation:         PT Charges       Row Name 01/04/24 1208             Time Calculation    Start Time 0926  -AM      Stop Time 1005  -AM      Time Calculation (min) 39 min  -AM      PT Received On 01/04/24  -AM      PT - Next Appointment 01/05/24  -AM      PT Goal Re-Cert Due Date 01/18/24  -AM         Time Calculation- PT    Total Timed Code Minutes- PT 25 minute(s)  -AM                User Key  (r) = Recorded By, (t) = Taken By, (c) = Cosigned By      Initials Name Provider Type    AM Yosef Hicks, PT Physical Therapist                  Therapy Charges for Today       Code Description Service Date Service Provider Modifiers Qty    26661394370 HC PT RE-EVAL ESTABLISHED PLAN 2 1/4/2024 Yosef Hicks, PT GP 1    91812243295 HC PT THERAPEUTIC ACT EA 15 MIN 1/4/2024 Yosef Hicks, PT GP 1    90012565658 HC GAIT TRAINING EA 15 MIN 1/4/2024 Yosef Hicks, PT GP 1            PT G-Codes  Outcome Measure Options: AM-PAC 6 Clicks Basic Mobility (PT)  AM-PAC 6 Clicks Score (PT): 9  AM-PAC 6 Clicks Score (OT): 14  PT Discharge Summary  Anticipated Discharge Disposition (PT): skilled nursing facility    Yosef Hicks PT  1/4/2024

## 2024-01-04 NOTE — PROGRESS NOTES
[facility]     Progress Note    Patient Name: Morgan Quick  : 1955  MRN: [id number]  Primary Care Physician:  Wanye Saavedra MD  Date of admission: 2023  Service date and time: 24 12:25 EST  Subjective   Subjective     Chief Complaint: Back pain    HPI:    Patient laying on the bed comfortably.    Patient stated still have back pain however improved after kyphoplasty.    S/P catheter plasty yesterday.    Denies chest pain, shortness of breath, nausea and vomiting.    Disposition: Awaiting placement        Objective   Objective     Vitals:   Temp:  [97.4 °F (36.3 °C)-98.5 °F (36.9 °C)] 97.9 °F (36.6 °C)  Heart Rate:  [69-94] 83  Resp:  [8-22] 17  BP: (119-206)/() 138/70  Flow (L/min):  [3] 3  Physical Exam    Constitutional: Awake, alert   Respiratory: Clear to auscultation bilaterally, nonlabored respirations    Cardiovascular: RRR, no murmurs, rubs, or gallops, palpable pedal pulses bilaterally   Gastrointestinal: Positive bowel sounds, soft, nontender, nondistended   Musculoskeletal: No bilateral ankle edema, no clubbing or cyanosis to extremities   Psychiatric: Appropriate affect, cooperative   Neurologic: Oriented x 3, strength symmetric in all extremities, Cranial Nerves grossly intact to confrontation, speech clear   Skin: No rashes     Result Review    Result Review:  I have personally reviewed the results from the time of this admission to 2024 12:25 EST and agree with these findings:  [x]  Laboratory list / accordion  []  Microbiology  [x]  Radiology  []  EKG/Telemetry   []  Cardiology/Vascular   []  Pathology  []  Old records  []  Other:  Most notable findings include:   MRI lumbar  Impression:  1. Acute/subacute 25% compression fracture at L2.  2. Acute/subacute 50% compression fracture at L4.  3. No retropulsed fracture fragment or significant canal stenosis.  4. Chronic height loss at L3 related to remote fracture and/or Schmorl's node. Chronic mild compression  fracture at L5.  5. Lumbar degenerative findings above.    Assessment & Plan   Assessment / Plan       Active Hospital Problems:  Active Hospital Problems    Diagnosis     **Low back pain     Compression fracture of L2 lumbar vertebra, closed, initial encounter     Compression fracture of L4 lumbar vertebra, closed, initial encounter     Back pain     Bipolar 1 disorder     High cholesterol     Essential hypertension      Plan:    -Compression fracture of L2, L4 :   - S/P kyphoplasty on 01/03  -Resume home chronic medication.    DVT prophylaxis:  Mechanical DVT prophylaxis orders are present.    CODE STATUS:   Code Status (Patient has no pulse and is not breathing): CPR (Attempt to Resuscitate)  Medical Interventions (Patient has pulse or is breathing): Full Support    Disposition: Awaiting placement    Amr CHRISTIE Beard MD

## 2024-01-04 NOTE — CONSULTS
Nutrition Services    Patient Name: Morgan Quick  YOB: 1955  MRN: [id number]  Admission date: 12/27/2023    PPE Documentation        PPE Worn By Provider Did not enter room this encounter   PPE Worn By Patient  N/A     NUTRITION SCREENING      Encounter Information: Pt being assessed for LOS x 8 days. Pt admitted to [facility] with low back pain. Pt s/p kyphoplasty.       PO Diet: Diet: Regular/House Diet; Texture: Regular Texture (IDDSI 7); Fluid Consistency: Thin (IDDSI 0)   PO Supplements: -   PO Intake:  50-75%       Labs (reviewed below):         GI Function:  + BM on 1/2       Skin: Intact        Weight Hx Review: 1/4: 246# - scale wt taken on admission  Current wt stable with range of wts recorded October 2022       Nutrition Intervention: Continue current diet and encourage good PO intake.       Results from last 7 days   Lab Units 12/30/23  0408 12/29/23  0410   SODIUM mmol/L 138 136   POTASSIUM mmol/L 3.8 4.0   CHLORIDE mmol/L 97* 97*   CO2 mmol/L 32.0* 32.0*   BUN mg/dL 12 9   CREATININE mg/dL 0.44* 0.42*   CALCIUM mg/dL 9.7 9.5   GLUCOSE mg/dL 89 120*     Results from last 7 days   Lab Units 12/30/23  0408   HEMOGLOBIN g/dL 12.8   HEMATOCRIT % 40.4     COVID19   Date Value Ref Range Status   09/24/2022 Not Detected Not Detected - Ref. Range Final     Lab Results   Component Value Date    HGBA1C 6.2 (H) 09/25/2022       RD to follow up per protocol.    Electronically signed by:  Jeannette Jorge RD  01/04/24 15:20 EST

## 2024-01-04 NOTE — PROGRESS NOTES
Enter Query Response Below      Query Response: CHF R/O   Normal echo EF and diastolic              If applicable, please update the problem list.     Patient: Morgan Quick        : 1955  Account: [id number]           Admit Date:         How to Respond to this query:       a. Click New Note     b. Answer query within the yellow box.                c. Update the Problem List, if applicable.      If you have any questions about this query contact me at: [email or web address]    Dr. Beard,     Patient admitted with compression fracture L2, L4. History of CHF noted in progress notes. Treatment includes PO Lasix and hydralazine.  ECHO 2022 EF 56-60%.     Please clarify the type of heart failure treated/monitored:  Chronic diastolic heart failure  Other- specify_________  Unable to determine    By submitting this query, we are merely seeking further clarification of documentation to accurately reflect all conditions that you are monitoring, evaluating, treating or that extend the hospitalization or utilize additional resources of care. Please utilize your independent clinical judgment when addressing the question(s) above.     This query and your response, once completed, will be entered into the legal medical record.    Sincerely,  Rosana Yañez RN BSN  Clinical Documentation Integrity Program

## 2024-01-04 NOTE — THERAPY RE-EVALUATION
Patient Name: Morgan Quick  : 1955    MRN: [id number]                              Today's Date: 2024       Admit Date: 2023    Visit Dx:     ICD-10-CM ICD-9-CM   1. Fall, initial encounter  W19.XXXA E888.9   2. Contusion of coccyx, initial encounter  S30.0XXA 922.32   3. Acute midline low back pain without sciatica  M54.50 724.2   4. Compression fracture of L4 lumbar vertebra, closed, initial encounter  S32.040A 805.4     Patient Active Problem List   Diagnosis    Morbidly obese    Essential hypertension    Malignant neoplasm of overlapping sites of left breast in female, estrogen receptor positive    Primary cancer of left female breast    Abdominal pain, unspecified abdominal location    Bipolar 1 disorder    High cholesterol    Anorexia    Nausea    Low back pain    Back pain    Compression fracture of L2 lumbar vertebra, closed, initial encounter    Compression fracture of L4 lumbar vertebra, closed, initial encounter     Past Medical History:   Diagnosis Date    Bipolar 1 disorder     Breast cancer     Cancer     Disease of thyroid gland     High cholesterol     Hypertension      Past Surgical History:   Procedure Laterality Date    ABDOMINAL SURGERY      APPENDECTOMY      ENDOSCOPY N/A 2022    Procedure: ESOPHAGOGASTRODUODENOSCOPY with biopsy x 2 areas;  Surgeon: Deonte Wong MD;  Location: [facility] ENDOSCOPY;  Service: Gastroenterology;  Laterality: N/A;  post: gastritis, duodinitis, nodule,     HYSTERECTOMY      MASTECTOMY      L side      General Information       Row Name 24 1022          OT Time and Intention    Document Type re-evaluation  -LS     Mode of Treatment occupational therapy  -LS       Row Name 24 1022          General Information    Patient Profile Reviewed yes  -LS     Existing Precautions/Restrictions fall;oxygen therapy device and L/min;spinal  -LS     Barriers to Rehab medically complex;previous functional deficit;ineffective coping  -LS        Row Name 01/04/24 1022          Cognition    Orientation Status (Cognition) oriented x 4  -LS       Row Name 01/04/24 1022          Safety Issues, Functional Mobility    Impairments Affecting Function (Mobility) balance;endurance/activity tolerance;pain;strength;range of motion (ROM);postural/trunk control  -               User Key  (r) = Recorded By, (t) = Taken By, (c) = Cosigned By      Initials Name Provider Type     Butch Acevedo OT Occupational Therapist                     Mobility/ADL's       Row Name 01/04/24 1023          Bed Mobility    Bed Mobility supine-sit;sit-supine  -LS     Supine-Sit [place] (Bed Mobility) 2 person assist;maximum assist (25% patient effort)  -     Sit-Supine [place] (Bed Mobility) moderate assist (50% patient effort);maximum assist (25% patient effort);2 person assist  -     Comment, (Bed Mobility) Pt remains guarded to movement but improved pain reported since kyphoplasty  -       Row Name 01/04/24 1023          Transfers    Transfers sit-stand transfer  -       Row Name 01/04/24 1023          Sit-Stand Transfer    Sit-Stand [place] (Transfers) minimum assist (75% patient effort);2 person assist  -     Assistive Device (Sit-Stand Transfers) walker, front-wheeled  -       Row Name 01/04/24 1023          Functional Mobility    Functional Mobility- Ind. Level minimum assist (75% patient effort);2 person assist required  -     Functional Mobility- Device walker, front-wheeled  -LS     Functional Mobility- Comment Able to take lateral steps to left toward the HOB  -       Row Name 01/04/24 1023          Activities of Daily Living    BADL Assessment/Intervention lower body dressing  -       Row Name 01/04/24 1023          Lower Body Dressing Assessment/Training    [place] Level (Lower Body Dressing) don;socks;maximum assist (25% patient effort)  -     Comment, (Lower Body Dressing) Able to assist by straightening knees while sitting EOB  -                User Key  (r) = Recorded By, (t) = Taken By, (c) = Cosigned By      Initials Name Provider Type     Butch Acevedo OT Occupational Therapist                   Obj/Interventions       Row Name 01/04/24 1025          Sensory Assessment (Somatosensory)    Sensory Assessment (Somatosensory) sensation intact  -[facility] Name 01/04/24 1025          Range of Motion Comprehensive    Comment, General Range of Motion BUEs actively 50% AROM at shoulders but no PROM deficits  -[facility] Name 01/04/24 1025          Strength Comprehensive (MMT)    Comment, General Manual Muscle Testing (MMT) Assessment BUEs with functional strength, limitations due ot pain and pain avoidance  -       Row Name 01/04/24 1025          Balance    Balance Assessment sitting static balance;sitting dynamic balance;standing static balance;standing dynamic balance  -     Static Sitting Balance standby assist  -     Dynamic Sitting Balance contact guard  -LS     Position, Sitting Balance unsupported;sitting edge of bed  -     Static Standing Balance contact guard  -LS     Dynamic Standing Balance minimal assist;2-person assist  -LS     Position/Device Used, Standing Balance supported;walker, front-wheeled  -               User Key  (r) = Recorded By, (t) = Taken By, (c) = Cosigned By      Initials Name Provider Type     Butch Acevedo OT Occupational Therapist                   Goals/Plan       [facility] Name 01/04/24 1111          Bed Mobility Goal 1 (OT)    Activity/Assistive Device (Bed Mobility Goal 1, OT) bed mobility activities, all  -LS     [place] Level/Cues Needed (Bed Mobility Goal 1, OT) minimum assist (75% or more patient effort)  -     Time Frame (Bed Mobility Goal 1, OT) long term goal (LTG);2 weeks  -     Progress/Outcomes (Bed Mobility Goal 1, OT) goal ongoing  -[facility] Name 01/04/24 1111          Transfer Goal 1 (OT)    Activity/Assistive Device (Transfer Goal 1, OT) transfers, all  -LS      [place] Level/Cues Needed (Transfer Goal 1, OT) contact guard required  -LS     Time Frame (Transfer Goal 1, OT) long term goal (LTG);2 weeks  -LS     Progress/Outcome (Transfer Goal 1, OT) goal ongoing;goal revised this date  -LS       Row Name 01/04/24 1111          Toileting Goal 1 (OT)    Activity/Device (Toileting Goal 1, OT) toileting skills, all;commode, bedside without drop arms  -LS     [place] Level/Cues Needed (Toileting Goal 1, OT) minimum assist (75% or more patient effort)  -LS     Time Frame (Toileting Goal 1, OT) long term goal (LTG);2 weeks  -LS     Progress/Outcome (Toileting Goal 1, OT) goal ongoing  -LS       Row Name 01/04/24 1111          Therapy Assessment/Plan (OT)    Planned Therapy Interventions (OT) activity tolerance training;BADL retraining;functional balance retraining;IADL retraining;occupation/activity based interventions;ROM/therapeutic exercise;strengthening exercise;transfer/mobility retraining;patient/caregiver education/training  -LS               User Key  (r) = Recorded By, (t) = Taken By, (c) = Cosigned By      Initials Name Provider Type    LS Butch Acevedo OT Occupational Therapist                   Clinical Impression       Row Name 01/04/24 1104          Pain Assessment    Pretreatment Pain Rating 7/10  -LS     Posttreatment Pain Rating 6/10  -LS     Pain Location lower  -LS     Pain Location - back  -LS       Row Name 01/04/24 1104          Plan of Care Review    Plan of Care Reviewed With patient  -LS     Outcome Evaluation Patient re-evaluated this date POD #1 kyphoplasty L2 and L4 vertabrae. She reports an improvement in pain versus prior to surgery. Pt flat supine in bed on arrival, nurses reporting that she has been in this position x3 days. She was agreeable to therapy this date, however. Pt educated on logrolling techniques for spinal precautions which was then performed with Max A. Max Ax2 required to come to sitting EOB, pt somewhat resistance to  movement and tense while being assisted. Once in sitting, she was able to balance with SBA. She required Max A for lower body dressing from EOB, then Min Ax2 to stand with RW and take steps laterally to HOB. Limited this date by dizziness, BP droppipng with change in position but remained at safe levels. Mod-Max Ax2 to return to supine and scoot to HOB. Patient demos pain avoidance throughout treatment, but overall more tolerant to activity versus prior to kyphoplasty. She will require SNF at dc as she requires assistance for all mobility and self-care. OT will continue to follow.  -       Row Name 01/04/24 1104          Therapy Assessment/Plan (OT)    Rehab Potential (OT) good, to achieve stated therapy goals  -     Criteria for Skilled Therapeutic Interventions Met (OT) yes;skilled treatment is necessary  -     Therapy Frequency (OT) 5 times/wk  -     Predicted Duration of Therapy Intervention (OT) until dc  -       Row Name 01/04/24 1104          Therapy Plan Review/Discharge Plan (OT)    Anticipated Discharge Disposition (OT) skilled nursing facility  -       Row Name 01/04/24 1104          Vital Signs    Pre Systolic BP Rehab 145  -LS     Pre Treatment Diastolic BP 71  -LS     Intra Systolic BP Rehab 127  -LS     Intra Treatment Diastolic BP 74  -LS     O2 Delivery Pre Treatment nasal cannula  -LS     O2 Delivery Intra Treatment nasal cannula  -LS     O2 Delivery Post Treatment nasal cannula  -LS     Pre Patient Position Supine  -LS     Intra Patient Position Standing  -LS     Post Patient Position Supine  -       Row Name 01/04/24 1104          Positioning and Restraints    Pre-Treatment Position in bed  -LS     Post Treatment Position bed  -LS     In Bed notified nsg;call light within reach;encouraged to call for assist;exit alarm on;fowlers  -               User Key  (r) = Recorded By, (t) = Taken By, (c) = Cosigned By      Initials Name Provider Type    Butch Orourke, NANCY Occupational  Therapist                   Outcome Measures       Row Name 01/04/24 1112          How much help from another is currently needed...    Putting on and taking off regular lower body clothing? 2  -LS     Bathing (including washing, rinsing, and drying) 2  -LS     Toileting (which includes using toilet bed pan or urinal) 2  -LS     Putting on and taking off regular upper body clothing 2  -LS     Taking care of personal grooming (such as brushing teeth) 2  -LS     Eating meals 4  -LS     AM-PAC 6 Clicks Score (OT) 14  -LS       Row Name 01/04/24 1112          Functional Assessment    Outcome Measure Options AM-PAC 6 Clicks Daily Activity (OT)  -LS               User Key  (r) = Recorded By, (t) = Taken By, (c) = Cosigned By      Initials Name Provider Type    Butch Orourke OT Occupational Therapist                    Occupational Therapy Education       Title: PT OT SLP Therapies (Done)       Topic: Occupational Therapy (Done)       Point: ADL training (Done)       Description:   Instruct learner(s) on proper safety adaptation and remediation techniques during self care or transfers.   Instruct in proper use of assistive devices.                  Learning Progress Summary             Patient Acceptance, E, VU by  at 12/31/2023 1114    Acceptance, E,TB, VU by MS at 12/29/2023 1126                         Point: Precautions (Done)       Description:   Instruct learner(s) on prescribed precautions during self-care and functional transfers.                  Learning Progress Summary             Patient Acceptance, E, VU by  at 12/31/2023 1114    Acceptance, E,TB, VU by MS at 12/29/2023 1126                         Point: Body mechanics (Done)       Description:   Instruct learner(s) on proper positioning and spine alignment during self-care, functional mobility activities and/or exercises.                  Learning Progress Summary             Patient Acceptance, E, VU by  at 12/31/2023 1114    Acceptance, E,TB, VU  by MS at 12/29/2023 1126                                         User Key       Initials Effective Dates Name Provider Type Discipline    MS 07/13/22 -  Belem Simms OT Occupational Therapist OT     07/05/23 -  Neetu Franco, LORI Registered Nurse Nurse                  OT Recommendation and Plan  Planned Therapy Interventions (OT): activity tolerance training, BADL retraining, functional balance retraining, IADL retraining, occupation/activity based interventions, ROM/therapeutic exercise, strengthening exercise, transfer/mobility retraining, patient/caregiver education/training  Therapy Frequency (OT): 5 times/wk  Plan of Care Review  Plan of Care Reviewed With: patient  Outcome Evaluation: Patient re-evaluated this date POD #1 kyphoplasty L2 and L4 vertabrae. She reports an improvement in pain versus prior to surgery. Pt flat supine in bed on arrival, nurses reporting that she has been in this position x3 days. She was agreeable to therapy this date, however. Pt educated on logrolling techniques for spinal precautions which was then performed with Max A. Max Ax2 required to come to sitting EOB, pt somewhat resistance to movement and tense while being assisted. Once in sitting, she was able to balance with SBA. She required Max A for lower body dressing from EOB, then Min Ax2 to stand with RW and take steps laterally to HOB. Limited this date by dizziness, BP droppipng with change in position but remained at safe levels. Mod-Max Ax2 to return to supine and scoot to HOB. Patient demos pain avoidance throughout treatment, but overall more tolerant to activity versus prior to kyphoplasty. She will [place] SNF [place] as she requires assistance for all mobility and self-care. OT will continue to follow.     Time Calculation:         Time Calculation- OT       Row Name 01/04/24 1113             Time Calculation- OT    OT Start Time 0932  -LS      OT Stop Time 1006  -LS      OT Time Calculation (min) 34 min  -LS       Total Timed Code Minutes- OT 24 minute(s)  -LS      OT Received On 01/04/24  -      OT - Next Appointment 01/05/24  -      OT Goal Re-Cert Due Date 01/18/24  -         Timed Charges    08567 - OT Therapeutic Activity Minutes 14  -LS      11535 - OT Self Care/Mgmt Minutes 10  -LS         Untimed Charges    OT Eval/Re-eval Minutes 10  -LS         Total Minutes    Timed Charges Total Minutes 24  -LS      Untimed Charges Total Minutes 10  -LS       Total Minutes 34  -LS                User Key  (r) = Recorded By, (t) = Taken By, (c) = Cosigned By      Initials Name Provider Type    Butch Orourke OT Occupational Therapist                  Therapy Charges for Today       Code Description Service Date Service Provider Modifiers Qty    41516005685 HC OT SELF CARE/MGMT/TRAIN EA 15 MIN 1/4/2024 Butch Acevedo OT GO 1    16209960198 HC OT THERAPEUTIC ACT EA 15 MIN 1/4/2024 Butch Acevedo OT GO 1    78578969579 HC OT RE-EVAL 2 1/4/2024 Butch Acevedo OT GO 1                 Butch Acevedo OT  1/4/2024

## 2024-01-04 NOTE — PLAN OF CARE
Problem: Fall Injury Risk  Goal: Absence of Fall and Fall-Related Injury  Outcome: Ongoing, Progressing  Intervention: Identify and Manage Contributors  Recent Flowsheet Documentation  Taken 1/4/2024 0030 by Sherrie Syed RN  Medication Review/Management: medications reviewed  Taken 1/3/2024 2009 by Sherrie Syed RN  Medication Review/Management: medications reviewed  Intervention: Promote Injury-Free Environment  Recent Flowsheet Documentation  Taken 1/4/2024 0030 by Sherrie Syed RN  Safety Promotion/Fall Prevention: safety round/check completed  Taken 1/3/2024 2244 by Sherrie Syed RN  Safety Promotion/Fall Prevention: safety round/check completed  Taken 1/3/2024 2225 by Sherrie Syed RN  Safety Promotion/Fall Prevention: safety round/check completed  Taken 1/3/2024 2009 by Sherrie Syed RN  Safety Promotion/Fall Prevention: safety round/check completed     Problem: Pain Acute  Goal: Acceptable Pain Control and Functional Ability  Outcome: Ongoing, Progressing  Intervention: Prevent or Manage Pain  Recent Flowsheet Documentation  Taken 1/4/2024 0030 by Sherrie Syed RN  Medication Review/Management: medications reviewed  Taken 1/3/2024 2009 by Sherrie Syed RN  Medication Review/Management: medications reviewed  Intervention: Develop Pain Management Plan  Recent Flowsheet Documentation  Taken 1/3/2024 2009 by Sherrie Syed RN  Pain Management Interventions: see MAR  Intervention: Optimize Psychosocial Wellbeing  Recent Flowsheet Documentation  Taken 1/3/2024 2009 by Sherrie Syed RN  Supportive Measures: active listening utilized  Diversional Activities:   smartphone   television  Goal: Acceptable Pain Control and Functional Ability  Outcome: Ongoing, Progressing  Intervention: Prevent or Manage Pain  Recent Flowsheet Documentation  Taken 1/4/2024 0030 by Sherrie Syed RN  Medication Review/Management: medications  reviewed  Taken 1/3/2024 2009 by Sherrie Syed RN  Medication Review/Management: medications reviewed  Intervention: Develop Pain Management Plan  Recent Flowsheet Documentation  Taken 1/3/2024 2009 by Sherrie Syed RN  Pain Management Interventions: see MAR  Intervention: Optimize Psychosocial Wellbeing  Recent Flowsheet Documentation  Taken 1/3/2024 2009 by Sherrie Syed RN  Supportive Measures: active listening utilized  Diversional Activities:   smartphone   television     Problem: Skin Injury Risk Increased  Goal: Skin Health and Integrity  Outcome: Ongoing, Progressing  Intervention: Optimize Skin Protection  Recent Flowsheet Documentation  Taken 1/3/2024 2009 by Sherrie Syed RN  Head of Bed (HOB) Positioning: HOB flat  Skin Protection:   adhesive use limited   transparent dressing maintained     Problem: Asthma Comorbidity  Goal: Maintenance of Asthma Control  Outcome: Ongoing, Progressing  Intervention: Maintain Asthma Symptom Control  Recent Flowsheet Documentation  Taken 1/4/2024 0030 by Sherrie Syed RN  Medication Review/Management: medications reviewed  Taken 1/3/2024 2009 by Sherrie Syed RN  Medication Review/Management: medications reviewed     Problem: Behavioral Health Comorbidity  Goal: Maintenance of Behavioral Health Symptom Control  Outcome: Ongoing, Progressing  Intervention: Maintain Behavioral Health Symptom Control  Recent Flowsheet Documentation  Taken 1/4/2024 0030 by Sherrie Syed RN  Medication Review/Management: medications reviewed  Taken 1/3/2024 2009 by Sherrie Syed RN  Medication Review/Management: medications reviewed     Problem: COPD (Chronic Obstructive Pulmonary Disease) Comorbidity  Goal: Maintenance of COPD Symptom Control  Outcome: Ongoing, Progressing  Intervention: Maintain COPD-Symptom Control  Recent Flowsheet Documentation  Taken 1/4/2024 0030 by Sherrie Syed RN  Medication Review/Management:  medications reviewed  Taken 1/3/2024 2009 by Sherrie Syed RN  Supportive Measures: active listening utilized  Medication Review/Management: medications reviewed     Problem: Diabetes Comorbidity  Goal: Blood Glucose Level Within Targeted Range  Outcome: Ongoing, Progressing     Problem: Heart Failure Comorbidity  Goal: Maintenance of Heart Failure Symptom Control  Outcome: Ongoing, Progressing  Intervention: Maintain Heart Failure-Management  Recent Flowsheet Documentation  Taken 1/4/2024 0030 by Sherrie Syed RN  Medication Review/Management: medications reviewed  Taken 1/3/2024 2009 by Sherrie Syed RN  Medication Review/Management: medications reviewed     Problem: Hypertension Comorbidity  Goal: Blood Pressure in Desired Range  Outcome: Ongoing, Progressing  Intervention: Maintain Blood Pressure Management  Recent Flowsheet Documentation  Taken 1/4/2024 0030 by Sherrie Syed RN  Medication Review/Management: medications reviewed  Taken 1/3/2024 2009 by Sherrie Syed RN  Medication Review/Management: medications reviewed     Problem: Obstructive Sleep Apnea Risk or Actual Comorbidity Management  Goal: Unobstructed Breathing During Sleep  Outcome: Ongoing, Progressing     Problem: Osteoarthritis Comorbidity  Goal: Maintenance of Osteoarthritis Symptom Control  Outcome: Ongoing, Progressing  Intervention: Maintain Osteoarthritis Symptom Control  Recent Flowsheet Documentation  Taken 1/4/2024 0030 by Sherrie Syed RN  Medication Review/Management: medications reviewed  Taken 1/3/2024 2009 by Sherrie Syed RN  Activity Management: bedrest  Medication Review/Management: medications reviewed     Problem: Pain Chronic (Persistent) (Comorbidity Management)  Goal: Acceptable Pain Control and Functional Ability  Outcome: Ongoing, Progressing  Intervention: Manage Persistent Pain  Recent Flowsheet Documentation  Taken 1/4/2024 0030 by Sherrie Syed  RN  Medication Review/Management: medications reviewed  Taken 1/3/2024 2009 by Sherrie Syed RN  Medication Review/Management: medications reviewed  Intervention: Develop Pain Management Plan  Recent Flowsheet Documentation  Taken 1/3/2024 2009 by Sherrie Syed RN  Pain Management Interventions: see MAR  Intervention: Optimize Psychosocial Wellbeing  Recent Flowsheet Documentation  Taken 1/3/2024 2009 by Sherrie Syed, RN  Supportive Measures: active listening utilized  Diversional Activities:   smartphone   television  Family/Support System Care: support provided     Problem: Seizure Disorder Comorbidity  Goal: Maintenance of Seizure Control  Outcome: Ongoing, Progressing   Goal Outcome Evaluation:      Patient resting quietly in bed at this time, patient with HOB flat, unable to tolerate HOB elevated, patient in severe pain with any movement of bed, turing or repositioning, patient remains completely flat/supine, at start of shift patient c/o pain 10/10, MD notified, new orders given, patient did have some relief, although pain continued throughout the night. Unable to turn patient to remove Lidoderm patch, charge nurse made aware. Safety maintained call light in reach

## 2024-01-05 VITALS
DIASTOLIC BLOOD PRESSURE: 55 MMHG | WEIGHT: 229.5 LBS | HEIGHT: 65 IN | TEMPERATURE: 98 F | RESPIRATION RATE: 16 BRPM | SYSTOLIC BLOOD PRESSURE: 148 MMHG | OXYGEN SATURATION: 99 % | HEART RATE: 79 BPM | BODY MASS INDEX: 38.24 KG/M2

## 2024-01-05 PROCEDURE — 63710000001 PREDNISONE PER 1 MG: Performed by: PHYSICIAN ASSISTANT

## 2024-01-05 RX ADMIN — POTASSIUM CHLORIDE 10 MEQ: 750 TABLET, EXTENDED RELEASE ORAL at 10:01

## 2024-01-05 RX ADMIN — GUAIFENESIN 1200 MG: 600 TABLET, MULTILAYER, EXTENDED RELEASE ORAL at 10:01

## 2024-01-05 RX ADMIN — SENNOSIDES AND DOCUSATE SODIUM 2 TABLET: 50; 8.6 TABLET ORAL at 10:01

## 2024-01-05 RX ADMIN — PANTOPRAZOLE SODIUM 40 MG: 40 TABLET, DELAYED RELEASE ORAL at 06:12

## 2024-01-05 RX ADMIN — OXCARBAZEPINE 150 MG: 150 TABLET, FILM COATED ORAL at 10:01

## 2024-01-05 RX ADMIN — SODIUM CHLORIDE 2 G: 1 TABLET ORAL at 12:22

## 2024-01-05 RX ADMIN — LEVOTHYROXINE SODIUM 25 MCG: 0.03 TABLET ORAL at 06:12

## 2024-01-05 RX ADMIN — SERTRALINE 200 MG: 100 TABLET, FILM COATED ORAL at 10:02

## 2024-01-05 RX ADMIN — FUROSEMIDE 20 MG: 20 TABLET ORAL at 10:02

## 2024-01-05 RX ADMIN — SODIUM CHLORIDE 2 G: 1 TABLET ORAL at 10:02

## 2024-01-05 RX ADMIN — PREDNISONE 20 MG: 20 TABLET ORAL at 10:01

## 2024-01-05 RX ADMIN — Medication 10 ML: at 10:03

## 2024-01-05 RX ADMIN — POLYETHYLENE GLYCOL 3350 17 G: 17 POWDER, FOR SOLUTION ORAL at 10:01

## 2024-01-05 RX ADMIN — LIDOCAINE 1 PATCH: 50 PATCH CUTANEOUS at 10:02

## 2024-01-05 RX ADMIN — NALOXEGOL OXALATE 25 MG: 25 TABLET, FILM COATED ORAL at 06:12

## 2024-01-05 RX ADMIN — LUBIPROSTONE 24 MCG: 24 CAPSULE, GELATIN COATED ORAL at 10:01

## 2024-01-05 RX ADMIN — ACETAMINOPHEN 650 MG: 325 TABLET, FILM COATED ORAL at 06:12

## 2024-01-05 RX ADMIN — OXYCODONE HYDROCHLORIDE 10 MG: 5 TABLET ORAL at 12:22

## 2024-01-05 RX ADMIN — CYCLOBENZAPRINE 10 MG: 10 TABLET, FILM COATED ORAL at 10:01

## 2024-01-05 NOTE — CASE MANAGEMENT/SOCIAL WORK
Continued Stay Note  [facility]     Patient Name: Morgan Quick  MRN: [id number]  Today's Date: 1/5/2024    Admit Date: 12/27/2023  Ambulance transport set up with [facility] and medical necessity form completed and given to floor nurse. [facility] and  patient  informed of dc orders and transport  Plan: From [facility]. Referred to [facility] ; accepted. PASRR completed. Precert started 1/04 and approved. Pharmacy Remedi. Will need ambulance transport   Discharge Plan       Row Name 01/05/24 0810       Plan    Plan Comments CM informed nursing and DrPancho that Mrs. Quick has prior auth and ready bed at [facility] SNF if medically ready for discharge today. Copy of signed IMM given to Mrs. Quick                        Phone communication or documentation only - no physical contact with patient or family.   Marivel LEE,RN Case Manager  [facility]  Phone: Desk- [phone]  Cell- [phone]

## 2024-01-05 NOTE — CASE MANAGEMENT/SOCIAL WORK
"Physicians Statement of Medical Necessity for  Ambulance Transportation    GENERAL INFORMATION     Name: Morgan Quick  YOB: 1955  Medicare #:     [id number]     Transport Date: 1/05/2024 (Valid for round trips this date, or for scheduled repetitive trips for 60 days from the date signed below.)  Origin: [facility]  Destination: [address]. [place], [place]  Is the Patient's stay covered under Medicare Part A (PPS/DRG?)Yes  Closest appropriate facility? Yes  If this a hosp-hosp transfer? No  Is this a hospice patient? No    MEDICAL NECESSITY QUESTIONAIRE    Ambulance Transportation is medically necessary only if other means of transportation are contraindicated or would be potentially harmful to the patient.  To meet this requirement, the patient must be either \"bed confined\" or suffer from a condition such that transport by means other than an ambulance is contraindicated by the patient's condition.  The following questions must be answered by the healthcare professional signing below for this form to be valid:     1) Describe the MEDICAL CONDITION (physical and/or mental) of this patient AT THE TIME OF AMBULANCE TRANSPORT that requires the patient to be transported in an ambulance, and why transport by other means is contraindicated by the patient's condition: severe back pain, recent kyphoplasty due to multiple lumbar fractures, immobility, bed bound  Past Medical History:   Diagnosis Date    Bipolar 1 disorder     Breast cancer     Cancer     Disease of thyroid gland     High cholesterol     Hypertension       Past Surgical History:   Procedure Laterality Date    ABDOMINAL SURGERY      APPENDECTOMY      ENDOSCOPY N/A 9/25/2022    Procedure: ESOPHAGOGASTRODUODENOSCOPY with biopsy x 2 areas;  Surgeon: Deonte Wong MD;  Location: [facility] ENDOSCOPY;  Service: Gastroenterology;  Laterality: N/A;  post: gastritis, duodinitis, nodule,     HYSTERECTOMY      MASTECTOMY      L " "side      2) Is this patient \"bed confined\" as defined below?Yes   To be \"bed confined\" the patient must satisfy all three of the following criteria:  (1) unable to get up from bed without assistance; AND (2) unable to ambulate;  AND (3) unable to sit in a chair or wheelchair.  3) Can this patient safely be transported by car or wheelchair van (I.e., may safely sit during transport, without an attendant or monitoring?)No   4. In addition to completing questions 1-3 above, please check any of the following conditions that apply*:          *Note: supporting documentation for any boxes checked must be maintained in the patient's medical records Moderate/severe pain on movement and Requires oxygen - unable to self administer      SIGNATURE OF PHYSICIAN OR OTHER AUTHORIZED HEALTHCARE PROFESSIONAL    I certify that the above information is true and correct based on my evaluation of this patient, and represent that the patient requires transport by ambulance and that other forms of transport are contraindicated.  I understand that this information will be used by the Centers for Medicare and Medicaid Services (CMS) to support the determiniation of medical necessity for ambulance services, and I represent that I have personal knowledge of the patient's condition at the time of transport.    .   If this box is checked, I also certify that the patient is physically or mentally incapable of signing the ambulance service's claim form and that the institution with which I am affiliated has furnished care, services or assistance to the patient.  My signature below is made on behalf of the patient pursuant to 42 .36(b)(4). In accordance with 42 .37, the specific reason(s) that the patient is physically or mentally incapable of signing the claim for is as follows: .    Signature of Physician or Healthcare Professional   Dona Billy RN Case Manager Date/Time:   1/05/2024x     (For Scheduled repetitive transport, this " form is not valid for transports performed more than 60 days after this date).                                                                                                                                            --------------------------------------------------------------------------------------------  Printed Name and Credentials of Physician or Authorized Healthcare Professional     *Form must be signed by patient's attending physician for scheduled, repetitive transports,.  For non-repetitive ambulance transports, if unable to obtain the signature of the attending physician, any of the following may sign (please select below):     Physician  Clinical Nurse Specialist x Registered Nurse     Physician Assistant x Discharge Planner  Licensed Practical Nurse     Nurse Practitioner x

## 2024-01-05 NOTE — PLAN OF CARE
Problem: Fall Injury Risk  Goal: Absence of Fall and Fall-Related Injury  Outcome: Ongoing, Progressing  Intervention: Identify and Manage Contributors  Recent Flowsheet Documentation  Taken 1/4/2024 2000 by Romy Romero RN  Medication Review/Management: medications reviewed  Intervention: Promote Injury-Free Environment  Recent Flowsheet Documentation  Taken 1/4/2024 2254 by Romy Romero RN  Safety Promotion/Fall Prevention: safety round/check completed  Taken 1/4/2024 2000 by Romy Romero RN  Safety Promotion/Fall Prevention: safety round/check completed     Problem: Pain Acute  Goal: Acceptable Pain Control and Functional Ability  Outcome: Ongoing, Progressing  Intervention: Prevent or Manage Pain  Recent Flowsheet Documentation  Taken 1/4/2024 2000 by Romy Romero RN  Medication Review/Management: medications reviewed  Intervention: Optimize Psychosocial Wellbeing  Recent Flowsheet Documentation  Taken 1/4/2024 2000 by Romy Romero RN  Supportive Measures: active listening utilized  Diversional Activities:   television   smartphone  Goal: Acceptable Pain Control and Functional Ability  Outcome: Ongoing, Progressing  Intervention: Prevent or Manage Pain  Recent Flowsheet Documentation  Taken 1/4/2024 2000 by Romy Romero RN  Medication Review/Management: medications reviewed  Intervention: Optimize Psychosocial Wellbeing  Recent Flowsheet Documentation  Taken 1/4/2024 2000 by Romy Romero RN  Supportive Measures: active listening utilized  Diversional Activities:   television   smartphone     Problem: Skin Injury Risk Increased  Goal: Skin Health and Integrity  Outcome: Ongoing, Progressing  Intervention: Optimize Skin Protection  Recent Flowsheet Documentation  Taken 1/4/2024 2000 by Romy Romero RN  Pressure Reduction Techniques: frequent weight shift encouraged  Head of Bed (HOB) Positioning: HOB elevated  Pressure Reduction Devices:  pressure-redistributing mattress utilized  Skin Protection: adhesive use limited     Problem: Asthma Comorbidity  Goal: Maintenance of Asthma Control  Outcome: Ongoing, Progressing  Intervention: Maintain Asthma Symptom Control  Recent Flowsheet Documentation  Taken 1/4/2024 2000 by Romy Romero RN  Medication Review/Management: medications reviewed     Problem: Behavioral Health Comorbidity  Goal: Maintenance of Behavioral Health Symptom Control  Outcome: Ongoing, Progressing  Intervention: Maintain Behavioral Health Symptom Control  Recent Flowsheet Documentation  Taken 1/4/2024 2000 by Romy Romero RN  Medication Review/Management: medications reviewed     Problem: COPD (Chronic Obstructive Pulmonary Disease) Comorbidity  Goal: Maintenance of COPD Symptom Control  Outcome: Ongoing, Progressing  Intervention: Maintain COPD-Symptom Control  Recent Flowsheet Documentation  Taken 1/4/2024 2000 by Romy Romero RN  Supportive Measures: active listening utilized  Medication Review/Management: medications reviewed     Problem: Diabetes Comorbidity  Goal: Blood Glucose Level Within Targeted Range  Outcome: Ongoing, Progressing     Problem: Heart Failure Comorbidity  Goal: Maintenance of Heart Failure Symptom Control  Outcome: Ongoing, Progressing  Intervention: Maintain Heart Failure-Management  Recent Flowsheet Documentation  Taken 1/4/2024 2000 by Romy Romero RN  Medication Review/Management: medications reviewed     Problem: Hypertension Comorbidity  Goal: Blood Pressure in Desired Range  Outcome: Ongoing, Progressing  Intervention: Maintain Blood Pressure Management  Recent Flowsheet Documentation  Taken 1/4/2024 2000 by Romy Romero RN  Medication Review/Management: medications reviewed     Problem: Obstructive Sleep Apnea Risk or Actual Comorbidity Management  Goal: Unobstructed Breathing During Sleep  Outcome: Ongoing, Progressing     Problem: Osteoarthritis  Comorbidity  Goal: Maintenance of Osteoarthritis Symptom Control  Outcome: Ongoing, Progressing  Intervention: Maintain Osteoarthritis Symptom Control  Recent Flowsheet Documentation  Taken 1/4/2024 2000 by Romy Romero RN  Medication Review/Management: medications reviewed     Problem: Pain Chronic (Persistent) (Comorbidity Management)  Goal: Acceptable Pain Control and Functional Ability  Outcome: Ongoing, Progressing  Intervention: Manage Persistent Pain  Recent Flowsheet Documentation  Taken 1/4/2024 2000 by Romy Romero RN  Medication Review/Management: medications reviewed  Intervention: Optimize Psychosocial Wellbeing  Recent Flowsheet Documentation  Taken 1/4/2024 2000 by Romy Romero RN  Supportive Measures: active listening utilized  Diversional Activities:   television   smartphone     Problem: Seizure Disorder Comorbidity  Goal: Maintenance of Seizure Control  Outcome: Ongoing, Progressing   Goal Outcome Evaluation:

## 2024-01-05 NOTE — DISCHARGE SUMMARY
[facility]         DISCHARGE SUMMARY    Patient Name: Morgan Quick  : 1955  MRN: [id number]    Date of Admission: 2023  Date of Discharge:  2027  Primary Care Physician: Wayne Saavedra MD    Consults       Date and Time Order Name Status Description    2024 12:31 PM Inpatient Hospitalist Consult              Presenting Problem:   Low back pain [M54.50]  Fall, initial encounter [W19.XXXA]  Contusion of coccyx, initial encounter [S30.0XXA]  Acute midline low back pain without sciatica [M54.50]  Back pain [M54.9]    Active and Resolved Hospital Problems:  Active Hospital Problems    Diagnosis POA    **Low back pain [M54.50] Yes    Compression fracture of L2 lumbar vertebra, closed, initial encounter [S32.020A] Unknown    Compression fracture of L4 lumbar vertebra, closed, initial encounter [S32.040A] Unknown    Back pain [M54.9] Yes    Bipolar 1 disorder [F31.9] Yes    High cholesterol [E78.00] Yes    Essential hypertension [I10] Yes      Resolved Hospital Problems   No resolved problems to display.         Hospital Course     Hospital Course:  Morgan Quick is a 68 y.o. female history of hypertension, thyroid disorder, high cholesterol, chronic pain   Who admitted to the hospital for compression fracture L2, L4 after fall.  Interventional radiology was consulted and patient had kyphoplasty on .  During this admission patient was able to tolerate the surgery well pain was better controlled.  At this point patient received maximum benefit from this hospitalization was safe to discharge her to the rehab to complete her recovery      DISCHARGE Follow Up Recommendations for labs and diagnostics:   -Follow-up with your PCP in 1 week      Day of Discharge     Vital Signs:  Temp:  [97 °F (36.1 °C)-98 °F (36.7 °C)] 98 °F (36.7 °C)  Heart Rate:  [64-90] 79  Resp:  [16-18] 16  BP: (124-161)/(55-81) 148/55  Flow (L/min):  [2] 2  Physical Exam:  Constitutional: Awake,  alert   Eyes: PERRLA, sclerae anicteric, no conjunctival injection   HENT: NCAT, mucous membranes moist   Neck: Supple, no thyromegaly, no lymphadenopathy, trachea midline   Respiratory: Clear to auscultation bilaterally, nonlabored respirations    Cardiovascular: RRR, no murmurs, rubs, or gallops, palpable pedal pulses bilaterally   Gastrointestinal: Positive bowel sounds, soft, nontender, nondistended   Musculoskeletal: No bilateral ankle edema, no clubbing or cyanosis to extremities   Psychiatric: Appropriate affect, cooperative   Neurologic: Oriented x 3, strength symmetric in all extremities, Cranial Nerves grossly intact to confrontation, speech clear   Skin: No rashes     Pertinent  and/or Most Recent Results     LAB RESULTS:      Lab 01/03/24  0917 12/30/23  0408   WBC  --  10.10   HEMOGLOBIN  --  12.8   HEMATOCRIT  --  40.4   PLATELETS  --  294   NEUTROS ABS  --  8.00*   LYMPHS ABS  --  1.10   MONOS ABS  --  0.90   EOS ABS  --  0.10   MCV  --  92.5   PROTIME 11.5  --    APTT 27.6  --          Lab 12/30/23  0408   SODIUM 138   POTASSIUM 3.8   CHLORIDE 97*   CO2 32.0*   ANION GAP 9.0   BUN 12   CREATININE 0.44*   EGFR 105.5   GLUCOSE 89   CALCIUM 9.7             Lab 01/03/24  0917   PROTIME 11.5   INR 1.06                 Brief Urine Lab Results       None          Microbiology Results (last 10 days)       ** No results found for the last 240 hours. **            IR Kyphoplasty Lumbar    Result Date: 1/3/2024  Impression: 1. Successful L2 and L4 kyphoplasty as described above. Electronically Signed: Blair Murrieta MD  1/3/2024 3:56 PM EST  Workstation ID: [id number]    IR Kyphoplasty Additional Level    Result Date: 1/3/2024  Impression: 1. Successful L2 and L4 kyphoplasty as described above. Electronically Signed: Blair Murrieta MD  1/3/2024 3:56 PM EST  Workstation ID: [id number]    MRI Lumbar Spine Without Contrast    Result Date: 1/2/2024  Impression: Impression: 1. Acute/subacute 25% compression fracture at  L2. 2. Acute/subacute 50% compression fracture at L4. 3. No retropulsed fracture fragment or significant canal stenosis. 4. Chronic height loss at L3 related to remote fracture and/or Schmorl's node. Chronic mild compression fracture at L5. 5. Lumbar degenerative findings above. Electronically Signed: Ramsey Chen MD  1/2/2024 8:03 AM EST  Workstation ID: [id number]    CT Abdomen Pelvis Without Contrast    Result Date: 12/27/2023  Impression: Impression: No acute findings in the abdomen or pelvis. No acute osseous injury. Electronically Signed: Kasia Paul MD  12/27/2023 12:51 PM CST  Workstation ID: [id number]             Results for orders placed during the hospital encounter of 09/24/22    Adult Transthoracic Echo Complete W/ Cont if Necessary Per Protocol    Interpretation Summary  · Estimated left ventricular EF was in agreement with the calculated left ventricular EF. Left ventricular ejection fraction appears to be 56 - 60%. Left ventricular systolic function is normal.  · There is calcification of the aortic valve mainly affecting the non-coronary, left coronary and right coronary cusp(s).  · The study is technically difficult for diagnosis.      Labs Pending at Discharge:        Discharge Details        Discharge Medications        Continue These Medications        Instructions Start Date   furosemide 20 MG tablet  Commonly known as: LASIX   20 mg, Oral, Daily      guaiFENesin 600 MG 12 hr tablet  Commonly known as: MUCINEX   1,200 mg, Oral, Every 12 Hours Scheduled      HYDROcodone-acetaminophen  MG per tablet  Commonly known as: NORCO   1 tablet, Oral, 4 Times Daily      levothyroxine 25 MCG tablet  Commonly known as: SYNTHROID, LEVOTHROID   25 mcg, Oral, Every Early Morning      lubiprostone 24 MCG capsule  Commonly known as: AMITIZA   24 mcg, Oral, 2 Times Daily      OXcarbazepine 150 MG tablet  Commonly known as: TRILEPTAL   150 mg, Oral, 2 Times Daily      pantoprazole 40 MG EC tablet  Commonly  known as: PROTONIX   40 mg, Oral, Every Early Morning      potassium chloride 10 MEQ CR capsule  Commonly known as: MICRO-K   10 mEq, Oral, Daily      risperiDONE 0.25 MG tablet  Commonly known as: risperDAL   0.25 mg, Oral, Nightly      rosuvastatin 10 MG tablet  Commonly known as: CRESTOR   30 mg, Oral, Daily      sertraline 100 MG tablet  Commonly known as: ZOLOFT   200 mg, Oral, Daily      sodium chloride 1 g tablet   2 g, Oral, 3 Times Daily With Meals      traZODone 50 MG tablet  Commonly known as: DESYREL   25 mg, Oral, Nightly             Stop These Medications      methylPREDNISolone 4 MG tablet  Commonly known as: MEDROL              Allergies   Allergen Reactions    Contrast Dye (Echo Or Unknown Ct/Mr) Shortness Of Breath     Pt states she had trouble breathing when she had contrast in the past.          Discharge Disposition:   Skilled Nursing Facility (DC - External)    Discharge Condition: .cond    Diet:  Hospital:  Diet Order   Procedures    Diet: Regular/House Diet; Texture: Regular Texture (IDDSI 7); Fluid Consistency: Thin (IDDSI 0)         Discharge Activity:   Activity Instructions       Activity as Tolerated                CODE STATUS:  Code Status and Medical Interventions:   Ordered at: 12/27/23 1629     Code Status (Patient has no pulse and is not breathing):    CPR (Attempt to Resuscitate)     Medical Interventions (Patient has pulse or is breathing):    Full Support         No future appointments.    Additional Instructions for the Follow-ups that You Need to Schedule       Discharge Follow-up with PCP   As directed       Currently Documented PCP:    Wayne Saavedra MD    PCP Phone Number:    [phone]     Follow Up Details: 1 week                Time spent on Discharge including face to face service: Less than 30 minutes    Vicki Beard MD

## 2024-01-05 NOTE — DISCHARGE PLACEMENT REQUEST
"PRIOR AUTHORIZATION REQUEST FOR NON EMERGENT EMS TRANSPORTATION    Patient discharging from inpatient acute care hospital [facility] ([address] [place] [address]) via non emergent transportation to [facility], [address] [place] [address].    Ramila Mackay, EMS Coordinator  ([phone]         Jerrod Wasserman (68 y.o. Female)       Date of Birth   1955    Social Security Number       Address   [facility] [place] [address]    Home Phone   [phone]    MRN   [id number]       Denominational   [facility]    Marital Status                               Admission Date   12/27/23    Admission Type   Emergency    Admitting Provider   Sathish Seals MD    Attending Provider   Vicki Beard MD    Department, Room/Bed   [facility] OBSERVATION, 227/1       Discharge Date       Discharge Disposition   Skilled Nursing Facility (DC - External)    Discharge Destination                                 Attending Provider: Vicki Beard MD    Allergies: Contrast Dye (Echo Or Unknown Ct/mr)    Isolation: None   Infection: None   Code Status: CPR    Ht: 165.1 cm (65\")   Wt: 112 kg (246 lb 11.1 oz)    Admission Cmt: None   Principal Problem: Low back pain [M54.50]                   Active Insurance as of 12/27/2023       Primary Coverage       Payor Plan Insurance Group Employer/Plan Group    ANTHEM MEDICARE REPLACEMENT ANTHEM MEDICARE ADVANTAGE [id number]       Payor Plan Address Payor Plan Phone Number Payor Plan Fax Number Effective Dates    [address] [phone]  7/1/2019 - None Entered    [facility] [id number]         Subscriber Name Subscriber Birth Date Member ID       JERROD WASSERMAN 1955 [id number]                     Emergency Contacts        (Rel.) Home Phone Work Phone Mobile Phone    JERI JULIAN (Spouse) [phone] -- [phone]    Arsen Barbosa (Sister) -- -- [phone]               "   History & Physical        Blair Murrieta MD at 24 1426            [facility]   Interventional Radiology H&P    Patient Name: Morgan Quick  : 1955  MRN: [id number]  Primary Care Physician:  Wayne Saavedra MD  Referring Physician: No Known Provider  Date of admission: 2023    Subjective  Subjective     HPI:  Morgan Quick is a 68 y.o. female with l2 and l4 compression deformities.    Review of Systems:   Constitutional no fever,  no weight loss       Otolaryngeal no difficulty swallowing   Cardiovascular no chest pain   Pulmonary no cough, no sputum production   Gastrointestinal no constipation, no diarrhea                         Personal History       Past Medical/Surgical History:   Past Medical History:   Diagnosis Date    Bipolar 1 disorder     Breast cancer     Cancer     Disease of thyroid gland     High cholesterol     Hypertension      Past Surgical History:   Procedure Laterality Date    ABDOMINAL SURGERY      APPENDECTOMY      ENDOSCOPY N/A 2022    Procedure: ESOPHAGOGASTRODUODENOSCOPY with biopsy x 2 areas;  Surgeon: Deonte Wong MD;  Location: [facility] ENDOSCOPY;  Service: Gastroenterology;  Laterality: N/A;  post: gastritis, duodinitis, nodule,     HYSTERECTOMY      MASTECTOMY      L side       Social History:  reports that she has never smoked. She has never been exposed to tobacco smoke. She has never used smokeless tobacco. She reports that she does not drink alcohol and does not use drugs.    Medications:  Medications Prior to Admission   Medication Sig Dispense Refill Last Dose    furosemide (LASIX) 20 MG tablet Take 1 tablet by mouth Daily. 30 tablet 0     guaiFENesin (MUCINEX) 600 MG 12 hr tablet Take 2 tablets by mouth Every 12 (Twelve) Hours. 20 tablet 0     HYDROcodone-acetaminophen (NORCO)  MG per tablet Take 1 tablet by mouth 4 (Four) Times a Day.       levothyroxine (SYNTHROID, LEVOTHROID) 25 MCG tablet Take 1 tablet by mouth Every Morning.  30 tablet 0     lubiprostone (AMITIZA) 24 MCG capsule Take 1 capsule by mouth 2 (Two) Times a Day.       [] methylPREDNISolone (MEDROL) 4 MG tablet Take as directed       OXcarbazepine (TRILEPTAL) 150 MG tablet Take 1 tablet by mouth 2 (Two) Times a Day. 60 tablet 0     pantoprazole (PROTONIX) 40 MG EC tablet Take 1 tablet by mouth Every Morning. 30 tablet 0     potassium chloride (MICRO-K) 10 MEQ CR capsule Take 1 capsule by mouth Daily.       risperiDONE (risperDAL) 0.25 MG tablet Take 1 tablet by mouth Every Night. 30 tablet 0     rosuvastatin (CRESTOR) 10 MG tablet Take 3 tablets by mouth Daily.       sertraline (ZOLOFT) 100 MG tablet Take 2 tablets by mouth Daily. 30 tablet 0     sodium chloride 1 g tablet Take 2 tablets by mouth 3 (Three) Times a Day With Meals. 30 tablet 0     traZODone (DESYREL) 50 MG tablet Take 0.5 tablets by mouth Every Night.        Current medications:  acetaminophen, 650 mg, Oral, Q6H  bisacodyl, 10 mg, Rectal, Daily  cyclobenzaprine, 10 mg, Oral, TID  furosemide, 20 mg, Oral, Daily  guaiFENesin, 1,200 mg, Oral, Q12H  levothyroxine, 25 mcg, Oral, Q AM  lidocaine, 1 patch, Transdermal, Q24H  lubiprostone, 24 mcg, Oral, BID  Naloxegol Oxalate, 25 mg, Oral, QAM  OXcarbazepine, 150 mg, Oral, BID  pantoprazole, 40 mg, Oral, QAM  polyethylene glycol, 17 g, Oral, BID  potassium chloride, 10 mEq, Oral, Daily  predniSONE, 20 mg, Oral, Daily With Breakfast  risperiDONE, 0.25 mg, Oral, Nightly  rosuvastatin, 30 mg, Oral, Nightly  senna-docusate sodium, 2 tablet, Oral, BID  sertraline, 200 mg, Oral, Daily  sodium chloride, 10 mL, Intravenous, Q12H  sodium chloride, 2 g, Oral, TID With Meals  traZODone, 25 mg, Oral, Nightly      Current IV drips:       Allergies:  Allergies   Allergen Reactions    Contrast Dye (Echo Or Unknown Ct/Mr) Shortness Of Breath     Pt states she had trouble breathing when she had contrast in the past.        Objective   Objective     Vitals:   Temp:  [97.8 °F (36.6  "°C)-98.9 °F (37.2 °C)] 97.8 °F (36.6 °C)  Heart Rate:  [60-78] 65  Resp:  [16] 16  BP: (114-146)/(66-72) 146/72  Flow (L/min):  [3] 3      Physical Exam:   Constitutional: Awake, alert, No acute distress    Respiratory: Clear to auscultation bilaterally, nonlabored respirations    Cardiovascular: RRR, no murmurs, rubs, or gallops, palpable pedal pulses bilaterally   Gastrointestinal: Positive bowel sounds, soft, nontender, nondistended        ASA SCALE ASSESSMENT:  2-Mild to moderate systemic disease, medically well controlled, with no functional limitation    MALLAMPATI CLASSIFICATION:  2-Able to visualize the soft palate, fauces, uvula. The anterior & posterior tonsilar pillars are hidden by the tongue.       Result Review       Result Review:     No results found for: \"NA\"    No results found for: \"K\"    No results found for: \"CL\"    No results found for: \"PLASMABICARB\"    No results found for: \"BUN\"    No results found for: \"CREATININE\"    No results found for: \"CALCIUM\"        No components found for: \"GLUCOSE.*\"  Results from last 7 days   Lab Units 23  0408   WBC 10*3/mm3 10.10   HEMOGLOBIN g/dL 12.8   HEMATOCRIT % 40.4   PLATELETS 10*3/mm3 294      Results from last 7 days   Lab Units 24  0917   INR  1.06           Assessment / Plan     Assesment:   L2 and l4 compression deformities.      Plan:   L2 and l4 kyphoplasties.    The risks and benefits of the procedure were discussed with the patient.    Electronically signed by Blair Murrieta MD, 24, 2:26 PM EST.     Electronically signed by Blair Murrieta MD at 24 6817       Gloria Lacy PA-C at 23 1049          FEM Observation Unit H&P    Patient Name: Morgan Quick  : 1955  MRN: [id number]  Primary Care Physician: Wayne Saavedra MD  Date of admission: 2023     Patient Care Team:  Wayne Saavedra MD as PCP - General (Internal Medicine)          Subjective  History Present Illness     Chief Complaint: "   Chief Complaint   Patient presents with    Back Pain     Back pain    Back Pain  Pertinent negatives include no bladder incontinence, numbness or paresthesias.        68-year-old white female with history of hypertension, thyroid disorder, high cholesterol, chronic pain presents today by EMS from assisted living facility with reports of a fall and pain in her lower back.  She states she was trying to get up from her chair to her walker when she lost her balance and fell backwards and her buttocks landed against the chair.  She denies striking her head or having LOC.  She complains of pain in her lower back and tailbone area.  Pain is worse with certain movements.  She denies any numbness tingling or weakness in either lower extremity.  She denies any bowel or bladder dysfunction or saddle anesthesia.  No fever.  She denies any other injuries.       Observation 12/28/23  Pt concurs with er hpi. Pt states she has no paraesthesias, no loss of bladder or bowel control. Pt has seen ortho spine at [facility] and mri scheduled for chronic disc disease on Tuesday. Pt given pain medication and awaiting pt and ot eval. Pt fell from standing to chair on buttocks.       Review of Systems   Musculoskeletal:  Positive for back pain.   Genitourinary:  Negative for bladder incontinence.   Neurological:  Negative for focal weakness, numbness and paresthesias.       Personal History     Past Medical History:   Past Medical History:   Diagnosis Date    Bipolar 1 disorder     Breast cancer     Cancer     Disease of thyroid gland     High cholesterol     Hypertension        Surgical History:      Past Surgical History:   Procedure Laterality Date    ABDOMINAL SURGERY      APPENDECTOMY      ENDOSCOPY N/A 9/25/2022    Procedure: ESOPHAGOGASTRODUODENOSCOPY with biopsy x 2 areas;  Surgeon: Deonte Wong MD;  Location: [facility] ENDOSCOPY;  Service: Gastroenterology;  Laterality: N/A;  post: gastritis, duodinitis, nodule,     HYSTERECTOMY       MASTECTOMY      L side           Family History: family history includes Cancer in her mother. Otherwise pertinent FHx was reviewed and unremarkable.     Social History:  reports that she has never smoked. She has never been exposed to tobacco smoke. She has never used smokeless tobacco. She reports that she does not drink alcohol and does not use drugs.      Medications:  Prior to Admission medications    Medication Sig Start Date End Date Taking? Authorizing Provider   furosemide (LASIX) 20 MG tablet Take 1 tablet by mouth Daily. 10/11/22  Yes Ladonna Redd DO   guaiFENesin (MUCINEX) 600 MG 12 hr tablet Take 2 tablets by mouth Every 12 (Twelve) Hours. 10/10/22  Yes Ladonna Redd DO   HYDROcodone-acetaminophen (NORCO)  MG per tablet Take 1 tablet by mouth 4 (Four) Times a Day.   Yes Brennon Serrano MD   levothyroxine (SYNTHROID, LEVOTHROID) 25 MCG tablet Take 1 tablet by mouth Every Morning. 10/11/22  Yes Ladonna Redd DO   lubiprostone (AMITIZA) 24 MCG capsule Take 1 capsule by mouth 2 (Two) Times a Day.   Yes Brennon Serrano MD   methylPREDNISolone (MEDROL) 4 MG tablet Take as directed 12/25/23 12/30/23 Yes Brennon Serrano MD   OXcarbazepine (TRILEPTAL) 150 MG tablet Take 1 tablet by mouth 2 (Two) Times a Day. 10/10/22  Yes Ladonna Redd DO   pantoprazole (PROTONIX) 40 MG EC tablet Take 1 tablet by mouth Every Morning. 10/11/22  Yes Ladonna Redd DO   potassium chloride (MICRO-K) 10 MEQ CR capsule Take 1 capsule by mouth Daily.   Yes Brennon Serrano MD   risperiDONE (risperDAL) 0.25 MG tablet Take 1 tablet by mouth Every Night. 10/10/22  Yes Ladonna Redd DO   rosuvastatin (CRESTOR) 10 MG tablet Take 3 tablets by mouth Daily.   Yes Brennon Serrano MD   sertraline (ZOLOFT) 100 MG tablet Take 2 tablets by mouth Daily. 10/10/22  Yes Ladonna Redd DO   sodium chloride 1 g tablet Take 2 tablets by mouth 3 (Three) Times a Day With Meals. 10/10/22  Yes Ladonna Redd DO    traZODone (DESYREL) 50 MG tablet Take 0.5 tablets by mouth Every Night.   Yes Provider, Historical, MD       Allergies:    Allergies   Allergen Reactions    Contrast Dye (Echo Or Unknown Ct/Mr) Shortness Of Breath     Pt states she had trouble breathing when she had contrast in the past.        Objective  Objective     Vital Signs  Temp:  [97.9 °F (36.6 °C)-98.6 °F (37 °C)] 97.9 °F (36.6 °C)  Heart Rate:  [57-74] 62  Resp:  [16-20] 18  BP: (133-192)/(53-80) 152/66  SpO2:  [91 %-94 %] 92 %  on  Flow (L/min):  [2-3] 3;   Device (Oxygen Therapy): nasal cannula  Body mass index is 41.05 kg/m².    Physical Exam  Constitutional:       Appearance: She is obese.   Cardiovascular:      Rate and Rhythm: Normal rate and regular rhythm.      Pulses: Normal pulses.      Heart sounds: Normal heart sounds.   Pulmonary:      Effort: Pulmonary effort is normal.      Breath sounds: Normal breath sounds.   Musculoskeletal:      Comments: Pain with rom   Skin:     General: Skin is warm and dry.   Neurological:      General: No focal deficit present.      Mental Status: She is alert and oriented to person, place, and time.   Psychiatric:         Mood and Affect: Mood normal.         Behavior: Behavior normal.           Results Review:  I have personally reviewed most recent lab results and radiology images and interpretations and agree with findings, most notably: cbc, bmp, xr spine and ct abd.    Results from last 7 days   Lab Units 12/28/23  0359   WBC 10*3/mm3 8.20   HEMOGLOBIN g/dL 12.0   HEMATOCRIT % 37.1   PLATELETS 10*3/mm3 252     Results from last 7 days   Lab Units 12/28/23  0359   SODIUM mmol/L 141   POTASSIUM mmol/L 3.8   CHLORIDE mmol/L 99   CO2 mmol/L 32.0*   BUN mg/dL 10   CREATININE mg/dL 0.51*   GLUCOSE mg/dL 90   CALCIUM mg/dL 9.0     Estimated Creatinine Clearance: 131.7 mL/min (A) (by C-G formula based on SCr of 0.51 mg/dL (L)).  Brief Urine Lab Results       None            Microbiology Results (last 10 days)        ** No results found for the last 240 hours. **            ECG/EMG Results (most recent)       None                Results for orders placed during the hospital encounter of 09/24/22    Adult Transthoracic Echo Complete W/ Cont if Necessary Per Protocol    Interpretation Summary  · Estimated left ventricular EF was in agreement with the calculated left ventricular EF. Left ventricular ejection fraction appears to be 56 - 60%. Left ventricular systolic function is normal.  · There is calcification of the aortic valve mainly affecting the non-coronary, left coronary and right coronary cusp(s).  · The study is technically difficult for diagnosis.      CT Abdomen Pelvis Without Contrast    Result Date: 12/27/2023  Impression: No acute findings in the abdomen or pelvis. No acute osseous injury. Electronically Signed: Kasia Paul MD  12/27/2023 12:51 PM CST  Workstation ID: [id number]       Estimated Creatinine Clearance: 131.7 mL/min (A) (by C-G formula based on SCr of 0.51 mg/dL (L)).    Assessment & Plan  Assessment/Plan       Active Hospital Problems    Diagnosis  POA    **Low back pain [M54.50]  Yes      Resolved Hospital Problems   No resolved problems to display.       Low back pain s/p fall  - cbc and bmp are unremarkable  - xr spine lumbar 12/22/23 reviewed and showing mild degenerative disc disease   - ct abd reviewed and showing a Schmorl's node invagination along the superior endplate of L3 which appears unchanged. New Schmorl's node invagination is noted at L4 and L5. No evidence for acute osseous injury in the lumbar spine. The coccyx appears intact.   - pain medication  - pt and ot consult  - pt should need 30 days or less of skilled nursing       Hypothyroidism  - cont synthroid    Bipolar disorder  - cont trileptal, risperidone, zoloft    Hx of chf  - cont lasix and potassium replacement    HPL  - cont statin      VTE Prophylaxis -   Mechanical Order History:        Ordered        12/27/23 1650   Place Sequential Compression Device  Once            12/27/23 1838  Maintain Sequential Compression Device  Continuous                          Pharmalogical Order History:       None            CODE STATUS:    Code Status and Medical Interventions:   Ordered at: 12/27/23 1629     Code Status (Patient has no pulse and is not breathing):    CPR (Attempt to Resuscitate)     Medical Interventions (Patient has pulse or is breathing):    Full Support       This patient has been examined wearing personal protective equipment.     I discussed the patient's findings and my recommendations with patient and nursing staff.      Signature:Electronically signed by Gloria Lacy PA-C, 12/28/23, 10:49 AM EST.             Electronically signed by Gloria Lacy PA-C at 12/28/23 1352          Emergency Department Notes        Rufino Dumont LPN at 12/27/23 1725          Nursing report ED to floor  Morgan Quick  68 y.o.  female    HPI:   Chief Complaint   Patient presents with    Back Pain       Admitting doctor:   Sathish Seals MD    Admitting diagnosis:   The primary encounter diagnosis was Fall, initial encounter. Diagnoses of Contusion of coccyx, initial encounter and Acute midline low back pain without sciatica were also pertinent to this visit.    Code status:   Current Code Status       Date Active Code Status Order ID Comments User Context       12/27/2023 1629 CPR (Attempt to Resuscitate) [id number]  Gloria Lacy PA-C ED        Question Answer    Code Status (Patient has no pulse and is not breathing) CPR (Attempt to Resuscitate)    Medical Interventions (Patient has pulse or is breathing) Full Support                    Allergies:   Contrast dye (echo or unknown ct/mr)    Isolation:  No active isolations     Fall Risk:  Fall Risk Assessment was completed, and patient is at high risk for falls.   Predictive Model Details         11 (Low) Factor Value    Calculated 12/27/2023 17:25 Age 68    Risk of Fall Model  "Musculoskeletal Assessment WDL     Active Peripheral IV Present     Imaging order in this encounter Present     Respiratory Rate 18     Skin Assessment WDL     Financial Class Other     Magnesium not on file     Number of Distinct Medication Classes administered 4     Diastolic BP 61     Drug Use No     Yasmany Scale not on file     Number of administrations of Analgesic Narcotics 2     Peripheral Vascular Assessment WDL     Calcium not on file     Gastrointestinal Assessment WDL     Albumin not on file     Cardiac Assessment WDL     Days after Admission 0.221     Total Bilirubin not on file     Chloride not on file     Potassium not on file     Creatinine not on file     ALT not on file         Weight:       12/27/23  1203   Weight: 101 kg (223 lb)       Intake and Output  No intake or output data in the 24 hours ending 12/27/23 1725    Diet:        Most recent vitals:   Vitals:    12/27/23 1203 12/27/23 1657   BP: 180/74 (!) 192/61   BP Location: Left arm Left arm   Patient Position: Lying Sitting   Pulse: 74 62   Resp: 17 18   Temp: 98 °F (36.7 °C)    TempSrc: Oral    SpO2: 94% 92%   Weight: 101 kg (223 lb)    Height: 165.1 cm (65\")        Active LDAs/IV Access:   Lines, Drains & Airways       Active LDAs       Name Placement date Placement time Site Days    Peripheral IV 12/27/23 1216 Left Antecubital 12/27/23  1216  Antecubital  less than 1                    Skin Condition:   Skin Assessments (last day)       None             Labs (abnormal labs have a star):   Labs Reviewed   RAINBOW DRAW    Narrative:     The following orders were created for panel order [place] Draw.  Procedure                               Abnormality         Status                     ---------                               -----------         ------                     Green Top (Gel)[[id number]]                                  Final result               Lavender Top[[id number]]                                     Final result             "   Gold Top - [facility][[id number]]                                   Final result               Light Blue Top[[id number]]                                   Final result                 Please view results for these tests on the individual orders.   GREEN TOP   LAVENDER TOP   GOLD [facility] [place] - [facility]   LIGHT BLUE TOP       LOC: Person, Place, Time, and Situation    Telemetry:  Observation Unit    Cardiac Monitoring Ordered: yes    EKG:   No orders to display       Medications Given in the ED:   Medications   sodium chloride 0.9 % flush 10 mL (10 mL Intravenous Given 12/27/23 1254)   HYDROcodone-acetaminophen (NORCO) 7.5-325 MG per tablet 1 tablet (has no administration in time range)   morphine injection 4 mg (has no administration in time range)   predniSONE (DELTASONE) tablet 40 mg (has no administration in time range)   lidocaine (LIDODERM) 5 % 1 patch (1 patch Transdermal Medication Applied 12/27/23 1649)   ondansetron (ZOFRAN) injection 4 mg (4 mg Intravenous Given 12/27/23 1322)   morphine injection 4 mg (4 mg Intravenous Given 12/27/23 1322)   morphine injection 4 mg (4 mg Intravenous Given 12/27/23 1649)       Imaging results:  CT Abdomen Pelvis Without Contrast    Result Date: 12/27/2023  Impression: No acute findings in the abdomen or pelvis. No acute osseous injury. Electronically Signed: Kasia Paul MD  12/27/2023 12:51 PM [facility]  Workstation ID: [id number]     Social issues:   Social History     Socioeconomic History    Marital status:    Tobacco Use    Smoking status: Never    Smokeless tobacco: Never   Substance and Sexual Activity    Alcohol use: No    Drug use: No    Sexual activity: Defer       NIH Stroke Scale:  Interval: (not recorded)  1a. Level of Consciousness: (not recorded)  1b. LOC Questions: (not recorded)  1c. LOC Commands: (not recorded)  2. Best Gaze: (not recorded)  3. Visual: (not recorded)  4. Facial Palsy: (not recorded)  5a. Motor Arm, Left: (not recorded)  5b. Motor Arm, Right: (not  recorded)  6a. Motor Leg, Left: (not recorded)  6b. Motor Leg, Right: (not recorded)  7. Limb Ataxia: (not recorded)  8. Sensory: (not recorded)  9. Best Language: (not recorded)  10. Dysarthria: (not recorded)  11. Extinction and Inattention (formerly Neglect): (not recorded)    Total (NIH Stroke Scale): (not recorded)     Additional notable assessment information:     Nursing report ED to floor:  Report given to RN taken room 227    Rufino Dumont LPN   12/27/23 17:25 EST Nursing report ED to floor  Morgan Quick  68 y.o.  female    HPI:   Chief Complaint   Patient presents with    Back Pain       Admitting doctor:   Sathish Seals MD    Admitting diagnosis:   The primary encounter diagnosis was Fall, initial encounter. Diagnoses of Contusion of coccyx, initial encounter and Acute midline low back pain without sciatica were also pertinent to this visit.    Code status:   Current Code Status       Date Active Code Status Order ID Comments User Context       12/27/2023 1629 CPR (Attempt to Resuscitate) [id number]  Gloria Lacy PA-C ED        Question Answer    Code Status (Patient has no pulse and is not breathing) CPR (Attempt to Resuscitate)    Medical Interventions (Patient has pulse or is breathing) Full Support                    Allergies:   Contrast dye (echo or unknown ct/mr)    Isolation:  No active isolations     Fall Risk:  Fall Risk Assessment was completed, and patient is at high risk for falls.   Predictive Model Details         11 (Low) Factor Value    Calculated 12/27/2023 17:25 Age 68    Risk of Fall Model Musculoskeletal Assessment WDL     Active Peripheral IV Present     Imaging order in this encounter Present     Respiratory Rate 18     Skin Assessment WDL     Financial Class Other     Magnesium not on file     Number of Distinct Medication Classes administered 4     Diastolic BP 61     Drug Use No     Yasmany Scale not on file     Number of administrations of Analgesic Narcotics 2      "Peripheral Vascular Assessment WDL     Calcium not on file     Gastrointestinal Assessment WDL     Albumin not on file     Cardiac Assessment WDL     Days after Admission 0.221     Total Bilirubin not on file     Chloride not on file     Potassium not on file     Creatinine not on file     ALT not on file         Weight:       12/27/23  1203   Weight: 101 kg (223 lb)       Intake and Output  No intake or output data in the 24 hours ending 12/27/23 1725    Diet:        Most recent vitals:   Vitals:    12/27/23 1203 12/27/23 1657   BP: 180/74 (!) 192/61   BP Location: Left arm Left arm   Patient Position: Lying Sitting   Pulse: 74 62   Resp: 17 18   Temp: 98 °F (36.7 °C)    TempSrc: Oral    SpO2: 94% 92%   Weight: 101 kg (223 lb)    Height: 165.1 cm (65\")        Active LDAs/IV Access:   Lines, Drains & Airways       Active LDAs       Name Placement date Placement time Site Days    Peripheral IV 12/27/23 1216 Left Antecubital 12/27/23  1216  Antecubital  less than 1                    Skin Condition:   Skin Assessments (last day)       None             Labs (abnormal labs have a star):   Labs Reviewed   RAINBOW DRAW    Narrative:     The following orders were created for panel order [place] Draw.  Procedure                               Abnormality         Status                     ---------                               -----------         ------                     Green Top (Gel)[[id number]]                                  Final result               Lavender Top[[id number]]                                     Final result               Gold Top - SST[[id number]]                                   Final result               Light Blue Top[[id number]]                                   Final result                 Please view results for these tests on the individual orders.   GREEN TOP   LAVENDER TOP   GOLD TOP - SST   LIGHT BLUE TOP       LOC: Person, Place, Time, and Situation    Telemetry:  Observation Unit    Cardiac " Monitoring Ordered: yes    EKG:   No orders to display       Medications Given in the ED:   Medications   sodium chloride 0.9 % flush 10 mL (10 mL Intravenous Given 12/27/23 1254)   HYDROcodone-acetaminophen (NORCO) 7.5-325 MG per tablet 1 tablet (has no administration in time range)   morphine injection 4 mg (has no administration in time range)   predniSONE (DELTASONE) tablet 40 mg (has no administration in time range)   lidocaine (LIDODERM) 5 % 1 patch (1 patch Transdermal Medication Applied 12/27/23 1649)   ondansetron (ZOFRAN) injection 4 mg (4 mg Intravenous Given 12/27/23 1322)   morphine injection 4 mg (4 mg Intravenous Given 12/27/23 1322)   morphine injection 4 mg (4 mg Intravenous Given 12/27/23 1649)       Imaging results:  CT Abdomen Pelvis Without Contrast    Result Date: 12/27/2023  Impression: No acute findings in the abdomen or pelvis. No acute osseous injury. Electronically Signed: Kasia Paul MD  12/27/2023 12:51 PM [facility]  Workstation ID: [id number]     Social issues:   Social History     Socioeconomic History    Marital status:    Tobacco Use    Smoking status: Never    Smokeless tobacco: Never   Substance and Sexual Activity    Alcohol use: No    Drug use: No    Sexual activity: Defer       NIH Stroke Scale:  Interval: (not recorded)  1a. Level of Consciousness: (not recorded)  1b. LOC Questions: (not recorded)  1c. LOC Commands: (not recorded)  2. Best Gaze: (not recorded)  3. Visual: (not recorded)  4. Facial Palsy: (not recorded)  5a. Motor Arm, Left: (not recorded)  5b. Motor Arm, Right: (not recorded)  6a. Motor Leg, Left: (not recorded)  6b. Motor Leg, Right: (not recorded)  7. Limb Ataxia: (not recorded)  8. Sensory: (not recorded)  9. Best Language: (not recorded)  10. Dysarthria: (not recorded)  11. Extinction and Inattention (formerly Neglect): (not recorded)    Total (NIH Stroke Scale): (not recorded)     Additional notable assessment information:     Nursing report ED to  floor:  Report given to RN taken room 227    Rufino Dumont LPN   12/27/23 17:25 EST     Electronically signed by Rufino Dumont LPN at 12/27/23 1725       Jenny Dill APRN at 12/27/23 1401          Subjective   History of Present Illness  Patient is a 68-year-old white female with history of hypertension, thyroid disorder, high cholesterol, chronic pain presents today by EMS from assisted living facility with reports of a fall and pain in her lower back.  She states she was trying to get up from her chair to her walker when she lost her balance and fell backwards and her buttocks landed against the chair.  She denies striking her head or having LOC.  She complains of pain in her lower back and tailbone area.  Pain is worse with certain movements.  She denies any numbness tingling or weakness in either lower extremity.  She denies any bowel or bladder dysfunction or saddle anesthesia.  No fever.  She denies any other injuries.      Review of Systems   Constitutional:  Negative for fever.   Genitourinary:  Negative for difficulty urinating.   Musculoskeletal:  Positive for back pain. Negative for neck pain.   Neurological:  Negative for weakness, numbness and headaches.       Past Medical History:   Diagnosis Date    Bipolar 1 disorder     Breast cancer     Cancer     Disease of thyroid gland     High cholesterol     Hypertension        Allergies   Allergen Reactions    Contrast Dye (Echo Or Unknown Ct/Mr) Shortness Of Breath     Pt states she had trouble breathing when she had contrast in the past.        Past Surgical History:   Procedure Laterality Date    ABDOMINAL SURGERY      APPENDECTOMY      ENDOSCOPY N/A 9/25/2022    Procedure: ESOPHAGOGASTRODUODENOSCOPY with biopsy x 2 areas;  Surgeon: Deonte Wong MD;  Location: [facility] ENDOSCOPY;  Service: Gastroenterology;  Laterality: N/A;  post: gastritis, duodinitis, nodule,     HYSTERECTOMY      MASTECTOMY      L side       Family History   Problem Relation  Age of Onset    Cancer Mother        Social History     Socioeconomic History    Marital status:    Tobacco Use    Smoking status: Never    Smokeless tobacco: Never   Substance and Sexual Activity    Alcohol use: No    Drug use: No    Sexual activity: Defer           Objective   Physical Exam  Vital signs and triage nurse note reviewed.  Constitutional: Awake, alert; well-developed and well-nourished. No acute distress is noted.  Obese.  HEENT: Normocephalic, atraumatic; pupils are PERRL with intact EOM; oropharynx is pink and moist without exudate or erythema.  No drooling or pooling of oral secretions.  Neck: Supple, full range of motion without pain; no cervical lymphadenopathy. Normal phonation.  Cardiovascular: Regular rate and rhythm, normal S1-S2.  No murmur noted.  Pulmonary: Respiratory effort regular nonlabored, breath sounds clear to auscultation all fields.  Abdomen: Soft, nontender, nondistended with normoactive bowel sounds; no rebound or guarding.  Musculoskeletal: Independent range of motion of all extremities with no palpable tenderness or edema.  There is tenderness to the midline lumbar spine and sacrum without crepitus or step-off noted.  Good range of motion of both lower extremities.  There is good sensation of both lower extremities.  There is no overlying erythema or ecchymosis.  Neuro: Alert oriented x3, speech is clear and appropriate, GCS 15.    Skin: Flesh tone, warm, dry, intact; no erythematous or petechial rash or lesion.    Procedures          ED Course      Labs Reviewed   RAINBOW DRAW    Narrative:     The following orders were created for panel order [place] Draw.  Procedure                               Abnormality         Status                     ---------                               -----------         ------                     Green Top (Gel)[[id number]]                                  Final result               Lavender Top[[id number]]                                      Final result               Gold Top - [facility][[id number]]                                   Final result               Light Blue Top[[id number]]                                   Final result                 Please view results for these tests on the individual orders.   GREEN TOP   LAVENDER TOP   GOLD TOP - SST   LIGHT BLUE TOP     CT Abdomen Pelvis Without Contrast    Result Date: 12/27/2023  Impression: No acute findings in the abdomen or pelvis. No acute osseous injury. Electronically Signed: Kasia Paul MD  12/27/2023 12:51 PM CST  Workstation ID: [id number]   Medications   sodium chloride 0.9 % flush 10 mL (10 mL Intravenous Given 12/27/23 1254)   ondansetron (ZOFRAN) injection 4 mg (4 mg Intravenous Given 12/27/23 1322)   morphine injection 4 mg (4 mg Intravenous Given 12/27/23 1322)                                            Medical Decision Making  Patient presents today with the above complaint.    She had the above exam evaluation.  IV was established.  CT was obtained.  She was given morphine for pain.    Workup: CT shows no acute findings in the abdomen or pelvis.  No acute osseous injury.    PT was consulted but patient stated she was in too much pain to allow PT to evaluate her.  Patient was given an additional dose of morphine but states that she still having too much pain to get up.  She is moving both lower extremities without difficulty.  She remains neurovascularly intact.    Findings were discussed with her.  She will be placed in observation unit for pain control and PT consultation.    Problems Addressed:  Acute midline low back pain without sciatica: complicated acute illness or injury  Contusion of coccyx, initial encounter: complicated acute illness or injury  Fall, initial encounter: complicated acute illness or injury    Amount and/or Complexity of Data Reviewed  Radiology: ordered.    Risk  Prescription drug management.  Decision regarding hospitalization.        Final diagnoses:    Fall, initial encounter   Contusion of coccyx, initial encounter   Acute midline low back pain without sciatica       ED Disposition  ED Disposition       ED Disposition   Decision to Admit    Condition   --    Comment   --               No follow-up provider specified.       Medication List      No changes were made to your prescriptions during this visit.            Jenny Dill APRN  23 1558       Jenny Dill APRN  23 1620      Electronically signed by Jenny Dill APRN at 23 1620       Operative/Procedure Notes (all)    No notes of this type exist for this encounter.          Physician Progress Notes (last 48 hours)        Vicki Beard MD at 24 1610            Enter Query Response Below      Query Response: CHF R/O   Normal echo EF and diastolic              If applicable, please update the problem list.     Patient: Morgan Quick        : 1955  Account: [id number]           Admit Date:         How to Respond to this query:       a. Click New Note     b. Answer query within the yellow box.                c. Update the Problem List, if applicable.      If you have any questions about this query contact me at: [email or web address]    Dr. Beard,     Patient admitted with compression fracture L2, L4. History of CHF noted in progress notes. Treatment includes PO Lasix and hydralazine.  ECHO 2022 EF 56-60%.     Please clarify the type of heart failure treated/monitored:  Chronic diastolic heart failure  Other- specify_________  Unable to determine    By submitting this query, we are merely seeking further clarification of documentation to accurately reflect all conditions that you are monitoring, evaluating, treating or that extend the hospitalization or utilize additional resources of care. Please utilize your independent clinical judgment when addressing the question(s) above.     This query and your response, once completed, will be entered into the legal  medical record.    Sincerely,  Rosana Yañez RN BSN  Clinical Documentation Integrity Program     Electronically signed by Vicki Beard MD at 24 1610       Vicki Beard MD at 24 1225           [facility]     Progress Note    Patient Name: Morgan Quick  : 1955  MRN: [id number]  Primary Care Physician:  Wayne Saavedra MD  Date of admission: 2023  Service date and time: 24 12:25 EST  Subjective   Subjective     Chief Complaint: Back pain    HPI:    Patient laying on the bed comfortably.    Patient stated still have back pain however improved after kyphoplasty.    S/P catheter plasty yesterday.    Denies chest pain, shortness of breath, nausea and vomiting.    Disposition: Awaiting placement        Objective   Objective     Vitals:   Temp:  [97.4 °F (36.3 °C)-98.5 °F (36.9 °C)] 97.9 °F (36.6 °C)  Heart Rate:  [69-94] 83  Resp:  [8-22] 17  BP: (119-206)/() 138/70  Flow (L/min):  [3] 3  Physical Exam    Constitutional: Awake, alert   Respiratory: Clear to auscultation bilaterally, nonlabored respirations    Cardiovascular: RRR, no murmurs, rubs, or gallops, palpable pedal pulses bilaterally   Gastrointestinal: Positive bowel sounds, soft, nontender, nondistended   Musculoskeletal: No bilateral ankle edema, no clubbing or cyanosis to extremities   Psychiatric: Appropriate affect, cooperative   Neurologic: Oriented x 3, strength symmetric in all extremities, Cranial Nerves grossly intact to confrontation, speech clear   Skin: No rashes     Result Review    Result Review:  I have personally reviewed the results from the time of this admission to 2024 12:25 EST and agree with these findings:  [x]  Laboratory list / accordion  []  Microbiology  [x]  Radiology  []  EKG/Telemetry   []  Cardiology/Vascular   []  Pathology  []  Old records  []  Other:  Most notable findings include:   MRI lumbar  Impression:  1. Acute/subacute 25% compression fracture at L2.  2.  Acute/subacute 50% compression fracture at L4.  3. No retropulsed fracture fragment or significant canal stenosis.  4. Chronic height loss at L3 related to remote fracture and/or Schmorl's node. Chronic mild compression fracture at L5.  5. Lumbar degenerative findings above.    Assessment & Plan   Assessment / Plan       Active Hospital Problems:  Active Hospital Problems    Diagnosis     **Low back pain     Compression fracture of L2 lumbar vertebra, closed, initial encounter     Compression fracture of L4 lumbar vertebra, closed, initial encounter     Back pain     Bipolar 1 disorder     High cholesterol     Essential hypertension      Plan:    -Compression fracture of L2, L4 :   - S/P kyphoplasty on 01/03  -Resume home chronic medication.    DVT prophylaxis:  Mechanical DVT prophylaxis orders are present.    CODE STATUS:   Code Status (Patient has no pulse and is not breathing): CPR (Attempt to Resuscitate)  Medical Interventions (Patient has pulse or is breathing): Full Support    Disposition: Awaiting placement    Vicki Beard MD     Electronically signed by Vicki Beard MD at 01/04/24 1226       Consult Notes (last 48 hours)  Notes from 01/03/24 1038 through 01/05/24 1038   No notes of this type exist for this encounter.          Nutrition Notes (most recent note)        Jeannette Jorge RD at 01/04/24 1520          Nutrition Services    Patient Name: Morgan Quick  YOB: 1955  MRN: [id number]  Admission date: 12/27/2023    PPE Documentation        PPE Worn By Provider Did not enter room this encounter   PPE Worn By Patient  N/A     NUTRITION SCREENING      Encounter Information: Pt being assessed for LOS x 8 days. Pt admitted to [facility] with low back pain. Pt s/p kyphoplasty.       PO Diet: Diet: Regular/House Diet; Texture: Regular Texture (IDDSI 7); Fluid Consistency: Thin (IDDSI 0)   PO Supplements: -   PO Intake:  50-75%       Labs (reviewed below):         GI Function:  + BM on 1/2        Skin: Intact        Weight Hx Review: : 246# - scale wt taken on admission  Current wt stable with range of wts recorded 2022       Nutrition Intervention: Continue current diet and encourage good PO intake.       Results from last 7 days   Lab Units 23  0408 23  0410   SODIUM mmol/L 138 136   POTASSIUM mmol/L 3.8 4.0   CHLORIDE mmol/L 97* 97*   CO2 mmol/L 32.0* 32.0*   BUN mg/dL 12 9   CREATININE mg/dL 0.44* 0.42*   CALCIUM mg/dL 9.7 9.5   GLUCOSE mg/dL 89 120*     Results from last 7 days   Lab Units 23  0408   HEMOGLOBIN g/dL 12.8   HEMATOCRIT % 40.4     COVID19   Date Value Ref Range Status   2022 Not Detected Not Detected - Ref. Range Final     Lab Results   Component Value Date    HGBA1C 6.2 (H) 2022       RD to follow up per protocol.    Electronically signed by:  Jeannette Jorge RD  24 15:20 EST      Electronically signed by Jeannette Jorge RD at 24 1523          Physical Therapy Notes (most recent note)        Yosef Hicks, PT at 24 1210  Version 1 of 1         Patient Name: Morgan Quick  : 1955    MRN: [id number]                              Today's Date: 2024       Admit Date: 2023    Visit Dx:     ICD-10-CM ICD-9-CM   1. Fall, initial encounter  W19.XXXA E888.9   2. Contusion of coccyx, initial encounter  S30.0XXA 922.32   3. Acute midline low back pain without sciatica  M54.50 724.2   4. Compression fracture of L4 lumbar vertebra, closed, initial encounter  S32.040A 805.4     Patient Active Problem List   Diagnosis    Morbidly obese    Essential hypertension    Malignant neoplasm of overlapping sites of left breast in female, estrogen receptor positive    Primary cancer of left female breast    Abdominal pain, unspecified abdominal location    Bipolar 1 disorder    High cholesterol    Anorexia    Nausea    Low back pain    Back pain    Compression fracture of L2 lumbar vertebra, closed, initial encounter     Compression fracture of L4 lumbar vertebra, closed, initial encounter     Past Medical History:   Diagnosis Date    Bipolar 1 disorder     Breast cancer     Cancer     Disease of thyroid gland     High cholesterol     Hypertension      Past Surgical History:   Procedure Laterality Date    ABDOMINAL SURGERY      APPENDECTOMY      ENDOSCOPY N/A 9/25/2022    Procedure: ESOPHAGOGASTRODUODENOSCOPY with biopsy x 2 areas;  Surgeon: Deonte Wong MD;  Location: [facility] ENDOSCOPY;  Service: Gastroenterology;  Laterality: N/A;  post: gastritis, duodinitis, nodule,     HYSTERECTOMY      MASTECTOMY      L side      General Information       Row Name 01/04/24 1152          Physical Therapy Time and Intention    Document Type re-evaluation  -AM     Mode of Treatment physical therapy  -AM       Row Name 01/04/24 1152          General Information    Patient Profile Reviewed yes  -AM     Existing Precautions/Restrictions fall;spinal;other (see comments)  3L O2  -AM     Barriers to Rehab medically complex;previous functional deficit;ineffective coping  -AM       Row Name 01/04/24 1152          Cognition    Orientation Status (Cognition) oriented x 4  -AM       Row Name 01/04/24 1152          Safety Issues, Functional Mobility    Impairments Affecting Function (Mobility) balance;endurance/activity tolerance;pain;postural/trunk control;strength  -AM     Comment, Safety Issues/Impairments (Mobility) gait belt utilized  -AM               User Key  (r) = Recorded By, (t) = Taken By, (c) = Cosigned By      Initials Name Provider Type    AM Yosef Hicks, PT Physical Therapist                   Mobility       Row Name 01/04/24 1154          Bed Mobility    Bed Mobility supine-sit;sit-supine  -AM     Supine-Sit [place] (Bed Mobility) maximum assist (25% patient effort);2 person assist  -AM     Sit-Supine [place] (Bed Mobility) moderate assist (50% patient effort);maximum assist (25% patient effort);2 person assist  -AM      Assistive Device (Bed Mobility) draw sheet  -AM     Comment, (Bed Mobility) Log roll technique utilized  -AM       Row Name 01/04/24 1154          Sit-Stand Transfer    Sit-Stand [place] (Transfers) minimum assist (75% patient effort);2 person assist  -AM     Assistive Device (Sit-Stand Transfers) walker, front-wheeled  -AM       Row Name 01/04/24 1154          Gait/Stairs (Locomotion)    [place] Level (Gait) minimum assist (75% patient effort);2 person assist  -AM     Assistive Device (Gait) walker, front-wheeled  -AM     Distance in Feet (Gait) 10 side steps to L  -AM     Deviations/Abnormal Patterns (Gait) base of support, narrow;festinating/shuffling;gait speed decreased  -AM     Left Sided Gait Deviations forward flexed posture  -AM     Comment, (Gait/Stairs) decreased strength L LE lead to decreased ability to advance L LE with steps  -AM               User Key  (r) = Recorded By, (t) = Taken By, (c) = Cosigned By      Initials Name Provider Type    AM Yosef Hicks PT Physical Therapist                   Obj/Interventions       Row Name 01/04/24 1155          Range of Motion Comprehensive    General Range of Motion bilateral lower extremity ROM WFL  -AM     Comment, General Range of Motion Defer BUE ROM to OT.  -AM       Row Name 01/04/24 1155          Strength Comprehensive (MMT)    Comment, General Manual Muscle Testing (MMT) Assessment Defer BUE to OT.  LLE: 3+ L hip  4-/5 knee  4/5 ankle.  RLE: 4--/5 throughout  -AM       Row Name 01/04/24 1155          Motor Skills    Motor Skills functional endurance  -AM     Functional Endurance poor  -AM       Row Name 01/04/24 1155          Balance    Balance Assessment sitting static balance;sitting dynamic balance;sit to stand dynamic balance;standing static balance;standing dynamic balance  -AM     Static Sitting Balance standby assist  -AM     Dynamic Sitting Balance contact guard  -AM     Position, Sitting Balance unsupported;sitting edge of bed  -AM      Sit to Stand Dynamic Balance minimal assist;2-person assist  -AM     Static Standing Balance contact guard  -AM     Dynamic Standing Balance minimal assist;2-person assist  -AM     Position/Device Used, Standing Balance supported;walker, front-wheeled  -AM       Row Name 01/04/24 1155          Sensory Assessment (Somatosensory)    Sensory Assessment (Somatosensory) sensation intact  -AM               User Key  (r) = Recorded By, (t) = Taken By, (c) = Cosigned By      Initials Name Provider Type    AM Yosef Hicks, PT Physical Therapist                   Goals/Plan       Row Name 01/04/24 1207          Bed Mobility Goal 1 (PT)    Progress/Outcomes (Bed Mobility Goal 1, PT) goal ongoing  -AM       Row Name 01/04/24 1207          Transfer Goal 1 (PT)    Progress/Outcome (Transfer Goal 1, PT) goal ongoing  -AM       Row Name 01/04/24 1207          Gait Training Goal 1 (PT)    Progress/Outcome (Gait Training Goal 1, PT) goal ongoing  -AM       Row Name 01/04/24 1207          Therapy Assessment/Plan (PT)    Planned Therapy Interventions (PT) balance training;bed mobility training;gait training;strengthening;patient/family education;transfer training;neuromuscular re-education  -AM               User Key  (r) = Recorded By, (t) = Taken By, (c) = Cosigned By      Initials Name Provider Type    AM Yosef Hicks, PT Physical Therapist                   Clinical Impression       Row Name 01/04/24 1159          Pain    Pretreatment Pain Rating 7/10  -AM     Posttreatment Pain Rating 6/10  -AM     Pain Location lower  -AM     Pain Location - back  -AM     Pre/Posttreatment Pain Comment Nsg gave pain medication prior to session  -AM     Pain Intervention(s) Repositioned;Emotional support  -AM       Row Name 01/04/24 1159          Plan of Care Review    Plan of Care Reviewed With patient  -AM     Outcome Evaluation Re-eval completed this date secondary to pt now s/p kyphoplasty L2 & L4 peformed on 1/3.  Pt completely flat  in bed, nsg reports pt has been in this position x 3 days.  Pt on 3L O2, telemetry and Purewick this date.  Pt required max verbal/tactile cues for logroll technqiue for bed mobility.  Max A x 2 for supine to sit and Mod A of 1 for trunk with Max A of 2nd person for BLEs for sit to supine.  Pt resistant to movement and tenses with tactile touch.  Min A x 2 for sit to stand with RW and Min A x 2 for 10 lateral steps to L side with increased time and effort.  Decreased ability to advance LLE with steps secondary to LLE weakness.  Pt with c/o dizziness throughout session and required max verbal cues to keep eyes open.  BP sitting EOB after supine to sit: 145/72.  BP sitting EOB at end of tx:  127/74.  Pt reports decreased pain in back after kyphoplasty sx.  Left pt supine in bed with HOB elevated to 60 degrees with max encouragement to keep HOB elevated throughout the days.  Recommend SNF  -AM       Row Name 01/04/24 1159          Therapy Assessment/Plan (PT)    Patient/Family Therapy Goals Statement (PT) To get back  -AM     Rehab Potential (PT) good, to achieve stated therapy goals  -AM     Criteria for Skilled Interventions Met (PT) yes  -AM     Therapy Frequency (PT) 5 times/wk  -AM     Predicted Duration of Therapy Intervention (PT) until d/c  -AM       Row Name 01/04/24 1159          Vital Signs    Pre Systolic BP Rehab 145  -AM     Pre Treatment Diastolic BP 71  -AM     Intra Systolic BP Rehab 127  -AM     Intra Treatment Diastolic BP 74  -AM     O2 Delivery Pre Treatment nasal cannula  3L O2  -AM     O2 Delivery Intra Treatment nasal cannula  -AM     O2 Delivery Post Treatment nasal cannula  -AM     Pre Patient Position Supine  -AM     Intra Patient Position Standing  -AM     Post Patient Position Supine  -AM       Row Name 01/04/24 1159          Positioning and Restraints    Pre-Treatment Position in bed  -AM     Post Treatment Position bed  -AM     In Bed notified nsg;supine;call light within reach;encouraged  to call for assist;exit alarm on  -AM               User Key  (r) = Recorded By, (t) = Taken By, (c) = Cosigned By      Initials Name Provider Type    Yosef Mcclelland, PT Physical Therapist                   Outcome Measures       Row Name 01/04/24 1208          How much help from another person do you currently need...    Turning from your back to your side while in flat bed without using bedrails? 2  -AM     Moving from lying on back to sitting on the side of a flat bed without bedrails? 2  -AM     Moving to and from a bed to a chair (including a wheelchair)? 2  -AM     Climbing 3-5 steps with a railing? 1  -AM     To walk in hospital room? 2  -AM       Row Name 01/04/24 1208 01/04/24 1112       Functional Assessment    Outcome Measure Options AM-PAC 6 Clicks Basic Mobility (PT)  -AM AM-PAC 6 Clicks Daily Activity (OT)  -LS              User Key  (r) = Recorded By, (t) = Taken By, (c) = Cosigned By      Initials Name Provider Type    AM Yosef Hicks, PT Physical Therapist    Butch Orourke OT Occupational Therapist                                 Physical Therapy Education       Title: PT OT SLP Therapies (Done)       Topic: Physical Therapy (Done)       Point: Mobility training (Done)       Learning Progress Summary             Patient Acceptance, E, VU by  at 12/31/2023 1114    Acceptance, E, VU by OD at 12/27/2023 1619                         Point: Home exercise program (Done)       Learning Progress Summary             Patient Acceptance, E, VU by  at 12/31/2023 1114    Acceptance, E, VU by OD at 12/27/2023 1619                         Point: Body mechanics (Done)       Learning Progress Summary             Patient Acceptance, E, VU by  at 12/31/2023 1114    Acceptance, E, VU by OD at 12/27/2023 1619                         Point: Precautions (Done)       Learning Progress Summary             Patient Acceptance, E, VU by  at 12/31/2023 1114    Acceptance, E, VU by OD at 12/27/2023 1619                                          User Key       Initials Effective Dates Name Provider Type Discipline    OD 05/11/23 -  Syl Grigsby, PT Physical Therapist PT     07/05/23 -  Neetu Franco, RN Registered Nurse Nurse                  PT Recommendation and Plan  Planned Therapy Interventions (PT): balance training, bed mobility training, gait training, strengthening, patient/family education, transfer training, neuromuscular re-education  Plan of Care Reviewed With: patient  Outcome Evaluation: Re-eval completed this date secondary to pt now s/p kyphoplasty L2 & L4 peformed on 1/3.  Pt completely flat in bed, nsg reports pt has been in this position x 3 days.  Pt on 3L O2, telemetry and Purewick this date.  Pt required max verbal/tactile cues for logroll technqiue for bed mobility.  Max A x 2 for supine to sit and Mod A of 1 for trunk with Max A of 2nd person for BLEs for sit to supine.  Pt resistant to movement and tenses with tactile touch.  Min A x 2 for sit to stand with RW and Min A x 2 for 10 lateral steps to L side with increased time and effort.  Decreased ability to advance LLE with steps secondary to LLE weakness.  Pt with c/o dizziness throughout session and required max verbal cues to keep eyes open.  BP sitting EOB after supine to sit: 145/72.  BP sitting EOB at end of tx:  127/74.  Pt reports decreased pain in back after kyphoplasty sx.  Left pt supine in bed with HOB elevated to 60 degrees with max encouragement to keep HOB elevated throughout the days.  Recommend SNF     Time Calculation:         PT Charges       Row Name 01/04/24 1208             Time Calculation    Start Time 0926  -AM      Stop Time 1005  -AM      Time Calculation (min) 39 min  -AM      PT Received On 01/04/24  -AM      PT - Next Appointment 01/05/24  -AM      PT Goal Re-Cert Due Date 01/18/24  -AM         Time Calculation- PT    Total Timed Code Minutes- PT 25 minute(s)  -AM                User Key  (r) = Recorded By, (t) = Taken  By, (c) = Cosigned By      Initials Name Provider Type    AM Yosef Hicks, PT Physical Therapist                  Therapy Charges for Today       Code Description Service Date Service Provider Modifiers Qty    04218607898 HC PT RE-EVAL ESTABLISHED PLAN 2 2024 Yosef Hicks, PT GP 1    73991038298 HC PT THERAPEUTIC ACT EA 15 MIN 2024 Yosef Hicks, PT GP 1    70762989383 HC GAIT TRAINING EA 15 MIN 2024 Yosef Hicks, PT GP 1            PT G-Codes  Outcome Measure Options: AM-PAC 6 Clicks Basic Mobility (PT)  AM-PAC 6 Clicks Score (PT): 9  AM-PAC 6 Clicks Score (OT): 14  PT Discharge Summary  Anticipated Discharge Disposition (PT): skilled nursing facility    Yosef Hicks, PT  2024      Electronically signed by Yosef Hicks, PT at 24 1210          Occupational Therapy Notes (most recent note)        Butch Acevedo, OT at 24 1113          Patient Name: Morgan Quick  : 1955    MRN: [id number]                              Today's Date: 2024       Admit Date: 2023    Visit Dx:     ICD-10-CM ICD-9-CM   1. Fall, initial encounter  W19.XXXA E888.9   2. Contusion of coccyx, initial encounter  S30.0XXA 922.32   3. Acute midline low back pain without sciatica  M54.50 724.2   4. Compression fracture of L4 lumbar vertebra, closed, initial encounter  S32.040A 805.4     Patient Active Problem List   Diagnosis    Morbidly obese    Essential hypertension    Malignant neoplasm of overlapping sites of left breast in female, estrogen receptor positive    Primary cancer of left female breast    Abdominal pain, unspecified abdominal location    Bipolar 1 disorder    High cholesterol    Anorexia    Nausea    Low back pain    Back pain    Compression fracture of L2 lumbar vertebra, closed, initial encounter    Compression fracture of L4 lumbar vertebra, closed, initial encounter     Past Medical History:   Diagnosis Date    Bipolar 1 disorder     Breast cancer     Cancer     Disease  of thyroid gland     High cholesterol     Hypertension      Past Surgical History:   Procedure Laterality Date    ABDOMINAL SURGERY      APPENDECTOMY      ENDOSCOPY N/A 9/25/2022    Procedure: ESOPHAGOGASTRODUODENOSCOPY with biopsy x 2 areas;  Surgeon: Deonte Wong MD;  Location: [facility] ENDOSCOPY;  Service: Gastroenterology;  Laterality: N/A;  post: gastritis, duodinitis, nodule,     HYSTERECTOMY      MASTECTOMY      L side      General Information       Row Name 01/04/24 1022          OT Time and Intention    Document Type re-evaluation  -     Mode of Treatment occupational therapy  -       Row Name 01/04/24 1022          General Information    Patient Profile Reviewed yes  -LS     Existing Precautions/Restrictions fall;oxygen therapy device and L/min;spinal  -LS     Barriers to Rehab medically complex;previous functional deficit;ineffective coping  -       Row Name 01/04/24 1022          Cognition    Orientation Status (Cognition) oriented x 4  -LS       Row Name 01/04/24 1022          Safety Issues, Functional Mobility    Impairments Affecting Function (Mobility) balance;endurance/activity tolerance;pain;strength;range of motion (ROM);postural/trunk control  -               User Key  (r) = Recorded By, (t) = Taken By, (c) = Cosigned By      Initials Name Provider Type    LS Butch Acevedo OT Occupational Therapist                     Mobility/ADL's       Row Name 01/04/24 1023          Bed Mobility    Bed Mobility supine-sit;sit-supine  -LS     Supine-Sit [place] (Bed Mobility) 2 person assist;maximum assist (25% patient effort)  -     Sit-Supine [place] (Bed Mobility) moderate assist (50% patient effort);maximum assist (25% patient effort);2 person assist  -     Comment, (Bed Mobility) Pt remains guarded to movement but improved pain reported since kyphoplasty  -       Row Name 01/04/24 1023          Transfers    Transfers sit-stand transfer  -       Row Name 01/04/24 1023           Sit-Stand Transfer    Sit-Stand [place] (Transfers) minimum assist (75% patient effort);2 person assist  -     Assistive Device (Sit-Stand Transfers) walker, front-wheeled  -       Row Name 01/04/24 1023          Functional Mobility    Functional Mobility- Ind. Level minimum assist (75% patient effort);2 person assist required  -     Functional Mobility- Device walker, front-wheeled  -LS     Functional Mobility- Comment Able to take lateral steps to left toward the HOB  -       Row Name 01/04/24 1023          Activities of Daily Living    BADL Assessment/Intervention lower body dressing  -       Row Name 01/04/24 1023          Lower Body Dressing Assessment/Training    [place] Level (Lower Body Dressing) don;socks;maximum assist (25% patient effort)  -     Comment, (Lower Body Dressing) Able to assist by straightening knees while sitting EOB  -               User Key  (r) = Recorded By, (t) = Taken By, (c) = Cosigned By      Initials Name Provider Type     Butch Acevedo OT Occupational Therapist                   Obj/Interventions       Row Name 01/04/24 1025          Sensory Assessment (Somatosensory)    Sensory Assessment (Somatosensory) sensation intact  -[facility] Name 01/04/24 1025          Range of Motion Comprehensive    Comment, General Range of Motion BUEs actively 50% AROM at shoulders but no PROM deficits  -       Row Name 01/04/24 1025          Strength Comprehensive (MMT)    Comment, General Manual Muscle Testing (MMT) Assessment BUEs with functional strength, limitations due ot pain and pain avoidance  -       Row Name 01/04/24 1025          Balance    Balance Assessment sitting static balance;sitting dynamic balance;standing static balance;standing dynamic balance  -     Static Sitting Balance standby assist  -     Dynamic Sitting Balance contact guard  -     Position, Sitting Balance unsupported;sitting edge of bed  -     Static Standing Balance contact guard   -LS     Dynamic Standing Balance minimal assist;2-person assist  -LS     Position/Device Used, Standing Balance supported;walker, front-wheeled  -LS               User Key  (r) = Recorded By, (t) = Taken By, (c) = Cosigned By      Initials Name Provider Type    Butch Orourke, OT Occupational Therapist                   Goals/Plan       Row Name 01/04/24 1111          Bed Mobility Goal 1 (OT)    Activity/Assistive Device (Bed Mobility Goal 1, OT) bed mobility activities, all  -LS     [place] Level/Cues Needed (Bed Mobility Goal 1, OT) minimum assist (75% or more patient effort)  -LS     Time Frame (Bed Mobility Goal 1, OT) long term goal (LTG);2 weeks  -LS     Progress/Outcomes (Bed Mobility Goal 1, OT) goal ongoing  -       Row Name 01/04/24 1111          Transfer Goal 1 (OT)    Activity/Assistive Device (Transfer Goal 1, OT) transfers, all  -LS     [place] Level/Cues Needed (Transfer Goal 1, OT) contact guard required  -LS     Time Frame (Transfer Goal 1, OT) long term goal (LTG);2 weeks  -LS     Progress/Outcome (Transfer Goal 1, OT) goal ongoing;goal revised this date  -       Row Name 01/04/24 1111          Toileting Goal 1 (OT)    Activity/Device (Toileting Goal 1, OT) toileting skills, all;commode, bedside without drop arms  -LS     [place] Level/Cues Needed (Toileting Goal 1, OT) minimum assist (75% or more patient effort)  -LS     Time Frame (Toileting Goal 1, OT) long term goal (LTG);2 weeks  -LS     Progress/Outcome (Toileting Goal 1, OT) goal ongoing  -       Row Name 01/04/24 1111          Therapy Assessment/Plan (OT)    Planned Therapy Interventions (OT) activity tolerance training;BADL retraining;functional balance retraining;IADL retraining;occupation/activity based interventions;ROM/therapeutic exercise;strengthening exercise;transfer/mobility retraining;patient/caregiver education/training  -LS               User Key  (r) = Recorded By, (t) = Taken By, (c) = Cosigned By       Initials Name Provider Type    LS Butch Acevedo, NANCY Occupational Therapist                   Clinical Impression       Row Name 01/04/24 1104          Pain Assessment    Pretreatment Pain Rating 7/10  -LS     Posttreatment Pain Rating 6/10  -LS     Pain Location lower  -LS     Pain Location - back  -LS       Row Name 01/04/24 1100          Plan of Care Review    Plan of Care Reviewed With patient  -LS     Outcome Evaluation Patient re-evaluated this date POD #1 kyphoplasty L2 and L4 vertabrae. She reports an improvement in pain versus prior to surgery. Pt flat supine in bed on arrival, nurses reporting that she has been in this position x3 days. She was agreeable to therapy this date, however. Pt educated on logrolling techniques for spinal precautions which was then performed with Max A. Max Ax2 required to come to sitting EOB, pt somewhat resistance to movement and tense while being assisted. Once in sitting, she was able to balance with SBA. She required Max A for lower body dressing from EOB, then Min Ax2 to stand with RW and take steps laterally to HOB. Limited this date by dizziness, BP droppipng with change in position but remained at safe levels. Mod-Max Ax2 to return to supine and scoot to HOB. Patient demos pain avoidance throughout treatment, but overall more tolerant to activity versus prior to kyphoplasty. She will require SNF at [place] as she requires assistance for all mobility and self-care. OT will continue to follow.  -       Row Name 01/04/24 1104          Therapy Assessment/Plan (OT)    Rehab Potential (OT) good, to achieve stated therapy goals  -     Criteria for Skilled Therapeutic Interventions Met (OT) yes;skilled treatment is necessary  -     Therapy Frequency (OT) 5 times/wk  -     Predicted Duration of Therapy Intervention (OT) until dc  -       Row Name 01/04/24 1104          Therapy Plan Review/Discharge Plan (OT)    Anticipated Discharge Disposition (OT) skilled nursing facility   -LS       Row Name 01/04/24 1104          Vital Signs    Pre Systolic BP Rehab 145  -LS     Pre Treatment Diastolic BP 71  -LS     Intra Systolic BP Rehab 127  -LS     Intra Treatment Diastolic BP 74  -LS     O2 Delivery Pre Treatment nasal cannula  -LS     O2 Delivery Intra Treatment nasal cannula  -LS     O2 Delivery Post Treatment nasal cannula  -LS     Pre Patient Position Supine  -LS     Intra Patient Position Standing  -LS     Post Patient Position Supine  -LS       Row Name 01/04/24 1104          Positioning and Restraints    Pre-Treatment Position in bed  -LS     Post Treatment Position bed  -LS     In Bed notified nsg;call light within reach;encouraged to call for assist;exit alarm on;fowlers  -LS               User Key  (r) = Recorded By, (t) = Taken By, (c) = Cosigned By      Initials Name Provider Type    Butch Orourke OT Occupational Therapist                   Outcome Measures       Row Name 01/04/24 1112          How much help from another is currently needed...    Putting on and taking off regular lower body clothing? 2  -LS     Bathing (including washing, rinsing, and drying) 2  -LS     Toileting (which includes using toilet bed pan or urinal) 2  -LS     Putting on and taking off regular upper body clothing 2  -LS     Taking care of personal grooming (such as brushing teeth) 2  -LS     Eating meals 4  -LS     AM-PAC 6 Clicks Score (OT) 14  -LS       Row Name 01/04/24 1112          Functional Assessment    Outcome Measure Options AM-PAC 6 Clicks Daily Activity (OT)  -LS               User Key  (r) = Recorded By, (t) = Taken By, (c) = Cosigned By      Initials Name Provider Type    Butch Orourke OT Occupational Therapist                    Occupational Therapy Education       Title: PT OT SLP Therapies (Done)       Topic: Occupational Therapy (Done)       Point: ADL training (Done)       Description:   Instruct learner(s) on proper safety adaptation and remediation techniques during self care  or transfers.   Instruct in proper use of assistive devices.                  Learning Progress Summary             Patient Acceptance, E, VU by  at 12/31/2023 1114    Acceptance, E,TB, VU by MS at 12/29/2023 1126                         Point: Precautions (Done)       Description:   Instruct learner(s) on prescribed precautions during self-care and functional transfers.                  Learning Progress Summary             Patient Acceptance, E, VU by  at 12/31/2023 1114    Acceptance, E,TB, VU by MS at 12/29/2023 1126                         Point: Body mechanics (Done)       Description:   Instruct learner(s) on proper positioning and spine alignment during self-care, functional mobility activities and/or exercises.                  Learning Progress Summary             Patient Acceptance, E, VU by  at 12/31/2023 1114    Acceptance, E,TB, VU by MS at 12/29/2023 1126                                         User Key       Initials Effective Dates Name Provider Type Discipline    MS 07/13/22 -  Belem Simms OT Occupational Therapist OT     07/05/23 -  Neetu Franco, LORI Registered Nurse Nurse                  OT Recommendation and Plan  Planned Therapy Interventions (OT): activity tolerance training, BADL retraining, functional balance retraining, IADL retraining, occupation/activity based interventions, ROM/therapeutic exercise, strengthening exercise, transfer/mobility retraining, patient/caregiver education/training  Therapy Frequency (OT): 5 times/wk  Plan of Care Review  Plan of Care Reviewed With: patient  Outcome Evaluation: Patient re-evaluated this date POD #1 kyphoplasty L2 and L4 vertabrae. She reports an improvement in pain versus prior to surgery. Pt flat supine in bed on arrival, nurses reporting that she has been in this position x3 days. She was agreeable to therapy this date, however. Pt educated on logrolling techniques for spinal precautions which was then performed with Max APancho Simental Ax2  required to come to sitting EOB, pt somewhat resistance to movement and tense while being assisted. Once in sitting, she was able to balance with SBA. She required Max A for lower body dressing from EOB, then Min Ax2 to stand with RW and take steps laterally to HOB. Limited this date by dizziness, BP droppipng with change in position but remained at safe levels. Mod-Max Ax2 to return to supine and scoot to HOB. Patient demos pain avoidance throughout treatment, but overall more tolerant to activity versus prior to kyphoplasty. She will require SNF at [place] as she requires assistance for all mobility and self-care. OT will continue to follow.     Time Calculation:         Time Calculation- OT       Row Name 01/04/24 1113             Time Calculation- OT    OT Start Time 0932  -LS      OT Stop Time 1006  -LS      OT Time Calculation (min) 34 min  -LS      Total Timed Code Minutes- OT 24 minute(s)  -LS      OT Received On 01/04/24  -LS      OT - Next Appointment 01/05/24  -      OT Goal Re-Cert Due Date 01/18/24  -LS         Timed Charges    86423 - OT Therapeutic Activity Minutes 14  -LS      88872 - OT Self Care/Mgmt Minutes 10  -LS         Untimed Charges    OT Eval/Re-eval Minutes 10  -LS         Total Minutes    Timed Charges Total Minutes 24  -LS      Untimed Charges Total Minutes 10  -LS       Total Minutes 34  -LS                User Key  (r) = Recorded By, (t) = Taken By, (c) = Cosigned By      Initials Name Provider Type    LS Butch Acevedo OT Occupational Therapist                  Therapy Charges for Today       Code Description Service Date Service Provider Modifiers Qty    33820102141 HC OT SELF CARE/MGMT/TRAIN EA 15 MIN 1/4/2024 Butch Acevedo OT GO 1    66106793400 HC OT THERAPEUTIC ACT EA 15 MIN 1/4/2024 Butch Acevedo OT GO 1    33719642759 HC OT RE-EVAL 2 1/4/2024 Butch Acevedo OT GO 1                 Butch Acevedo OT  1/4/2024    Electronically signed by Butch Acevedo OT at 01/04/24 1114        Speech Language Pathology Notes (most recent note)    No notes exist for this encounter.       Respiratory Therapy Notes (most recent note)    No notes exist for this encounter.          Discharge Summary        Vicki Beard MD at 24 0814                         [facility]         DISCHARGE SUMMARY    Patient Name: Morgan Quick  : 1955  MRN: [id number]    Date of Admission: 2023  Date of Discharge:  2027  Primary Care Physician: Wayne Saavedra MD    Consults       Date and Time Order Name Status Description    2024 12:31 PM Inpatient Hospitalist Consult              Presenting Problem:   Low back pain [M54.50]  Fall, initial encounter [W19.XXXA]  Contusion of coccyx, initial encounter [S30.0XXA]  Acute midline low back pain without sciatica [M54.50]  Back pain [M54.9]    Active and Resolved Hospital Problems:  Active Hospital Problems    Diagnosis POA    **Low back pain [M54.50] Yes    Compression fracture of L2 lumbar vertebra, closed, initial encounter [S32.020A] Unknown    Compression fracture of L4 lumbar vertebra, closed, initial encounter [S32.040A] Unknown    Back pain [M54.9] Yes    Bipolar 1 disorder [F31.9] Yes    High cholesterol [E78.00] Yes    Essential hypertension [I10] Yes      Resolved Hospital Problems   No resolved problems to display.         Hospital Course     Hospital Course:  Morgan Quick is a 68 y.o. female history of hypertension, thyroid disorder, high cholesterol, chronic pain   Who admitted to the hospital for compression fracture L2, L4 after fall.  Interventional radiology was consulted and patient had kyphoplasty on .  During this admission patient was able to tolerate the surgery well pain was better controlled.  At this point patient received maximum benefit from this hospitalization was safe to discharge her to the rehab to complete her recovery      DISCHARGE Follow Up Recommendations for labs and diagnostics:   -Follow-up  with your PCP in 1 week      Day of Discharge     Vital Signs:  Temp:  [97 °F (36.1 °C)-98 °F (36.7 °C)] 98 °F (36.7 °C)  Heart Rate:  [64-90] 79  Resp:  [16-18] 16  BP: (124-161)/(55-81) 148/55  Flow (L/min):  [2] 2  Physical Exam:  Constitutional: Awake, alert   Eyes: PERRLA, sclerae anicteric, no conjunctival injection   HENT: NCAT, mucous membranes moist   Neck: Supple, no thyromegaly, no lymphadenopathy, trachea midline   Respiratory: Clear to auscultation bilaterally, nonlabored respirations    Cardiovascular: RRR, no murmurs, rubs, or gallops, palpable pedal pulses bilaterally   Gastrointestinal: Positive bowel sounds, soft, nontender, nondistended   Musculoskeletal: No bilateral ankle edema, no clubbing or cyanosis to extremities   Psychiatric: Appropriate affect, cooperative   Neurologic: Oriented x 3, strength symmetric in all extremities, Cranial Nerves grossly intact to confrontation, speech clear   Skin: No rashes     Pertinent  and/or Most Recent Results     LAB RESULTS:      Lab 01/03/24  0917 12/30/23  0408   WBC  --  10.10   HEMOGLOBIN  --  12.8   HEMATOCRIT  --  40.4   PLATELETS  --  294   NEUTROS ABS  --  8.00*   LYMPHS ABS  --  1.10   MONOS ABS  --  0.90   EOS ABS  --  0.10   MCV  --  92.5   PROTIME 11.5  --    APTT 27.6  --          Lab 12/30/23  0408   SODIUM 138   POTASSIUM 3.8   CHLORIDE 97*   CO2 32.0*   ANION GAP 9.0   BUN 12   CREATININE 0.44*   EGFR 105.5   GLUCOSE 89   CALCIUM 9.7             Lab 01/03/24  0917   PROTIME 11.5   INR 1.06                 Brief Urine Lab Results       None          Microbiology Results (last 10 days)       ** No results found for the last 240 hours. **            IR Kyphoplasty Lumbar    Result Date: 1/3/2024  Impression: 1. Successful L2 and L4 kyphoplasty as described above. Electronically Signed: Blair Murrieta MD  1/3/2024 3:56 PM EST  Workstation ID: [id number]    IR Kyphoplasty Additional Level    Result Date: 1/3/2024  Impression: 1. Successful L2 and  L4 kyphoplasty as described above. Electronically Signed: Blair Murrieta MD  1/3/2024 3:56 PM EST  Workstation ID: [id number]    MRI Lumbar Spine Without Contrast    Result Date: 1/2/2024  Impression: Impression: 1. Acute/subacute 25% compression fracture at L2. 2. Acute/subacute 50% compression fracture at L4. 3. No retropulsed fracture fragment or significant canal stenosis. 4. Chronic height loss at L3 related to remote fracture and/or Schmorl's node. Chronic mild compression fracture at L5. 5. Lumbar degenerative findings above. Electronically Signed: Ramsey Chen MD  1/2/2024 8:03 AM EST  Workstation ID: [id number]    CT Abdomen Pelvis Without Contrast    Result Date: 12/27/2023  Impression: Impression: No acute findings in the abdomen or pelvis. No acute osseous injury. Electronically Signed: Kasia Paul MD  12/27/2023 12:51 PM CST  Workstation ID: [id number]             Results for orders placed during the hospital encounter of 09/24/22    Adult Transthoracic Echo Complete W/ Cont if Necessary Per Protocol    Interpretation Summary  · Estimated left ventricular EF was in agreement with the calculated left ventricular EF. Left ventricular ejection fraction appears to be 56 - 60%. Left ventricular systolic function is normal.  · There is calcification of the aortic valve mainly affecting the non-coronary, left coronary and right coronary cusp(s).  · The study is technically difficult for diagnosis.      Labs Pending at Discharge:        Discharge Details        Discharge Medications        Continue These Medications        Instructions Start Date   furosemide 20 MG tablet  Commonly known as: LASIX   20 mg, Oral, Daily      guaiFENesin 600 MG 12 hr tablet  Commonly known as: MUCINEX   1,200 mg, Oral, Every 12 Hours Scheduled      HYDROcodone-acetaminophen  MG per tablet  Commonly known as: NORCO   1 tablet, Oral, 4 Times Daily      levothyroxine 25 MCG tablet  Commonly known as: SYNTHROID, LEVOTHROID   25  mcg, Oral, Every Early Morning      lubiprostone 24 MCG capsule  Commonly known as: AMITIZA   24 mcg, Oral, 2 Times Daily      OXcarbazepine 150 MG tablet  Commonly known as: TRILEPTAL   150 mg, Oral, 2 Times Daily      pantoprazole 40 MG EC tablet  Commonly known as: PROTONIX   40 mg, Oral, Every Early Morning      potassium chloride 10 MEQ CR capsule  Commonly known as: MICRO-K   10 mEq, Oral, Daily      risperiDONE 0.25 MG tablet  Commonly known as: risperDAL   0.25 mg, Oral, Nightly      rosuvastatin 10 MG tablet  Commonly known as: CRESTOR   30 mg, Oral, Daily      sertraline 100 MG tablet  Commonly known as: ZOLOFT   200 mg, Oral, Daily      sodium chloride 1 g tablet   2 g, Oral, 3 Times Daily With Meals      traZODone 50 MG tablet  Commonly known as: DESYREL   25 mg, Oral, Nightly             Stop These Medications      methylPREDNISolone 4 MG tablet  Commonly known as: MEDROL              Allergies   Allergen Reactions    Contrast Dye (Echo Or Unknown Ct/Mr) Shortness Of Breath     Pt states she had trouble breathing when she had contrast in the past.          Discharge Disposition:   Skilled Nursing Facility (DC - External)    Discharge Condition: .cond    Diet:  Hospital:  Diet Order   Procedures    Diet: Regular/House Diet; Texture: Regular Texture (IDDSI 7); Fluid Consistency: Thin (IDDSI 0)         Discharge Activity:   Activity Instructions       Activity as Tolerated                CODE STATUS:  Code Status and Medical Interventions:   Ordered at: 12/27/23 1629     Code Status (Patient has no pulse and is not breathing):    CPR (Attempt to Resuscitate)     Medical Interventions (Patient has pulse or is breathing):    Full Support         No future appointments.    Additional Instructions for the Follow-ups that You Need to Schedule       Discharge Follow-up with PCP   As directed       Currently Documented PCP:    Wayne Saavedra MD    PCP Phone Number:    [phone]     Follow Up Details: 1 week                 Time spent on Discharge including face to face service: Less than 30 minutes    Vicki Beard MD       Electronically signed by Vicki Beard MD at 01/05/24 08

## 2024-01-05 NOTE — PLAN OF CARE
Goal Outcome Evaluation:    Discussed discharge instructions with patient and transfer to SNF.

## 2024-01-05 NOTE — CASE MANAGEMENT/SOCIAL WORK
Case Management Discharge Note      Final Note: [facility] via ambulance         Selected Continued Care - Discharged on 1/5/2024 Admission date: 12/27/2023 - Discharge disposition: Skilled Nursing Facility (DC - External)      Destination Coordination complete.      Service Provider Selected Services Address Phone Fax Patient Preferred    [address] [facility] [address] [facility] [place] [address] [phone] --                        Transportation Services  Ambulance: [facility]    Final Discharge Disposition Code: 03 - skilled nursing facility (SNF)

## 2024-11-04 ENCOUNTER — HOSPITAL ENCOUNTER (INPATIENT)
Facility: HOSPITAL | Age: 69
LOS: 5 days | Discharge: NURSING, REHAB OR LONG-TERM CARE | DRG: 516 | End: 2024-11-10
Attending: EMERGENCY MEDICINE | Admitting: EMERGENCY MEDICINE
Payer: MEDICARE

## 2024-11-04 ENCOUNTER — APPOINTMENT (OUTPATIENT)
Dept: GENERAL RADIOLOGY | Facility: HOSPITAL | Age: 69
DRG: 516 | End: 2024-11-04
Payer: MEDICARE

## 2024-11-04 DIAGNOSIS — S22.080A COMPRESSION FRACTURE OF T12 VERTEBRA, INITIAL ENCOUNTER: Primary | ICD-10-CM

## 2024-11-04 DIAGNOSIS — M54.50 ACUTE LOW BACK PAIN, UNSPECIFIED BACK PAIN LATERALITY, UNSPECIFIED WHETHER SCIATICA PRESENT: ICD-10-CM

## 2024-11-04 DIAGNOSIS — S22.080A COMPRESSION FRACTURE OF T11 VERTEBRA, INITIAL ENCOUNTER: ICD-10-CM

## 2024-11-04 DIAGNOSIS — S32.040A COMPRESSION FRACTURE OF L4 LUMBAR VERTEBRA, CLOSED, INITIAL ENCOUNTER: ICD-10-CM

## 2024-11-04 PROCEDURE — G0378 HOSPITAL OBSERVATION PER HR: HCPCS

## 2024-11-04 PROCEDURE — 25010000002 MORPHINE PER 10 MG: Performed by: NURSE PRACTITIONER

## 2024-11-04 PROCEDURE — 72110 X-RAY EXAM L-2 SPINE 4/>VWS: CPT

## 2024-11-04 PROCEDURE — 99285 EMERGENCY DEPT VISIT HI MDM: CPT

## 2024-11-04 RX ORDER — RISPERIDONE 0.25 MG/1
0.25 TABLET ORAL ONCE
Status: COMPLETED | OUTPATIENT
Start: 2024-11-04 | End: 2024-11-04

## 2024-11-04 RX ORDER — LIDOCAINE 4 G/G
1 PATCH TOPICAL ONCE
Status: COMPLETED | OUTPATIENT
Start: 2024-11-04 | End: 2024-11-05

## 2024-11-04 RX ORDER — HYDROCODONE BITARTRATE AND ACETAMINOPHEN 10; 325 MG/1; MG/1
1 TABLET ORAL 4 TIMES DAILY
Status: DISCONTINUED | OUTPATIENT
Start: 2024-11-04 | End: 2024-11-04

## 2024-11-04 RX ORDER — HYDROCODONE BITARTRATE AND ACETAMINOPHEN 10; 325 MG/1; MG/1
1 TABLET ORAL 2 TIMES DAILY
Status: DISCONTINUED | OUTPATIENT
Start: 2024-11-04 | End: 2024-11-05

## 2024-11-04 RX ORDER — POLYETHYLENE GLYCOL 3350 17 G/17G
17 POWDER, FOR SOLUTION ORAL DAILY PRN
Status: DISCONTINUED | OUTPATIENT
Start: 2024-11-04 | End: 2024-11-10 | Stop reason: HOSPADM

## 2024-11-04 RX ORDER — SODIUM CHLORIDE 0.9 % (FLUSH) 0.9 %
10 SYRINGE (ML) INJECTION EVERY 12 HOURS SCHEDULED
Status: DISCONTINUED | OUTPATIENT
Start: 2024-11-04 | End: 2024-11-10 | Stop reason: HOSPADM

## 2024-11-04 RX ORDER — TRAZODONE HYDROCHLORIDE 50 MG/1
50 TABLET, FILM COATED ORAL ONCE
Status: COMPLETED | OUTPATIENT
Start: 2024-11-04 | End: 2024-11-04

## 2024-11-04 RX ORDER — ONDANSETRON 2 MG/ML
4 INJECTION INTRAMUSCULAR; INTRAVENOUS EVERY 6 HOURS PRN
Status: DISCONTINUED | OUTPATIENT
Start: 2024-11-04 | End: 2024-11-10 | Stop reason: HOSPADM

## 2024-11-04 RX ORDER — GUAIFENESIN 600 MG/1
600 TABLET, EXTENDED RELEASE ORAL ONCE
Status: COMPLETED | OUTPATIENT
Start: 2024-11-04 | End: 2024-11-04

## 2024-11-04 RX ORDER — AMOXICILLIN 250 MG
2 CAPSULE ORAL 2 TIMES DAILY PRN
Status: DISCONTINUED | OUTPATIENT
Start: 2024-11-04 | End: 2024-11-10 | Stop reason: HOSPADM

## 2024-11-04 RX ORDER — BISACODYL 5 MG/1
5 TABLET, DELAYED RELEASE ORAL DAILY PRN
Status: DISCONTINUED | OUTPATIENT
Start: 2024-11-04 | End: 2024-11-10 | Stop reason: HOSPADM

## 2024-11-04 RX ORDER — BISACODYL 10 MG
10 SUPPOSITORY, RECTAL RECTAL DAILY PRN
Status: DISCONTINUED | OUTPATIENT
Start: 2024-11-04 | End: 2024-11-10 | Stop reason: HOSPADM

## 2024-11-04 RX ORDER — SODIUM CHLORIDE 9 MG/ML
40 INJECTION, SOLUTION INTRAVENOUS AS NEEDED
Status: DISCONTINUED | OUTPATIENT
Start: 2024-11-04 | End: 2024-11-10 | Stop reason: HOSPADM

## 2024-11-04 RX ORDER — SODIUM CHLORIDE 0.9 % (FLUSH) 0.9 %
10 SYRINGE (ML) INJECTION AS NEEDED
Status: DISCONTINUED | OUTPATIENT
Start: 2024-11-04 | End: 2024-11-10 | Stop reason: HOSPADM

## 2024-11-04 RX ADMIN — TRAZODONE HYDROCHLORIDE 50 MG: 50 TABLET ORAL at 20:52

## 2024-11-04 RX ADMIN — Medication 10 ML: at 22:28

## 2024-11-04 RX ADMIN — Medication 5 MG: at 20:52

## 2024-11-04 RX ADMIN — MORPHINE SULFATE 4 MG: 4 INJECTION, SOLUTION INTRAMUSCULAR; INTRAVENOUS at 22:28

## 2024-11-04 RX ADMIN — GUAIFENESIN 600 MG: 600 TABLET, EXTENDED RELEASE ORAL at 20:52

## 2024-11-04 RX ADMIN — LIDOCAINE 1 PATCH: 4 PATCH TOPICAL at 15:45

## 2024-11-04 RX ADMIN — RISPERIDONE 0.25 MG: 0.25 TABLET, FILM COATED ORAL at 20:52

## 2024-11-04 RX ADMIN — HYDROCODONE BITARTRATE AND ACETAMINOPHEN 1 TABLET: 10; 325 TABLET ORAL at 20:52

## 2024-11-04 RX ADMIN — MORPHINE SULFATE 4 MG: 4 INJECTION, SOLUTION INTRAMUSCULAR; INTRAVENOUS at 16:07

## 2024-11-04 NOTE — ED PROVIDER NOTES
Subjective   Chief Complaint   Patient presents with    Back Pain     Lower back pain       History of Present Illness  Patient is a 68-year-old female presents emergency department for evaluation of low back pain.  Patient reports she went to get up to go to the activity room today, experienced pain in her lower back, she reports that she sat back down, reports and then injured her left upper chest going to move.  Her chest no longer hurts.  She longer has neck pain  Patient did not fall or have any recent trauma.  She has not had any fevers.  Denies any new numbness, tingling, weakness, no urinary retention, bowel or bladder incontinence, no IVDA.    Review of Systems    Past Medical History:   Diagnosis Date    Bipolar 1 disorder     Breast cancer     Cancer     Disease of thyroid gland     High cholesterol     Hypertension        Allergies   Allergen Reactions    Contrast Dye (Echo Or Unknown Ct/Mr) Shortness Of Breath     Pt states she had trouble breathing when she had contrast in the past.    Iodinated Contrast Media Hives       Past Surgical History:   Procedure Laterality Date    ABDOMINAL SURGERY      APPENDECTOMY      ENDOSCOPY N/A 9/25/2022    Procedure: ESOPHAGOGASTRODUODENOSCOPY with biopsy x 2 areas;  Surgeon: Deonte Wong MD;  Location: [facility] ENDOSCOPY;  Service: Gastroenterology;  Laterality: N/A;  post: gastritis, duodinitis, nodule,     HYSTERECTOMY      MASTECTOMY      L side       Family History   Problem Relation Age of Onset    Cancer Mother        Social History     Socioeconomic History    Marital status:    Tobacco Use    Smoking status: Never     Passive exposure: Never    Smokeless tobacco: Never   Vaping Use    Vaping status: Never Used   Substance and Sexual Activity    Alcohol use: No    Drug use: No    Sexual activity: Defer           Objective   Physical Exam  Vitals and nursing note reviewed.   Constitutional:       Appearance: Normal appearance. She is not  toxic-appearing.   HENT:      Head: Normocephalic and atraumatic.      Mouth/Throat:      Mouth: Mucous membranes are moist.      Pharynx: Oropharynx is clear.   Eyes:      Extraocular Movements: Extraocular movements intact.      Conjunctiva/sclera: Conjunctivae normal.      Pupils: Pupils are equal, round, and reactive to light.   Cardiovascular:      Rate and Rhythm: Normal rate and regular rhythm.      Heart sounds: Normal heart sounds. No murmur heard.     No friction rub. No gallop.   Pulmonary:      Effort: Pulmonary effort is normal.      Breath sounds: Normal breath sounds.   Abdominal:      General: Bowel sounds are normal.      Palpations: Abdomen is soft.   Musculoskeletal:      Cervical back: Normal range of motion and neck supple.      Comments: No vertebral point tenderness noted to the C-spine T-spine or L-spine.  No palpable step-offs.  Diffuse lumbar paraspinal tenderness.  No skin abnormalities are appreciated.  No saddle anesthesia   Skin:     General: Skin is warm and dry.      Capillary Refill: Capillary refill takes less than 2 seconds.      Findings: No erythema or rash.   Neurological:      General: No focal deficit present.      Mental Status: She is alert and oriented to person, place, and time.         Procedures           ED Course                                   XR Spine Lumbar Complete 4+VW    Result Date: 11/4/2024  Chronic appearing compression form is of L1-L5 with vertebroplasty at L2 and L4. No acute lumbar spine findings. Electronically Signed: Emily Gonzalez MD  11/4/2024 3:57 PM EST  Workstation ID: [id number]               Medical Decision Making    Chart Review: Discharge summary 1/5/2024 for fall, sciatica, contusion    Pt was Placed on appropriate monitoring.  Differential diagnoses considered for patient presentation, this list is not all inclusive of diagnoses considered: Strain, contusion, chronic back pain patient neurovascular intact.  X-ray imaging reveals no acute  findings today.  She has a history of kyphoplasty.  Patient reports that she is unable to perform her ADLs at home, and is below baseline, her  reports he is unable to assist her at home.  Will place in ED observation, consult neurosurgery, consult case management.  Patient and family agree with current treatment plan.    Reports her pain improved with pain medication here.    Note Disclaimer: At [facility], we believe that sharing information builds trust and better relationships. You are receiving this note because you recently visited [facility]. It is possible you will see health information before a provider has talked with you about it. This kind of information can be easy to misunderstand. To help you fully understand what it means for your health, we urge you to discuss this note with your provider  Note dictated utilizing Dragon Dictation.  Appropriate PPE worn during patient interactions.      Final diagnoses:   Acute low back pain, unspecified back pain laterality, unspecified whether sciatica present       ED Disposition  ED Disposition       ED Disposition   Decision to Admit    Condition   --    Comment   --               No follow-up provider specified.       Medication List      No changes were made to your prescriptions during this visit.            Patsy Martin, APRN  11/04/24 0419

## 2024-11-04 NOTE — LETTER
EMS Transport Request  For use at [facility], [place], [place], [place], and [place] only   Patient Name: Morgan Quick : 1955   Weight:109 kg (239 lb 13.8 oz) Pick-up Location: [facility] BLS/ALS: BLS/ALS: BLS   Insurance: [employer] MEDICARE REPLACEMENT Auth End Date: n/a   Pre-Cert #: D/C Summary complete:    Destination: Other [facility]   Contact Precautions: None   Equipment (O2, Fluids, etc.): O2, settings 4LPM    Arrive By Date/Time: anytime Stretcher/WC: Stretcher   CM Requesting: Ginger Esparza RN Ext: [phone]   Notes/Medical Necessity: 109kg, 4LPM O2, thoracic spine wounds, max assist for bed mobility, max assist x2 for sit to stand, hx T  compression fracture.      ______________________________________________________________________    *Only 2 patient bags OR 1 carry-on size bag are permitted.  Wheelchairs and walkers CANNOT transported with the patient. Acknowledge: Yes

## 2024-11-05 ENCOUNTER — APPOINTMENT (OUTPATIENT)
Dept: MRI IMAGING | Facility: HOSPITAL | Age: 69
DRG: 516 | End: 2024-11-05
Payer: MEDICARE

## 2024-11-05 LAB
ANION GAP SERPL CALCULATED.3IONS-SCNC: 10.5 MMOL/L (ref 5–15)
BASOPHILS # BLD AUTO: 0.02 10*3/MM3 (ref 0–0.2)
BASOPHILS NFR BLD AUTO: 0.2 % (ref 0–1.5)
BUN SERPL-MCNC: 9 MG/DL (ref 8–23)
BUN/CREAT SERPL: 17.6 (ref 7–25)
CALCIUM SPEC-SCNC: 9.4 MG/DL (ref 8.6–10.5)
CHLORIDE SERPL-SCNC: 104 MMOL/L (ref 98–107)
CO2 SERPL-SCNC: 27.5 MMOL/L (ref 22–29)
CREAT SERPL-MCNC: 0.51 MG/DL (ref 0.57–1)
DEPRECATED RDW RBC AUTO: 47.4 FL (ref 37–54)
EGFRCR SERPLBLD CKD-EPI 2021: 101.8 ML/MIN/1.73
EOSINOPHIL # BLD AUTO: 0.04 10*3/MM3 (ref 0–0.4)
EOSINOPHIL NFR BLD AUTO: 0.5 % (ref 0.3–6.2)
ERYTHROCYTE [DISTWIDTH] IN BLOOD BY AUTOMATED COUNT: 14.8 % (ref 12.3–15.4)
GLUCOSE SERPL-MCNC: 114 MG/DL (ref 65–99)
HBA1C MFR BLD: 6.19 % (ref 4.8–5.6)
HCT VFR BLD AUTO: 43.3 % (ref 34–46.6)
HGB BLD-MCNC: 12.2 G/DL (ref 12–15.9)
IMM GRANULOCYTES # BLD AUTO: 0.02 10*3/MM3 (ref 0–0.05)
IMM GRANULOCYTES NFR BLD AUTO: 0.2 % (ref 0–0.5)
LYMPHOCYTES # BLD AUTO: 0.51 10*3/MM3 (ref 0.7–3.1)
LYMPHOCYTES NFR BLD AUTO: 5.9 % (ref 19.6–45.3)
MCH RBC QN AUTO: 24.4 PG (ref 26.6–33)
MCHC RBC AUTO-ENTMCNC: 28.2 G/DL (ref 31.5–35.7)
MCV RBC AUTO: 86.6 FL (ref 79–97)
MONOCYTES # BLD AUTO: 0.46 10*3/MM3 (ref 0.1–0.9)
MONOCYTES NFR BLD AUTO: 5.3 % (ref 5–12)
NEUTROPHILS NFR BLD AUTO: 7.62 10*3/MM3 (ref 1.7–7)
NEUTROPHILS NFR BLD AUTO: 87.9 % (ref 42.7–76)
NRBC BLD AUTO-RTO: 0 /100 WBC (ref 0–0.2)
PLATELET # BLD AUTO: 224 10*3/MM3 (ref 140–450)
PMV BLD AUTO: 10.4 FL (ref 6–12)
POTASSIUM SERPL-SCNC: 3.9 MMOL/L (ref 3.5–5.2)
RBC # BLD AUTO: 5 10*6/MM3 (ref 3.77–5.28)
SODIUM SERPL-SCNC: 142 MMOL/L (ref 136–145)
WBC NRBC COR # BLD AUTO: 8.67 10*3/MM3 (ref 3.4–10.8)

## 2024-11-05 PROCEDURE — 25010000002 HYDROMORPHONE PER 4 MG: Performed by: NURSE PRACTITIONER

## 2024-11-05 PROCEDURE — 25010000002 ONDANSETRON PER 1 MG: Performed by: EMERGENCY MEDICINE

## 2024-11-05 PROCEDURE — 25010000002 ORPHENADRINE CITRATE PER 60 MG

## 2024-11-05 PROCEDURE — A9579 GAD-BASE MR CONTRAST NOS,1ML: HCPCS | Performed by: INTERNAL MEDICINE

## 2024-11-05 PROCEDURE — 97162 PT EVAL MOD COMPLEX 30 MIN: CPT

## 2024-11-05 PROCEDURE — 25010000002 HYDROMORPHONE PER 4 MG

## 2024-11-05 PROCEDURE — 99221 1ST HOSP IP/OBS SF/LOW 40: CPT

## 2024-11-05 PROCEDURE — 72148 MRI LUMBAR SPINE W/O DYE: CPT

## 2024-11-05 PROCEDURE — 25010000002 GADOTERIDOL PER 1 ML: Performed by: INTERNAL MEDICINE

## 2024-11-05 PROCEDURE — 99221 1ST HOSP IP/OBS SF/LOW 40: CPT | Performed by: STUDENT IN AN ORGANIZED HEALTH CARE EDUCATION/TRAINING PROGRAM

## 2024-11-05 PROCEDURE — 72149 MRI LUMBAR SPINE W/DYE: CPT

## 2024-11-05 PROCEDURE — 85025 COMPLETE CBC W/AUTO DIFF WBC: CPT | Performed by: EMERGENCY MEDICINE

## 2024-11-05 PROCEDURE — 80048 BASIC METABOLIC PNL TOTAL CA: CPT | Performed by: EMERGENCY MEDICINE

## 2024-11-05 PROCEDURE — 83036 HEMOGLOBIN GLYCOSYLATED A1C: CPT | Performed by: NURSE PRACTITIONER

## 2024-11-05 RX ORDER — GUAIFENESIN 600 MG/1
1200 TABLET, EXTENDED RELEASE ORAL EVERY 12 HOURS SCHEDULED
Status: DISCONTINUED | OUTPATIENT
Start: 2024-11-05 | End: 2024-11-10 | Stop reason: HOSPADM

## 2024-11-05 RX ORDER — CYCLOBENZAPRINE HCL 10 MG
10 TABLET ORAL 3 TIMES DAILY PRN
Status: DISCONTINUED | OUTPATIENT
Start: 2024-11-05 | End: 2024-11-10 | Stop reason: HOSPADM

## 2024-11-05 RX ORDER — PANTOPRAZOLE SODIUM 40 MG/1
40 TABLET, DELAYED RELEASE ORAL
Status: DISCONTINUED | OUTPATIENT
Start: 2024-11-05 | End: 2024-11-10 | Stop reason: HOSPADM

## 2024-11-05 RX ORDER — HYDROCODONE BITARTRATE AND ACETAMINOPHEN 10; 325 MG/1; MG/1
1 TABLET ORAL EVERY 6 HOURS PRN
Status: DISCONTINUED | OUTPATIENT
Start: 2024-11-05 | End: 2024-11-05

## 2024-11-05 RX ORDER — POTASSIUM CHLORIDE 750 MG/1
10 TABLET, FILM COATED, EXTENDED RELEASE ORAL DAILY
Status: DISCONTINUED | OUTPATIENT
Start: 2024-11-05 | End: 2024-11-10 | Stop reason: HOSPADM

## 2024-11-05 RX ORDER — ORPHENADRINE CITRATE 30 MG/ML
60 INJECTION INTRAMUSCULAR; INTRAVENOUS EVERY 12 HOURS
Status: DISCONTINUED | OUTPATIENT
Start: 2024-11-05 | End: 2024-11-10 | Stop reason: HOSPADM

## 2024-11-05 RX ORDER — LUBIPROSTONE 24 UG/1
24 CAPSULE ORAL 2 TIMES DAILY
Status: DISCONTINUED | OUTPATIENT
Start: 2024-11-05 | End: 2024-11-10 | Stop reason: HOSPADM

## 2024-11-05 RX ORDER — LEVOTHYROXINE SODIUM 25 UG/1
25 TABLET ORAL
Status: DISCONTINUED | OUTPATIENT
Start: 2024-11-05 | End: 2024-11-10 | Stop reason: HOSPADM

## 2024-11-05 RX ORDER — OXCARBAZEPINE 150 MG/1
150 TABLET, FILM COATED ORAL 2 TIMES DAILY
Status: DISCONTINUED | OUTPATIENT
Start: 2024-11-05 | End: 2024-11-10 | Stop reason: HOSPADM

## 2024-11-05 RX ORDER — FUROSEMIDE 20 MG/1
20 TABLET ORAL DAILY
Status: DISCONTINUED | OUTPATIENT
Start: 2024-11-05 | End: 2024-11-10 | Stop reason: HOSPADM

## 2024-11-05 RX ORDER — RISPERIDONE 0.25 MG/1
0.25 TABLET ORAL NIGHTLY
Status: DISCONTINUED | OUTPATIENT
Start: 2024-11-05 | End: 2024-11-10 | Stop reason: HOSPADM

## 2024-11-05 RX ORDER — TRAZODONE HYDROCHLORIDE 50 MG/1
25 TABLET, FILM COATED ORAL NIGHTLY
Status: DISCONTINUED | OUTPATIENT
Start: 2024-11-05 | End: 2024-11-10 | Stop reason: HOSPADM

## 2024-11-05 RX ORDER — OXYCODONE HYDROCHLORIDE 5 MG/1
10 TABLET ORAL EVERY 4 HOURS PRN
Status: DISCONTINUED | OUTPATIENT
Start: 2024-11-05 | End: 2024-11-06

## 2024-11-05 RX ORDER — ROSUVASTATIN CALCIUM 10 MG/1
30 TABLET, COATED ORAL DAILY
Status: DISCONTINUED | OUTPATIENT
Start: 2024-11-05 | End: 2024-11-10 | Stop reason: HOSPADM

## 2024-11-05 RX ORDER — SODIUM CHLORIDE 1 G/1
2 TABLET ORAL
Status: DISCONTINUED | OUTPATIENT
Start: 2024-11-05 | End: 2024-11-10 | Stop reason: HOSPADM

## 2024-11-05 RX ORDER — SERTRALINE HYDROCHLORIDE 100 MG/1
200 TABLET, FILM COATED ORAL DAILY
Status: DISCONTINUED | OUTPATIENT
Start: 2024-11-05 | End: 2024-11-10 | Stop reason: HOSPADM

## 2024-11-05 RX ORDER — HYDROMORPHONE HYDROCHLORIDE 1 MG/ML
0.5 INJECTION, SOLUTION INTRAMUSCULAR; INTRAVENOUS; SUBCUTANEOUS
Status: DISCONTINUED | OUTPATIENT
Start: 2024-11-05 | End: 2024-11-05

## 2024-11-05 RX ORDER — HYDROMORPHONE HYDROCHLORIDE 1 MG/ML
0.25 INJECTION, SOLUTION INTRAMUSCULAR; INTRAVENOUS; SUBCUTANEOUS ONCE
Status: COMPLETED | OUTPATIENT
Start: 2024-11-05 | End: 2024-11-05

## 2024-11-05 RX ADMIN — Medication 10 ML: at 20:27

## 2024-11-05 RX ADMIN — LUBIPROSTONE 24 MCG: 24 CAPSULE, GELATIN COATED ORAL at 20:27

## 2024-11-05 RX ADMIN — Medication 10 ML: at 09:26

## 2024-11-05 RX ADMIN — HYDROCODONE BITARTRATE AND ACETAMINOPHEN 1 TABLET: 10; 325 TABLET ORAL at 09:26

## 2024-11-05 RX ADMIN — ONDANSETRON 4 MG: 2 INJECTION, SOLUTION INTRAMUSCULAR; INTRAVENOUS at 07:26

## 2024-11-05 RX ADMIN — POTASSIUM CHLORIDE 10 MEQ: 750 TABLET, EXTENDED RELEASE ORAL at 09:25

## 2024-11-05 RX ADMIN — ROSUVASTATIN 30 MG: 10 TABLET, FILM COATED ORAL at 09:25

## 2024-11-05 RX ADMIN — HYDROMORPHONE HYDROCHLORIDE 0.5 MG: 1 INJECTION, SOLUTION INTRAMUSCULAR; INTRAVENOUS; SUBCUTANEOUS at 13:36

## 2024-11-05 RX ADMIN — GUAIFENESIN 1200 MG: 600 TABLET, EXTENDED RELEASE ORAL at 20:27

## 2024-11-05 RX ADMIN — TRAZODONE HYDROCHLORIDE 25 MG: 50 TABLET ORAL at 20:27

## 2024-11-05 RX ADMIN — RISPERIDONE 0.25 MG: 0.25 TABLET, FILM COATED ORAL at 20:27

## 2024-11-05 RX ADMIN — OXCARBAZEPINE 150 MG: 150 TABLET, FILM COATED ORAL at 20:27

## 2024-11-05 RX ADMIN — LEVOTHYROXINE SODIUM 25 MCG: 25 TABLET ORAL at 09:25

## 2024-11-05 RX ADMIN — GADOTERIDOL 15 ML: 279.3 INJECTION, SOLUTION INTRAVENOUS at 22:10

## 2024-11-05 RX ADMIN — CYCLOBENZAPRINE 10 MG: 10 TABLET, FILM COATED ORAL at 09:25

## 2024-11-05 RX ADMIN — FUROSEMIDE 20 MG: 20 TABLET ORAL at 09:25

## 2024-11-05 RX ADMIN — PANTOPRAZOLE SODIUM 40 MG: 40 TABLET, DELAYED RELEASE ORAL at 09:25

## 2024-11-05 RX ADMIN — Medication 10 ML: at 00:50

## 2024-11-05 RX ADMIN — HYDROMORPHONE HYDROCHLORIDE 0.25 MG: 1 INJECTION, SOLUTION INTRAMUSCULAR; INTRAVENOUS; SUBCUTANEOUS at 00:49

## 2024-11-05 RX ADMIN — ORPHENADRINE CITRATE 60 MG: 30 INJECTION, SOLUTION INTRAMUSCULAR; INTRAVENOUS at 00:50

## 2024-11-05 RX ADMIN — OXCARBAZEPINE 150 MG: 150 TABLET, FILM COATED ORAL at 09:26

## 2024-11-05 RX ADMIN — ORPHENADRINE CITRATE 60 MG: 30 INJECTION, SOLUTION INTRAMUSCULAR; INTRAVENOUS at 13:58

## 2024-11-05 RX ADMIN — SERTRALINE 200 MG: 100 TABLET, FILM COATED ORAL at 09:25

## 2024-11-05 NOTE — PLAN OF CARE
Patient is a 68 year old female who presents to [facility] for back pain. Neurosurgery has been consulted. Patient had an x-ray performed showing chronic compression fractures at L1, L3 and L5. She also has a vertebroplasty at L2 and L4. I ordered an MRI for more detailed imaging. Will see the patient after MRI is complete. Please reach out with any questions or concerns.     Kasia Ceballos PA-C

## 2024-11-05 NOTE — PLAN OF CARE
Problem: Adult Inpatient Plan of Care  Goal: Absence of Hospital-Acquired Illness or Injury  Intervention: Identify and Manage Fall Risk  Recent Flowsheet Documentation  Taken 11/5/2024 1800 by Kimberly Mascorro RN  Safety Promotion/Fall Prevention: safety round/check completed  Taken 11/5/2024 1600 by Kimberly Mascorro RN  Safety Promotion/Fall Prevention: safety round/check completed  Taken 11/5/2024 1336 by Kimberly Mascorro RN  Safety Promotion/Fall Prevention: safety round/check completed  Taken 11/5/2024 1200 by Kimberly Mascorro RN  Safety Promotion/Fall Prevention: patient off unit  Taken 11/5/2024 1000 by Kimberly Mascorro RN  Safety Promotion/Fall Prevention: safety round/check completed  Taken 11/5/2024 0830 by Kimberly Mascorro RN  Safety Promotion/Fall Prevention: safety round/check completed  Intervention: Prevent Skin Injury  Recent Flowsheet Documentation  Taken 11/5/2024 1600 by Kimberly Mascorro RN  Body Position:   side-lying   left  Taken 11/5/2024 1336 by Kimberly Mascorro RN  Body Position: position changed independently  Taken 11/5/2024 0830 by Kimberly Mascorro RN  Body Position: position changed independently  Goal: Optimal Comfort and Wellbeing  Intervention: Monitor Pain and Promote Comfort  Recent Flowsheet Documentation  Taken 11/5/2024 0925 by Kimberly Mascorro RN  Pain Management Interventions:   pain medication given   pillow support provided   position adjusted     Problem: Fall Injury Risk  Goal: Absence of Fall and Fall-Related Injury  Intervention: Promote Injury-Free Environment  Recent Flowsheet Documentation  Taken 11/5/2024 1800 by Kimberly Mascorro RN  Safety Promotion/Fall Prevention: safety round/check completed  Taken 11/5/2024 1600 by Kimberly Mascorro RN  Safety Promotion/Fall Prevention: safety round/check completed  Taken 11/5/2024 1336 by Kimberly Mascorro RN  Safety Promotion/Fall Prevention: safety round/check completed  Taken 11/5/2024  1200 by Kimberly Mascorro, RN  Safety Promotion/Fall Prevention: patient off unit  Taken 11/5/2024 1000 by Kimberly Mascorro, RN  Safety Promotion/Fall Prevention: safety round/check completed  Taken 11/5/2024 0830 by Kimberly Mascorro, RN  Safety Promotion/Fall Prevention: safety round/check completed   Goal Outcome Evaluation:

## 2024-11-05 NOTE — PLAN OF CARE
"Goal Outcome Evaluation:  Plan of Care Reviewed With: patient        Progress: no change  Outcome Evaluation: Pt is a 69 y/o F who presented to [facility] w/ c/o severe, acute low back pain with inability to move. Pt w/ chronic compression fractures at L1, L3 and L5 and with hx of vertebroplasty at L2 and L4, however, radiograph (-) for acute fx/dislocation, MRI pending. Pt describes pain as \"spasms with numbness in my legs\" and with nausea 2*/2 severity of pain. Pt is a resident of [facility] where she resides in a room with her  and is typically MOD I w/ transfers from recliner (sleeps in recliner), to/from Rollator walker (sits on Rollator walker and uses feet to \"scoot around backwards\", typically MOD I with bathing/dressing, hasn't ambulated in ~3-4 months, reports she's awaiting delivery of her wheelchair). During eval, pt with 10/10 LBP and with nausea (RN aware), able to tolerate only rolling/repositioning in bed, requiring MAX A with increased pain with all mobility. Neuro has ordered an MRI and will consult after results. At this time, pt not safe to return to [facility], significantly below baseline. Recommending SNF at d/c and plan to see 5x/week at [facility] for progression of mobility. PPE: gloves    Anticipated Discharge Disposition (PT): skilled nursing facility                        "

## 2024-11-05 NOTE — DISCHARGE PLACEMENT REQUEST
"Jerrod Wasserman (68 y.o. Female)       Date of Birth   1955    Social Security Number       Address   [address] [place] [address]    Home Phone   [phone]    MRN   [id number]       Religious   [facility]    Marital Status                               Admission Date   11/4/24    Admission Type   Emergency    Admitting Provider   Lacho Vance DO    Attending Provider   Lacho Vance DO    Department, Room/Bed   [facility] OBSERVATION, 234/1       Discharge Date       Discharge Disposition       Discharge Destination                                 Attending Provider: Lacho Vance DO    Allergies: Contrast Dye (Echo Or Unknown Ct/mr), Iodinated Contrast Media    Isolation: None   Infection: None   Code Status: CPR    Ht: 165 cm (64.96\")   Wt: 111 kg (244 lb 0.8 oz)    Admission Cmt: None   Principal Problem: Back pain [M54.9]                   Active Insurance as of 11/4/2024       Primary Coverage       Payor Plan Insurance Group Employer/Plan Group    [employer]HEM MEDICARE REPLACEMENT AN[employer]M MEDICARE ADVANTAGE [id number]       Payor Plan Address Payor Plan Phone Number Payor Plan Fax Number Effective Dates    [address] [phone]  7/1/2019 - None Entered    [facility] [address]         Subscriber Name Subscriber Birth Date Member ID       JERROD WASSERMAN 1955 [id number]               Secondary Coverage       Payor Plan Insurance Group Employer/Plan Group    [employer]HEM MEDICAID ANTHEM PATHWAYS        Payor Plan Address Payor Plan Phone Number Payor Plan Fax Number Effective Dates    [address]   11/4/2024 - None Entered    [facility] [address]         Subscriber Name Subscriber Birth Date Member ID       JERROD WASSERMAN 1955 [id number]                     Emergency Contacts        (Rel.) Home Phone Work Phone Mobile Phone    JERI JULIAN (Spouse) [phone] -- " [phone]    Arsen Barbosa (Sister) -- -- [phone]              Insurance Information                  ANTHEM MEDICARE REPLACEMENT/ANTHEM MEDICARE ADVANTAGE Phone: [phone]    Subscriber: Morgan Quick Subscriber#: [id number]    Group#: [id number] Precert#: --    Authorization#: NPN Effective Date: --        [employer]HE[employer] MEDICAID/ANTHEM PATHWAYS Phone: --    Subscriber: Morgan Quick Subscriber#: [id number]    Group#: -- Precert#: --    Authorization#: 2Nd to mcare plan Effective Date: --

## 2024-11-05 NOTE — THERAPY EVALUATION
"Patient Name: Morgan Quick  : 1955    MRN: [id number]                              Today's Date: 2024       Admit Date: 2024    Visit Dx:     ICD-10-CM ICD-9-CM   1. Acute low back pain, unspecified back pain laterality, unspecified whether sciatica present  M54.50 724.2     Patient Active Problem List   Diagnosis    Morbidly obese    Essential hypertension    Malignant neoplasm of overlapping sites of left breast in female, estrogen receptor positive    Primary cancer of left female breast    Abdominal pain, unspecified abdominal location    Bipolar 1 disorder    High cholesterol    Anorexia    Nausea    Low back pain    Back pain    Compression fracture of L2 lumbar vertebra, closed, initial encounter    Compression fracture of L4 lumbar vertebra, closed, initial encounter     Past Medical History:   Diagnosis Date    Bipolar 1 disorder     Breast cancer     Cancer     Disease of thyroid gland     High cholesterol     Hypertension      Past Surgical History:   Procedure Laterality Date    ABDOMINAL SURGERY      APPENDECTOMY      ENDOSCOPY N/A 2022    Procedure: ESOPHAGOGASTRODUODENOSCOPY with biopsy x 2 areas;  Surgeon: Deonte Wong MD;  Location: [facility] ENDOSCOPY;  Service: Gastroenterology;  Laterality: N/A;  post: gastritis, duodinitis, nodule,     HYSTERECTOMY      MASTECTOMY      L side      General Information       Row Name 24 0949          Physical Therapy Time and Intention    Document Type evaluation  -AO     Mode of Treatment physical therapy  -AO       Row Name 24 0949          General Information    Patient Profile Reviewed yes  -AO     Prior Level of Function --  Typically MOD I w/ transfers from recliner (sleeps in recliner), to/from Rollator walker (sits on Rollator walker and uses feet to \"scoot around backwards\", tpyically MOD I with bathing/dressing, hasn't ambulated in ~3-4 months  -AO     Existing Precautions/Restrictions fall;oxygen therapy device " and L/min  -AO     Barriers to Rehab medically complex;previous functional deficit;impaired sensation  -AO       Row Name 11/05/24 0949          Living Environment    People in Home facility resident  Pt resides with  in a shared room at [facility]  -AO       Row Name 11/05/24 0949          Home Main Entrance    Number of Stairs, Main Entrance none  -AO       Row Name 11/05/24 0949          Stairs Within Home, Primary    Number of Stairs, Within Home, Primary none  -AO       Row Name 11/05/24 0949          Cognition    Orientation Status (Cognition) oriented x 4  -AO       Row Name 11/05/24 0949          Safety Issues/Impairments Affecting Functional Mobility    Impairments Affecting Function (Mobility) strength;pain;endurance/activity tolerance;sensation/sensory awareness;shortness of breath  -AO               User Key  (r) = Recorded By, (t) = Taken By, (c) = Cosigned By      Initials Name Provider Type    Nayeli Oreilly PT Physical Therapist                   Mobility       Row Name 11/05/24 0949          Bed Mobility    Bed Mobility rolling left;rolling right  -AO     Rolling Left [place] (Bed Mobility) maximum assist (25% patient effort)  -AO     Rolling Right [place] (Bed Mobility) maximum assist (25% patient effort)  -AO     Comment, (Bed Mobility) significant pain with rolling and repositioning in bed, unable to tolerate supine to/from sit transfer this date  -AO       Row Name 11/05/24 0949          Bed-Chair Transfer    Bed-Chair [place] (Transfers) unable to assess  -AO       Row Name 11/05/24 0949          Sit-Stand Transfer    Sit-Stand [place] (Transfers) unable to assess  -AO       Row Name 11/05/24 0949          Gait/Stairs (Locomotion)    Comment, (Gait/Stairs) Pt has been non-ambulatory for ~3-4 months  -AO               User Key  (r) = Recorded By, (t) = Taken By, (c) = Cosigned By      Initials Name Provider Type    Nayeli Oreilly, PT Physical Therapist                    Obj/Interventions       Row Name 11/05/24 0949          Range of Motion Comprehensive    Comment, General Range of Motion unable to fully assess 2*/2 pain, grossly WFL during bed mobility  -AO       Row Name 11/05/24 0949          Strength Comprehensive (MMT)    Comment, General Manual Muscle Testing (MMT) Assessment unable to fully assess 2*/2 pain, grossly WFL during bed mobility  -AO       Row Name 11/05/24 0949          Sensory Assessment (Somatosensory)    Sensory Assessment (Somatosensory) other (see comments)  Pt c/o numbness/tingling BLE distal to hips  -AO               User Key  (r) = Recorded By, (t) = Taken By, (c) = Cosigned By      Initials Name Provider Type    AO Nayeli Mario, PT Physical Therapist                   Goals/Plan       Row Name 11/05/24 0949          Bed Mobility Goal 1 (PT)    Activity/Assistive Device (Bed Mobility Goal 1, PT) bed mobility activities, all  -AO     [place] Level/Cues Needed (Bed Mobility Goal 1, PT) minimum assist (75% or more patient effort)  -AO     Time Frame (Bed Mobility Goal 1, PT) long term goal (LTG);2 weeks  -AO     Progress/Outcomes (Bed Mobility Goal 1, PT) goal not met  -AO       Row Name 11/05/24 0949          Transfer Goal 1 (PT)    Activity/Assistive Device (Transfer Goal 1, PT) transfers, all;walker, rolling  -AO     [place] Level/Cues Needed (Transfer Goal 1, PT) minimum assist (75% or more patient effort)  -AO     Time Frame (Transfer Goal 1, PT) long term goal (LTG);2 weeks  -AO     Progress/Outcome (Transfer Goal 1, PT) goal not met  -AO       Row Name 11/05/24 0949          Gait Training Goal 1 (PT)    Activity/Assistive Device (Gait Training Goal 1, PT) gait (walking locomotion)  -AO     [place] Level (Gait Training Goal 1, PT) minimum assist (75% or more patient effort)  -AO     Distance (Gait Training Goal 1, PT) 3 ft  -AO     Time Frame (Gait Training Goal 1, PT) long term goal (LTG);2 weeks  -AO      "Progress/Outcome (Gait Training Goal 1, PT) goal not met  -AO       Row Name 11/05/24 0949          Therapy Assessment/Plan (PT)    Planned Therapy Interventions (PT) balance training;bed mobility training;gait training;home exercise program;neuromuscular re-education;patient/family education;strengthening;transfer training  -AO               User Key  (r) = Recorded By, (t) = Taken By, (c) = Cosigned By      Initials Name Provider Type    AO Nayeli Mario, PT Physical Therapist                   Clinical Impression       Row Name 11/05/24 0949          Pain    Pretreatment Pain Rating 10/10  -AO     Posttreatment Pain Rating 10/10  -AO     Pain Location back  -AO     Pain Side/Orientation lower;posterior  -AO     Pain Management Interventions nursing notified  -AO     Response to Pain Interventions no change per patient report  -AO       Row Name 11/05/24 0949          Plan of Care Review    Plan of Care Reviewed With patient  -AO     Progress no change  -AO     Outcome Evaluation Pt is a 69 y/o F who presented to [facility] w/ c/o severe, acute low back pain with inability to move. Pt w/ chronic compression fractures at L1, L3 and L5 and with hx of vertebroplasty at L2 and L4, however, radiograph (-) for acute fx/dislocation, MRI pending. Pt describes pain as \"spasms with numbness in my legs\" and with nausea 2*/2 severity of pain. Pt is a resident of [facility] where she resides in a room with her  and is typically MOD I w/ transfers from recliner (sleeps in recliner), to/from Rollator walker (sits on Rollator walker and uses feet to \"scoot around backwards\", typically MOD I with bathing/dressing, hasn't ambulated in ~3-4 months, reports she's awaiting delivery of her wheelchair). During eval, pt with 10/10 LBP and with nausea (RN aware), able to tolerate only rolling/repositioning in bed, requiring MAX A with increased pain with all mobility. Neuro has ordered an MRI and will consult after results. At this " time, pt not safe to return to [facility], significantly below baseline. Recommending SNF at d/c and plan to see 5x/week at [facility] for progression of mobility. PPE: gloves  -AO       Row Name 11/05/24 0949          Therapy Assessment/Plan (PT)    Rehab Potential (PT) fair  -AO     Criteria for Skilled Interventions Met (PT) yes;skilled treatment is necessary  -AO     Therapy Frequency (PT) 5 times/wk  -AO     Predicted Duration of Therapy Intervention (PT) until d/c  -AO       Row Name 11/05/24 0949          Vital Signs    Recovery Time vitals stable and WNL on 3L 02 (uses 3L at night at [facility])  -AO       Row Name 11/05/24 0949          Positioning and Restraints    Pre-Treatment Position in bed  -AO     Post Treatment Position bed  -AO     In Bed notified nsg;side lying right;call light within reach;encouraged to call for assist;exit alarm on  -AO               User Key  (r) = Recorded By, (t) = Taken By, (c) = Cosigned By      Initials Name Provider Type    Nayeli Oreilly, PT Physical Therapist                   Outcome Measures       Row Name 11/05/24 0949          How much help from another person do you currently need...    Turning from your back to your side while in flat bed without using bedrails? 2  -AO     Moving from lying on back to sitting on the side of a flat bed without bedrails? 1  -AO     Moving to and from a bed to a chair (including a wheelchair)? 1  -AO     Standing up from a chair using your arms (e.g., wheelchair, bedside chair)? 1  -AO     Climbing 3-5 steps with a railing? 1  -AO     To walk in hospital room? 1  -AO     AM-PAC 6 Clicks Score (PT) 7  -AO     Highest Level of Mobility Goal 2 --> Bed activities/dependent transfer  -AO       Row Name 11/05/24 0949          Functional Assessment    Outcome Measure Options AM-PAC 6 Clicks Basic Mobility (PT)  -AO               User Key  (r) = Recorded By, (t) = Taken By, (c) = Cosigned By      Initials Name Provider Type    Nayeli Oreilly, PT  "Physical Therapist                                 Physical Therapy Education       Title: PT OT SLP Therapies (Done)       Topic: Physical Therapy (Done)       Point: Mobility training (Done)       Learning Progress Summary            Patient Acceptance, E,TB, VU by AO at 11/5/2024 1037                      Point: Body mechanics (Done)       Learning Progress Summary            Patient Acceptance, E,TB, VU by AO at 11/5/2024 1037                      Point: Precautions (Done)       Learning Progress Summary            Patient Acceptance, E,TB, VU by AO at 11/5/2024 1037                                      User Key       Initials Effective Dates Name Provider Type Discipline    AO 06/16/21 -  Nayeli Mario, PT Physical Therapist PT                  PT Recommendation and Plan  Planned Therapy Interventions (PT): balance training, bed mobility training, gait training, home exercise program, neuromuscular re-education, patient/family education, strengthening, transfer training  Progress: no change  Outcome Evaluation: Pt is a 67 y/o F who presented to [facility] w/ c/o severe, acute low back pain with inability to move. Pt w/ chronic compression fractures at L1, L3 and L5 and with hx of vertebroplasty at L2 and L4, however, radiograph (-) for acute fx/dislocation, MRI pending. Pt describes pain as \"spasms with numbness in my legs\" and with nausea 2*/2 severity of pain. Pt is a resident of [facility] where she resides in a room with her  and is typically MOD I w/ transfers from recliner (sleeps in recliner), to/from Rollator walker (sits on Rollator walker and uses feet to \"scoot around backwards\", typically MOD I with bathing/dressing, hasn't ambulated in ~3-4 months, reports she's awaiting delivery of her wheelchair). During eval, pt with 10/10 LBP and with nausea (RN aware), able to tolerate only rolling/repositioning in bed, requiring MAX A with increased pain with all mobility. Neuro has ordered an MRI and " will consult after results. At this time, pt not safe to return to [facility], significantly below baseline. Recommending SNF at d/c and plan to see 5x/week at [facility] for progression of mobility. PPE: gloves     Time Calculation:   PT Evaluation Complexity  History, PT Evaluation Complexity: 1-2 personal factors and/or comorbidities  Examination of Body Systems (PT Eval Complexity): total of 3 or more elements  Clinical Presentation (PT Evaluation Complexity): evolving  Clinical Decision Making (PT Evaluation Complexity): moderate complexity  Overall Complexity (PT Evaluation Complexity): moderate complexity     PT Charges       Row Name 11/05/24 1039             Time Calculation    Start Time 0949  -AO      Stop Time 1004  -AO      Time Calculation (min) 15 min  -AO      PT Received On 11/05/24  -AO      PT - Next Appointment 11/06/24  -AO      PT Goal Re-Cert Due Date 11/19/24  -AO         Time Calculation- PT    Total Timed Code Minutes- PT 0 minute(s)  -AO                User Key  (r) = Recorded By, (t) = Taken By, (c) = Cosigned By      Initials Name Provider Type    AO Nayeli Mario, PT Physical Therapist                  Therapy Charges for Today       Code Description Service Date Service Provider Modifiers Qty    [id number]  PT EVAL MOD COMPLEXITY 3 11/5/2024 Nayeli Mario, PT GP 1            PT G-Codes  Outcome Measure Options: AM-PAC 6 Clicks Basic Mobility (PT)  AM-PAC 6 Clicks Score (PT): 7  PT Discharge Summary  Anticipated Discharge Disposition (PT): skilled nursing facility    Nayeli Mario PT  11/5/2024

## 2024-11-05 NOTE — H&P
"[facility] Observation Unit H&P    Patient Name: Morgan Quick  : 1955  MRN: [id number]  Primary Care Physician: Wayne Saavedra MD  Date of admission: 2024     Patient Care Team:  Wayne Saavedra MD as PCP - General (Internal Medicine)          Subjective   History Present Illness     Chief Complaint:   Chief Complaint   Patient presents with    Back Pain     Lower back pain     Back Pain     Back Pain        ED 2024  History of Present Illness  Patient is a 68-year-old female presents emergency department for evaluation of low back pain.  Patient reports she went to get up to go to the activity room today, experienced pain in her lower back, she reports that she sat back down, reports and then injured her left upper chest going to move.  Her chest no longer hurts.  She longer has neck pain  Patient did not fall or have any recent trauma.  She has not had any fevers.  Denies any new numbness, tingling, weakness, no urinary retention, bowel or bladder incontinence, no IVDA.     Observation 2024  Patient agrees with HPI noted above including acute on chronic back pain.  She reports history of back pain \" for years\" but proceeded to the ED yesterday as she felt worse and was unable to walk even with a walker.  States low back pain radiates to legs and is associated with tingling of lower extremities.  Currently rates pain 10/10.       Discussed with neurosurgeon and patient will benefit from pain management consult in am. OT eval pending. Will need TLSO brace when out of bed. Per UR, meets IP criteria if not discharging today, will consult the hospitalist.     Review of Systems   Musculoskeletal:  Positive for back pain.     Review of Systems   Musculoskeletal:  Positive for back pain, myalgias and stiffness.   Neurological:  Positive for numbness and paresthesias.   All other systems reviewed and are negative.         Personal History     Past Medical History:   Past Medical History: "   Diagnosis Date    Bipolar 1 disorder     Breast cancer     Cancer     Disease of thyroid gland     High cholesterol     Hypertension        Surgical History:      Past Surgical History:   Procedure Laterality Date    ABDOMINAL SURGERY      APPENDECTOMY      ENDOSCOPY N/A 9/25/2022    Procedure: ESOPHAGOGASTRODUODENOSCOPY with biopsy x 2 areas;  Surgeon: Deonte Wong MD;  Location: [facility] ENDOSCOPY;  Service: Gastroenterology;  Laterality: N/A;  post: gastritis, duodinitis, nodule,     HYSTERECTOMY      MASTECTOMY      L side           Family History: family history includes Cancer in her mother. Otherwise pertinent FHx was reviewed and unremarkable.     Social History:  reports that she has never smoked. She has never been exposed to tobacco smoke. She has never used smokeless tobacco. She reports that she does not drink alcohol and does not use drugs.      Medications:  Prior to Admission medications    Medication Sig Start Date End Date Taking? Authorizing Provider   furosemide (LASIX) 20 MG tablet Take 1 tablet by mouth Daily. 10/11/22  Yes Ladonna Redd DO   guaiFENesin (MUCINEX) 600 MG 12 hr tablet Take 2 tablets by mouth Every 12 (Twelve) Hours. 10/10/22  Yes Ladonna Redd DO   HYDROcodone-acetaminophen (NORCO)  MG per tablet Take 1 tablet by mouth 4 (Four) Times a Day.   Yes Brennon Serrano MD   levothyroxine (SYNTHROID, LEVOTHROID) 25 MCG tablet Take 1 tablet by mouth Every Morning. 10/11/22  Yes Ladonna Redd DO   lubiprostone (AMITIZA) 24 MCG capsule Take 1 capsule by mouth 2 (Two) Times a Day.   Yes Brennon Serrano MD   OXcarbazepine (TRILEPTAL) 150 MG tablet Take 1 tablet by mouth 2 (Two) Times a Day. 10/10/22  Yes Ladonna Redd DO   pantoprazole (PROTONIX) 40 MG EC tablet Take 1 tablet by mouth Every Morning. 10/11/22  Yes Ladonna Redd DO   potassium chloride (MICRO-K) 10 MEQ CR capsule Take 1 capsule by mouth Daily.   Yes Brennon Serrano MD   risperiDONE  (risperDAL) 0.25 MG tablet Take 1 tablet by mouth Every Night. 10/10/22  Yes Ladonna Redd DO   rosuvastatin (CRESTOR) 10 MG tablet Take 3 tablets by mouth Daily.   Yes ProviderBrennon MD   sertraline (ZOLOFT) 100 MG tablet Take 2 tablets by mouth Daily. 10/10/22  Yes Ladonna Redd DO   sodium chloride 1 g tablet Take 2 tablets by mouth 3 (Three) Times a Day With Meals. 10/10/22  Yes Ladonna Redd DO   traZODone (DESYREL) 50 MG tablet Take 0.5 tablets by mouth Every Night.   Yes Provider, MD Brennon       Allergies:    Allergies   Allergen Reactions    Contrast Dye (Echo Or Unknown Ct/Mr) Shortness Of Breath     Pt states she had trouble breathing when she had contrast in the past.    Iodinated Contrast Media Hives       Objective   Objective     Vital Signs  Temp:  [97.5 °F (36.4 °C)-98.7 °F (37.1 °C)] 97.5 °F (36.4 °C)  Heart Rate:  [58-77] 67  Resp:  [16-20] 16  BP: ()/(55-90) 143/65  SpO2:  [91 %-100 %] 93 %  on  Flow (L/min) (Oxygen Therapy):  [2-3] 3;   Device (Oxygen Therapy): nasal cannula  Body mass index is 40.66 kg/m².    Physical Exam  Vitals and nursing note reviewed.   Constitutional:       Appearance: Normal appearance. She is obese.   HENT:      Head: Normocephalic and atraumatic.      Right Ear: External ear normal.      Left Ear: External ear normal.      Nose: Nose normal.      Mouth/Throat:      Mouth: Mucous membranes are moist.      Pharynx: Oropharynx is clear.   Eyes:      Extraocular Movements: Extraocular movements intact.   Cardiovascular:      Rate and Rhythm: Normal rate and regular rhythm.      Pulses: Normal pulses.      Heart sounds: Normal heart sounds.   Pulmonary:      Effort: Pulmonary effort is normal.      Breath sounds: Normal breath sounds.   Abdominal:      General: Abdomen is flat. Bowel sounds are normal.      Palpations: Abdomen is soft.   Musculoskeletal:         General: Normal range of motion.      Cervical back: Normal range of motion.   Skin:      General: Skin is warm.   Neurological:      General: No focal deficit present.      Mental Status: She is alert and oriented to person, place, and time.   Psychiatric:         Mood and Affect: Mood normal.         Behavior: Behavior normal.         Results Review:  I have personally reviewed most recent lab results and radiology images and interpretations and agree with findings, most notably: CMP, CBC, x-ray spine.    Results from last 7 days   Lab Units 11/05/24  0425   WBC 10*3/mm3 8.67   HEMOGLOBIN g/dL 12.2   HEMATOCRIT % 43.3   PLATELETS 10*3/mm3 224     Results from last 7 days   Lab Units 11/05/24  0425   SODIUM mmol/L 142   POTASSIUM mmol/L 3.9   CHLORIDE mmol/L 104   CO2 mmol/L 27.5   BUN mg/dL 9   CREATININE mg/dL 0.51*   GLUCOSE mg/dL 114*   CALCIUM mg/dL 9.4     Estimated Creatinine Clearance: 130.8 mL/min (A) (by C-G formula based on SCr of 0.51 mg/dL (L)).  Brief Urine Lab Results       None            Microbiology Results (last 10 days)       ** No results found for the last 240 hours. **            ECG/EMG Results (most recent)       None                Results for orders placed during the hospital encounter of 09/24/22    Adult Transthoracic Echo Complete W/ Cont if Necessary Per Protocol    Interpretation Summary  · Estimated left ventricular EF was in agreement with the calculated left ventricular EF. Left ventricular ejection fraction appears to be 56 - 60%. Left ventricular systolic function is normal.  · There is calcification of the aortic valve mainly affecting the non-coronary, left coronary and right coronary cusp(s).  · The study is technically difficult for diagnosis.      XR Spine Lumbar Complete 4+VW    Result Date: 11/4/2024  Chronic appearing compression form is of L1-L5 with vertebroplasty at L2 and L4. No acute lumbar spine findings. Electronically Signed: Emily Gonzalez MD  11/4/2024 3:57 PM EST  Workstation ID: [id number]       Estimated Creatinine Clearance: 130.8 mL/min (A) (by  C-G formula based on SCr of 0.51 mg/dL (L)).    Assessment & Plan   Assessment/Plan       Active Hospital Problems    Diagnosis  POA    **Back pain [M54.9]  Yes      Resolved Hospital Problems   No resolved problems to display.        Back Pain   -CMP and CBC generally unremarkable  -X-ray lumbar spine showed chronic appearing compression form of L1-L5 with vertebroplasty at L2 and L4  -In the ED patient given Dilaudid, morphine, lidocaine  -Neurosurgeon consulted  -MRI lumbar spine showed acute to subacute T12 compression fracture, T11 vertebral body edema, chronic L1 compression fracture, prior L2 and L4 kyphoplasty and multilevel lumbar spondylosis.   -PT consulted recommended SNF.  -OT eval pending  -TLSO brace ordered  -Oxycodone as needed  -Neurosurgery following  -Per UR, meets IP criteria if not discharging tonight. Will consult the hospitalist.       Hypothyroidism  -Continue Synthroid     GERD  -Continue PPI     Mood disorder  -Continue Zoloft, trazodone, Risperdal     Hyperlipidemia  -Continue Crestor     Obesity  -BMI 40.66  -Lifestyle modifications               VTE Prophylaxis - Active VTE Prophylaxis  Mechanical:        Start        11/04/24 1742  Maintain Sequential Compression Device  Continuous                          Select Pharmacologic VTE Prophylaxis if Desired & Appropriate      CODE STATUS:    Code Status and Medical Interventions: CPR (Attempt to Resuscitate); Full Support   Ordered at: 11/04/24 1719     Level Of Support Discussed With:    Patient     Code Status (Patient has no pulse and is not breathing):    CPR (Attempt to Resuscitate)     Medical Interventions (Patient has pulse or is breathing):    Full Support       This patient has been examined wearing personal protective equipment.     I discussed the patient's findings and my recommendations with patient and nursing staff.      Signature: Electronically signed by NIDIA Cadena, 11/05/24, 11:20 AM EST.

## 2024-11-05 NOTE — PROGRESS NOTES
"[facility] Observation Unit Progress Note    Patient Name: Morgan Quick  : 1955  MRN: [id number]  Primary Care Physician: Wayne Saavedra MD  Date of admission: 2024     Patient Care Team:  Wayne Saavedra MD as PCP - General (Internal Medicine)          Subjective   History Present Illness     Chief Complaint:   Chief Complaint   Patient presents with    Back Pain     Lower back pain     Back Pain     Back Pain          ED 2024  History of Present Illness  Patient is a 68-year-old female presents emergency department for evaluation of low back pain.  Patient reports she went to get up to go to the activity room today, experienced pain in her lower back, she reports that she sat back down, reports and then injured her left upper chest going to move.  Her chest no longer hurts.  She longer has neck pain  Patient did not fall or have any recent trauma.  She has not had any fevers.  Denies any new numbness, tingling, weakness, no urinary retention, bowel or bladder incontinence, no IVDA.     Observation 2024  Patient agrees with HPI noted above including acute on chronic back pain.  She reports history of back pain \" for years\" but proceeded to the ED yesterday as she felt worse and was unable to walk even with a walker.  States low back pain radiates to legs and is associated with tingling of lower extremities.  Currently rates pain 10/10.    Discussed with neurosurgeon and patient will benefit from pain management consult in am. OT eval pending. Will need TLSO brace when out of bed. Per UR, meets IP criteria if not discharging today, will consult the hospitalist.     Review of Systems   Musculoskeletal:  Positive for back pain.     Review of Systems   Musculoskeletal:  Positive for back pain, myalgias and stiffness.   Neurological:  Positive for numbness and paresthesias.   All other systems reviewed and are negative.            Personal History     Past Medical History:   Past Medical " History:   Diagnosis Date    Bipolar 1 disorder     Breast cancer     Cancer     Disease of thyroid gland     High cholesterol     Hypertension        Surgical History:      Past Surgical History:   Procedure Laterality Date    ABDOMINAL SURGERY      APPENDECTOMY      ENDOSCOPY N/A 9/25/2022    Procedure: ESOPHAGOGASTRODUODENOSCOPY with biopsy x 2 areas;  Surgeon: Deonte Wong MD;  Location: [facility] ENDOSCOPY;  Service: Gastroenterology;  Laterality: N/A;  post: gastritis, duodinitis, nodule,     HYSTERECTOMY      MASTECTOMY      L side           Family History: family history includes Cancer in her mother. Otherwise pertinent FHx was reviewed and unremarkable.     Social History:  reports that she has never smoked. She has never been exposed to tobacco smoke. She has never used smokeless tobacco. She reports that she does not drink alcohol and does not use drugs.      Medications:  Prior to Admission medications    Medication Sig Start Date End Date Taking? Authorizing Provider   furosemide (LASIX) 20 MG tablet Take 1 tablet by mouth Daily. 10/11/22  Yes Ladonna Redd DO   guaiFENesin (MUCINEX) 600 MG 12 hr tablet Take 2 tablets by mouth Every 12 (Twelve) Hours. 10/10/22  Yes Ladonna Redd DO   HYDROcodone-acetaminophen (NORCO)  MG per tablet Take 1 tablet by mouth 4 (Four) Times a Day.   Yes Brennon Serrano MD   levothyroxine (SYNTHROID, LEVOTHROID) 25 MCG tablet Take 1 tablet by mouth Every Morning. 10/11/22  Yes Ladonna Redd DO   lubiprostone (AMITIZA) 24 MCG capsule Take 1 capsule by mouth 2 (Two) Times a Day.   Yes Brennon Serrano MD   OXcarbazepine (TRILEPTAL) 150 MG tablet Take 1 tablet by mouth 2 (Two) Times a Day. 10/10/22  Yes Ladonna Redd DO   pantoprazole (PROTONIX) 40 MG EC tablet Take 1 tablet by mouth Every Morning. 10/11/22  Yes Ladonna Redd DO   potassium chloride (MICRO-K) 10 MEQ CR capsule Take 1 capsule by mouth Daily.   Yes Brennon Serrano MD    risperiDONE (risperDAL) 0.25 MG tablet Take 1 tablet by mouth Every Night. 10/10/22  Yes Ladonna Redd DO   rosuvastatin (CRESTOR) 10 MG tablet Take 3 tablets by mouth Daily.   Yes ProviderBrennon MD   sertraline (ZOLOFT) 100 MG tablet Take 2 tablets by mouth Daily. 10/10/22  Yes Ladonna Redd DO   sodium chloride 1 g tablet Take 2 tablets by mouth 3 (Three) Times a Day With Meals. 10/10/22  Yes Ladonna Redd DO   traZODone (DESYREL) 50 MG tablet Take 0.5 tablets by mouth Every Night.   Yes Provider, MD Brennon       Allergies:    Allergies   Allergen Reactions    Contrast Dye (Echo Or Unknown Ct/Mr) Shortness Of Breath     Pt states she had trouble breathing when she had contrast in the past.    Iodinated Contrast Media Hives       Objective   Objective     Vital Signs  Temp:  [97.2 °F (36.2 °C)-98.4 °F (36.9 °C)] 97.2 °F (36.2 °C)  Heart Rate:  [58-74] 70  Resp:  [15-20] 15  BP: ()/(55-83) 152/76  SpO2:  [91 %-100 %] 94 %  on  Flow (L/min) (Oxygen Therapy):  [3] 3;   Device (Oxygen Therapy): nasal cannula  Body mass index is 40.66 kg/m².    Physical Exam  Vitals and nursing note reviewed.   Constitutional:       Appearance: Normal appearance. She is obese.   HENT:      Head: Normocephalic and atraumatic.      Right Ear: External ear normal.      Left Ear: External ear normal.      Nose: Nose normal.      Mouth/Throat:      Mouth: Mucous membranes are moist.      Pharynx: Oropharynx is clear.   Eyes:      Extraocular Movements: Extraocular movements intact.   Cardiovascular:      Rate and Rhythm: Normal rate and regular rhythm.      Pulses: Normal pulses.      Heart sounds: Normal heart sounds.   Pulmonary:      Effort: Pulmonary effort is normal.      Breath sounds: Normal breath sounds.   Abdominal:      General: Abdomen is flat. Bowel sounds are normal.      Palpations: Abdomen is soft.   Musculoskeletal:         General: Normal range of motion.      Cervical back: Normal range of motion.    Skin:     General: Skin is warm.   Neurological:      General: No focal deficit present.      Mental Status: She is alert and oriented to person, place, and time.   Psychiatric:         Mood and Affect: Mood normal.         Behavior: Behavior normal.           Results Review:  I have personally reviewed most recent lab results and radiology images and interpretations and agree with findings, most notably: CMP, A1c, CBC, and x-ray spine and MRI lumbar spine.    Results from last 7 days   Lab Units 11/05/24  0425   WBC 10*3/mm3 8.67   HEMOGLOBIN g/dL 12.2   HEMATOCRIT % 43.3   PLATELETS 10*3/mm3 224     Results from last 7 days   Lab Units 11/05/24  0425   SODIUM mmol/L 142   POTASSIUM mmol/L 3.9   CHLORIDE mmol/L 104   CO2 mmol/L 27.5   BUN mg/dL 9   CREATININE mg/dL 0.51*   GLUCOSE mg/dL 114*   CALCIUM mg/dL 9.4     Estimated Creatinine Clearance: 130.8 mL/min (A) (by C-G formula based on SCr of 0.51 mg/dL (L)).  Brief Urine Lab Results       None            Microbiology Results (last 10 days)       ** No results found for the last 240 hours. **            ECG/EMG Results (most recent)       None                Results for orders placed during the hospital encounter of 09/24/22    Adult Transthoracic Echo Complete W/ Cont if Necessary Per Protocol    Interpretation Summary  · Estimated left ventricular EF was in agreement with the calculated left ventricular EF. Left ventricular ejection fraction appears to be 56 - 60%. Left ventricular systolic function is normal.  · There is calcification of the aortic valve mainly affecting the non-coronary, left coronary and right coronary cusp(s).  · The study is technically difficult for diagnosis.      MRI Lumbar Spine Without Contrast    Result Date: 11/5/2024  Impression: 1. Acute to subacute T12 compression fracture with mild height loss and no bony retropulsion, new since prior comparison. 2. T11 vertebral body edema significant height loss or fracture line which could  reflect an acute to subacute nondisplaced fracture versus possible contusion or if there is history of trauma. 3. Chronic L1 compression fracture with mild height loss, new since 1/2/2024. 4. Prior L2 and L4 kyphoplasty. Chronic L2, L3, L4 and L5 fractures are stable from prior. 5. Multilevel lumbar spondylosis without high-grade thecal sac or neuroforaminal stenosis. Degenerative change at T12-L1 has slightly worsened from prior. Electronically Signed: Michael Leal MD  11/5/2024 1:35 PM EST  Workstation ID: [id number]    XR Spine Lumbar Complete 4+VW    Result Date: 11/4/2024  Chronic appearing compression form is of L1-L5 with vertebroplasty at L2 and L4. No acute lumbar spine findings. Electronically Signed: Emily Gonzalez MD  11/4/2024 3:57 PM EST  Workstation ID: [id number]       Estimated Creatinine Clearance: 130.8 mL/min (A) (by C-G formula based on SCr of 0.51 mg/dL (L)).    Assessment & Plan   Assessment/Plan       Active Hospital Problems    Diagnosis  POA    **Back pain [M54.9]  Yes      Resolved Hospital Problems   No resolved problems to display.        Back Pain   -CMP and CBC generally unremarkable  -X-ray lumbar spine showed chronic appearing compression form of L1-L5 with vertebroplasty at L2 and L4  -In the ED patient given Dilaudid, morphine, lidocaine  -Neurosurgeon consulted  -MRI lumbar spine showed acute to subacute T12 compression fracture, T11 vertebral body edema, chronic L1 compression fracture, prior L2 and L4 kyphoplasty and multilevel lumbar spondylosis.  -PT consulted recommended SNF.  -OT eval pending  -TLSO brace ordered  -Oxycodone as needed  -Neurosurgery following  -Per UR, meets IP criteria if not discharging tonight. Will consult the hospitalist.        Hypothyroidism  -Continue Synthroid     GERD  -Continue PPI     Mood disorder  -Continue Zoloft, trazodone, Risperdal     Hyperlipidemia  -Continue Crestor     Obesity  -BMI 40.66  -Lifestyle modifications              VTE  Prophylaxis - Active VTE Prophylaxis  Mechanical:        Start        11/04/24 1742  Maintain Sequential Compression Device  Continuous                          Select Pharmacologic VTE Prophylaxis if Desired & Appropriate      CODE STATUS:    Code Status and Medical Interventions: CPR (Attempt to Resuscitate); Full Support   Ordered at: 11/04/24 1719     Level Of Support Discussed With:    Patient     Code Status (Patient has no pulse and is not breathing):    CPR (Attempt to Resuscitate)     Medical Interventions (Patient has pulse or is breathing):    Full Support       This patient has been examined wearing personal protective equipment.     I discussed the patient's findings and my recommendations with patient and nursing staff.      Signature:Electronically signed by NIDIA Cadena, 11/05/24, 3:11 PM EST.

## 2024-11-05 NOTE — CONSULTS
CHIEF COMPLAINT  Lower back pain      Subjective   Morgan Quick is a 68 y.o. female who presented to ER on 11/4/2024 with severe lower back pain.  She states that yesterday she went to get up using her walker and felt severe left lower back pain and could not get up and fell back down in the chair.  History of kyphoplasties of L2, L4 vertebral level on 1/2/2024 with IR.  Patient is unable to ambulate due to severe pain and also requires external catheter for urination.  Pain, numbness and tingling in bilateral lower extremities-this is chronic.  Her main complaint is axial lower back pain.  She has been living in assisted living for about 1 year.  Denies any bowel or bladder incontinence since admitted to the hospital.    Lower back pain is 7/10 on VAS, at maximum is 10/10. Pain is sharp, shooting, and stabbing in nature. Pain is not referred. The pain is constant. The pain is improved by pain medications. The pain is worse with any movement.    PMH:   Bipolar 1, history of breast cancer s/p radiation about 2 years ago    Current Medications:   Roxicodone 10 mg q4H PRN  Dilaudid 0.5 mg PRN  Lidocaine 4% patch  Morphine 4 mg PRN      Past Medications:    Past Modalities:  TENS:       no          Physical Therapy Within The Last 6 Months     no  Psychotherapy     no  Massage Therapy      no    Patient Complains Of:  Uro-Fecal Incontinence no  Weight Gain/Loss  no  Fever/Chills   no  Weakness   no              Current Facility-Administered Medications:     sennosides-docusate (PERICOLACE) 8.6-50 MG per tablet 2 tablet, 2 tablet, Oral, BID PRN **AND** polyethylene glycol (MIRALAX) packet 17 g, 17 g, Oral, Daily PRN **AND** bisacodyl (DULCOLAX) EC tablet 5 mg, 5 mg, Oral, Daily PRN **AND** bisacodyl (DULCOLAX) suppository 10 mg, 10 mg, Rectal, Daily PRN, Lacho Vance, DO    Calcium Replacement - Follow Nurse / BPA Driven Protocol, , Does not apply, PRN, HotLacho rodriguez, DO    cyclobenzaprine  (FLEXERIL) tablet 10 mg, 10 mg, Oral, TID PRN, Tripure, Flaquita S, APRN, 10 mg at 11/05/24 0925    furosemide (LASIX) tablet 20 mg, 20 mg, Oral, Daily, Tripure, Flaquita S, APRN, 20 mg at 11/05/24 0925    guaiFENesin (MUCINEX) 12 hr tablet 1,200 mg, 1,200 mg, Oral, Q12H, Tripure, Flaquita S, APRN    levothyroxine (SYNTHROID, LEVOTHROID) tablet 25 mcg, 25 mcg, Oral, Q AM, Tripure, Flaquita S, APRN, 25 mcg at 11/05/24 0925    lubiprostone (AMITIZA) capsule 24 mcg, 24 mcg, Oral, BID, Tripure, Flaquita S, APRN    Magnesium Standard Dose Replacement - Follow Nurse / BPA Driven Protocol, , Does not apply, PRN, HottmLacho sanchez, DO    melatonin tablet 5 mg, 5 mg, Oral, Nightly PRN, Lacho Vance DO, 5 mg at 11/04/24 2052    ondansetron (ZOFRAN) injection 4 mg, 4 mg, Intravenous, Q6H PRN, Lacho Vance DO, 4 mg at 11/05/24 0726    orphenadrine (NORFLEX) injection 60 mg, 60 mg, Intravenous, Q12H, Valencia Fu, APRN, 60 mg at 11/05/24 1358    OXcarbazepine (TRILEPTAL) tablet 150 mg, 150 mg, Oral, BID, Tripure, Flaquita S, APRN, 150 mg at 11/05/24 0926    oxyCODONE (ROXICODONE) immediate release tablet 10 mg, 10 mg, Oral, Q4H PRN, Tripure, Flaquita S, APRN    pantoprazole (PROTONIX) EC tablet 40 mg, 40 mg, Oral, Q AM, Tripure, Flaquita S, APRN, 40 mg at 11/05/24 0925    Phosphorus Replacement - Follow Nurse / BPA Driven Protocol, , Does not apply, PRN, HottmLacho sanchez, DO    potassium chloride (K-DUR,KLOR-CON) ER tablet 10 mEq, 10 mEq, Oral, Daily, TripFlaquita beck S, APRN, 10 mEq at 11/05/24 0925    Potassium Replacement - Follow Nurse / BPA Driven Protocol, , Does not apply, PRN, Lacho Vance,     risperiDONE (risperDAL) tablet 0.25 mg, 0.25 mg, Oral, Nightly, TripShiva becka S, APRN    rosuvastatin (CRESTOR) tablet 30 mg, 30 mg, Oral, Daily, TripureFlaquita S, APRN, 30 mg at 11/05/24 0925    sertraline (ZOLOFT) tablet 200 mg, 200 mg, Oral, Daily, TripFlaquita beck S, APRN,  200 mg at 11/05/24 0925    sodium chloride 0.9 % flush 10 mL, 10 mL, Intravenous, Q12H, Hottman, Lacho Cheng, DO, 10 mL at 11/05/24 0926    sodium chloride 0.9 % flush 10 mL, 10 mL, Intravenous, PRN, Hottman, Lacho Cheng, DO, 10 mL at 11/05/24 0050    sodium chloride 0.9 % infusion 40 mL, 40 mL, Intravenous, PRN, Hottman, Lacho Cheng, DO    [Held by provider] sodium chloride tablet 2 g, 2 g, Oral, TID With Meals, Tripure, Flaquita S, APRN    traZODone (DESYREL) tablet 25 mg, 25 mg, Oral, Nightly, Tripure, Flaquita S, APRN    The following portions of the patient's history were reviewed and updated as appropriate: allergies, current medications, past family history, past medical history, past social history, past surgical history, and problem list.      REVIEW OF PERTINENT MEDICAL DATA    Past Medical History:   Diagnosis Date    Bipolar 1 disorder     Breast cancer     Cancer     Disease of thyroid gland     High cholesterol     Hypertension      Past Surgical History:   Procedure Laterality Date    ABDOMINAL SURGERY      APPENDECTOMY      ENDOSCOPY N/A 9/25/2022    Procedure: ESOPHAGOGASTRODUODENOSCOPY with biopsy x 2 areas;  Surgeon: Deonte Wong MD;  Location: [facility] ENDOSCOPY;  Service: Gastroenterology;  Laterality: N/A;  post: gastritis, duodinitis, nodule,     HYSTERECTOMY      MASTECTOMY      L side     Family History   Problem Relation Age of Onset    Cancer Mother      Social History     Socioeconomic History    Marital status:    Tobacco Use    Smoking status: Never     Passive exposure: Never    Smokeless tobacco: Never   Vaping Use    Vaping status: Never Used   Substance and Sexual Activity    Alcohol use: No    Drug use: No    Sexual activity: Defer         Review of Systems   Musculoskeletal:  Positive for arthralgias, back pain and gait problem.         Vitals:    11/05/24 0300 11/05/24 0830 11/05/24 1305 11/05/24 1600   BP: 129/61 143/65 152/76 157/72   BP Location: Right arm Right  arm Left arm Left arm   Patient Position: Lying  Comment: Left side lying Lying Lying Lying   Pulse:  67 70 74   Resp: 16 16 15 16   Temp: 98.4 °F (36.9 °C) 97.5 °F (36.4 °C) 97.2 °F (36.2 °C) 97.2 °F (36.2 °C)   TempSrc: Oral Oral Oral Oral   SpO2:  93% 94% 95%   Weight:       Height:             Objective   Physical Exam  Musculoskeletal:         General: Tenderness present.        Back:            Imaging Reviewed:  MRI lumbar spine without contrast-11/5/2024  - Hx of  L2, L4 kyphoplasty  - Chronic L2, L3, L4, L5 fractures stable from prior.  - Chronic L1 compression fracture with mild height loss, new since 1/2/2024  - Mild central height loss at T12 with increased T2 signal consistent with edema consistent with acute to subacute compression fracture  - Increased T2 signal at T11 vertebral body without significant fracture line or height loss.  No bony retropulsion.  -Stable and probable benign hemangiomas in T12, T11  L4-5-mild to moderate bilateral neuroforaminal stenosis and moderate facet arthropathy  L5-S1-moderate to severe facet arthropathy.  Mild to moderate bilateral neuroforaminal stenosis.    Assessment:    1. Acute low back pain, unspecified back pain laterality, unspecified whether sciatica present    2. Compression fracture of L4 lumbar vertebra, closed, initial encounter    3. Compression fracture of T12 vertebra, initial encounter    4. Compression fracture of T11 vertebra, initial encounter         Plan:   1.Patient has axial lower back pain and severe tenderness to palpation over the fractured vertebrae.  Imaging shows acute to subacute compression fracture.  Patient is currently hospitalized inpatient and requiring escalating doses of opioids without significant improvement in her pain.  Unable to do any activities due to severe pain.  Discussed with patient that she is good candidate for T11 and T12 kyphoplasty.  Benefits and risks were discussed with patient including but not limited to  bleeding, infection, nerve damage, paralysis, pulmonary embolism, cement leak, death.  Patient understands and agrees with the plan and agrees to proceed with the procedure.  2.  Current pain management as per primary team.      Will work on getting her scheduled for T11, T12 kyphoplasty    Jayce Redd DO  Pain Management   [facility]           EMR Dragon/Transcription Disclaimer:   Much of this encounter note is an electronic transcription/translation of spoken language to printed text. The electronic translation of spoken language may permit erroneous, or at times, nonsensical words or phrases to be inadvertently transcribed; Although I have reviewed the note for such errors, some may still exist.

## 2024-11-05 NOTE — H&P
[facility] Medicine Services  History & Physical    Patient Name: Morgan Quick  : 1955  MRN: [id number]  Primary Care Physician:  Wayne Saavedra MD  Date of admission: 2024  Date and Time of Service: 2024 at 1540    Subjective      Chief Complaint: Back pain    History of Present Illness: Morgan Quick is a 68 y.o. female with a CMH of hypertension, hyperlipidemia, history of breast cancer s/p radiation, chronic back pain with history of kyphoplasty who presented to [facility] on 2024 with back pain.  On ED workup, x-ray of lumbar spine with chronic appearing compression of L1-L5 with vertebroplasty at L1-2 and L4, no acute lumbar spine findings.  Neurosurgery was consulted and MRI was ordered.  MRI revealed acute to subacute T12 compression fracture with mild height loss, T11 vertebral body edema significant height loss, chronic L1 compression fracture, multilevel lumbar spondylosis without high-grade thecal sac or neural foramen stenosis.  Neurosurgery recommended pain management consult and TLSO.  PT/OT eval pending.  Due to meeting inpatient criteria, hospitalist service to assume care for the remainder of this admission.    Patient currently resting and endorses lower back pain that is exacerbated with movement.  Denies leg paresthesias at this time.  She denies history of trauma/injury.    Review of Systems   Constitutional:  Negative for chills and fever.   Respiratory:  Negative for shortness of breath.    Cardiovascular:  Negative for chest pain.   Gastrointestinal:  Negative for abdominal pain, nausea and vomiting.   Musculoskeletal:  Positive for back pain.   All other systems reviewed and are negative.      Personal History     Past Medical History:   Diagnosis Date    Bipolar 1 disorder     Breast cancer     Cancer     Disease of thyroid gland     High cholesterol     Hypertension        Past Surgical History:   Procedure Laterality Date    ABDOMINAL  SURGERY      APPENDECTOMY      ENDOSCOPY N/A 9/25/2022    Procedure: ESOPHAGOGASTRODUODENOSCOPY with biopsy x 2 areas;  Surgeon: Deonte Wong MD;  Location: [facility] ENDOSCOPY;  Service: Gastroenterology;  Laterality: N/A;  post: gastritis, duodinitis, nodule,     HYSTERECTOMY      MASTECTOMY      L side       Family History: family history includes Cancer in her mother. Otherwise pertinent FHx was reviewed and not pertinent to current issue.    Social History:  reports that she has never smoked. She has never been exposed to tobacco smoke. She has never used smokeless tobacco. She reports that she does not drink alcohol and does not use drugs.    Home Medications:  Prior to Admission Medications       Prescriptions Last Dose Informant Patient Reported? Taking?    furosemide (LASIX) 20 MG tablet 11/4/2024  No Yes    Take 1 tablet by mouth Daily.    guaiFENesin (MUCINEX) 600 MG 12 hr tablet 11/4/2024  No Yes    Take 2 tablets by mouth Every 12 (Twelve) Hours.    HYDROcodone-acetaminophen (NORCO)  MG per tablet 11/4/2024  Yes Yes    Take 1 tablet by mouth 4 (Four) Times a Day.    levothyroxine (SYNTHROID, LEVOTHROID) 25 MCG tablet 11/4/2024  No Yes    Take 1 tablet by mouth Every Morning.    lubiprostone (AMITIZA) 24 MCG capsule 11/4/2024  Yes Yes    Take 1 capsule by mouth 2 (Two) Times a Day.    OXcarbazepine (TRILEPTAL) 150 MG tablet 11/4/2024  No Yes    Take 1 tablet by mouth 2 (Two) Times a Day.    pantoprazole (PROTONIX) 40 MG EC tablet 11/4/2024  No Yes    Take 1 tablet by mouth Every Morning.    potassium chloride (MICRO-K) 10 MEQ CR capsule 11/4/2024  Yes Yes    Take 1 capsule by mouth Daily.    risperiDONE (risperDAL) 0.25 MG tablet 11/3/2024  No Yes    Take 1 tablet by mouth Every Night.    rosuvastatin (CRESTOR) 10 MG tablet 11/4/2024  Yes Yes    Take 3 tablets by mouth Daily.    sertraline (ZOLOFT) 100 MG tablet 11/4/2024  No Yes    Take 2 tablets by mouth Daily.    sodium chloride 1 g tablet  11/4/2024  No Yes    Take 2 tablets by mouth 3 (Three) Times a Day With Meals.    traZODone (DESYREL) 50 MG tablet 11/4/2024  Yes Yes    Take 0.5 tablets by mouth Every Night.              Allergies:  Allergies   Allergen Reactions    Contrast Dye (Echo Or Unknown Ct/Mr) Shortness Of Breath     Pt states she had trouble breathing when she had contrast in the past.    Iodinated Contrast Media Hives       Objective      Vitals:   Temp:  [97.2 °F (36.2 °C)-98.4 °F (36.9 °C)] 97.2 °F (36.2 °C)  Heart Rate:  [58-74] 70  Resp:  [15-20] 15  BP: ()/(55-83) 152/76  Body mass index is 40.66 kg/m².    Physical Exam  General: 69yo WF, Alert and oriented, obese, no acute distress.  HENT: Normocephalic, normal hearing, moist oral mucosa, no scleral icterus.  Neck: Supple, non-tender, no carotid bruits, no JVD, no LAD.  Lungs: Clear to auscultation, non-labored respiration.  Heart: RRR, no murmur, gallop or edema.  Abdomen: Soft, nontender, non-distended, + bowel sounds.  Musculoskeletal: Midline lumbar spine TTP.  Normal range of motion and strength, no swelling.  Skin: Skin is warm, dry and pink, no rashes or lesions.  Psychiatric: Cooperative, appropriate mood and affect.      Diagnostic Data:  Lab Results (last 24 hours)       Procedure Component Value Units Date/Time    Hemoglobin A1c [[id number]]  (Abnormal) Collected: 11/05/24 0425    Specimen: Blood from Arm, Left Updated: 11/05/24 0812     Hemoglobin A1C 6.19 %     Basic Metabolic Panel [[id number]]  (Abnormal) Collected: 11/05/24 0425    Specimen: Blood from Arm, Left Updated: 11/05/24 0538     Glucose 114 mg/dL      BUN 9 mg/dL      Creatinine 0.51 mg/dL      Sodium 142 mmol/L      Potassium 3.9 mmol/L      Chloride 104 mmol/L      CO2 27.5 mmol/L      Calcium 9.4 mg/dL      BUN/Creatinine Ratio 17.6     Anion Gap 10.5 mmol/L      eGFR 101.8 mL/min/1.73     Narrative:      GFR Normal >60  Chronic Kidney Disease <60  Kidney Failure <15      CBC Auto Differential  [[id number]]  (Abnormal) Collected: 11/05/24 0425    Specimen: Blood from Arm, Left Updated: 11/05/24 0525     WBC 8.67 10*3/mm3      RBC 5.00 10*6/mm3      Hemoglobin 12.2 g/dL      Hematocrit 43.3 %      MCV 86.6 fL      MCH 24.4 pg      MCHC 28.2 g/dL      RDW 14.8 %      RDW-SD 47.4 fl      MPV 10.4 fL      Platelets 224 10*3/mm3      Neutrophil % 87.9 %      Lymphocyte % 5.9 %      Monocyte % 5.3 %      Eosinophil % 0.5 %      Basophil % 0.2 %      Immature Grans % 0.2 %      Neutrophils, Absolute 7.62 10*3/mm3      Lymphocytes, Absolute 0.51 10*3/mm3      Monocytes, Absolute 0.46 10*3/mm3      Eosinophils, Absolute 0.04 10*3/mm3      Basophils, Absolute 0.02 10*3/mm3      Immature Grans, Absolute 0.02 10*3/mm3      nRBC 0.0 /100 WBC              Imaging Results (Last 24 Hours)       Procedure Component Value Units Date/Time    MRI Lumbar Spine Without Contrast [[id number]] Collected: 11/05/24 1323     Updated: 11/05/24 1337    Narrative:      MRI LUMBAR SPINE WO CONTRAST    Date of Exam: 11/5/2024 11:58 AM EST    Indication: back pain.     Comparison: MRI lumbar spine 1/2/2024    Technique:  Routine multiplanar/multisequence sequence images of the lumbar spine were obtained without contrast administration.        Findings:  5 nonrib-bearing lumbar-type vertebral bodies. Status post L2 and L4 kyphoplasty. Degree of vertebral body height loss stable from prior comparison. Stable mild to moderate chronic height loss at L3. Stable mild height loss at L5. There is mild central   vertebral artery height loss at L1 new from prior comparison without associated edema. There is mild central height loss at T12 with associated increased T2 signal consistent with edema. There is increased T2 signal within T11 vertebral body without   appreciable fracture line or height loss. No bony retropulsion. There is multilevel degenerative disc height loss and disc desiccation. No significant Modic endplate change. Conus terminates at  L1 without intrinsic or extrinsic mass. Stable probable   benign hemangiomas within T12 and T11. Mild symmetric paraspinous muscle atrophy. Small simple appearing left upper pole renal cyst. Degenerative osteoarthritis of the SI joints. Level-by-level analysis detailed below.    T12-L1: Tiny concentric disc bulge with mild facet arthropathy. Disc bulge has slightly worsened from prior exam resulting in mild thecal sac stenosis. Mild bilateral neuroforaminal stenosis also worsened from prior.     L1-L2: Tiny concentric disc bulge. Mild facet arthropathy. Findings in combination with mild dorsal epidural lipomatosis results in mild stable thecal sac stenosis and mild stable bilateral neuroforaminal stenosis.     L2-L3: Posterior disc osteophyte complex with mild facet arthropathy and ligamentum flavum thickening in combination with minimal dorsal epidural lipomatosis results in mild thecal sac stenosis stable from prior. There is mild right and more mild to   moderate left neuroforaminal stenosis stable from prior.     L3-L4: Tiny concentric disc bulge with moderate facet arthropathy and mild ligamentum flavum thickening. Minimal resulting thecal sac stenosis stable from prior. Mild right and mild to moderate left neuroforaminal stenosis stable from prior.     L4-L5: Tiny concentric disc bulge with moderate facet hypertrophy and mild ligamentum flavum thickening. No significant thecal sac stenosis. Mild to moderate bilateral neuroforaminal stenosis stable from prior.     L5-S1: Tiny concentric disc bulge with posterior annular fissure stable from prior. Moderate to severe facet arthropathy. No thecal sac stenosis. Mild to moderate bilateral neuroforaminal stenosis, left greater than right stable from prior.         Impression:      Impression:  1. Acute to subacute T12 compression fracture with mild height loss and no bony retropulsion, new since prior comparison.  2. T11 vertebral body edema significant height loss  or fracture line which could reflect an acute to subacute nondisplaced fracture versus possible contusion or if there is history of trauma.  3. Chronic L1 compression fracture with mild height loss, new since 1/2/2024.  4. Prior L2 and L4 kyphoplasty. Chronic L2, L3, L4 and L5 fractures are stable from prior.  5. Multilevel lumbar spondylosis without high-grade thecal sac or neuroforaminal stenosis. Degenerative change at T12-L1 has slightly worsened from prior.        Electronically Signed: Michael Leal MD    11/5/2024 1:35 PM EST    Workstation ID: [id number]    XR Spine Lumbar Complete 4+VW [[id number]] Collected: 11/04/24 1555     Updated: 11/04/24 1559    Narrative:      XR SPINE LUMBAR COMPLETE 4+VW    Date of Exam: 11/4/2024 3:53 PM EST    Indication: back pain    Comparison: Lumbar spine MRI 1/2/2024    Findings: Chronic compression fractures of L2 and L4 with vertebroplasty. Mild chronic appearing compression deformities involving the superior plates of L1, L3, L5. Normal lumbar alignment. Mild diminished disc space height at L1-2 and L2-3. Osteopenic   changes are present. Sacroiliac joints are normal. Gastric band device is incidentally noted.      Impression:      Chronic appearing compression form is of L1-L5 with vertebroplasty at L2 and L4.  No acute lumbar spine findings.      Electronically Signed: Emily Gonzalez MD    11/4/2024 3:57 PM EST    Workstation ID: [id number]              Assessment & Plan        This is a 68 y.o. female with:    Active and Resolved Problems  Active Hospital Problems    Diagnosis  POA    **Back pain [M54.9]  Yes      Resolved Hospital Problems   No resolved problems to display.       Acute T12 compression fracture  Chronic back pain  TLSO when OOB  As needed analgesics  Neurosurgery following  Pain management consulted  Awaiting PT/OT evaluation, will need SNF at discharge    Hyperlipidemia-continue statin  Hypothyroidism-continue Synthroid  GERD-continue PPI  Mood  disorder-continue Zoloft, trazodone, risperidone      I anticipate patient will require less than 30 days stay in a SNF facility.      VTE Prophylaxis:  Mechanical VTE prophylaxis orders are present.        The patient desires to be as follows:    CODE STATUS:    Level Of Support Discussed With: Patient  Code Status (Patient has no pulse and is not breathing): CPR (Attempt to Resuscitate)  Medical Interventions (Patient has pulse or is breathing): Full Support        Maximino Weber, who can be contacted at [phone], is the designated person to make medical decisions on the patient's behalf if She is incapable of doing so. This was clarified with patient and/or next of kin on 11/4/2024 during the course of this H&P.    Admission Status:  I believe this patient meets inpatient status.    Expected Length of Stay: 2 to 3 days    PDMP and Medication Dispenses via Sidebar reviewed and consistent with patient reported medications.    I discussed the patient's findings and my recommendations with patient.      Signature:     This document has been electronically signed by Robert Aguayo PA-C on November 5, 2024 15:47 [facility] Hospitalist Team

## 2024-11-05 NOTE — SIGNIFICANT NOTE
11/05/24 1530   OTHER   Discipline occupational therapist   Rehab Time/Intention   Session Not Performed   (OT holding until pt has their TLSO brace)   Recommendation   OT - Next Appointment 11/06/24

## 2024-11-05 NOTE — PLAN OF CARE
Goal Outcome Evaluation:      Pt c/o pain 10/10. Pt has been ordered hydrocodone, morphine, dilaudid and oraphenadrine (one time each).  Pt has difficulty moving from side to side in bed. Pt appears to be resting at this time; call light in reach.  Will continue to monitor.  Neuro surgery consult placed.

## 2024-11-06 PROBLEM — S22.080A COMPRESSION FRACTURE OF T12 VERTEBRA: Status: ACTIVE | Noted: 2024-11-04

## 2024-11-06 PROBLEM — S22.080A COMPRESSION FRACTURE OF T11 VERTEBRA: Status: ACTIVE | Noted: 2024-11-04

## 2024-11-06 LAB
ANION GAP SERPL CALCULATED.3IONS-SCNC: 12 MMOL/L (ref 5–15)
BASOPHILS # BLD AUTO: 0.01 10*3/MM3 (ref 0–0.2)
BASOPHILS NFR BLD AUTO: 0.1 % (ref 0–1.5)
BUN SERPL-MCNC: 8 MG/DL (ref 8–23)
BUN/CREAT SERPL: 17.4 (ref 7–25)
CALCIUM SPEC-SCNC: 9.5 MG/DL (ref 8.6–10.5)
CHLORIDE SERPL-SCNC: 100 MMOL/L (ref 98–107)
CO2 SERPL-SCNC: 24 MMOL/L (ref 22–29)
CREAT SERPL-MCNC: 0.46 MG/DL (ref 0.57–1)
DEPRECATED RDW RBC AUTO: 44.9 FL (ref 37–54)
EGFRCR SERPLBLD CKD-EPI 2021: 104.4 ML/MIN/1.73
EOSINOPHIL # BLD AUTO: 0.04 10*3/MM3 (ref 0–0.4)
EOSINOPHIL NFR BLD AUTO: 0.4 % (ref 0.3–6.2)
ERYTHROCYTE [DISTWIDTH] IN BLOOD BY AUTOMATED COUNT: 14.7 % (ref 12.3–15.4)
GLUCOSE SERPL-MCNC: 93 MG/DL (ref 65–99)
HCT VFR BLD AUTO: 41.9 % (ref 34–46.6)
HGB BLD-MCNC: 12.3 G/DL (ref 12–15.9)
IMM GRANULOCYTES # BLD AUTO: 0.03 10*3/MM3 (ref 0–0.05)
IMM GRANULOCYTES NFR BLD AUTO: 0.3 % (ref 0–0.5)
LYMPHOCYTES # BLD AUTO: 0.46 10*3/MM3 (ref 0.7–3.1)
LYMPHOCYTES NFR BLD AUTO: 4.7 % (ref 19.6–45.3)
MCH RBC QN AUTO: 24.6 PG (ref 26.6–33)
MCHC RBC AUTO-ENTMCNC: 29.4 G/DL (ref 31.5–35.7)
MCV RBC AUTO: 83.6 FL (ref 79–97)
MONOCYTES # BLD AUTO: 0.65 10*3/MM3 (ref 0.1–0.9)
MONOCYTES NFR BLD AUTO: 6.6 % (ref 5–12)
NEUTROPHILS NFR BLD AUTO: 8.64 10*3/MM3 (ref 1.7–7)
NEUTROPHILS NFR BLD AUTO: 87.9 % (ref 42.7–76)
NRBC BLD AUTO-RTO: 0 /100 WBC (ref 0–0.2)
PLATELET # BLD AUTO: 268 10*3/MM3 (ref 140–450)
PMV BLD AUTO: 9.5 FL (ref 6–12)
POTASSIUM SERPL-SCNC: 3.9 MMOL/L (ref 3.5–5.2)
RBC # BLD AUTO: 5.01 10*6/MM3 (ref 3.77–5.28)
SODIUM SERPL-SCNC: 136 MMOL/L (ref 136–145)
WBC NRBC COR # BLD AUTO: 9.83 10*3/MM3 (ref 3.4–10.8)

## 2024-11-06 PROCEDURE — 97112 NEUROMUSCULAR REEDUCATION: CPT

## 2024-11-06 PROCEDURE — 25010000002 HYDROMORPHONE PER 4 MG: Performed by: HOSPITALIST

## 2024-11-06 PROCEDURE — 25010000002 HYDRALAZINE PER 20 MG: Performed by: HOSPITALIST

## 2024-11-06 PROCEDURE — 25010000002 ONDANSETRON PER 1 MG: Performed by: EMERGENCY MEDICINE

## 2024-11-06 PROCEDURE — 85025 COMPLETE CBC W/AUTO DIFF WBC: CPT | Performed by: NURSE PRACTITIONER

## 2024-11-06 PROCEDURE — 97166 OT EVAL MOD COMPLEX 45 MIN: CPT

## 2024-11-06 PROCEDURE — 97530 THERAPEUTIC ACTIVITIES: CPT

## 2024-11-06 PROCEDURE — 25010000002 ORPHENADRINE CITRATE PER 60 MG

## 2024-11-06 PROCEDURE — 80048 BASIC METABOLIC PNL TOTAL CA: CPT | Performed by: NURSE PRACTITIONER

## 2024-11-06 RX ORDER — HYDRALAZINE HYDROCHLORIDE 20 MG/ML
10 INJECTION INTRAMUSCULAR; INTRAVENOUS EVERY 6 HOURS PRN
Status: DISCONTINUED | OUTPATIENT
Start: 2024-11-06 | End: 2024-11-10 | Stop reason: HOSPADM

## 2024-11-06 RX ORDER — HYDROMORPHONE HYDROCHLORIDE 1 MG/ML
0.5 INJECTION, SOLUTION INTRAMUSCULAR; INTRAVENOUS; SUBCUTANEOUS
Status: DISCONTINUED | OUTPATIENT
Start: 2024-11-06 | End: 2024-11-08

## 2024-11-06 RX ORDER — OXYCODONE HYDROCHLORIDE 5 MG/1
10 TABLET ORAL EVERY 4 HOURS PRN
Status: DISCONTINUED | OUTPATIENT
Start: 2024-11-06 | End: 2024-11-10 | Stop reason: HOSPADM

## 2024-11-06 RX ADMIN — ORPHENADRINE CITRATE 60 MG: 30 INJECTION, SOLUTION INTRAMUSCULAR; INTRAVENOUS at 01:41

## 2024-11-06 RX ADMIN — GUAIFENESIN 1200 MG: 600 TABLET, EXTENDED RELEASE ORAL at 10:23

## 2024-11-06 RX ADMIN — LUBIPROSTONE 24 MCG: 24 CAPSULE, GELATIN COATED ORAL at 20:37

## 2024-11-06 RX ADMIN — CYCLOBENZAPRINE 10 MG: 10 TABLET, FILM COATED ORAL at 20:38

## 2024-11-06 RX ADMIN — OXCARBAZEPINE 150 MG: 150 TABLET, FILM COATED ORAL at 10:22

## 2024-11-06 RX ADMIN — POTASSIUM CHLORIDE 10 MEQ: 750 TABLET, EXTENDED RELEASE ORAL at 10:22

## 2024-11-06 RX ADMIN — Medication 10 ML: at 20:39

## 2024-11-06 RX ADMIN — HYDROMORPHONE HYDROCHLORIDE 0.5 MG: 1 INJECTION, SOLUTION INTRAMUSCULAR; INTRAVENOUS; SUBCUTANEOUS at 18:29

## 2024-11-06 RX ADMIN — ORPHENADRINE CITRATE 60 MG: 30 INJECTION, SOLUTION INTRAMUSCULAR; INTRAVENOUS at 14:33

## 2024-11-06 RX ADMIN — RISPERIDONE 0.25 MG: 0.25 TABLET, FILM COATED ORAL at 20:37

## 2024-11-06 RX ADMIN — Medication 10 ML: at 08:39

## 2024-11-06 RX ADMIN — TRAZODONE HYDROCHLORIDE 25 MG: 50 TABLET ORAL at 20:38

## 2024-11-06 RX ADMIN — GUAIFENESIN 1200 MG: 600 TABLET, EXTENDED RELEASE ORAL at 20:37

## 2024-11-06 RX ADMIN — OXCARBAZEPINE 150 MG: 150 TABLET, FILM COATED ORAL at 20:38

## 2024-11-06 RX ADMIN — CYCLOBENZAPRINE 10 MG: 10 TABLET, FILM COATED ORAL at 10:22

## 2024-11-06 RX ADMIN — FUROSEMIDE 20 MG: 20 TABLET ORAL at 10:23

## 2024-11-06 RX ADMIN — HYDRALAZINE HYDROCHLORIDE 10 MG: 20 INJECTION INTRAMUSCULAR; INTRAVENOUS at 09:19

## 2024-11-06 RX ADMIN — OXYCODONE 10 MG: 5 TABLET ORAL at 20:38

## 2024-11-06 RX ADMIN — ONDANSETRON 4 MG: 2 INJECTION, SOLUTION INTRAMUSCULAR; INTRAVENOUS at 10:29

## 2024-11-06 RX ADMIN — SERTRALINE 200 MG: 100 TABLET, FILM COATED ORAL at 10:22

## 2024-11-06 RX ADMIN — ROSUVASTATIN 30 MG: 10 TABLET, FILM COATED ORAL at 10:22

## 2024-11-06 RX ADMIN — OXYCODONE 10 MG: 5 TABLET ORAL at 10:22

## 2024-11-06 RX ADMIN — OXYCODONE 10 MG: 5 TABLET ORAL at 16:25

## 2024-11-06 NOTE — CASE MANAGEMENT/SOCIAL WORK
Continued Stay Note  [facility]     Patient Name: Morgan Quick  MRN: [id number]  Today's Date: 11/6/2024    Admit Date: 11/4/2024    Plan: From [facility]. SNF recommended and SNF list given; choice needed. Once accepted will need precert and pharmacy changed. PASRR QFR   Discharge Plan       Row Name 11/06/24 0912       Plan    Plan From [facility]. SNF recommended and SNF list given; choice needed. Once accepted will need precert and pharmacy changed. PASRR QFR    Plan Comments Barriers: Pain management for possible  T11, T12 kyphoplasty. Per  PASRR QFR.. Once PASRR completed CM will need SNF choice, acceptance, precert and pharmacy changed                          Marivel LEE,RN Case Manager  [facility]  Phone: Desk- [phone] cell- [phone]

## 2024-11-06 NOTE — THERAPY TREATMENT NOTE
Subjective: Pt agreeable to therapeutic plan of care.  Per pt's sister, present for PT session, Pt was walking with rollator in the community as recently as 10/22/24.  Sister and pt confirm that she sits on rollator and scoots backwards at home.     Objective:   TLSO order pending. PT saw for bed mobility and attempted standing. Pt refused OOB activities.    Precautions - High falls risk; spinal precautions utilized by PT due to compression fractures.    Bed mobility - rolls with mod -  Max-A; pt actively resists movement with BUEs.  Transfers - Supine <->sit with Max-A x 2; pt resists with upper extremities and muscle guards with BLEs. Pt cued for deep breathing but utilizes shallow breaths and holds breath throughout.   Ambulation - N/A or Not attempted.    In seated, pt provided verbal and tactile cues to improve alignment and trunk positioning.  Pt tolerated upright x 8 minutes with improved midline and decreased reliance on BUEs.      Pt was returned to sidelying with mod A 2 and positioned for comfort with pillow between knees to improve spinal alignment.     Vitals: WNL SPO2 97% on 2L    Pain: 9 VAS   Location: Entire back  Intervention for pain: Repositioned and Increased Activity    Education: Provided education on the importance of mobility in the acute care setting and Transfer Training; spinal precautions.  Pt educated on the risks of immobility as well as how muscle guarding leads to increased pain .     Assessment: Morgan Quick presents with functional mobility impairments which indicate the need for skilled intervention. Tolerating session today without incident. Session limited by ongoing back pain, fear of movement and muscle guarding.  Pt required assist of 2 throughout and is functioning below her baseline. Will continue to follow and progress as tolerated. She would benefit from SNF at discharge for continued PT.     Plan/Recommendations:   If medically appropriate, Moderate Intensity  "Therapy recommended post-acute care. This is recommended as therapy feels the patient would require 3-4 days per week and wouldn't tolerate \"3 hour daily\" rehab intensity. SNF would be the preferred choice. If the patient does not agree to SNF, arrange HH or OP depending on home bound status. If patient is medically complex, consider LTACH. Pt requires no DME at discharge.     Pt desires Skilled Rehab placement at discharge. Pt cooperative; agreeable to therapeutic recommendations and plan of care.         Basic Mobility 6-click:  Rollin = Total, A lot = 2, A little = 3; 4 = None  Supine>Sit:   1 = Total, A lot = 2, A little = 3; 4 = None   Sit>Stand with arms:  1 = Total, A lot = 2, A little = 3; 4 = None  Bed>Chair:   1 = Total, A lot = 2, A little = 3; 4 = None  Ambulate in room:  1 = Total, A lot = 2, A little = 3; 4 = None  3-5 Steps with railin = Total, A lot = 2, A little = 3; 4 = None  Score: 8    Modified [place]: N/A = No pre-op stroke/TIA    Post-Tx Position: Alarms activated and Call light and personal items within reach; sidelying  PPE: gloves    Therapy Charges for Today       Code Description Service Date Service Provider Modifiers Qty    [id number]  PT THERAPEUTIC ACT EA 15 MIN 2024 Sherrie Oshea, PT  1    [id number]  PT NEUROMUSC RE EDUCATION EA 15 MIN 2024 Sherrie Oshea, PT GP 1           PT Charges       Row Name 24 1501 24 0918          Time Calculation    Start Time 1314  -CL --     Stop Time 1331  -CL --     Time Calculation (min) 17 min  -CL --     PT Received On 24  -CL --     PT - Next Appointment 24  -CL 24  -CL     PT Goal Re-Cert Due Date 24  -CL --        Time Calculation- PT    Total Timed Code Minutes- PT 17 minute(s)  -CL --               User Key  (r) = Recorded By, (t) = Taken By, (c) = Cosigned By      Initials Name Provider Type    CL Sherrie Oshea, PT Physical Therapist                    "

## 2024-11-06 NOTE — PLAN OF CARE
Goal Outcome Evaluation:      Morgan Quick presents with functional mobility impairments which indicate the need for skilled intervention. Tolerating session today without incident. Session limited by ongoing back pain, fear of movement and muscle guarding.  Pt required assist of 2 throughout and is functioning below her baseline. Will continue to follow and progress as tolerated. She would benefit from SNF at discharge for continued PT.

## 2024-11-06 NOTE — PLAN OF CARE
Goal Outcome Evaluation:  Plan of Care Reviewed With: patient    Outcome Evaluation: Pt is a 67 y/o female admitted to [facility] on 11/4/24 with c/o back pain. XR lumbar spine: chronic appearing compression of L1-L5 with vertebroplasty at L1-2 and L4. MRI: T11 and T12 compression fxs. Neurosurgery recommended pain management consult and TLSO. Pt does not have TLSO brace in room at this time, however therapy cleared as pt tolerates as there is no bed rest necessary. PMHx significant for HTN, HLD, hx of breast cancer s/p radiation, and chronic back pain with kyphoplasty. At baseline, pt resides with spouse in [facility], typically (I) with ADLs. Pt utilizes rollator for household mobility, and electric scooter when grocery shopping, does not drive. Pt wears 3L home O2. Upon assessment, pt A&O x4 with reports of 9/10 pain in back. Pt max assist x2 to sit EOB, unable to progress further mobility d/t excessive pain at this time. Pt is below baseline and would benefit from continued skilled OT services while admitted. Pt currently awaiting kyphoplasty with pain management. OT recommending SNF when medically appropriate.    Anticipated Discharge Disposition (OT): skilled nursing facility

## 2024-11-06 NOTE — PROGRESS NOTES
[facility] MEDICINE SERVICE  DAILY PROGRESS NOTE    NAME: Morgan Quick  : 1955  MRN: [id number]      LOS: 1 day     PROVIDER OF SERVICE: Edouard Hong MD    Chief Complaint: Back pain    Subjective:     Interval History:  History taken from: patient chart RN  Pain is controlled awaiting kyphoplasty with pain management        Review of Systems:   Review of Systems  All negative except as above  Objective:     Vital Signs  Temp:  [97.2 °F (36.2 °C)-98 °F (36.7 °C)] 97.5 °F (36.4 °C)  Heart Rate:  [66-74] 70  Resp:  [15-16] 16  BP: (146-182)/(67-83) 173/83  Flow (L/min) (Oxygen Therapy):  [3] 3   Body mass index is 40.66 kg/m².    Physical Exam  Physical Exam  NAD.  Drowsy easily arousable  RRR S1-S2 audible  Lungs with fair air entry  Abdomen soft nontender nondistended     Diagnostic Data    Results from last 7 days   Lab Units 24  0425   WBC 10*3/mm3 9.83   HEMOGLOBIN g/dL 12.3   HEMATOCRIT % 41.9   PLATELETS 10*3/mm3 268   GLUCOSE mg/dL 93   CREATININE mg/dL 0.46*   BUN mg/dL 8   SODIUM mmol/L 136   POTASSIUM mmol/L 3.9   ANION GAP mmol/L 12.0       MRI Lumbar Spine With Contrast    Result Date: 2024  Impression: 1.  Small foci of enhancement along the superior endplate of L3 immediately adjacent to Schmorl's node protrusion, as well as along the anterior inferior endplate of T12 adjacent to concavity/compression endplate deformity. These findings are favored to represent areas of reactive enhancement related to compression fractures, with static involvement thought less likely. Electronically Signed: Emily Gonzalez MD  2024 8:45 AM EST  Workstation ID: [id number]    MRI Lumbar Spine Without Contrast    Result Date: 2024  Impression: 1. Acute to subacute T12 compression fracture with mild height loss and no bony retropulsion, new since prior comparison. 2. T11 vertebral body edema significant height loss or fracture line which could reflect an acute to subacute  Plan for bilateral medial rectus recession   Patch the LEFT eye 2 hours while awake EVERY day.     EYE MUSCLE SURGERY        What is strabismus? Strabismus is the medical term for eye muscle incoordination, resulting in either crossed eyes, wandering eyes, or drifting eyes. There are many types of strabismus, and Dr. Boone and her team are experts in diagnosing each particular type. Strabismus may cause lack of depth perception, decreased visual field, eye strain, or diplopia (double vision). Other treatments for strabismus include glasses, eye drops, eye muscle exercises, or medical injections; however, if none of these treatments are appropriate or effective for you or your child, surgical correction may be necessary.     What causes strabismus? The cause of strabismus may be poor vision in one or both eyes, paralysis, or weakness of one or more of the eye muscles, scars or injuries to the eye muscles, or (most common) a basic incoordination problem resulting from a weakness in the area of the brain that is responsible for coordination of eye movements. Strabismus surgery in most cases improves the strength and coordination of the eye muscles, but in many cases does not result in a complete cure in the sense that the eyes may not coordinate perfectly in all directions of gaze.     Will surgery correct strabismus? In most cases, surgical treatment of strabismus will result in considerable improvement of the incoordination problem. Seventy percent of patients who have surgery with Dr. Boone for strabismus will experience significant improvement such that no further surgery is required. About 10% of patients may have incomplete correction in the short term and, in some of these patients, it may be significant enough to require additional surgical correction 3-6 months after the first surgery. About 20-30% of children have very good eye alignment within a few months after surgery but the eyes may drift again over  nondisplaced fracture versus possible contusion or if there is history of trauma. 3. Chronic L1 compression fracture with mild height loss, new since 1/2/2024. 4. Prior L2 and L4 kyphoplasty. Chronic L2, L3, L4 and L5 fractures are stable from prior. 5. Multilevel lumbar spondylosis without high-grade thecal sac or neuroforaminal stenosis. Degenerative change at T12-L1 has slightly worsened from prior. Electronically Signed: Michael Leal MD  11/5/2024 1:35 PM EST  Workstation ID: [id number]    XR Spine Lumbar Complete 4+VW    Result Date: 11/4/2024  Chronic appearing compression form is of L1-L5 with vertebroplasty at L2 and L4. No acute lumbar spine findings. Electronically Signed: Emily Gonzalez MD  11/4/2024 3:57 PM EST  Workstation ID: [id number]       I reviewed the patient's new clinical results.  I reviewed the patient's new imaging results and agree with the interpretation.    Assessment/Plan:     Active and Resolved Problems  Active Hospital Problems    Diagnosis  POA    **Back pain [M54.9]  Yes      Resolved Hospital Problems   No resolved problems to display.       68-year-old female with history of hypertension, hyperlipidemia, history of breast cancer status postradiation, chronic back pain status post kyphoplasty admitted to [facility] 11/4 with acute on chronic back pain    #Acute to subacute T 11/12 compression fracture  #Chronic L1 compression fracture  Seen by neurosurgery no acute surgical intervention planned.  TLSO brace out of bed  Pain management following noted plan for kyphoplasty  PT/OT  Cautious with opioids    #History of mood disorder  Continue home medications    #Hypothyroidism  Continue levothyroxine    #Hyperlipidemia  Continue statins    VTE Prophylaxis:  Mechanical VTE prophylaxis orders are present.             Disposition Planning:     Barriers to Discharge: Medical workup   Anticipated Date of Discharge: 24 to 48 hours  Place of Discharge: Home versus rehab      Time: 45  time: months, years, or decades later. This too may require another surgery. Sometimes residual misalignment after surgery can be improved by other treatments.      How do you decide which muscles (which eye) to operate on? The doctor considers several factors, including the alignment of the eyes in different directions of looking as measured in the office, muscles that are underacting or overacting, and previous surgeries that have been performed. Sometimes it is necessary to operate on  the good eye  to make sure that the eyes remain balanced. Inevitably, the surgical consent will be for BOTH eyes so that Dr. Boone can test all eye muscles under anesthesia and operate accordingly to give the patient the best possible outcome.     What kind of anesthesia is used? All children have surgery under general anesthesia, meaning that they are completely asleep for the surgery. General anesthesia is begun by breathing medicine from a mask, or by receiving medicine through a small tube that is placed in a blood vessel. All patients receive a tube in the vein, but it is placed after anesthesia is begun with a mask for children who are afraid of needles before they are sleeping. Young children sometimes receive medicine in the Pre-Anesthesia Room, to help them accept the anesthesia more easily. During anesthesia, a tube will be placed in or on the patient's airway (endotracheal tube or larygeal mask airway) for safety. Heart rate and rhythm, breathing rate, blood pressure, oxygen level, and level of anesthetic medicines are constantly monitored by the anesthesia team. Feel free to address any concerns that you have about anesthesia with the anesthesiologist who will be talking with you before surgery. Some adults may have local anesthesia, with medicine placed around the eyeball to numb it.      What should I expect after surgery?    All sutures are dissolvable.  In almost all cases, an eye patch or bandage is not required  after surgery.  Sensitivity to light, blurry vision, double vision, foreign body sensation (feeling like the eyes have something in them or are scratchy), aching or sore eyes especially with movement, bloodstained orange/red tears and crusting along the eyelashes are all normal after surgery. These will be the worst for the first 24-48 hours after surgery. As a result, some patients will elect to keep their eyes closed for 1-3 days after surgery. This is normal. Whenever Ever is comfortable, he may open his eyes.    Movies, tablets, and phones may be watched anytime. If glasses are worn, it is ok to keep them off while the eyes are resting and resume wear once the patient is comfortably opening the eyes again in a few days.   Avoid eye pressure, rubbing, straining, and athletics for 1 week. (Don't worry, Dr. Boone has never seen a child pop a stitch or cause harm despite some inevitable rubbing.)   It is normal for the white part of the eyes to be red/orange/purple and puffy or gelatinous like a gummy bear on the surface of the eye. This is just a bruise and will fade away slowly over a few weeks.   To prevent infection, it is important to keep  dirty  water, sand, and dirt out of the eye after surgery. So, no swimming (lakes or pools), sand, or dirt in the eyes for 2 weeks after surgery. Bathe or shower as usual.  The  muscle ache  discomfort experienced after eye muscle surgery improves significantly over the first 2 to 3 days after surgery. Young children may receive Tylenol or ibuprofen in the usual doses if they seem uncomfortable or irritable. Cool washcloths or ice placed over the eyes can be soothing. Activity is limited only by the individual patient's level of comfort.   An antibiotic eye drop or ointment may be prescribed to use for 1 week after surgery.  Scars are nature's way of healing a surgical wound. The scars are not usually noticeable, unless more than one surgery is required. Techniques are  minutes     Code Status and Medical Interventions: CPR (Attempt to Resuscitate); Full Support   Ordered at: 11/04/24 8364     Level Of Support Discussed With:    Patient     Code Status (Patient has no pulse and is not breathing):    CPR (Attempt to Resuscitate)     Medical Interventions (Patient has pulse or is breathing):    Full Support       Signature: Electronically signed by Edouard Hong MD, 11/06/24, 09:09 EST.  [facility]     "used at the time of surgery to minimize scarring. Scars are located in the thin conjunctiva covering the white of the eye, and are not on the skin of the eyelid.  Ever may return to /school/work whenever comfortable. Surgery is generally on a Thursday. Most patients return on the Monday after surgery.   It takes 1-2 months for the eye muscles to fully regain their strength, for the brain to figure out the new system, and for the eye alignment to normalize. During this time, Ever may experience double vision (\"I see 2 mommies/daddies\") and some unsteadiness. After surgery, the eyes may appear to wander in any direction (in, out, up, or down). This is normal and will gradually improve each day. It is hard to wait, but trust that it will improve with time. If Ever is complaining of double vision past 3 weeks after surgery please call Dr. Boone's clinic to discuss with her team.      Will another surgery be needed?  While every attempt is made to correct the misalignment with just one surgery, more than one surgery may be required.  This is related to the individual's healing after muscle surgery, and other types of misalignment of the eyes that may develop in the future. There is no specific number of surgeries beyond which additional surgeries cannot be performed. There is no specific age beyond which eye muscle surgery cannot be performed.     What are the risks of strabismus surgery? The most common  complication from eye muscle surgery is an under-correction or over-correction of the misalignment that requires additional surgery (on average, about 1 out of 3 patients will need another operation at some time in their life). Other very rare complications include bleeding, infection in the eye, or damage to any structure in or around the eye. These are uncommon, and most often easily treated with no long-term impact to vision. Less than 1% of the time, they could result in permanent loss of vision, " "blindness, or loss of the eye. This is considered very safe. For context, statistically, you are less safe driving on the highway for 1-2 hours. In addition, surgery may expose the patient to other rare complications such as a reaction to anesthesia (again less than 1% of the time). The anesthesiologist will review these risks prior to surgery. If adverse reactions occur, the situation will be handled in the best interest of the patient, even if surgery needs to be postponed.     Dr. Boone's surgery scheduler, Edilberto, will contact you in the next few business days to schedule surgery. For questions, call [phone].     Once your surgery is scheduled, you will receive a text message or e-mail to set up an account with [employer], our online program designed to help you and your child prepare for surgery. Dr. Boone highly recommends signing up!     Read more about your child's partially accommodative esotropia and eye muscle surgery (also called strabismus surgery) online at: http://www.aapos.org/terms. Dr. Boone is a member of the American Association for Pediatric Ophthalmology and Strabismus, an international organization of physicians (doctors with an \"MD\" degree) with specialized training and experience in providing state-of-the-art medical and surgical eye care for children.      For a free and informative book on strabismus (eye misalignment disorders), go to: http://[employer].Right Hemisphere/eyemusclebook     For more information, see also: http://eyewiki.aao.org/Category:Pediatric_Ophthalmology/Strabismus     I recommend eye muscle surgery. Today with Ever and his mother, I reviewed the indications, risks, benefits, and alternatives of eye muscle surgery including, but not limited to, failure obtain the desired ocular alignment (\"over\" or \"under\" correction), diplopia, and damage to any structure in or around the eye that may necessitate treatment with medicine, laser, or surgery. I further explained that the " "goal of surgery is to help control Ever's strabismus. Surgery will not \"cure\" Ever's strabismus or resolve/prevent the need for refractive correction. Additional strabismus surgery may be required in the short or long term. I emphasized that regular follow-up to monitor and optimize his vision and alignment would be necessary. We also discussed the risks of surgical injury, bleeding, and infection which may necessitate further medical or surgical treatment and which may result in diplopia, loss of vision, blindness, or loss of the eye(s) in less than 1% of cases and the remote possibility of permanent damage to any organ system or death with the use of general anesthesia.  I explained that we would hide visible scars as much as possible in natural creases but that every patient heals and pigments differently resulting in a variable degree of scarring to the eyes or surrounding facial structures after surgery.  I also discussed that trainees would be involved in Ever's surgery under my direct supervision.  I provided multiple opportunities for questions, answered all questions to the best of my ability, and confirmed that my answers and my discussion were understood.    PATCH THERAPY FOR AMBLYOPIA    Your child is being treated for a condition called amblyopia (visual developmental delay).  In nonmedical terms, this is sometimes referred to as  lazy eye.   Proper motivation and compliance with the patching schedule is of great importance to the success of the treatment.  The following are commonly asked questions about patching.     What type of patch should be used?    We recommend the Opticlude, Coverlet, or Ortopad brands of patches.  These fit securely on the face and prevent light from entering the patched eye, as well as reducing the likelihood of peeking over or around the patch.  Your pharmacist may order these patches if they are not in stock.  They come in torsten size for infants and regular size for " older children.  A patch should not be used more than once.  They are usually packaged in boxes of 20.  You can make your own patch with a gauze pad and tape, but this is a bit more time consuming and not quite as attractive.  The black eye patch that ties around the head is not recommended since it may be easily displaced, and the child may peek around the patch.    When should the patch be applied?    If your child is being patched for a full day, apply the patch as soon as your child is awake in the morning.  The patch should remain in place until the child is put to bed at night, at which time, the patch may be removed.  When patching less than full-time, any hours your child is awake are acceptable.  Some parents find it easier to place the patch prior to the child awakening, but any time the child is asleep cannot be included in the amount of time the child should be patched.    How long will my child have to wear a patch?    There is no easy answer to this question.  It varies from child to child.  Some children respond very quickly to patching; others do not.  In general, the younger the child, the quicker the response.  If a child is old enough for vision testing, the patch will be used until the vision is equal in both eyes.  For younger children, the patch will be continued until testing indicates that the eyes are being used equally well.  After the vision is equal, part-time patching may be required to maintain good vision in each eye.  If your child has a crossing or wandering eye, you may notice during treatment that the  good eye  begins to cross or wander when the patch is off.  This is a good sign because it means the eyes are being used equally and vision has improved in the amblyopic eye.  The doctors may then suggest less patching or patching each eye alternately.    Will the vision ever go down again once it has improved?    Yes, this may happen and, therefore, it is necessary to keep a close  watch on your child and continue with regular follow-up exams after the initial patching is discontinued.    Will patching the good eye decrease the vision in that eye?    Not usually, but in the unlikely event that this does occur, discontinuing patching or alternately patching will restore normal vision.  Any decrease in vision in the patched eye will be promptly detected on scheduled follow-up visits.    Will the patch straighten my child s crossing eye?    No.  If your child s eye is crossing or wandering, there are two problems present:  loss of vision (amblyopia) and misalignment of the two eyes (strabismus).  Patching is used to  restore loss of vision.  You may notice that the crossed eye is straight when the patch is in place but only one eye is being seen.  When the patch is removed and both eyes are open, misalignment may be noted.    In some cases of wandering eye (one eye turning out), a successful patching treatment may result in less tendency toward wandering due to better vision in that eye.    Will patching always restore vision?    No.  There are times when vision cannot be restored to a normal level even with complete compliance with the patching program.  However, even if this should happen, parents have the satisfaction of knowing that they have tried the most effective method available in an attempt to help their child regain vision.    Are there methods other than patching for treating amblyopia?    Yes.  Drops, contact lenses or alteration in glasses can be used in some instances.  These methods have some problems and are not as effective as patching.  There are no effective exercises for this condition.  As a child s vision improves, the patching time may be lessened, or the patch may be worn on the glasses rather than the face.    What do I do if the skin becomes irritated?    You may want to try a different type of patch, rotate the patch to change position on the face, or alternate  between small and large patches.  Vaseline or baby oil may be applied to the irritated skin, carefully avoiding the eyes.  With severe irritation, leaving the patch off for a few days or patching the glasses instead of the eye until the skin heals will help.  A different brand of patch may also be tried.  If the skin becomes irritated, apply a liquid antacid (such as Maalox) to the skin.  Allow the antacid to dry and then apply the patch.    What if a child refuses to wear the patch?    For the very young child, you may find tube socks or mittens on the hands to be helpful.  Paper tape placed around the patch may also be successful.    For the slightly older child who is able to understand, a reward program may help.  Start by applying the patch for a half-hour daily.  Entertain the child during that time so he/she forgets the patch is in place.  Have a buzzer or timer ring at the end of that time and reward the child.  The child should be praised for keeping the patch on during that half hour.  The time can then be increased to a full schedule, as tolerated by the child.    When treatment is initiated for the older child, a  special  time should be set aside to explain just what is going to happen.  The improvement in vision can be a very positive experience as time progresses.    Some children like to apply popular stickers to their patches.    Others receive a sticker to place on their  Patching Calendar  each day that the patch is successfully worn.    The more the eyes are used with the patch in place, the better the visual result.  Games that might interest the older child include connecting the dots, threading beads, video games, circling specific letters in the newspaper or using a colored pencil to fill in rounded letters in the paper.  It is not necessary to do these activities to experience an improvement in vision, but this may be a fun activity for your child while patched.  Your child is being treated  for a condition called amblyopia.  In nonmedical terms, this is sometimes referred to as  lazy eye.   Proper motivation and compliance with the patching schedule is of great importance to the success of the treatment.  The following are commonly asked questions about  patching.     It is the parents who have the responsibility for the child s welfare.    As difficult as it may be to enforce patching according to the prescribed schedule, it is well worth the effort to ensure the development of good vision in each eye.    If your child attends school, the teacher should be informed about the need for patching and the planned schedule of patching.  The teacher may then explain the treatment to your child s classmates.    Are there any restrictions when my child is wearing a patch?    Safety is the primary concern.  A young child should not cross streets unassisted, as side vision is limited when the patch is in place.  Also, care should be taken while bicycle riding near busy streets.    If you find other  tricks  that work for your child during the patching period, please let us know so that we may pass these on to other parents.  If you would care to be a support person for a parent undertaking this experience for the first time, it would be much appreciated.      Please feel free to call the [facility]   at [phone] or [phone]  if you have any problems or concerns.        Patching Options    Adhesive Patches  Adhesive patches are considered the  gold  standard of patching options.    Where to Buy:  There are several brands of adhesive eye patches. The Nexcare and similar tan colored patches are usually found at over-the-counter in drug stores and other retail establishments (such as some [employer] and [employer]). Ortopad is an example of the colorful patches, and can be ordered directly from the company as detailed below. They can often also be ordered through online retailers  such as [employer]. Don t forget to use [employer] and to choose the [employer] as your lucy! This foundation fights blindness in children.     Ortopad  Eye Care and Cure  6-140-JGZWMXM  www.ortopConsult A Doctor    Nexcare Opticlude Orthoptic Eye Patch  [address] [place] [address]  Available at local pharmacies    Coverlet Orthoptic Eye Patch  [employer].  [facility]   Available at local pharmacies    Krafty Patches   [employer].   [email or web address]  [phone]  www.threadsy    MYI Occlusion Eye Patch  [employer]  1-866.662.8051  www.myipatches.com      Non-Adhesive Patches  Several alternatives to adhesive patches are available. Some are cloth patches for wearing over the glasses. Some are cloth patches for wear over the eye while others fit over glasses. Please consult your ophthalmologist before selecting or changing your child s eye patch.     Danielle s Fun Patches  www.anissasTalents Garden  [phone]    The Perfect Patch  www.perfecteyepatch.com    iPatch  www.goipatch.com    PatchPals  [phone]  www.patchGlimpse.comsRedKix    Patch Me  Http://www.etsy.com/shop/PatchMe    Pumpkin Patch Eyeworks  www.TIME PLUS Q    PatchWorks  [email or web address]  [phone]    Dr. Patch  www.drpatch.[employer]    ALIVIA Patch  [employer]  [phone]    Appfrica  www.framehuggers.com    Kids Bright Eyes  www.kidsbrighteyes.com    Etsy  Many different sources for eye patches can be found on [place]:  https://www.etsy.com    More Resources:  Patching accessories are available at several web sites that can make patching more fun and motivational for your child.  See the following resources:    Ortopad: for adhesive patches with fun designs  7-709-AMMBOBJ([phone])  www.ortopMetastorm.[employer]    Patch Pals: for reusable patches which fit over glasses  1-348.102.9796  www.Rewarding ReturnsRedKix    Resources for information:  Prevent Blindness Kym    1-800-331-2020  www.preventblindness.org/children/EyePatchClub.html    National [facility] [place] [facility] (National Institutes of Health)  5-281- 693-6971  www.nei.nih.gov/health/amblyopia            You can even sign up for the Eye Patch Club with PreventBlindness.org!   Https://www.preventblindness.org/eye-patch-club-0  When you join the Eye Patch Club, you receive the Eye Patch Club Kit, containing:  - The Eye Patch Club News. This newsletter features tips and techniques for promoting compliance, stories from and about children who are patching and helpful advice from eye care professionals. The newsletter also includes a Kid's Page with fun games and puzzles for your child.  - Calendar and stickers. For each day of wearing the patch as prescribed, your child gets to put a sticker on the calendar. After six months of successful patching, your child can send a return form to Prevent Blindness Kym to receive a free prize.  - Pen Pal form and birthday card club let children share their stories with other Eye Patch Club members.  - Only $12.95 plus shipping. To order, call 1-800-331-2020.

## 2024-11-06 NOTE — PLAN OF CARE
Problem: Adult Inpatient Plan of Care  Goal: Absence of Hospital-Acquired Illness or Injury  Intervention: Identify and Manage Fall Risk  Recent Flowsheet Documentation  Taken 11/6/2024 1400 by Kimberly Mascorro RN  Safety Promotion/Fall Prevention: safety round/check completed  Taken 11/6/2024 1200 by Kimberly Mascorro RN  Safety Promotion/Fall Prevention: safety round/check completed  Taken 11/6/2024 1000 by Kimberly Mascorro RN  Safety Promotion/Fall Prevention: safety round/check completed  Taken 11/6/2024 0800 by Kimberly Mascorro RN  Safety Promotion/Fall Prevention: safety round/check completed  Intervention: Prevent Skin Injury  Recent Flowsheet Documentation  Taken 11/6/2024 1200 by Kimberly Mascorro RN  Body Position:   position changed independently   side-lying  Goal: Optimal Comfort and Wellbeing  Intervention: Provide Person-Centered Care  Recent Flowsheet Documentation  Taken 11/6/2024 0800 by Kimberly Mascorro RN  Trust Relationship/Rapport:   care explained   choices provided   emotional support provided   empathic listening provided   questions answered   questions encouraged   reassurance provided   thoughts/feelings acknowledged     Problem: Skin Injury Risk Increased  Goal: Skin Health and Integrity  Intervention: Optimize Skin Protection  Recent Flowsheet Documentation  Taken 11/6/2024 0800 by Kimberly Mascorro RN  Pressure Reduction Techniques: frequent weight shift encouraged  Pressure Reduction Devices: pressure-redistributing mattress utilized     Problem: Fall Injury Risk  Goal: Absence of Fall and Fall-Related Injury  Intervention: Promote Injury-Free Environment  Recent Flowsheet Documentation  Taken 11/6/2024 1400 by Kimberly Mascorro RN  Safety Promotion/Fall Prevention: safety round/check completed  Taken 11/6/2024 1200 by Kimberly Mascorro RN  Safety Promotion/Fall Prevention: safety round/check completed  Taken 11/6/2024 1000 by Kimberly Mascorro RN  Safety  Promotion/Fall Prevention: safety round/check completed  Taken 11/6/2024 0800 by Kimberly Mascorro RN  Safety Promotion/Fall Prevention: safety round/check completed   Goal Outcome Evaluation:          Patient to have vertebroplasty per pain management. NPO at midnight. Will continue to monitor. Care continues.

## 2024-11-06 NOTE — THERAPY EVALUATION
Patient Name: Morgan Quick  : 1955    MRN: [id number]                              Today's Date: 2024       Admit Date: 2024    Visit Dx:     ICD-10-CM ICD-9-CM   1. Acute low back pain, unspecified back pain laterality, unspecified whether sciatica present  M54.50 724.2   2. Compression fracture of L4 lumbar vertebra, closed, initial encounter  S32.040A 805.4   3. Compression fracture of T12 vertebra, initial encounter  S22.080A 805.2   4. Compression fracture of T11 vertebra, initial encounter  S22.080A 805.2     Patient Active Problem List   Diagnosis    Morbidly obese    Essential hypertension    Malignant neoplasm of overlapping sites of left breast in female, estrogen receptor positive    Primary cancer of left female breast    Abdominal pain, unspecified abdominal location    Bipolar 1 disorder    High cholesterol    Anorexia    Nausea    Low back pain    Back pain    Compression fracture of L2 lumbar vertebra, closed, initial encounter    Compression fracture of L4 lumbar vertebra, closed, initial encounter    Compression fracture of T12 vertebra    Compression fracture of T11 vertebra     Past Medical History:   Diagnosis Date    Bipolar 1 disorder     Breast cancer     Cancer     Disease of thyroid gland     High cholesterol     Hypertension      Past Surgical History:   Procedure Laterality Date    ABDOMINAL SURGERY      APPENDECTOMY      ENDOSCOPY N/A 2022    Procedure: ESOPHAGOGASTRODUODENOSCOPY with biopsy x 2 areas;  Surgeon: Deonte Wong MD;  Location: [facility] ENDOSCOPY;  Service: Gastroenterology;  Laterality: N/A;  post: gastritis, duodinitis, nodule,     HYSTERECTOMY      MASTECTOMY      L side      General Information       Row Name 24 1413          OT Time and Intention    Subjective Information complains of;pain  -SP     Document Type evaluation  -SP     Mode of Treatment occupational therapy  -SP     Symptoms Noted During/After Treatment increased pain   -SP     Comment Verbal cueing for PLB technique calming strategies as pt appears tense and grasping bed rails.  -SP       Row Name 11/06/24 1413          General Information    Patient Profile Reviewed yes  -SP     Prior Level of Function independent:;ADL's  -SP     Existing Precautions/Restrictions fall;oxygen therapy device and L/min  3L O2 NC at night  -SP     Barriers to Rehab medically complex;previous functional deficit  -SP       Row Name 11/06/24 1413          Living Environment    People in Home spouse  Resides with spouse at [facility]  -SP       Row Name 11/06/24 1413          Home Main Entrance    Number of Stairs, Main Entrance none  -SP       Row Name 11/06/24 1413          Stairs Within Home, Primary    Stair Railings, Within Home, Primary none  -SP       Row Name 11/06/24 1413          Cognition    Orientation Status (Cognition) oriented x 4  -SP       Row Name 11/06/24 1413          Safety Issues/Impairments Affecting Functional Mobility    Impairments Affecting Function (Mobility) strength;pain;endurance/activity tolerance;shortness of breath;balance  -SP               User Key  (r) = Recorded By, (t) = Taken By, (c) = Cosigned By      Initials Name Provider Type    SP Cheng Bowles, OT Occupational Therapist                     Mobility/ADL's       Row Name 11/06/24 1422          Bed Mobility    Bed Mobility bed mobility (all) activities  -SP     All Activities, [place] (Bed Mobility) maximum assist (25% patient effort);2 person assist  -SP       Row Name 11/06/24 1422          Bed-Chair Transfer    Bed-Chair [place] (Transfers) unable to assess  -SP       Row Name 11/06/24 1422          Sit-Stand Transfer    Sit-Stand [place] (Transfers) unable to assess  -SP       Row Name 11/06/24 1422          Functional Mobility    Patient was able to Ambulate no, other medical factors prevent ambulation  -SP     Reason Patient was unable to Ambulate Uncontrolled Pain  -SP               User Key   (r) = Recorded By, (t) = Taken By, (c) = Cosigned By      Initials Name Provider Type    SP Cheng Bowles OT Occupational Therapist                   Obj/Interventions       Row Name 11/06/24 1516          Sensory Assessment (Somatosensory)    Sensory Assessment (Somatosensory) UE sensation intact  -SP       Row Name 11/06/24 1516          Range of Motion Comprehensive    General Range of Motion bilateral upper extremity ROM WFL  -SP       Row Name 11/06/24 1516          Strength Comprehensive (MMT)    Comment, General Manual Muscle Testing (MMT) Assessment Did not formally assess d/t pain, however  3+/5  -SP       Row Name 11/06/24 1516          Balance    Balance Assessment sitting static balance;sitting dynamic balance  -SP     Static Sitting Balance standby assist  -SP     Dynamic Sitting Balance standby assist  -SP     Position, Sitting Balance unsupported;sitting edge of bed  -SP               User Key  (r) = Recorded By, (t) = Taken By, (c) = Cosigned By      Initials Name Provider Type    SP Cheng Bowles OT Occupational Therapist                   Goals/Plan       Row Name 11/06/24 1519          Bathing Goal 1 (OT)    Activity/Device (Bathing Goal 1, OT) bathing skills, all  -SP     [place] Level/Cues Needed (Bathing Goal 1, OT) moderate assist (50-74% patient effort)  -SP     Time Frame (Bathing Goal 1, OT) 2 weeks  -SP     Strategies/Barriers (Bathing Goal 1, OT) until d/c  -SP       Row Name 11/06/24 1519          Dressing Goal 1 (OT)    Activity/Device (Dressing Goal 1, OT) dressing skills, all  -SP     [place]/Cues Needed (Dressing Goal 1, OT) moderate assist (50-74% patient effort)  -SP     Time Frame (Dressing Goal 1, OT) 2 weeks  -SP     Strategies/Barriers (Dressing Goal 1, OT) until d/c  -SP       Row Name 11/06/24 1519          Toileting Goal 1 (OT)    Activity/Device (Toileting Goal 1, OT) toileting skills, all  -SP     [place] Level/Cues Needed (Toileting Goal 1, OT)  moderate assist (50-74% patient effort)  -SP     Time Frame (Toileting Goal 1, OT) 2 weeks  -SP     Strategies/Barriers (Toileting Goal 1, OT) until d/c  -SP       Row Name 11/06/24 1519          Therapy Assessment/Plan (OT)    Planned Therapy Interventions (OT) activity tolerance training;BADL retraining;functional balance retraining;IADL retraining;passive ROM/stretching;occupation/activity based interventions;patient/caregiver education/training;ROM/therapeutic exercise;strengthening exercise;transfer/mobility retraining  -SP               User Key  (r) = Recorded By, (t) = Taken By, (c) = Cosigned By      Initials Name Provider Type    SP Cheng Bowles OT Occupational Therapist                   Clinical Impression       Row Name 11/06/24 1517          Pain Assessment    Pretreatment Pain Rating 9/10  -SP     Posttreatment Pain Rating 9/10  -SP     Pain Location back  -SP     Pain Management Interventions nursing notified  -SP     Response to Pain Interventions activity participation with increased pain  -SP       Row Name 11/06/24 1514          Plan of Care Review    Plan of Care Reviewed With patient  -SP     Outcome Evaluation Pt is a 69 y/o female admitted to [facility] on 11/4/24 with c/o back pain. XR lumbar spine: chronic appearing compression of L1-L5 with vertebroplasty at L1-2 and L4. MRI: T11 and T12 compression fxs. Neurosurgery recommended pain management consult and TLSO. Pt does not have TLSO brace in room at this time, however therapy cleared as pt tolerates as there is no bed rest necessary. PMHx significant for HTN, HLD, hx of breast cancer s/p radiation, and chronic back pain with kyphoplasty. At baseline, pt resides with spouse in [facility], typically (I) with ADLs. Pt utilizes rollator for household mobility, and electric scooter when grocery shopping, does not drive. Pt wears 3L home O2. Upon assessment, pt A&O x4 with reports of 9/10 pain in back. Pt max assist x2 to sit EOB, unable to progress  further mobility d/t excessive pain at this time. Pt is below baseline and would benefit from continued skilled OT services while admitted. Pt currently awaiting kyphoplasty with pain management. OT recommending SNF when medically appropriate.  -SP       Row Name 11/06/24 1517          Therapy Assessment/Plan (OT)    Criteria for Skilled Therapeutic Interventions Met (OT) yes;meets criteria;skilled treatment is necessary  -SP     Therapy Frequency (OT) 5 times/wk  -SP     Predicted Duration of Therapy Intervention (OT) until d/c  -SP       Row Name 11/06/24 1517          Therapy Plan Review/Discharge Plan (OT)    Anticipated Discharge Disposition (OT) skilled nursing facility  -SP       Row Name 11/06/24 1517          Vital Signs    O2 Delivery Pre Treatment supplemental O2  -SP     O2 Delivery Intra Treatment supplemental O2  -SP     Post SpO2 (%) 98  -SP     O2 Delivery Post Treatment supplemental O2  -SP     Pre Patient Position Supine  -SP     Intra Patient Position Sitting  -SP     Post Patient Position Supine  -SP       Row Name 11/06/24 1517          Positioning and Restraints    Pre-Treatment Position in bed  -SP     Post Treatment Position bed  -SP     In Bed notified nsg;side lying left;call light within reach;encouraged to call for assist;exit alarm on;with family/caregiver  -SP               User Key  (r) = Recorded By, (t) = Taken By, (c) = Cosigned By      Initials Name Provider Type    Cheng Kumar, NANCY Occupational Therapist                   Outcome Measures       Row Name 11/06/24 151          How much help from another is currently needed...    Putting on and taking off regular lower body clothing? 1  -SP     Bathing (including washing, rinsing, and drying) 1  -SP     Toileting (which includes using toilet bed pan or urinal) 1  -SP     Putting on and taking off regular upper body clothing 2  -SP     Taking care of personal grooming (such as brushing teeth) 3  -SP     Eating meals 3  -SP      AM-PAC 6 Clicks Score (OT) 11  -SP       Row Name 11/06/24 0800          How much help from another person do you currently need...    Turning from your back to your side while in flat bed without using bedrails? 2  -KH     Moving from lying on back to sitting on the side of a flat bed without bedrails? 1  -KH     Moving to and from a bed to a chair (including a wheelchair)? 1  -KH     Standing up from a chair using your arms (e.g., wheelchair, bedside chair)? 1  -KH     Climbing 3-5 steps with a railing? 1  -KH     To walk in hospital room? 1  -KH     AM-PAC 6 Clicks Score (PT) 7  -KH     Highest Level of Mobility Goal 2 --> Bed activities/dependent transfer  -KH       Row Name 11/06/24 1519          Functional Assessment    Outcome Measure Options AM-PAC 6 Clicks Daily Activity (OT)  -SP               User Key  (r) = Recorded By, (t) = Taken By, (c) = Cosigned By      Initials Name Provider Type    Kimberly Santiago, RN Registered Nurse    Cheng Kumar, NANCY Occupational Therapist                    Occupational Therapy Education       Title: PT OT SLP Therapies (In Progress)       Topic: Occupational Therapy (In Progress)       Point: ADL training (Done)       Description:   Instruct learner(s) on proper safety adaptation and remediation techniques during self care or transfers.   Instruct in proper use of assistive devices.                  Learning Progress Summary            Patient Acceptance, E,TB, VU by SP at 11/6/2024 1520                      Point: Home exercise program (Not Started)       Description:   Instruct learner(s) on appropriate technique for monitoring, assisting and/or progressing therapeutic exercises/activities.                  Learner Progress:  Not documented in this visit.              Point: Precautions (Done)       Description:   Instruct learner(s) on prescribed precautions during self-care and functional transfers.                  Learning Progress Summary             Patient Acceptance, E,TB, VU by SP at 11/6/2024 1520                      Point: Body mechanics (Done)       Description:   Instruct learner(s) on proper positioning and spine alignment during self-care, functional mobility activities and/or exercises.                  Learning Progress Summary            Patient Acceptance, E,TB, VU by SP at 11/6/2024 1520                                      User Key       Initials Effective Dates Name Provider Type Discipline    SP 11/15/23 -  Cheng Bowles, NANCY Occupational Therapist OT                  OT Recommendation and Plan  Planned Therapy Interventions (OT): activity tolerance training, BADL retraining, functional balance retraining, IADL retraining, passive ROM/stretching, occupation/activity based interventions, patient/caregiver education/training, ROM/therapeutic exercise, strengthening exercise, transfer/mobility retraining  Therapy Frequency (OT): 5 times/wk  Plan of Care Review  Plan of Care Reviewed With: patient  Outcome Evaluation: Pt is a 69 y/o female admitted to [facility] on 11/4/24 with c/o back pain. XR lumbar spine: chronic appearing compression of L1-L5 with vertebroplasty at L1-2 and L4. MRI: T11 and T12 compression fxs. Neurosurgery recommended pain management consult and TLSO. Pt does not have TLSO brace in room at this time, however therapy cleared as pt tolerates as there is no bed rest necessary. PMHx significant for HTN, HLD, hx of breast cancer s/p radiation, and chronic back pain with kyphoplasty. At baseline, pt resides with spouse in [facility], typically (I) with ADLs. Pt utilizes rollator for household mobility, and electric scooter when grocery shopping, does not drive. Pt wears 3L home O2. Upon assessment, pt A&O x4 with reports of 9/10 pain in back. Pt max assist x2 to sit EOB, unable to progress further mobility d/t excessive pain at this time. Pt is below baseline and would benefit from continued skilled OT services while admitted. Pt currently  awaiting kyphoplasty with pain management. OT recommending SNF when medically appropriate.     Time Calculation:         Time Calculation- OT       Row Name 11/06/24 1520             Time Calculation- OT    OT Start Time 1313  -SP      OT Stop Time 1331  -SP      OT Time Calculation (min) 18 min  -SP      OT Received On 11/06/24  -SP      OT - Next Appointment 11/07/24  -SP      OT Goal Re-Cert Due Date 11/20/24  -SP                User Key  (r) = Recorded By, (t) = Taken By, (c) = Cosigned By      Initials Name Provider Type    SP Cheng Bowles OT Occupational Therapist                  Therapy Charges for Today       Code Description Service Date Service Provider Modifiers Qty    [id number] HC OT EVAL MOD COMPLEXITY 4 11/6/2024 Cheng Bowles OT GO 1                 Cheng Bowles OT  11/6/2024

## 2024-11-06 NOTE — PLAN OF CARE
Problem: Adult Inpatient Plan of Care  Goal: Absence of Hospital-Acquired Illness or Injury  Outcome: Progressing  Intervention: Identify and Manage Fall Risk  Recent Flowsheet Documentation  Taken 11/5/2024 2204 by Brandy Mederos RN  Safety Promotion/Fall Prevention: safety round/check completed  Taken 11/5/2024 2045 by Brandy Mederos RN  Safety Promotion/Fall Prevention: patient off unit  Taken 11/5/2024 2030 by Brandy Mederos RN  Safety Promotion/Fall Prevention: safety round/check completed  Intervention: Prevent Skin Injury  Recent Flowsheet Documentation  Taken 11/5/2024 2204 by Brandy Mederos RN  Body Position: position changed independently  Taken 11/5/2024 2030 by Brandy Mederos RN  Body Position: position changed independently  Intervention: Prevent Infection  Recent Flowsheet Documentation  Taken 11/5/2024 2204 by Brandy Mederos RN  Infection Prevention:   single patient room provided   rest/sleep promoted  Taken 11/5/2024 2030 by Brandy Mederos RN  Infection Prevention:   single patient room provided   rest/sleep promoted     Problem: Adult Inpatient Plan of Care  Goal: Absence of Hospital-Acquired Illness or Injury  Intervention: Prevent Infection  Recent Flowsheet Documentation  Taken 11/5/2024 2204 by Brandy Mederos RN  Infection Prevention:   single patient room provided   rest/sleep promoted  Taken 11/5/2024 2030 by Brandy Mederos RN  Infection Prevention:   single patient room provided   rest/sleep promoted     Problem: Adult Inpatient Plan of Care  Goal: Optimal Comfort and Wellbeing  Outcome: Progressing  Intervention: Provide Person-Centered Care  Recent Flowsheet Documentation  Taken 11/5/2024 2030 by Brandy Mederos RN  Trust Relationship/Rapport:   care explained   questions answered   questions encouraged     Problem: Skin Injury Risk Increased  Goal: Skin Health and Integrity  Outcome: Progressing  Intervention: Optimize Skin Protection  Recent Flowsheet Documentation  Taken 11/5/2024 2204  by Brandy Mederos, RN  Head of Bed (HOB) Positioning: HOB lowered  Taken 11/5/2024 2030 by Brandy Mederos, RN  Activity Management: activity encouraged  Pressure Reduction Techniques: frequent weight shift encouraged  Head of Bed (HOB) Positioning: HOB lowered  Pressure Reduction Devices: pressure-redistributing mattress utilized   Goal Outcome Evaluation:  Plan of Care Reviewed With: patient

## 2024-11-07 ENCOUNTER — ANESTHESIA EVENT (OUTPATIENT)
Dept: PERIOP | Facility: HOSPITAL | Age: 69
End: 2024-11-07
Payer: MEDICARE

## 2024-11-07 ENCOUNTER — APPOINTMENT (OUTPATIENT)
Dept: GENERAL RADIOLOGY | Facility: HOSPITAL | Age: 69
DRG: 516 | End: 2024-11-07
Payer: MEDICARE

## 2024-11-07 ENCOUNTER — ANESTHESIA (OUTPATIENT)
Dept: PERIOP | Facility: HOSPITAL | Age: 69
End: 2024-11-07
Payer: MEDICARE

## 2024-11-07 LAB
ANION GAP SERPL CALCULATED.3IONS-SCNC: 10.5 MMOL/L (ref 5–15)
BASOPHILS # BLD AUTO: 0.01 10*3/MM3 (ref 0–0.2)
BASOPHILS NFR BLD AUTO: 0.1 % (ref 0–1.5)
BUN SERPL-MCNC: 12 MG/DL (ref 8–23)
BUN/CREAT SERPL: 25.5 (ref 7–25)
CALCIUM SPEC-SCNC: 9.6 MG/DL (ref 8.6–10.5)
CHLORIDE SERPL-SCNC: 97 MMOL/L (ref 98–107)
CO2 SERPL-SCNC: 29.5 MMOL/L (ref 22–29)
CREAT SERPL-MCNC: 0.47 MG/DL (ref 0.57–1)
DEPRECATED RDW RBC AUTO: 44.7 FL (ref 37–54)
EGFRCR SERPLBLD CKD-EPI 2021: 103.8 ML/MIN/1.73
EOSINOPHIL # BLD AUTO: 0.1 10*3/MM3 (ref 0–0.4)
EOSINOPHIL NFR BLD AUTO: 1 % (ref 0.3–6.2)
ERYTHROCYTE [DISTWIDTH] IN BLOOD BY AUTOMATED COUNT: 14.8 % (ref 12.3–15.4)
GLUCOSE SERPL-MCNC: 108 MG/DL (ref 65–99)
HCT VFR BLD AUTO: 41 % (ref 34–46.6)
HGB BLD-MCNC: 11.9 G/DL (ref 12–15.9)
IMM GRANULOCYTES # BLD AUTO: 0.03 10*3/MM3 (ref 0–0.05)
IMM GRANULOCYTES NFR BLD AUTO: 0.3 % (ref 0–0.5)
LYMPHOCYTES # BLD AUTO: 0.71 10*3/MM3 (ref 0.7–3.1)
LYMPHOCYTES NFR BLD AUTO: 7.2 % (ref 19.6–45.3)
MCH RBC QN AUTO: 24.3 PG (ref 26.6–33)
MCHC RBC AUTO-ENTMCNC: 29 G/DL (ref 31.5–35.7)
MCV RBC AUTO: 83.8 FL (ref 79–97)
MONOCYTES # BLD AUTO: 0.82 10*3/MM3 (ref 0.1–0.9)
MONOCYTES NFR BLD AUTO: 8.3 % (ref 5–12)
NEUTROPHILS NFR BLD AUTO: 8.2 10*3/MM3 (ref 1.7–7)
NEUTROPHILS NFR BLD AUTO: 83.1 % (ref 42.7–76)
NRBC BLD AUTO-RTO: 0 /100 WBC (ref 0–0.2)
PLATELET # BLD AUTO: 219 10*3/MM3 (ref 140–450)
PMV BLD AUTO: 9.6 FL (ref 6–12)
POTASSIUM SERPL-SCNC: 3.6 MMOL/L (ref 3.5–5.2)
POTASSIUM SERPL-SCNC: 4.3 MMOL/L (ref 3.5–5.2)
RBC # BLD AUTO: 4.89 10*6/MM3 (ref 3.77–5.28)
SODIUM SERPL-SCNC: 137 MMOL/L (ref 136–145)
WBC NRBC COR # BLD AUTO: 9.87 10*3/MM3 (ref 3.4–10.8)

## 2024-11-07 PROCEDURE — C1713 ANCHOR/SCREW BN/BN,TIS/BN: HCPCS | Performed by: STUDENT IN AN ORGANIZED HEALTH CARE EDUCATION/TRAINING PROGRAM

## 2024-11-07 PROCEDURE — 25010000002 HYDROMORPHONE 1 MG/ML SOLUTION: Performed by: NURSE ANESTHETIST, CERTIFIED REGISTERED

## 2024-11-07 PROCEDURE — 25010000002 CEFAZOLIN PER 500 MG: Performed by: STUDENT IN AN ORGANIZED HEALTH CARE EDUCATION/TRAINING PROGRAM

## 2024-11-07 PROCEDURE — 85025 COMPLETE CBC W/AUTO DIFF WBC: CPT | Performed by: NURSE PRACTITIONER

## 2024-11-07 PROCEDURE — 25010000002 BUPIVACAINE (PF) 0.25 % SOLUTION 10 ML VIAL: Performed by: STUDENT IN AN ORGANIZED HEALTH CARE EDUCATION/TRAINING PROGRAM

## 2024-11-07 PROCEDURE — 25510000001 IOPAMIDOL 41 % SOLUTION: Performed by: STUDENT IN AN ORGANIZED HEALTH CARE EDUCATION/TRAINING PROGRAM

## 2024-11-07 PROCEDURE — 25010000002 ONDANSETRON PER 1 MG: Performed by: NURSE ANESTHETIST, CERTIFIED REGISTERED

## 2024-11-07 PROCEDURE — 84132 ASSAY OF SERUM POTASSIUM: CPT | Performed by: HOSPITALIST

## 2024-11-07 PROCEDURE — 25010000002 ORPHENADRINE CITRATE PER 60 MG

## 2024-11-07 PROCEDURE — 25010000002 LIDOCAINE PF 1% 1 % SOLUTION: Performed by: NURSE ANESTHETIST, CERTIFIED REGISTERED

## 2024-11-07 PROCEDURE — 25810000003 LACTATED RINGERS PER 1000 ML: Performed by: NURSE ANESTHETIST, CERTIFIED REGISTERED

## 2024-11-07 PROCEDURE — 25010000002 LIDOCAINE 1 % SOLUTION 20 ML VIAL: Performed by: STUDENT IN AN ORGANIZED HEALTH CARE EDUCATION/TRAINING PROGRAM

## 2024-11-07 PROCEDURE — 76000 FLUOROSCOPY <1 HR PHYS/QHP: CPT

## 2024-11-07 PROCEDURE — 25810000003 LACTATED RINGERS PER 1000 ML: Performed by: ANESTHESIOLOGY

## 2024-11-07 PROCEDURE — 25010000002 HYDROMORPHONE PER 4 MG: Performed by: HOSPITALIST

## 2024-11-07 PROCEDURE — 22515 PERQ VERTEBRAL AUGMENTATION: CPT | Performed by: STUDENT IN AN ORGANIZED HEALTH CARE EDUCATION/TRAINING PROGRAM

## 2024-11-07 PROCEDURE — 25010000002 DEXAMETHASONE PER 1 MG: Performed by: NURSE ANESTHETIST, CERTIFIED REGISTERED

## 2024-11-07 PROCEDURE — S0260 H&P FOR SURGERY: HCPCS | Performed by: STUDENT IN AN ORGANIZED HEALTH CARE EDUCATION/TRAINING PROGRAM

## 2024-11-07 PROCEDURE — 25010000002 HYDRALAZINE PER 20 MG: Performed by: HOSPITALIST

## 2024-11-07 PROCEDURE — 0PU43JZ SUPPLEMENT THORACIC VERTEBRA WITH SYNTHETIC SUBSTITUTE, PERCUTANEOUS APPROACH: ICD-10-PCS | Performed by: STUDENT IN AN ORGANIZED HEALTH CARE EDUCATION/TRAINING PROGRAM

## 2024-11-07 PROCEDURE — 0PS43ZZ REPOSITION THORACIC VERTEBRA, PERCUTANEOUS APPROACH: ICD-10-PCS | Performed by: STUDENT IN AN ORGANIZED HEALTH CARE EDUCATION/TRAINING PROGRAM

## 2024-11-07 PROCEDURE — 80048 BASIC METABOLIC PNL TOTAL CA: CPT | Performed by: NURSE PRACTITIONER

## 2024-11-07 PROCEDURE — 72070 X-RAY EXAM THORAC SPINE 2VWS: CPT

## 2024-11-07 PROCEDURE — 22513 PERQ VERTEBRAL AUGMENTATION: CPT | Performed by: STUDENT IN AN ORGANIZED HEALTH CARE EDUCATION/TRAINING PROGRAM

## 2024-11-07 PROCEDURE — 25010000002 PROPOFOL 10 MG/ML EMULSION: Performed by: NURSE ANESTHETIST, CERTIFIED REGISTERED

## 2024-11-07 RX ORDER — PHENYLEPHRINE HCL IN 0.9% NACL 1 MG/10 ML
SYRINGE (ML) INTRAVENOUS AS NEEDED
Status: DISCONTINUED | OUTPATIENT
Start: 2024-11-07 | End: 2024-11-07 | Stop reason: SURG

## 2024-11-07 RX ORDER — ONDANSETRON 2 MG/ML
INJECTION INTRAMUSCULAR; INTRAVENOUS AS NEEDED
Status: DISCONTINUED | OUTPATIENT
Start: 2024-11-07 | End: 2024-11-07 | Stop reason: SURG

## 2024-11-07 RX ORDER — IOPAMIDOL 408 MG/ML
INJECTION, SOLUTION INTRATHECAL AS NEEDED
Status: DISCONTINUED | OUTPATIENT
Start: 2024-11-07 | End: 2024-11-07 | Stop reason: HOSPADM

## 2024-11-07 RX ORDER — SODIUM CHLORIDE, SODIUM LACTATE, POTASSIUM CHLORIDE, CALCIUM CHLORIDE 600; 310; 30; 20 MG/100ML; MG/100ML; MG/100ML; MG/100ML
INJECTION, SOLUTION INTRAVENOUS CONTINUOUS PRN
Status: DISCONTINUED | OUTPATIENT
Start: 2024-11-07 | End: 2024-11-07 | Stop reason: SURG

## 2024-11-07 RX ORDER — LIDOCAINE HYDROCHLORIDE 10 MG/ML
INJECTION, SOLUTION EPIDURAL; INFILTRATION; INTRACAUDAL; PERINEURAL AS NEEDED
Status: DISCONTINUED | OUTPATIENT
Start: 2024-11-07 | End: 2024-11-07 | Stop reason: SURG

## 2024-11-07 RX ORDER — POTASSIUM CHLORIDE 1500 MG/1
40 TABLET, EXTENDED RELEASE ORAL EVERY 4 HOURS
Status: DISPENSED | OUTPATIENT
Start: 2024-11-07 | End: 2024-11-07

## 2024-11-07 RX ORDER — PROPOFOL 10 MG/ML
VIAL (ML) INTRAVENOUS AS NEEDED
Status: DISCONTINUED | OUTPATIENT
Start: 2024-11-07 | End: 2024-11-07 | Stop reason: SURG

## 2024-11-07 RX ORDER — ROCURONIUM BROMIDE 10 MG/ML
INJECTION, SOLUTION INTRAVENOUS AS NEEDED
Status: DISCONTINUED | OUTPATIENT
Start: 2024-11-07 | End: 2024-11-07 | Stop reason: SURG

## 2024-11-07 RX ORDER — SODIUM CHLORIDE, SODIUM LACTATE, POTASSIUM CHLORIDE, CALCIUM CHLORIDE 600; 310; 30; 20 MG/100ML; MG/100ML; MG/100ML; MG/100ML
30 INJECTION, SOLUTION INTRAVENOUS ONCE
Status: COMPLETED | OUTPATIENT
Start: 2024-11-07 | End: 2024-11-07

## 2024-11-07 RX ORDER — DEXAMETHASONE SODIUM PHOSPHATE 4 MG/ML
INJECTION, SOLUTION INTRA-ARTICULAR; INTRALESIONAL; INTRAMUSCULAR; INTRAVENOUS; SOFT TISSUE AS NEEDED
Status: DISCONTINUED | OUTPATIENT
Start: 2024-11-07 | End: 2024-11-07 | Stop reason: SURG

## 2024-11-07 RX ADMIN — SODIUM CHLORIDE 2000 MG: 900 INJECTION INTRAVENOUS at 17:08

## 2024-11-07 RX ADMIN — Medication 10 ML: at 09:29

## 2024-11-07 RX ADMIN — Medication 10 ML: at 22:21

## 2024-11-07 RX ADMIN — DEXAMETHASONE SODIUM PHOSPHATE 8 MG: 4 INJECTION, SOLUTION INTRAMUSCULAR; INTRAVENOUS at 17:34

## 2024-11-07 RX ADMIN — ORPHENADRINE CITRATE 60 MG: 30 INJECTION, SOLUTION INTRAMUSCULAR; INTRAVENOUS at 13:20

## 2024-11-07 RX ADMIN — PANTOPRAZOLE SODIUM 40 MG: 40 TABLET, DELAYED RELEASE ORAL at 05:02

## 2024-11-07 RX ADMIN — ONDANSETRON 4 MG: 2 INJECTION, SOLUTION INTRAMUSCULAR; INTRAVENOUS at 17:45

## 2024-11-07 RX ADMIN — LEVOTHYROXINE SODIUM 25 MCG: 25 TABLET ORAL at 05:02

## 2024-11-07 RX ADMIN — ORPHENADRINE CITRATE 60 MG: 30 INJECTION, SOLUTION INTRAMUSCULAR; INTRAVENOUS at 00:36

## 2024-11-07 RX ADMIN — HYDROMORPHONE HYDROCHLORIDE 0.5 MG: 1 INJECTION, SOLUTION INTRAMUSCULAR; INTRAVENOUS; SUBCUTANEOUS at 12:18

## 2024-11-07 RX ADMIN — Medication 100 MCG: at 17:45

## 2024-11-07 RX ADMIN — CYCLOBENZAPRINE 10 MG: 10 TABLET, FILM COATED ORAL at 05:01

## 2024-11-07 RX ADMIN — PROPOFOL 150 MG: 10 INJECTION, EMULSION INTRAVENOUS at 17:22

## 2024-11-07 RX ADMIN — TRAZODONE HYDROCHLORIDE 25 MG: 50 TABLET ORAL at 22:18

## 2024-11-07 RX ADMIN — ROCURONIUM BROMIDE 50 MG: 10 INJECTION, SOLUTION INTRAVENOUS at 17:22

## 2024-11-07 RX ADMIN — Medication 200 MCG: at 17:32

## 2024-11-07 RX ADMIN — OXYCODONE 10 MG: 5 TABLET ORAL at 05:01

## 2024-11-07 RX ADMIN — LUBIPROSTONE 24 MCG: 24 CAPSULE, GELATIN COATED ORAL at 22:18

## 2024-11-07 RX ADMIN — OXCARBAZEPINE 150 MG: 150 TABLET, FILM COATED ORAL at 22:20

## 2024-11-07 RX ADMIN — SODIUM CHLORIDE, SODIUM LACTATE, POTASSIUM CHLORIDE, AND CALCIUM CHLORIDE: .6; .31; .03; .02 INJECTION, SOLUTION INTRAVENOUS at 17:14

## 2024-11-07 RX ADMIN — LIDOCAINE HYDROCHLORIDE 50 MG: 10 INJECTION, SOLUTION EPIDURAL; INFILTRATION; INTRACAUDAL; PERINEURAL at 17:22

## 2024-11-07 RX ADMIN — Medication 200 MCG: at 17:42

## 2024-11-07 RX ADMIN — HYDRALAZINE HYDROCHLORIDE 10 MG: 20 INJECTION INTRAMUSCULAR; INTRAVENOUS at 12:18

## 2024-11-07 RX ADMIN — SODIUM CHLORIDE, POTASSIUM CHLORIDE, SODIUM LACTATE AND CALCIUM CHLORIDE 30 ML/HR: 600; 310; 30; 20 INJECTION, SOLUTION INTRAVENOUS at 16:37

## 2024-11-07 RX ADMIN — Medication 200 MCG: at 17:27

## 2024-11-07 RX ADMIN — GUAIFENESIN 1200 MG: 600 TABLET, EXTENDED RELEASE ORAL at 22:20

## 2024-11-07 RX ADMIN — POTASSIUM CHLORIDE 40 MEQ: 1500 TABLET, EXTENDED RELEASE ORAL at 04:27

## 2024-11-07 RX ADMIN — Medication 200 MCG: at 17:37

## 2024-11-07 RX ADMIN — HYDROMORPHONE HYDROCHLORIDE 1 MG: 1 INJECTION, SOLUTION INTRAMUSCULAR; INTRAVENOUS; SUBCUTANEOUS at 19:03

## 2024-11-07 RX ADMIN — RISPERIDONE 0.25 MG: 0.25 TABLET, FILM COATED ORAL at 22:20

## 2024-11-07 NOTE — PROGRESS NOTES
Subjective   Morgan Quick is a 68 y.o. female who is here for severe lower back pain after a fall and found to have acute T12 and T11 compression fractures.    Interval history on 11/7/2024-continues to have severe lower back pain with any activities and movement and requiring escalating doses of pain medications.    Lower back pain is 7/10 on VAS, at maximum is 10/10. Pain is sharp, shooting, and stabbing in nature. Pain is not referred. The pain is constant. The pain is improved by pain medications. The pain is worse with any movement.     Previous Injection:     Hx:   Presented to ER on 11/4/2024 with severe lower back pain.  She states that yesterday she went to get up using her walker and felt severe left lower back pain and could not get up and fell back down in the chair.  History of kyphoplasties of L2, L4 vertebral level on 1/2/2024 with IR.  Patient is unable to ambulate due to severe pain and also requires external catheter for urination.  Pain, numbness and tingling in bilateral lower extremities-this is chronic.  Her main complaint is axial lower back pain.  She has been living in assisted living for about 1 year.  Denies any bowel or bladder incontinence since admitted to the hospital.  She does have chronic pain in lower back from L2-L4 but her main complaint is significant and severe pain at T11, T12 vertebral level.  Also has history of chronic neck pain.  She does have chronic bilateral leg pain as well.       PMH:   Bipolar 1, history of breast cancer s/p radiation about 2 years ago     Current Medications:   Roxicodone 10 mg q4H PRN  Dilaudid 0.5 mg PRN  Lidocaine 4% patch  Morphine 4 mg PRN        Past Medications:     Past Modalities:  TENS:                                                                          no                                                  Physical Therapy Within The Last 6 Months              no  Psychotherapy                                                             no  Massage Therapy                                                       no     Patient Complains Of:  Uro-Fecal Incontinence          no  Weight Gain/Loss                   no  Fever/Chills                             no  Weakness                               no                     Current Facility-Administered Medications:     sennosides-docusate (PERICOLACE) 8.6-50 MG per tablet 2 tablet, 2 tablet, Oral, BID PRN **AND** polyethylene glycol (MIRALAX) packet 17 g, 17 g, Oral, Daily PRN **AND** bisacodyl (DULCOLAX) EC tablet 5 mg, 5 mg, Oral, Daily PRN **AND** bisacodyl (DULCOLAX) suppository 10 mg, 10 mg, Rectal, Daily PRN, Lacho Vance,     Calcium Replacement - Follow Nurse / BPA Driven Protocol, , Does not apply, PRN, Lacho Vance,     cyclobenzaprine (FLEXERIL) tablet 10 mg, 10 mg, Oral, TID PRN, Flaquita Whatley S, APRN, 10 mg at 11/07/24 0501    furosemide (LASIX) tablet 20 mg, 20 mg, Oral, Daily, TripureFlaquita S, APRN, 20 mg at 11/06/24 1023    guaiFENesin (MUCINEX) 12 hr tablet 1,200 mg, 1,200 mg, Oral, Q12H, Flaquita Whatley S APRN, 1,200 mg at 11/06/24 2037    hydrALAZINE (APRESOLINE) injection 10 mg, 10 mg, Intravenous, Q6H PRN, Edouard Hong MD, 10 mg at 11/06/24 0919    HYDROmorphone (DILAUDID) injection 0.5 mg, 0.5 mg, Intravenous, Q3H PRN, Edouard Hong MD, 0.5 mg at 11/06/24 1829    levothyroxine (SYNTHROID, LEVOTHROID) tablet 25 mcg, 25 mcg, Oral, Q AM, Flaquita Whatley S, APRN, 25 mcg at 11/07/24 0502    lubiprostone (AMITIZA) capsule 24 mcg, 24 mcg, Oral, BID, TripFlaquita beck APRN, 24 mcg at 11/06/24 2037    Magnesium Standard Dose Replacement - Follow Nurse / BPA Driven Protocol, , Does not apply, PRN, Lacho Vance DO    melatonin tablet 5 mg, 5 mg, Oral, Nightly PRN, Lacho Vance DO, 5 mg at 11/04/24 2052    ondansetron (ZOFRAN) injection 4 mg, 4 mg, Intravenous, Q6H PRN, Lacho Vance DO, 4 mg at 11/06/24  1029    orphenadrine (NORFLEX) injection 60 mg, 60 mg, Intravenous, Q12H, Valencia Fu APRN, 60 mg at 11/07/24 0036    OXcarbazepine (TRILEPTAL) tablet 150 mg, 150 mg, Oral, BID, Tripebony, Flaquita S, APRN, 150 mg at 11/06/24 2038    oxyCODONE (ROXICODONE) immediate release tablet 10 mg, 10 mg, Oral, Q4H PRN, Edouard Hong MD, 10 mg at 11/07/24 0501    pantoprazole (PROTONIX) EC tablet 40 mg, 40 mg, Oral, Q AM, Shiva Whatleya S, APRN, 40 mg at 11/07/24 0502    Phosphorus Replacement - Follow Nurse / BPA Driven Protocol, , Does not apply, PRN, Lacho Vance DO    potassium chloride (K-DUR,KLOR-CON) ER tablet 10 mEq, 10 mEq, Oral, Daily, TripFlaquita beck APRN, 10 mEq at 11/06/24 1022    potassium chloride (KLOR-CON M20) CR tablet 40 mEq, 40 mEq, Oral, Q4H, Edouard Hong MD, 40 mEq at 11/07/24 0427    Potassium Replacement - Follow Nurse / BPA Driven Protocol, , Does not apply, PRN, Lacho Vance DO    risperiDONE (risperDAL) tablet 0.25 mg, 0.25 mg, Oral, Nightly, Tripure, Flqauita S, APRN, 0.25 mg at 11/06/24 2037    rosuvastatin (CRESTOR) tablet 30 mg, 30 mg, Oral, Daily, Tripure, Flaquita S, APRN, 30 mg at 11/06/24 1022    sertraline (ZOLOFT) tablet 200 mg, 200 mg, Oral, Daily, Tripure, Flaquita S, APRN, 200 mg at 11/06/24 1022    sodium chloride 0.9 % flush 10 mL, 10 mL, Intravenous, Q12H, Lacho Vance DO, 10 mL at 11/06/24 2039    sodium chloride 0.9 % flush 10 mL, 10 mL, Intravenous, PRN, Lacho Vance DO, 10 mL at 11/05/24 0050    sodium chloride 0.9 % infusion 40 mL, 40 mL, Intravenous, PRN, Lacho Vance DO    [Held by provider] sodium chloride tablet 2 g, 2 g, Oral, TID With Meals, Tripure, Flaquita S, APRN    traZODone (DESYREL) tablet 25 mg, 25 mg, Oral, Nightly, Tripure, Flaquita S, APRN, 25 mg at 11/06/24 2038    The following portions of the patient's history were reviewed and updated as appropriate: allergies, current medications,  past family history, past medical history, past social history, past surgical history, and problem list.      REVIEW OF PERTINENT MEDICAL DATA    Past Medical History:   Diagnosis Date    Bipolar 1 disorder     Breast cancer     Cancer     Disease of thyroid gland     High cholesterol     Hypertension      Past Surgical History:   Procedure Laterality Date    ABDOMINAL SURGERY      APPENDECTOMY      ENDOSCOPY N/A 9/25/2022    Procedure: ESOPHAGOGASTRODUODENOSCOPY with biopsy x 2 areas;  Surgeon: Deonte Wong MD;  Location: [facility] ENDOSCOPY;  Service: Gastroenterology;  Laterality: N/A;  post: gastritis, duodinitis, nodule,     HYSTERECTOMY      MASTECTOMY      L side     Family History   Problem Relation Age of Onset    Cancer Mother      Social History     Socioeconomic History    Marital status:    Tobacco Use    Smoking status: Never     Passive exposure: Never    Smokeless tobacco: Never   Vaping Use    Vaping status: Never Used   Substance and Sexual Activity    Alcohol use: No    Drug use: No    Sexual activity: Defer         Review of Systems   Musculoskeletal:  Positive for arthralgias and back pain.         Vitals:    11/07/24 0037 11/07/24 0038 11/07/24 0422 11/07/24 0426   BP:   (!) 186/83 157/74   BP Location:   Right arm Left arm   Patient Position:   Lying Lying   Pulse: 67 67 71 73   Resp:   18 18   Temp:   98.9 °F (37.2 °C)    TempSrc:   Oral    SpO2: 96% 95% 95% 97%   Weight:       Height:             Objective   Physical Exam  Musculoskeletal:         General: Tenderness present.        Back:            Imaging Reviewed:  MRI lumbar spine without contrast-11/5/2024  - Hx of  L2, L4 kyphoplasty  - Chronic L2, L3, L4, L5 fractures stable from prior.  - Chronic L1 compression fracture with mild height loss, new since 1/2/2024  - Mild central height loss at T12 with increased T2 signal consistent with edema consistent with acute to subacute compression fracture  - Increased T2 signal at T11  vertebral body without significant fracture line or height loss.  No bony retropulsion.  Acute to subacute compression fracture.  -Stable and probable benign hemangiomas in T12, T11  L4-5-mild to moderate bilateral neuroforaminal stenosis and moderate facet arthropathy  L5-S1-moderate to severe facet arthropathy.  Mild to moderate bilateral neuroforaminal stenosis.    Assessment:    1. Compression fracture of T12 vertebra, initial encounter    2. Acute low back pain, unspecified back pain laterality, unspecified whether sciatica present    3. Compression fracture of L4 lumbar vertebra, closed, initial encounter    4. Compression fracture of T11 vertebra, initial encounter         Plan:   1.Patient has axial lower back pain and severe tenderness to palpation over the fractured vertebrae.  Imaging shows acute to subacute compression fracture.  Patient is currently hospitalized inpatient and requiring escalating doses of opioids without significant improvement in her pain.  Unable to do any activities due to severe pain.  Discussed with patient that she is good candidate for T11 and T12 kyphoplasty.  Benefits and risks were discussed with patient including but not limited to bleeding, infection, nerve damage, paralysis, pulmonary embolism, cement leak may requiring decompression surgery, death.  Patient understands and agrees with the plan and agrees to proceed with the procedure.  Patient's allergy to contrast dye was discussed.  Patient had tolerated kyphoplasty done in January 2024 without any issues with intra balloon contrast dye.  Benefits of using contrast dye or with risk of using it and patient agreed to proceed.  Patient was consented.  2.  Current pain management as per primary team.      Site is marked and consent is signed and will proceed with kyphoplasty as scheduled.    Jayce Redd DO  Pain Management   [facility]

## 2024-11-07 NOTE — SIGNIFICANT NOTE
11/07/24 1428   OTHER   Discipline occupational therapist   Rehab Time/Intention   Session Not Performed other (see comments)  (Preparing for kyphoplasty. Will follow up 11/8)   Recommendation   OT - Next Appointment 11/08/24

## 2024-11-07 NOTE — CASE MANAGEMENT/SOCIAL WORK
Continued Stay Note  [facility]     Patient Name: Morgan Quick  MRN: [id number]  Today's Date: 11/7/2024    Admit Date: 11/4/2024    Plan: From [facility]. Denied by [facility] due to no bed available. Accepted to [facility] with ready bed at discharge. Pharmacy changed to [facility]. Will need precert   Discharge Plan       Row Name 11/07/24 1541       Plan    Plan From [facility]. Denied by [facility] due to no bed available. Accepted to [facility] with ready bed at discharge. Pharmacy changed to [facility]. Will need precert    Plan Comments [facility] do not have beds available. Teal with [facility] said they will have a bed at  discharge and accept. She will need precert obtained.                          Marivel LEE,RN Case Manager  [facility]  Phone: Desk- [phone] cell- [phone]

## 2024-11-07 NOTE — CASE MANAGEMENT/SOCIAL WORK
Continued Stay Note  [facility]     Patient Name: Morgan Quick  MRN: [id number]  Today's Date: 11/7/2024    Admit Date: 11/4/2024    Plan: From [facility]. SNF recommended and referred to choices: [facility], [facility] and [facility]; accpetance pending. Will need pharmacy changed and precert. PASRR approved   Discharge Plan       Row Name 11/07/24 1154       Plan    Plan From [facility]. SNF recommended and referred to choices: [facility], [facility] and [facility]; accpetance pending. Will need pharmacy changed and precert. PASRR approved    Plan Comments CM called sister, Arsen per Mrs. Quick's request and asked about SNF choices. Arsen said choices are 1. [facility]. 2. [facility] and 3. [facility]. CM made referral to all 3 choices, and added to Epic. Acceptance pending. Will need precert      Row Name 11/07/24 0916       Plan    Plan From [facility]. SNF recommended and SNF list given; choice needed. Once accepted will need precert and pharmacy changed. PASRR approved.    Plan Comments CM updated on PASRR status.                               Marivel DUPREEN,RN Case Manager  [facility]  Phone: Desk- [phone] cell- [phone]

## 2024-11-07 NOTE — DISCHARGE PLACEMENT REQUEST
"Jerrod Wasserman (68 y.o. Female)       Date of Birth   1955    Social Security Number       Address   [address] [place] [address]    Home Phone   [phone]    MRN   [id number]       [employer]ism   [facility]    Marital Status                               Admission Date   11/4/24    Admission Type   Emergency    Admitting Provider   Lacho Vance DO    Attending Provider   Edouard Hong MD    Department, Room/Bed   [facility] OBSERVATION, 234/1       Discharge Date       Discharge Disposition       Discharge Destination                                 Attending Provider: Edouard Hong MD    Allergies: Contrast Dye (Echo Or Unknown Ct/mr), Iodinated Contrast Media    Isolation: None   Infection: None   Code Status: CPR    Ht: 165 cm (64.96\")   Wt: 111 kg (244 lb 0.8 oz)    Admission Cmt: None   Principal Problem: Back pain [M54.9]                   Active Insurance as of 11/4/2024       Primary Coverage       Payor Plan Insurance Group Employer/Plan Group    ANTHEM MEDICARE REPLACEMENT ANTHEM MEDICARE ADVANTAGE [id number]       Payor Plan Address Payor Plan Phone Number Payor Plan Fax Number Effective Dates    [address] [phone]  7/1/2019 - None Entered    [facility] [address]         Subscriber Name Subscriber Birth Date Member ID       JERROD WASSERMAN 1955 [id number]               Secondary Coverage       Payor Plan Insurance Group Employer/Plan Group    ANTHEM MEDICAID ANTHEM PATHWAYS        Payor Plan Address Payor Plan Phone Number Payor Plan Fax Number Effective Dates    [address]   11/4/2024 - None Entered    [facility] [address]         Subscriber Name Subscriber Birth Date Member ID       JERROD WASSERMAN 1955 [id number]                     Emergency Contacts        (Rel.) Home Phone Work Phone Mobile Phone    JERI JULIAN (Spouse) [phone] -- " [phone]    Arsen Barbosa (Sister) -- -- [phone]              Insurance Information                  ANTHEM MEDICARE REPLACEMENT/ANTHEM MEDICARE ADVANTAGE Phone: [phone]    Subscriber: Morgan Quick Subscriber#: [id number]    Group#: [id number] Precert#: [id number]    Authorization#: [id number] Effective Date: --        ANTHEM MEDICAID/ANTHEM PATHWAYS Phone: --    Subscriber: Morgan Quick Subscriber#: [id number]    Group#: -- Precert#: --    Authorization#: 2nd to MA plan Effective Date: --

## 2024-11-07 NOTE — ANESTHESIA PROCEDURE NOTES
Airway  Urgency: elective    Date/Time: 11/7/2024 5:25 PM  Airway not difficult    General Information and Staff    Patient location during procedure: OR  Anesthesiologist: Evens Yousif MD  CRNA/CAA: Lisa Moreno CRNA    Indications and Patient Condition  Indications for airway management: airway protection    Preoxygenated: yes  MILS maintained throughout  Mask difficulty assessment: 0 - not attempted    Final Airway Details  Final airway type: endotracheal airway      Successful airway: ETT  Cuffed: yes   Successful intubation technique: video laryngoscopy  Facilitating devices/methods: intubating stylet  Endotracheal tube insertion site: oral  Blade: Vieyra  Blade size: 3  ETT size (mm): 7.0  Cormack-Lehane Classification: grade I - full view of glottis  Placement verified by: chest auscultation and capnometry   Measured from: lips  ETT/EBT  to lips (cm): 21  Number of attempts at approach: 1  Assessment: lips, teeth, and gum same as pre-op and atraumatic intubation

## 2024-11-07 NOTE — PLAN OF CARE
Goal Outcome Evaluation:    Plan for VERTEBROPLASTY with Dr Redd

## 2024-11-07 NOTE — PROGRESS NOTES
[facility] MEDICINE SERVICE  DAILY PROGRESS NOTE    NAME: Morgan Quick  : 1955  MRN: [id number]      LOS: 2 days     PROVIDER OF SERVICE: Edouard oHng MD    Chief Complaint: Back pain    Subjective:     Interval History:  History taken from: patient chart RN    Back pain   Await kyphoplasty with pain management        Review of Systems:   Review of Systems  All negative except as above  Objective:     Vital Signs  Temp:  [97.8 °F (36.6 °C)-98.9 °F (37.2 °C)] 98.9 °F (37.2 °C)  Heart Rate:  [63-80] 73  Resp:  [18-28] 18  BP: (122-186)/(48-83) 157/74  Flow (L/min) (Oxygen Therapy):  [3] 3   Body mass index is 40.66 kg/m².    Physical Exam  Physical Exam  NAD.  AOx3  RRR S1-S2 audible  Lungs with fair air entry  Abdomen soft nontender nondistended            Diagnostic Data    Results from last 7 days   Lab Units 24  0046   WBC 10*3/mm3 9.87   HEMOGLOBIN g/dL 11.9*   HEMATOCRIT % 41.0   PLATELETS 10*3/mm3 219   GLUCOSE mg/dL 108*   CREATININE mg/dL 0.47*   BUN mg/dL 12   SODIUM mmol/L 137   POTASSIUM mmol/L 3.6   ANION GAP mmol/L 10.5       MRI Lumbar Spine With Contrast    Result Date: 2024  Impression: 1.  Small foci of enhancement along the superior endplate of L3 immediately adjacent to Schmorl's node protrusion, as well as along the anterior inferior endplate of T12 adjacent to concavity/compression endplate deformity. These findings are favored to represent areas of reactive enhancement related to compression fractures, with static involvement thought less likely. Electronically Signed: Emily Gonzalez MD  2024 8:45 AM EST  Workstation ID: [id number]    MRI Lumbar Spine Without Contrast    Result Date: 2024  Impression: 1. Acute to subacute T12 compression fracture with mild height loss and no bony retropulsion, new since prior comparison. 2. T11 vertebral body edema significant height loss or fracture line which could reflect an acute to subacute nondisplaced  fracture versus possible contusion or if there is history of trauma. 3. Chronic L1 compression fracture with mild height loss, new since 1/2/2024. 4. Prior L2 and L4 kyphoplasty. Chronic L2, L3, L4 and L5 fractures are stable from prior. 5. Multilevel lumbar spondylosis without high-grade thecal sac or neuroforaminal stenosis. Degenerative change at T12-L1 has slightly worsened from prior. Electronically Signed: Michael Leal MD  11/5/2024 1:35 PM EST  Workstation ID: [id number]       I reviewed the patient's new clinical results.  I reviewed the patient's new imaging results and agree with the interpretation.    Assessment/Plan:     Active and Resolved Problems  Active Hospital Problems    Diagnosis  POA    **Back pain [M54.9]  Yes    Compression fracture of T12 vertebra [S22.080A]  Unknown    Compression fracture of T11 vertebra [S22.080A]  Unknown      Resolved Hospital Problems   No resolved problems to display.       68-year-old female with history of hypertension, hyperlipidemia, history of breast cancer status postradiation, chronic back pain status post kyphoplasty admitted to [facility] 11/4 with acute on chronic back pain     #Acute to subacute T 11/12 compression fracture  #Chronic L1 compression fracture  Seen by neurosurgery no acute surgical intervention planned.  TLSO brace out of bed  Pain management following .  Now plan for kyphoplasty today   PT/OT  Cautious with opioids     #History of mood disorder  Continue home medications     #Hypothyroidism  Continue levothyroxine     #Hyperlipidemia  Continue statins    VTE Prophylaxis:  Mechanical VTE prophylaxis orders are present.             Disposition Planning:     Barriers to Discharge: Medical workup  Anticipated Date of Discharge: 24 to 48 hours  Place of Discharge: Home versus rehab       Time: 40 minutes     Code Status and Medical Interventions: CPR (Attempt to Resuscitate); Full Support   Ordered at: 11/04/24 1072     Level Of Support  Discussed With:    Patient     Code Status (Patient has no pulse and is not breathing):    CPR (Attempt to Resuscitate)     Medical Interventions (Patient has pulse or is breathing):    Full Support       Signature: Electronically signed by Edouard Hong MD, 11/07/24, 08:27 EST.  [facility] Hospitalist Team

## 2024-11-07 NOTE — CASE MANAGEMENT/SOCIAL WORK
Social Work Assessment  [facility]     Patient Name: Morgan Quick  MRN: [id number]  Today's Date: 11/7/2024    Admit Date: 11/4/2024       Discharge Plan       Row Name 11/07/24 0916       Plan    [facility]. SNF recommended and SNF list given; choice needed. Once accepted will need precert and pharmacy changed. PASRR approved.    Plan Comments CM updated on PASRR status.                  ESTELLE Shaffer, LSW    Phone: [phone]  Fax: [phone]  [email or web address]

## 2024-11-07 NOTE — ANESTHESIA PREPROCEDURE EVALUATION
Anesthesia Evaluation     NPO Solid Status: > 8 hours  NPO Liquid Status: > 8 hours           Airway   Mallampati: II  TM distance: >3 FB  Neck ROM: full  No difficulty expected  Dental - normal exam   (+) poor dentition    Pulmonary - normal exam   Cardiovascular - normal exam    (+) hypertension well controlled, hyperlipidemia      Neuro/Psych  (+) psychiatric history Anxiety and Depression  GI/Hepatic/Renal/Endo    (+) morbid obesity, thyroid problem     Musculoskeletal     (+) back pain  Abdominal  - normal exam    Bowel sounds: normal.   Substance History      OB/GYN          Other      history of cancer remission                Anesthesia Plan    ASA 3     general     intravenous induction     Anesthetic plan, risks, benefits, and alternatives have been provided, discussed and informed consent has been obtained with: patient.  Pre-procedure education provided  Plan discussed with CRNA.    CODE STATUS:    Level Of Support Discussed With: Patient  Code Status (Patient has no pulse and is not breathing): CPR (Attempt to Resuscitate)  Medical Interventions (Patient has pulse or is breathing): Full Support

## 2024-11-08 ENCOUNTER — APPOINTMENT (OUTPATIENT)
Dept: GENERAL RADIOLOGY | Facility: HOSPITAL | Age: 69
DRG: 516 | End: 2024-11-08
Payer: MEDICARE

## 2024-11-08 LAB
ANION GAP SERPL CALCULATED.3IONS-SCNC: 10.2 MMOL/L (ref 5–15)
BASOPHILS # BLD AUTO: 0.01 10*3/MM3 (ref 0–0.2)
BASOPHILS NFR BLD AUTO: 0.1 % (ref 0–1.5)
BUN SERPL-MCNC: 15 MG/DL (ref 8–23)
BUN/CREAT SERPL: 30.6 (ref 7–25)
CALCIUM SPEC-SCNC: 9.8 MG/DL (ref 8.6–10.5)
CHLORIDE SERPL-SCNC: 96 MMOL/L (ref 98–107)
CO2 SERPL-SCNC: 29.8 MMOL/L (ref 22–29)
CREAT SERPL-MCNC: 0.49 MG/DL (ref 0.57–1)
DEPRECATED RDW RBC AUTO: 45 FL (ref 37–54)
EGFRCR SERPLBLD CKD-EPI 2021: 102.8 ML/MIN/1.73
EOSINOPHIL # BLD AUTO: 0 10*3/MM3 (ref 0–0.4)
EOSINOPHIL NFR BLD AUTO: 0 % (ref 0.3–6.2)
ERYTHROCYTE [DISTWIDTH] IN BLOOD BY AUTOMATED COUNT: 14.8 % (ref 12.3–15.4)
GLUCOSE SERPL-MCNC: 121 MG/DL (ref 65–99)
HCT VFR BLD AUTO: 43.8 % (ref 34–46.6)
HGB BLD-MCNC: 13 G/DL (ref 12–15.9)
IMM GRANULOCYTES # BLD AUTO: 0.06 10*3/MM3 (ref 0–0.05)
IMM GRANULOCYTES NFR BLD AUTO: 0.7 % (ref 0–0.5)
LYMPHOCYTES # BLD AUTO: 0.22 10*3/MM3 (ref 0.7–3.1)
LYMPHOCYTES NFR BLD AUTO: 2.4 % (ref 19.6–45.3)
MCH RBC QN AUTO: 24.8 PG (ref 26.6–33)
MCHC RBC AUTO-ENTMCNC: 29.7 G/DL (ref 31.5–35.7)
MCV RBC AUTO: 83.4 FL (ref 79–97)
MONOCYTES # BLD AUTO: 0.28 10*3/MM3 (ref 0.1–0.9)
MONOCYTES NFR BLD AUTO: 3.1 % (ref 5–12)
NEUTROPHILS NFR BLD AUTO: 8.57 10*3/MM3 (ref 1.7–7)
NEUTROPHILS NFR BLD AUTO: 93.7 % (ref 42.7–76)
NRBC BLD AUTO-RTO: 0 /100 WBC (ref 0–0.2)
PLATELET # BLD AUTO: 318 10*3/MM3 (ref 140–450)
PMV BLD AUTO: 9.5 FL (ref 6–12)
POTASSIUM SERPL-SCNC: 4.2 MMOL/L (ref 3.5–5.2)
RBC # BLD AUTO: 5.25 10*6/MM3 (ref 3.77–5.28)
SODIUM SERPL-SCNC: 136 MMOL/L (ref 136–145)
WBC NRBC COR # BLD AUTO: 9.14 10*3/MM3 (ref 3.4–10.8)

## 2024-11-08 PROCEDURE — 97530 THERAPEUTIC ACTIVITIES: CPT

## 2024-11-08 PROCEDURE — 71045 X-RAY EXAM CHEST 1 VIEW: CPT

## 2024-11-08 PROCEDURE — 97112 NEUROMUSCULAR REEDUCATION: CPT

## 2024-11-08 PROCEDURE — 85025 COMPLETE CBC W/AUTO DIFF WBC: CPT | Performed by: STUDENT IN AN ORGANIZED HEALTH CARE EDUCATION/TRAINING PROGRAM

## 2024-11-08 PROCEDURE — 99024 POSTOP FOLLOW-UP VISIT: CPT | Performed by: STUDENT IN AN ORGANIZED HEALTH CARE EDUCATION/TRAINING PROGRAM

## 2024-11-08 PROCEDURE — 97535 SELF CARE MNGMENT TRAINING: CPT

## 2024-11-08 PROCEDURE — 97551 CAREGIVER TRAING EA ADDL 15: CPT

## 2024-11-08 PROCEDURE — 25010000002 ORPHENADRINE CITRATE PER 60 MG: Performed by: STUDENT IN AN ORGANIZED HEALTH CARE EDUCATION/TRAINING PROGRAM

## 2024-11-08 PROCEDURE — 80048 BASIC METABOLIC PNL TOTAL CA: CPT | Performed by: STUDENT IN AN ORGANIZED HEALTH CARE EDUCATION/TRAINING PROGRAM

## 2024-11-08 RX ORDER — OXYCODONE HYDROCHLORIDE 5 MG/1
5 TABLET ORAL EVERY 4 HOURS PRN
Status: DISCONTINUED | OUTPATIENT
Start: 2024-11-08 | End: 2024-11-10 | Stop reason: HOSPADM

## 2024-11-08 RX ADMIN — ORPHENADRINE CITRATE 60 MG: 30 INJECTION, SOLUTION INTRAMUSCULAR; INTRAVENOUS at 12:38

## 2024-11-08 RX ADMIN — OXCARBAZEPINE 150 MG: 150 TABLET, FILM COATED ORAL at 08:30

## 2024-11-08 RX ADMIN — RISPERIDONE 0.25 MG: 0.25 TABLET, FILM COATED ORAL at 20:30

## 2024-11-08 RX ADMIN — PANTOPRAZOLE SODIUM 40 MG: 40 TABLET, DELAYED RELEASE ORAL at 06:07

## 2024-11-08 RX ADMIN — Medication 10 ML: at 02:52

## 2024-11-08 RX ADMIN — Medication 10 ML: at 20:31

## 2024-11-08 RX ADMIN — OXYCODONE 10 MG: 5 TABLET ORAL at 20:30

## 2024-11-08 RX ADMIN — GUAIFENESIN 1200 MG: 600 TABLET, EXTENDED RELEASE ORAL at 08:30

## 2024-11-08 RX ADMIN — ORPHENADRINE CITRATE 60 MG: 30 INJECTION, SOLUTION INTRAMUSCULAR; INTRAVENOUS at 02:51

## 2024-11-08 RX ADMIN — LUBIPROSTONE 24 MCG: 24 CAPSULE, GELATIN COATED ORAL at 08:30

## 2024-11-08 RX ADMIN — Medication 10 ML: at 08:31

## 2024-11-08 RX ADMIN — POTASSIUM CHLORIDE 10 MEQ: 750 TABLET, EXTENDED RELEASE ORAL at 08:30

## 2024-11-08 RX ADMIN — LEVOTHYROXINE SODIUM 25 MCG: 25 TABLET ORAL at 06:07

## 2024-11-08 RX ADMIN — GUAIFENESIN 1200 MG: 600 TABLET, EXTENDED RELEASE ORAL at 20:30

## 2024-11-08 RX ADMIN — TRAZODONE HYDROCHLORIDE 25 MG: 50 TABLET ORAL at 20:30

## 2024-11-08 RX ADMIN — ROSUVASTATIN 30 MG: 10 TABLET, FILM COATED ORAL at 08:30

## 2024-11-08 RX ADMIN — FUROSEMIDE 20 MG: 20 TABLET ORAL at 08:30

## 2024-11-08 RX ADMIN — OXCARBAZEPINE 150 MG: 150 TABLET, FILM COATED ORAL at 20:30

## 2024-11-08 RX ADMIN — OXYCODONE 10 MG: 5 TABLET ORAL at 06:13

## 2024-11-08 RX ADMIN — SERTRALINE 200 MG: 100 TABLET, FILM COATED ORAL at 08:30

## 2024-11-08 RX ADMIN — LUBIPROSTONE 24 MCG: 24 CAPSULE, GELATIN COATED ORAL at 20:30

## 2024-11-08 NOTE — PLAN OF CARE
"Goal Outcome Evaluation:                            Morgan Quick presents with functional mobility impairments which indicate the need for skilled intervention. Pt required extended time and max A with cues for proper log roll technique to complete bed mobility. Pt able to perform stand pivot from bed to bedside chair with max A x2/ B HHA. Pt displayed forward flexed posture and difficulty taking side steps during transfer. Pt continues to be limited due to decreased activity tolerance and functional strength. Tolerating session today without incident. Will continue to follow and progress as tolerated.     Plan/Recommendations:   If medically appropriate, Moderate Intensity Therapy recommended post-acute care. This is recommended as therapy feels the patient would require 3-4 days per week and wouldn't tolerate \"3 hour daily\" rehab intensity. SNF would be the preferred choice. If the patient does not agree to SNF, arrange HH or OP depending on home bound status. If patient is medically complex, consider LTACH. Pt requires no DME at discharge.                   "

## 2024-11-08 NOTE — PROCEDURES
PROCEDURE: KYPHOPLASTY    SURGEON: Jayce Redd DO    OPERATION:    1. KYPHON® Balloon Kyphoplasty at T11 and T12 levels    2. Insertion of KYPHON® HV-R™ bone cement under low pressure at 184 levels      ANESTHESIA: General    PREOPERATIVE DIAGNOSIS: 733.13 pathologic fracture of vertebrae, 733.00 osteoporosis with pathologic fracture, vertebral compression fracture.    POSTOPERATIVE DIAGNOSIS: same    PREOPERATIVE ANTIBIOTICS: 2 g IV given 30 minutes prior to incision.    OPERATIVE PROCEDURE: The patient was brought to the operating room suite and general anesthesia with endotracheal intubation was performed. The patient was positioned prone on the Az table. The back was prepped and draped. The image  intensifier (C-arm) was brought into position and the T11 pedicles were identified and marked with a skin marker. In view of T11, a transpedicular approach to the vertebral body was appropriate.    A 22g sunni needle was advanced to the pedicle and Lidocaine 1 with marcaine 0.25%% was infiltrated, total of 5cc.    AP and lateral images were taken to verify position and trajectory. Alongside of the guide needle a 0.25 cm paramedian incision was made. The osteointroducer was advanced through the pedicle on the [id number]. Once I was at the junction of the pedicle and the vertebral body, a lateral image was taken to insure that the cannula was positioned approximately 1cm past the vertebral body wall. Through the cannula, a drill was advanced into the vertebral body under fluoroscopic guidance toward the anterior cortex, creating a channel. The anterior cortex was probed with the guide pin to ensure no perforations in the anterior cortex. After completing the entry into the vertebral body, inflatable bone tamp was inserted through the cannula and advanced under fluoroscopic guidance into the vertebral body near the anterior cortex. The radiopaque marker bands on the bone tamp were identified using AP and lateral  images.    The above sequence of instrument placement was then repeated on the left side of the T11 vertebral body.    Once both bone tamps were in position, they were inflated to 2.5 cc and 180 psi. Expansion of the bone tamps was done sequentially in increments of .25  to 0.5 cc of contrast, with careful attention being paid to the inflation pressures and balloon position. The inflation was monitored with AP and lateral imaging. The final balloon volume was 2 1/2 cc on the right side and 2 1/2 cc on the left. There was no breach of the lateral wall or anterior cortex of the vertebral body. Direct reduction of the fracture was achieved, end plate movement was noted and approximately 5 mm of height restoration was achieved.    Under fluoroscopic imaging, and the use of the bone void fillers, internal fixation was achieved through a low-pressure injection of KYPHON® HV-R™ bone cement. The cavity was filled with a total volume of 2.5 cc on the right side and 2.5 cc on the left side. Once the bone cement had hardened, the cannulas were then removed.    At this time, we proceeded to perform a balloon kyphoplasty at T12 using the same sequence of steps as above. An entry needle was placed bilaterally through the pedicle into the vertebral body, a cortical window was created, inflation of the bone tamps directly reduced the fracture, the bone tamps were removed, and internal fixation by bone void filler insertion was achieved.Throughout the procedure, AP and lateral imaging monitored positioning.    Post-procedure, all incisions were closed with sutures. The patient was kept in the prone    position for approximately 10 minutes post cement injection or until hardening of the cement was confirmed. Patient was then turned supine. They were monitored briefly and returned to the floor. Morgan CHU Chandniflaco was found to have no change in their baseline neurological exam at this time.    COMPLICATIONS: There were no immediate  postprocedure complications.    ESTIMATED BLOOD LOSS: The estimated blood loss was nil.    Specimens as above.    Jayce Redd,     November 7, 2024

## 2024-11-08 NOTE — OP NOTE
PROCEDURE: KYPHOPLASTY    SURGEON: Jayce Redd DO    OPERATION:    1. KYPHON® Balloon Kyphoplasty at T11 and T12 levels    2. Insertion of KYPHON® HV-R™ bone cement under low pressure at 184 levels      ANESTHESIA: General    PREOPERATIVE DIAGNOSIS: 733.13 pathologic fracture of vertebrae, 733.00 osteoporosis with pathologic fracture, vertebral compression fracture.    POSTOPERATIVE DIAGNOSIS: same    PREOPERATIVE ANTIBIOTICS: 2 g IV given 30 minutes prior to incision.    OPERATIVE PROCEDURE: The patient was brought to the operating room suite and general anesthesia with endotracheal intubation was performed. The patient was positioned prone on the Az table. The back was prepped and draped.  Extremely difficult view on x-ray due to significant bone loss.  The image  intensifier (C-arm) was brought into position and the T11 pedicles were identified and marked with a skin marker. In view of T11, a transpedicular approach to the vertebral body was appropriate.    A 22g sunni needle was advanced to the pedicle and Lidocaine 1 with marcaine 0.25%% was infiltrated, total of 5cc.    AP and lateral images were taken to verify position and trajectory. Alongside of the guide needle a 0.25 cm paramedian incision was made. The osteointroducer was advanced through the pedicle on the [id number]. Once I was at the junction of the pedicle and the vertebral body, a lateral image was taken to insure that the cannula was positioned approximately 1cm past the vertebral body wall. Through the cannula, a drill was advanced into the vertebral body under fluoroscopic guidance toward the anterior cortex, creating a channel. The anterior cortex was probed with the guide pin to ensure no perforations in the anterior cortex. After completing the entry into the vertebral body, inflatable bone tamp was inserted through the cannula and advanced under fluoroscopic guidance into the vertebral body near the anterior cortex. The radiopaque  marker bands on the bone tamp were identified using AP and lateral images.    The above sequence of instrument placement was then repeated on the left side of the T11 vertebral body.    Once both bone tamps were in position, they were inflated to 2.5 cc and 180 psi. Expansion of the bone tamps was done sequentially in increments of .25  to 0.5 cc of contrast, with careful attention being paid to the inflation pressures and balloon position. The inflation was monitored with AP and lateral imaging. The final balloon volume was 2 1/2 cc on the right side and 2 1/2 cc on the left. There was no breach of the lateral wall or anterior cortex of the vertebral body. Direct reduction of the fracture was achieved, end plate movement was noted and approximately 5 mm of height restoration was achieved.    Under fluoroscopic imaging, and the use of the bone void fillers, internal fixation was achieved through a low-pressure injection of KYPHON® HV-R™ bone cement. The cavity was filled with a total volume of 2.5 cc on the right side and 2.5 cc on the left side. Once the bone cement had hardened, the cannulas were then removed.    At this time, we proceeded to perform a balloon kyphoplasty at T12 using the same sequence of steps as above. An entry needle was placed bilaterally through the pedicle into the vertebral body, a cortical window was created, inflation of the bone tamps directly reduced the fracture, the bone tamps were removed, and internal fixation by bone void filler insertion was achieved.Throughout the procedure, AP and lateral imaging monitored positioning.    Post-procedure, all incisions were closed with sutures. The patient was kept in the prone    position for approximately 10 minutes post cement injection or until hardening of the cement was confirmed. Patient was then turned supine. They were monitored briefly and returned to the floor. Morgan Quick was found to have no change in their baseline  neurological exam at this time.    COMPLICATIONS: There were no immediate postprocedure complications.    ESTIMATED BLOOD LOSS: The estimated blood loss was nil.    Specimens as above.    Jayce Redd DO    November 7, 2024

## 2024-11-08 NOTE — DISCHARGE PLACEMENT REQUEST
"Jerrod Wasserman (68 y.o. Female)       Date of Birth   1955    Social Security Number       Address   [address] [facility] [place] [address]    Home Phone   [phone]    MRN   [id number]       Mandaen   [facility]    Marital Status                               Admission Date   11/4/24    Admission Type   Emergency    Admitting Provider   Lacho Vance DO    Attending Provider   Edouard Hong MD    Department, Room/Bed   [facility] OBSERVATION, 234/1       Discharge Date       Discharge Disposition       Discharge Destination                                 Attending Provider: Edouard Hong MD    Allergies: Contrast Dye (Echo Or Unknown Ct/mr), Iodinated Contrast Media    Isolation: None   Infection: None   Code Status: CPR    Ht: 165 cm (64.96\")   Wt: 109 kg (239 lb 13.8 oz)    Admission Cmt: None   Principal Problem: Back pain [M54.9]                   Active Insurance as of 11/4/2024       Primary Coverage       Payor Plan Insurance Group Employer/Plan Group    ANTHEM MEDICARE REPLACEMENT ANTHEM MEDICARE ADVANTAGE [id number]       Payor Plan Address Payor Plan Phone Number Payor Plan Fax Number Effective Dates    [address] [phone]  7/1/2019 - None Entered    [facility] [phone]         Subscriber Name Subscriber Birth Date Member ID       JERROD WASSERMAN 1955 [id number]               Secondary Coverage       Payor Plan Insurance Group Employer/Plan Group    ANTHEM MEDICAID ANTHEM PATHWAYS        Payor Plan Address Payor Plan Phone Number Payor Plan Fax Number Effective Dates    [address]   11/4/2024 - None Entered    [facility] [phone]         Subscriber Name Subscriber Birth Date Member ID       JERROD WASSERMAN 1955 [id number]                     Emergency Contacts        (Rel.) Home Phone Work Phone Mobile Phone    JERI JULIAN (Spouse) [phone] -- " [phone]    Arsen Barbosa (Sister) -- -- [phone]                 History & Physical        Don Edmondson MD at 24 [facility]              [facility]  History & Physical    Patient Name: Morgan Quick  : 1955  MRN: [id number]  Primary Care Physician:  Wayne Saavedra MD  Date of admission: 2024  Date and Time of Service: 2024 at 1540    Subjective      Chief Complaint: Back pain    History of Present Illness: Morgan Quick is a 68 y.o. female with a CMH of hypertension, hyperlipidemia, history of breast cancer s/p radiation, chronic back pain with history of kyphoplasty who presented to [facility] on 2024 with back pain.  On ED workup, x-ray of lumbar spine with chronic appearing compression of L1-L5 with vertebroplasty at L1-2 and L4, no acute lumbar spine findings.  Neurosurgery was consulted and MRI was ordered.  MRI revealed acute to subacute T12 compression fracture with mild height loss, T11 vertebral body edema significant height loss, chronic L1 compression fracture, multilevel lumbar spondylosis without high-grade thecal sac or neural foramen stenosis.  Neurosurgery recommended pain management consult and TLSO.  PT/OT eval pending.  Due to meeting inpatient criteria, hospitalist service to assume care for the remainder of this admission.    Patient currently resting and endorses lower back pain that is exacerbated with movement.  Denies leg paresthesias at this time.  She denies history of trauma/injury.    Review of Systems   Constitutional:  Negative for chills and fever.   Respiratory:  Negative for shortness of breath.    Cardiovascular:  Negative for chest pain.   Gastrointestinal:  Negative for abdominal pain, nausea and vomiting.   Musculoskeletal:  Positive for back pain.   All other systems reviewed and are negative.      Personal History     Past Medical History:   Diagnosis Date    Bipolar 1 disorder     Breast cancer      Cancer     Disease of thyroid gland     High cholesterol     Hypertension        Past Surgical History:   Procedure Laterality Date    ABDOMINAL SURGERY      APPENDECTOMY      ENDOSCOPY N/A 9/25/2022    Procedure: ESOPHAGOGASTRODUODENOSCOPY with biopsy x 2 areas;  Surgeon: Deonte Wong MD;  Location: [facility] ENDOSCOPY;  Service: Gastroenterology;  Laterality: N/A;  post: gastritis, duodinitis, nodule,     HYSTERECTOMY      MASTECTOMY      L side       Family History: family history includes Cancer in her mother. Otherwise pertinent FHx was reviewed and not pertinent to current issue.    Social History:  reports that she has never smoked. She has never been exposed to tobacco smoke. She has never used smokeless tobacco. She reports that she does not drink alcohol and does not use drugs.    Home Medications:  Prior to Admission Medications       Prescriptions Last Dose Informant Patient Reported? Taking?    furosemide (LASIX) 20 MG tablet 11/4/2024  No Yes    Take 1 tablet by mouth Daily.    guaiFENesin (MUCINEX) 600 MG 12 hr tablet 11/4/2024  No Yes    Take 2 tablets by mouth Every 12 (Twelve) Hours.    HYDROcodone-acetaminophen (NORCO)  MG per tablet 11/4/2024  Yes Yes    Take 1 tablet by mouth 4 (Four) Times a Day.    levothyroxine (SYNTHROID, LEVOTHROID) 25 MCG tablet 11/4/2024  No Yes    Take 1 tablet by mouth Every Morning.    lubiprostone (AMITIZA) 24 MCG capsule 11/4/2024  Yes Yes    Take 1 capsule by mouth 2 (Two) Times a Day.    OXcarbazepine (TRILEPTAL) 150 MG tablet 11/4/2024  No Yes    Take 1 tablet by mouth 2 (Two) Times a Day.    pantoprazole (PROTONIX) 40 MG EC tablet 11/4/2024  No Yes    Take 1 tablet by mouth Every Morning.    potassium chloride (MICRO-K) 10 MEQ CR capsule 11/4/2024  Yes Yes    Take 1 capsule by mouth Daily.    risperiDONE (risperDAL) 0.25 MG tablet 11/3/2024  No Yes    Take 1 tablet by mouth Every Night.    rosuvastatin (CRESTOR) 10 MG tablet 11/4/2024  Yes Yes    Take 3  tablets by mouth Daily.    sertraline (ZOLOFT) 100 MG tablet 11/4/2024  No Yes    Take 2 tablets by mouth Daily.    sodium chloride 1 g tablet 11/4/2024  No Yes    Take 2 tablets by mouth 3 (Three) Times a Day With Meals.    traZODone (DESYREL) 50 MG tablet 11/4/2024  Yes Yes    Take 0.5 tablets by mouth Every Night.              Allergies:  Allergies   Allergen Reactions    Contrast Dye (Echo Or Unknown Ct/Mr) Shortness Of Breath     Pt states she had trouble breathing when she had contrast in the past.    Iodinated Contrast Media Hives       Objective      Vitals:   Temp:  [97.2 °F (36.2 °C)-98.4 °F (36.9 °C)] 97.2 °F (36.2 °C)  Heart Rate:  [58-74] 70  Resp:  [15-20] 15  BP: ()/(55-83) 152/76  Body mass index is 40.66 kg/m².    Physical Exam  General: 69yo WF, Alert and oriented, obese, no acute distress.  HENT: Normocephalic, normal hearing, moist oral mucosa, no scleral icterus.  Neck: Supple, non-tender, no carotid bruits, no JVD, no LAD.  Lungs: Clear to auscultation, non-labored respiration.  Heart: RRR, no murmur, gallop or edema.  Abdomen: Soft, nontender, non-distended, + bowel sounds.  Musculoskeletal: Midline lumbar spine TTP.  Normal range of motion and strength, no swelling.  Skin: Skin is warm, dry and pink, no rashes or lesions.  Psychiatric: Cooperative, appropriate mood and affect.      Diagnostic Data:  Lab Results (last 24 hours)       Procedure Component Value Units Date/Time    Hemoglobin A1c [[id number]]  (Abnormal) Collected: 11/05/24 0425    Specimen: Blood from Arm, Left Updated: 11/05/24 0812     Hemoglobin A1C 6.19 %     Basic Metabolic Panel [[id number]]  (Abnormal) Collected: 11/05/24 0425    Specimen: Blood from Arm, Left Updated: 11/05/24 0538     Glucose 114 mg/dL      BUN 9 mg/dL      Creatinine 0.51 mg/dL      Sodium 142 mmol/L      Potassium 3.9 mmol/L      Chloride 104 mmol/L      CO2 27.5 mmol/L      Calcium 9.4 mg/dL      BUN/Creatinine Ratio 17.6     Anion Gap 10.5 mmol/L       eGFR 101.8 mL/min/1.73     Narrative:      GFR Normal >60  Chronic Kidney Disease <60  Kidney Failure <15      CBC Auto Differential [[id number]]  (Abnormal) Collected: 11/05/24 0425    Specimen: Blood from Arm, Left Updated: 11/05/24 0525     WBC 8.67 10*3/mm3      RBC 5.00 10*6/mm3      Hemoglobin 12.2 g/dL      Hematocrit 43.3 %      MCV 86.6 fL      MCH 24.4 pg      MCHC 28.2 g/dL      RDW 14.8 %      RDW-SD 47.4 fl      MPV 10.4 fL      Platelets 224 10*3/mm3      Neutrophil % 87.9 %      Lymphocyte % 5.9 %      Monocyte % 5.3 %      Eosinophil % 0.5 %      Basophil % 0.2 %      Immature Grans % 0.2 %      Neutrophils, Absolute 7.62 10*3/mm3      Lymphocytes, Absolute 0.51 10*3/mm3      Monocytes, Absolute 0.46 10*3/mm3      Eosinophils, Absolute 0.04 10*3/mm3      Basophils, Absolute 0.02 10*3/mm3      Immature Grans, Absolute 0.02 10*3/mm3      nRBC 0.0 /100 WBC              Imaging Results (Last 24 Hours)       Procedure Component Value Units Date/Time    MRI Lumbar Spine Without Contrast [[id number]] Collected: 11/05/24 1323     Updated: 11/05/24 1337    Narrative:      MRI LUMBAR SPINE WO CONTRAST    Date of Exam: 11/5/2024 11:58 AM EST    Indication: back pain.     Comparison: MRI lumbar spine 1/2/2024    Technique:  Routine multiplanar/multisequence sequence images of the lumbar spine were obtained without contrast administration.        Findings:  5 nonrib-bearing lumbar-type vertebral bodies. Status post L2 and L4 kyphoplasty. Degree of vertebral body height loss stable from prior comparison. Stable mild to moderate chronic height loss at L3. Stable mild height loss at L5. There is mild central   vertebral artery height loss at L1 new from prior comparison without associated edema. There is mild central height loss at T12 with associated increased T2 signal consistent with edema. There is increased T2 signal within T11 vertebral body without   appreciable fracture line or height loss. No bony  retropulsion. There is multilevel degenerative disc height loss and disc desiccation. No significant Modic endplate change. Conus terminates at L1 without intrinsic or extrinsic mass. Stable probable   benign hemangiomas within T12 and T11. Mild symmetric paraspinous muscle atrophy. Small simple appearing left upper pole renal cyst. Degenerative osteoarthritis of the SI joints. Level-by-level analysis detailed below.    T12-L1: Tiny concentric disc bulge with mild facet arthropathy. Disc bulge has slightly worsened from prior exam resulting in mild thecal sac stenosis. Mild bilateral neuroforaminal stenosis also worsened from prior.     L1-L2: Tiny concentric disc bulge. Mild facet arthropathy. Findings in combination with mild dorsal epidural lipomatosis results in mild stable thecal sac stenosis and mild stable bilateral neuroforaminal stenosis.     L2-L3: Posterior disc osteophyte complex with mild facet arthropathy and ligamentum flavum thickening in combination with minimal dorsal epidural lipomatosis results in mild thecal sac stenosis stable from prior. There is mild right and more mild to   moderate left neuroforaminal stenosis stable from prior.     L3-L4: Tiny concentric disc bulge with moderate facet arthropathy and mild ligamentum flavum thickening. Minimal resulting thecal sac stenosis stable from prior. Mild right and mild to moderate left neuroforaminal stenosis stable from prior.     L4-L5: Tiny concentric disc bulge with moderate facet hypertrophy and mild ligamentum flavum thickening. No significant thecal sac stenosis. Mild to moderate bilateral neuroforaminal stenosis stable from prior.     L5-S1: Tiny concentric disc bulge with posterior annular fissure stable from prior. Moderate to severe facet arthropathy. No thecal sac stenosis. Mild to moderate bilateral neuroforaminal stenosis, left greater than right stable from prior.         Impression:      Impression:  1. Acute to subacute T12  compression fracture with mild height loss and no bony retropulsion, new since prior comparison.  2. T11 vertebral body edema significant height loss or fracture line which could reflect an acute to subacute nondisplaced fracture versus possible contusion or if there is history of trauma.  3. Chronic L1 compression fracture with mild height loss, new since 1/2/2024.  4. Prior L2 and L4 kyphoplasty. Chronic L2, L3, L4 and L5 fractures are stable from prior.  5. Multilevel lumbar spondylosis without high-grade thecal sac or neuroforaminal stenosis. Degenerative change at T12-L1 has slightly worsened from prior.        Electronically Signed: Michael Leal MD    11/5/2024 1:35 PM EST    Workstation ID: [id number]    XR Spine Lumbar Complete 4+VW [[id number]] Collected: 11/04/24 1555     Updated: 11/04/24 1559    Narrative:      XR SPINE LUMBAR COMPLETE 4+VW    Date of Exam: 11/4/2024 3:53 PM EST    Indication: back pain    Comparison: Lumbar spine MRI 1/2/2024    Findings: Chronic compression fractures of L2 and L4 with vertebroplasty. Mild chronic appearing compression deformities involving the superior plates of L1, L3, L5. Normal lumbar alignment. Mild diminished disc space height at L1-2 and L2-3. Osteopenic   changes are present. Sacroiliac joints are normal. Gastric band device is incidentally noted.      Impression:      Chronic appearing compression form is of L1-L5 with vertebroplasty at L2 and L4.  No acute lumbar spine findings.      Electronically Signed: Emily Gonzalez MD    11/4/2024 3:57 PM EST    Workstation ID: [id number]              Assessment & Plan        This is a 68 y.o. female with:    Active and Resolved Problems  Active Hospital Problems    Diagnosis  POA    **Back pain [M54.9]  Yes      Resolved Hospital Problems   No resolved problems to display.       Acute T12 compression fracture  Chronic back pain  TLSO when OOB  As needed analgesics  Neurosurgery following  Pain management  consulted  Awaiting PT/OT evaluation, will need SNF at discharge    Hyperlipidemia-continue statin  Hypothyroidism-continue Synthroid  GERD-continue PPI  Mood disorder-continue Zoloft, trazodone, risperidone      I anticipate patient will require less than 30 days stay in a SNF facility.      VTE Prophylaxis:  Mechanical VTE prophylaxis orders are present.        The patient desires to be as follows:    CODE STATUS:    Level Of Support Discussed With: Patient  Code Status (Patient has no pulse and is not breathing): CPR (Attempt to Resuscitate)  Medical Interventions (Patient has pulse or is breathing): Full Support        Maximino Weber, who can be contacted at [phone], is the designated person to make medical decisions on the patient's behalf if She is incapable of doing so. This was clarified with patient and/or next of kin on 2024 during the course of this H&P.    Admission Status:  I believe this patient meets inpatient status.    Expected Length of Stay: 2 to 3 days    PDMP and Medication Dispenses via Sidebar reviewed and consistent with patient reported medications.    I discussed the patient's findings and my recommendations with patient.      Signature:     This document has been electronically signed by Robert Aguayo PA-C on 2024 15:47 EST   [facility] Hospitalist Team      Electronically signed by Don Edmondson MD at 24 1576       Flaquita Whatley APRN at 24 1120       Attestation signed by Lacho Vance DO at 24 1070    Reviewed the history and physical examination.                [facility] Observation Unit H&P    Patient Name: Morgan Quick  : 1955  MRN: [id number]  Primary Care Physician: Wayne Saavedra MD  Date of admission: 2024     Patient Care Team:  Wayne Saavedra MD as PCP - General (Internal Medicine)          Subjective  History Present Illness     Chief Complaint:   Chief Complaint   Patient presents with    Back Pain     " Lower back pain     Back Pain     Back Pain        ED 11/04/2024  History of Present Illness  Patient is a 68-year-old female presents emergency department for evaluation of low back pain.  Patient reports she went to get up to go to the activity room today, experienced pain in her lower back, she reports that she sat back down, reports and then injured her left upper chest going to move.  Her chest no longer hurts.  She longer has neck pain  Patient did not fall or have any recent trauma.  She has not had any fevers.  Denies any new numbness, tingling, weakness, no urinary retention, bowel or bladder incontinence, no IVDA.     Observation 11/05/2024  Patient agrees with HPI noted above including acute on chronic back pain.  She reports history of back pain \" for years\" but proceeded to the ED yesterday as she felt worse and was unable to walk even with a walker.  States low back pain radiates to legs and is associated with tingling of lower extremities.  Currently rates pain 10/10.       Discussed with neurosurgeon and patient will benefit from pain management consult in am. OT eval pending. Will need TLSO brace when out of bed. Per UR, meets IP criteria if not discharging today, will consult the hospitalist.     Review of Systems   Musculoskeletal:  Positive for back pain.     Review of Systems   Musculoskeletal:  Positive for back pain, myalgias and stiffness.   Neurological:  Positive for numbness and paresthesias.   All other systems reviewed and are negative.         Personal History     Past Medical History:   Past Medical History:   Diagnosis Date    Bipolar 1 disorder     Breast cancer     Cancer     Disease of thyroid gland     High cholesterol     Hypertension        Surgical History:      Past Surgical History:   Procedure Laterality Date    ABDOMINAL SURGERY      APPENDECTOMY      ENDOSCOPY N/A 9/25/2022    Procedure: ESOPHAGOGASTRODUODENOSCOPY with biopsy x 2 areas;  Surgeon: Deonte Wong MD;  " Location: [facility] ENDOSCOPY;  Service: Gastroenterology;  Laterality: N/A;  post: gastritis, duodinitis, nodule,     HYSTERECTOMY      MASTECTOMY      L side           Family History: family history includes Cancer in her mother. Otherwise pertinent FHx was reviewed and unremarkable.     Social History:  reports that she has never smoked. She has never been exposed to tobacco smoke. She has never used smokeless tobacco. She reports that she does not drink alcohol and does not use drugs.      Medications:  Prior to Admission medications    Medication Sig Start Date End Date Taking? Authorizing Provider   furosemide (LASIX) 20 MG tablet Take 1 tablet by mouth Daily. 10/11/22  Yes Ladonna Redd DO   guaiFENesin (MUCINEX) 600 MG 12 hr tablet Take 2 tablets by mouth Every 12 (Twelve) Hours. 10/10/22  Yes Ladonna Redd DO   HYDROcodone-acetaminophen (NORCO)  MG per tablet Take 1 tablet by mouth 4 (Four) Times a Day.   Yes Brennon Serrano MD   levothyroxine (SYNTHROID, LEVOTHROID) 25 MCG tablet Take 1 tablet by mouth Every Morning. 10/11/22  Yes Ladonna Redd DO   lubiprostone (AMITIZA) 24 MCG capsule Take 1 capsule by mouth 2 (Two) Times a Day.   Yes Brennon Serrano MD   OXcarbazepine (TRILEPTAL) 150 MG tablet Take 1 tablet by mouth 2 (Two) Times a Day. 10/10/22  Yes Ladonna Redd DO   pantoprazole (PROTONIX) 40 MG EC tablet Take 1 tablet by mouth Every Morning. 10/11/22  Yes Ladonna Redd DO   potassium chloride (MICRO-K) 10 MEQ CR capsule Take 1 capsule by mouth Daily.   Yes Brennon Serrano MD   risperiDONE (risperDAL) 0.25 MG tablet Take 1 tablet by mouth Every Night. 10/10/22  Yes Ladonna Redd DO   rosuvastatin (CRESTOR) 10 MG tablet Take 3 tablets by mouth Daily.   Yes Brennon Serrano MD   sertraline (ZOLOFT) 100 MG tablet Take 2 tablets by mouth Daily. 10/10/22  Yes Ladonna Redd DO   sodium chloride 1 g tablet Take 2 tablets by mouth 3 (Three) Times a Day With Meals.  10/10/22  Yes Ladonna Redd DO   traZODone (DESYREL) 50 MG tablet Take 0.5 tablets by mouth Every Night.   Yes Provider, Brennon, MD       Allergies:    Allergies   Allergen Reactions    Contrast Dye (Echo Or Unknown Ct/Mr) Shortness Of Breath     Pt states she had trouble breathing when she had contrast in the past.    Iodinated Contrast Media Hives       Objective  Objective     Vital Signs  Temp:  [97.5 °F (36.4 °C)-98.7 °F (37.1 °C)] 97.5 °F (36.4 °C)  Heart Rate:  [58-77] 67  Resp:  [16-20] 16  BP: ()/(55-90) 143/65  SpO2:  [91 %-100 %] 93 %  on  Flow (L/min) (Oxygen Therapy):  [2-3] 3;   Device (Oxygen Therapy): nasal cannula  Body mass index is 40.66 kg/m².    Physical Exam  Vitals and nursing note reviewed.   Constitutional:       Appearance: Normal appearance. She is obese.   HENT:      Head: Normocephalic and atraumatic.      Right Ear: External ear normal.      Left Ear: External ear normal.      Nose: Nose normal.      Mouth/Throat:      Mouth: Mucous membranes are moist.      Pharynx: Oropharynx is clear.   Eyes:      Extraocular Movements: Extraocular movements intact.   Cardiovascular:      Rate and Rhythm: Normal rate and regular rhythm.      Pulses: Normal pulses.      Heart sounds: Normal heart sounds.   Pulmonary:      Effort: Pulmonary effort is normal.      Breath sounds: Normal breath sounds.   Abdominal:      General: Abdomen is flat. Bowel sounds are normal.      Palpations: Abdomen is soft.   Musculoskeletal:         General: Normal range of motion.      Cervical back: Normal range of motion.   Skin:     General: Skin is warm.   Neurological:      General: No focal deficit present.      Mental Status: She is alert and oriented to person, place, and time.   Psychiatric:         Mood and Affect: Mood normal.         Behavior: Behavior normal.         Results Review:  I have personally reviewed most recent lab results and radiology images and interpretations and agree with findings,  most notably: CMP, CBC, x-ray spine.    Results from last 7 days   Lab Units 11/05/24  0425   WBC 10*3/mm3 8.67   HEMOGLOBIN g/dL 12.2   HEMATOCRIT % 43.3   PLATELETS 10*3/mm3 224     Results from last 7 days   Lab Units 11/05/24  0425   SODIUM mmol/L 142   POTASSIUM mmol/L 3.9   CHLORIDE mmol/L 104   CO2 mmol/L 27.5   BUN mg/dL 9   CREATININE mg/dL 0.51*   GLUCOSE mg/dL 114*   CALCIUM mg/dL 9.4     Estimated Creatinine Clearance: 130.8 mL/min (A) (by C-G formula based on SCr of 0.51 mg/dL (L)).  Brief Urine Lab Results       None            Microbiology Results (last 10 days)       ** No results found for the last 240 hours. **            ECG/EMG Results (most recent)       None                Results for orders placed during the hospital encounter of 09/24/22    Adult Transthoracic Echo Complete W/ Cont if Necessary Per Protocol    Interpretation Summary  · Estimated left ventricular EF was in agreement with the calculated left ventricular EF. Left ventricular ejection fraction appears to be 56 - 60%. Left ventricular systolic function is normal.  · There is calcification of the aortic valve mainly affecting the non-coronary, left coronary and right coronary cusp(s).  · The study is technically difficult for diagnosis.      XR Spine Lumbar Complete 4+VW    Result Date: 11/4/2024  Chronic appearing compression form is of L1-L5 with vertebroplasty at L2 and L4. No acute lumbar spine findings. Electronically Signed: Emily Gonzalez MD  11/4/2024 3:57 PM EST  Workstation ID: [id number]       Estimated Creatinine Clearance: 130.8 mL/min (A) (by C-G formula based on SCr of 0.51 mg/dL (L)).    Assessment & Plan  Assessment/Plan       Active Hospital Problems    Diagnosis  POA    **Back pain [M54.9]  Yes      Resolved Hospital Problems   No resolved problems to display.        Back Pain   -CMP and CBC generally unremarkable  -X-ray lumbar spine showed chronic appearing compression form of L1-L5 with vertebroplasty at L2 and  L4  -In the ED patient given Dilaudid, morphine, lidocaine  -Neurosurgeon consulted  -MRI lumbar spine showed acute to subacute T12 compression fracture, T11 vertebral body edema, chronic L1 compression fracture, prior L2 and L4 kyphoplasty and multilevel lumbar spondylosis.   -PT consulted recommended SNF.  -OT eval pending  -TLSO brace ordered  -Oxycodone as needed  -Neurosurgery following  -Per UR, meets IP criteria if not discharging tonight. Will consult the hospitalist.       Hypothyroidism  -Continue Synthroid     GERD  -Continue PPI     Mood disorder  -Continue Zoloft, trazodone, Risperdal     Hyperlipidemia  -Continue Crestor     Obesity  -BMI 40.66  -Lifestyle modifications               VTE Prophylaxis - Active VTE Prophylaxis  Mechanical:        Start        11/04/24 1742  Maintain Sequential Compression Device  Continuous                          Select Pharmacologic VTE Prophylaxis if Desired & Appropriate      CODE STATUS:    Code Status and Medical Interventions: CPR (Attempt to Resuscitate); Full Support   Ordered at: 11/04/24 1719     Level Of Support Discussed With:    Patient     Code Status (Patient has no pulse and is not breathing):    CPR (Attempt to Resuscitate)     Medical Interventions (Patient has pulse or is breathing):    Full Support       This patient has been examined wearing personal protective equipment.     I discussed the patient's findings and my recommendations with patient and nursing staff.      Signature: Electronically signed by NIDIA Cadena, 11/05/24, 11:20 AM EST.             Electronically signed by Lacho Vance DO at 11/05/24 2242          Operative/Procedure Notes (all)        Jayce Redd DO at 11/07/24 1742  Version 2 of 2         VERTEBROPLASTY  Progress Note    Morgan Quick  11/7/2024    Pre-op Diagnosis:   Compression fracture of T12 vertebra, initial encounter [S22.080A]  Compression fracture of T11 vertebra, initial encounter  [S22.080A]       Post-Op Diagnosis Codes:     * Compression fracture of T12 vertebra, initial encounter [S22.080A]     * Compression fracture of T11 vertebra, initial encounter [S22.080A]    Procedure/CPT® Codes:  ID PERQ VERT AGMNTJ CAVITY CRTJ UNI/BI CANNULATION [68679]  ID PERQ VERT AGMNTJ CAVITY CRTJ UNI/BI CANNULJ EACH [80576]      Procedure(s):  kyphoplasty of t11 and t12 vertebrae              Surgeon(s):  Jayce Redd DO    Anesthesia: General    Staff:   Circulator: Pierre Collazo RN; Isauro Hernandez RN  Radiology Technologist: Audrey Lacy  Scrub Person: Allie Pedraza  Vendor Representative: Roopa Fletcher         Estimated Blood Loss: minimal    Urine Voided: * No values recorded between 11/7/2024  5:14 PM and 11/7/2024  6:58 PM *    Specimens:                None          Drains:   External Urinary Catheter (Active)   Daily Indications Daily output 11/07/24 1218   Site Assessment Clean;Skin intact 11/07/24 1218   Application/Removal skin care provided 11/07/24 0810   Catheter care complete Yes 11/07/24 0810       [REMOVED] External Urinary Catheter (Removed)       Findings:         Complications: None          Jayce Redd DO     Date: 11/7/2024  Time: 19:19 EST          Electronically signed by Jayce Redd DO at 11/07/24 1920       Jayce Redd DO at 11/07/24 1742  Version 1 of 2         VERTEBROPLASTY  Progress Note    Morgan Quick  11/7/2024    Pre-op Diagnosis:   Compression fracture of T12 vertebra, initial encounter [S22.080A]  Compression fracture of T11 vertebra, initial encounter [S22.080A]       Post-Op Diagnosis Codes:     * Compression fracture of T12 vertebra, initial encounter [S22.080A]     * Compression fracture of T11 vertebra, initial encounter [S22.080A]    Procedure/CPT® Codes:  ID PERQ VERT AGMNTJ CAVITY CRTJ UNI/BI CANNULATION [23249]  ID PERQ VERT AGMNTJ CAVITY CRTJ UNI/BI CANNULJ EACH [18272]      Procedure(s):  kyphoplasty of t11 and t12  vertebrae              Surgeon(s):  Jayce Redd DO    Anesthesia: General    Staff:   Circulator: Pierre Collazo RN; Isauro Hernandez RN  Radiology Technologist: Audrey Lacy  Scrub Person: Allie Pedraza  Vendor Representative: Roopa Fletcher         Estimated Blood Loss: minimal    Urine Voided: * No values recorded between 11/7/2024  5:14 PM and 11/7/2024  6:58 PM *    Specimens:                None          Drains:   External Urinary Catheter (Active)   Daily Indications Daily output 11/07/24 1218   Site Assessment Clean;Skin intact 11/07/24 1218   Application/Removal skin care provided 11/07/24 0810   Catheter care complete Yes 11/07/24 0810       [REMOVED] External Urinary Catheter (Removed)       Findings:         Complications: None          Jayce Redd DO     Date: 11/7/2024  Time: 19:19 EST          Electronically signed by Jayce Redd DO at 11/07/24 1920       Jayce Redd DO at 11/07/24 1742  Version 1 of 1         PROCEDURE: KYPHOPLASTY    SURGEON: Jayce Redd DO    OPERATION:    1. KYPHON® Balloon Kyphoplasty at T11 and T12 levels    2. Insertion of KYPHON® HV-R™ bone cement under low pressure at 184 levels      ANESTHESIA: General    PREOPERATIVE DIAGNOSIS: 733.13 pathologic fracture of vertebrae, 733.00 osteoporosis with pathologic fracture, vertebral compression fracture.    POSTOPERATIVE DIAGNOSIS: same    PREOPERATIVE ANTIBIOTICS: 2 g IV given 30 minutes prior to incision.    OPERATIVE PROCEDURE: The patient was brought to the operating room suite and general anesthesia with endotracheal intubation was performed. The patient was positioned prone on the Az table. The back was prepped and draped.  Extremely difficult view on x-ray due to significant bone loss.  The image  intensifier (C-arm) was brought into position and the T11 pedicles were identified and marked with a skin marker. In view of T11, a transpedicular approach to the vertebral body was appropriate.    A 22g  sunni needle was advanced to the pedicle and Lidocaine 1 with marcaine 0.25%% was infiltrated, total of 5cc.    AP and lateral images were taken to verify position and trajectory. Alongside of the guide needle a 0.25 cm paramedian incision was made. The osteointroducer was advanced through the pedicle on the [id number]. Once I was at the junction of the pedicle and the vertebral body, a lateral image was taken to insure that the cannula was positioned approximately 1cm past the vertebral body wall. Through the cannula, a drill was advanced into the vertebral body under fluoroscopic guidance toward the anterior cortex, creating a channel. The anterior cortex was probed with the guide pin to ensure no perforations in the anterior cortex. After completing the entry into the vertebral body, inflatable bone tamp was inserted through the cannula and advanced under fluoroscopic guidance into the vertebral body near the anterior cortex. The radiopaque marker bands on the bone tamp were identified using AP and lateral images.    The above sequence of instrument placement was then repeated on the left side of the T11 vertebral body.    Once both bone tamps were in position, they were inflated to 2.5 cc and 180 psi. Expansion of the bone tamps was done sequentially in increments of .25  to 0.5 cc of contrast, with careful attention being paid to the inflation pressures and balloon position. The inflation was monitored with AP and lateral imaging. The final balloon volume was 2 1/2 cc on the right side and 2 1/2 cc on the left. There was no breach of the lateral wall or anterior cortex of the vertebral body. Direct reduction of the fracture was achieved, end plate movement was noted and approximately 5 mm of height restoration was achieved.    Under fluoroscopic imaging, and the use of the bone void fillers, internal fixation was achieved through a low-pressure injection of KYPHON® HV-R™ bone cement. The cavity was filled with  a total volume of 2.5 cc on the right side and 2.5 cc on the left side. Once the bone cement had hardened, the cannulas were then removed.    At this time, we proceeded to perform a balloon kyphoplasty at T12 using the same sequence of steps as above. An entry needle was placed bilaterally through the pedicle into the vertebral body, a cortical window was created, inflation of the bone tamps directly reduced the fracture, the bone tamps were removed, and internal fixation by bone void filler insertion was achieved.Throughout the procedure, AP and lateral imaging monitored positioning.    Post-procedure, all incisions were closed with sutures. The patient was kept in the prone    position for approximately 10 minutes post cement injection or until hardening of the cement was confirmed. Patient was then turned supine. They were monitored briefly and returned to the floor. Morgan Quick was found to have no change in their baseline neurological exam at this time.    COMPLICATIONS: There were no immediate postprocedure complications.    ESTIMATED BLOOD LOSS: The estimated blood loss was nil.    Specimens as above.    Jayce Redd DO    November 7, 2024      Electronically signed by Jayce Redd DO at 11/07/24 1936       Jayce Redd DO at 11/07/24 1911  Version 1 of 1       Pre-procedure Diagnoses    1. Compression fracture of body of thoracic vertebra [S22.000A]               Post-procedure Diagnoses    1. Compression fracture of body of thoracic vertebra [S22.000A]               Procedures    1. UT PERQ VERT AGMNTJ CAVITY CRTJ UNI/BI CANNULATION [17301 (CPT®)]    2. UT PERQ VERT AGMNTJ CAVITY CRTJ UNI/BI CANNULJ EACH [52932 (CPT®)]                 PROCEDURE: KYPHOPLASTY    SURGEON: Jayce Redd DO    OPERATION:    1. KYPHON® Balloon Kyphoplasty at T11 and T12 levels    2. Insertion of KYPHON® HV-R™ bone cement under low pressure at 184 levels      ANESTHESIA: General    PREOPERATIVE DIAGNOSIS: 733.13  pathologic fracture of vertebrae, 733.00 osteoporosis with pathologic fracture, vertebral compression fracture.    POSTOPERATIVE DIAGNOSIS: same    PREOPERATIVE ANTIBIOTICS: 2 g IV given 30 minutes prior to incision.    OPERATIVE PROCEDURE: The patient was brought to the operating room suite and general anesthesia with endotracheal intubation was performed. The patient was positioned prone on the Az table. The back was prepped and draped. The image  intensifier (C-arm) was brought into position and the T11 pedicles were identified and marked with a skin marker. In view of T11, a transpedicular approach to the vertebral body was appropriate.    A 22g sunni needle was advanced to the pedicle and Lidocaine 1 with marcaine 0.25%% was infiltrated, total of 5cc.    AP and lateral images were taken to verify position and trajectory. Alongside of the guide needle a 0.25 cm paramedian incision was made. The osteointroducer was advanced through the pedicle on the [id number]. Once I was at the junction of the pedicle and the vertebral body, a lateral image was taken to insure that the cannula was positioned approximately 1cm past the vertebral body wall. Through the cannula, a drill was advanced into the vertebral body under fluoroscopic guidance toward the anterior cortex, creating a channel. The anterior cortex was probed with the guide pin to ensure no perforations in the anterior cortex. After completing the entry into the vertebral body, inflatable bone tamp was inserted through the cannula and advanced under fluoroscopic guidance into the vertebral body near the anterior cortex. The radiopaque marker bands on the bone tamp were identified using AP and lateral images.    The above sequence of instrument placement was then repeated on the left side of the T11 vertebral body.    Once both bone tamps were in position, they were inflated to 2.5 cc and 180 psi. Expansion of the bone tamps was done sequentially in  increments of .25  to 0.5 cc of contrast, with careful attention being paid to the inflation pressures and balloon position. The inflation was monitored with AP and lateral imaging. The final balloon volume was 2 1/2 cc on the right side and 2 1/2 cc on the left. There was no breach of the lateral wall or anterior cortex of the vertebral body. Direct reduction of the fracture was achieved, end plate movement was noted and approximately 5 mm of height restoration was achieved.    Under fluoroscopic imaging, and the use of the bone void fillers, internal fixation was achieved through a low-pressure injection of KYPHON® HV-R™ bone cement. The cavity was filled with a total volume of 2.5 cc on the right side and 2.5 cc on the left side. Once the bone cement had hardened, the cannulas were then removed.    At this time, we proceeded to perform a balloon kyphoplasty at T12 using the same sequence of steps as above. An entry needle was placed bilaterally through the pedicle into the vertebral body, a cortical window was created, inflation of the bone tamps directly reduced the fracture, the bone tamps were removed, and internal fixation by bone void filler insertion was achieved.Throughout the procedure, AP and lateral imaging monitored positioning.    Post-procedure, all incisions were closed with sutures. The patient was kept in the prone    position for approximately 10 minutes post cement injection or until hardening of the cement was confirmed. Patient was then turned supine. They were monitored briefly and returned to the floor. Morgan Quick was found to have no change in their baseline neurological exam at this time.    COMPLICATIONS: There were no immediate postprocedure complications.    ESTIMATED BLOOD LOSS: The estimated blood loss was nil.    Specimens as above.    Jayce Redd DO    November 7, 2024      Electronically signed by Jayce Redd DO at 11/07/24 1936          Physician Progress Notes (last  48 hours)        Edouard Hong MD at 24 1402              [facility] MEDICINE SERVICE  DAILY PROGRESS NOTE    NAME: Morgan Quick  : 1955  MRN: [id number]      LOS: 3 days     PROVIDER OF SERVICE: Edouard Hong MD    Chief Complaint: Back pain    Subjective:     Interval History:  History taken from: patient chart RN    Pain is controlled  No sob  At home uses 3 L o2 o2        Review of Systems:   Review of Systems  All negative except as above   Objective:     Vital Signs  Temp:  [97.7 °F (36.5 °C)-98.7 °F (37.1 °C)] 98.1 °F (36.7 °C)  Heart Rate:  [67-88] 72  Resp:  [11-18] 15  BP: ()/(55-71) 112/58  Flow (L/min) (Oxygen Therapy):  [3-6] 4   Body mass index is 39.96 kg/m².    Physical Exam  Physical Exam  NAD.  AOx3  RRR S1-S2 audible  Lungs with fair air entry  Abdomen soft nontender nondistended            Diagnostic Data    Results from last 7 days   Lab Units 24  0300   WBC 10*3/mm3 9.14   HEMOGLOBIN g/dL 13.0   HEMATOCRIT % 43.8   PLATELETS 10*3/mm3 318   GLUCOSE mg/dL 121*   CREATININE mg/dL 0.49*   BUN mg/dL 15   SODIUM mmol/L 136   POTASSIUM mmol/L 4.2   ANION GAP mmol/L 10.2       XR Chest 1 View    Result Date: 2024  Impression: Chronic findings. No acute process. Electronically Signed: Valentine Carmichael MD  2024 9:26 AM EST  Workstation ID: [id number]       I reviewed the patient's new clinical results.  I reviewed the patient's new imaging results and agree with the interpretation.    Assessment/Plan:     Active and Resolved Problems  Active Hospital Problems    Diagnosis  POA    **Back pain [M54.9]  Yes    Compression fracture of T12 vertebra [S22.080A]  Unknown    Compression fracture of T11 vertebra [S22.080A]  Unknown      Resolved Hospital Problems   No resolved problems to display.       68-year-old female with history of hypertension, hyperlipidemia, history of breast cancer status postradiation, chronic back pain status post kyphoplasty  admitted to [facility] 11/4 with acute on chronic back pain     #Acute to subacute T 11/12 compression fracture  #Chronic L1 compression fracture  Seen by neurosurgery no acute surgical intervention planned.  TLSO brace out of bed  Seen by pain management status post T11/12 kyphoplasty 11/7 with significant improvement in pain  Discontinue IV pain medications  Oxycodone as needed for pain   PT/OT> rehab  Cautious with opioids    #Chronic hypoxic respiratory failure  At baseline uses 3 L of supplemental oxygen while sleeping  Chest x-ray on 11/8 no acute findings  Wean down supplemental oxygen as needed to keep SpO2 more than 90      #History of mood disorder  Continue home medications     #Hypothyroidism  Continue levothyroxine     #Hyperlipidemia  Continue statins    VTE Prophylaxis:  Mechanical VTE prophylaxis orders are present.             Disposition Planning:     Barriers to Discharge: Placement  Anticipated Date of Discharge: 11/9  Place of Discharge: Rehab      Time: 40 minutes     Code Status and Medical Interventions: CPR (Attempt to Resuscitate); Full Support   Ordered at: 11/04/24 1719     Level Of Support Discussed With:    Patient     Code Status (Patient has no pulse and is not breathing):    CPR (Attempt to Resuscitate)     Medical Interventions (Patient has pulse or is breathing):    Full Support       Signature: Electronically signed by Edouard Hong MD, 11/08/24, 14:02 EST.  [facility] Hospitalist Team      Electronically signed by Edouard Hong MD at 11/08/24 1404       Jayce Redd DO at 11/08/24 1321            Subjective   Morgan Quick is a 68 y.o. female is status post kyphoplasty at T11 and T12 levels on 11/7/2024 for compression fractures.    11/8/2024-significantly improved pain.  She was able to sit in chair from her bed which she was unable to do before.  Able to move better in bed without having significant pain.  Not requiring frequent dosing of pain  medications.    Interval history on 11/7/2024-continues to have severe lower back pain with any activities and movement and requiring escalating doses of pain medications.    Lower back pain is 7/10 on VAS, at maximum is 10/10. Pain is sharp, shooting, and stabbing in nature. Pain is not referred. The pain is constant. The pain is improved by pain medications. The pain is worse with any movement.     Previous Injection:     Hx:   Presented to ER on 11/4/2024 with severe lower back pain.  She states that yesterday she went to get up using her walker and felt severe left lower back pain and could not get up and fell back down in the chair.  History of kyphoplasties of L2, L4 vertebral level on 1/2/2024 with IR.  Patient is unable to ambulate due to severe pain and also requires external catheter for urination.  Pain, numbness and tingling in bilateral lower extremities-this is chronic.  Her main complaint is axial lower back pain.  She has been living in assisted living for about 1 year.  Denies any bowel or bladder incontinence since admitted to the hospital.  She does have chronic pain in lower back from L2-L4 but her main complaint is significant and severe pain at T11, T12 vertebral level.  Also has history of chronic neck pain.  She does have chronic bilateral leg pain as well.       PMH:   Bipolar 1, history of breast cancer s/p radiation about 2 years ago     Current Medications:   Roxicodone 10 mg q4H PRN  Dilaudid 0.5 mg PRN  Lidocaine 4% patch  Morphine 4 mg PRN        Past Medications:     Past Modalities:  TENS:                                                                          no                                                  Physical Therapy Within The Last 6 Months              no  Psychotherapy                                                            no  Massage Therapy                                                       no     Patient Complains Of:  Uro-Fecal Incontinence           no  Weight Gain/Loss                   no  Fever/Chills                             no  Weakness                               no                     Current Facility-Administered Medications:     sennosides-docusate (PERICOLACE) 8.6-50 MG per tablet 2 tablet, 2 tablet, Oral, BID PRN **AND** polyethylene glycol (MIRALAX) packet 17 g, 17 g, Oral, Daily PRN **AND** bisacodyl (DULCOLAX) EC tablet 5 mg, 5 mg, Oral, Daily PRN **AND** bisacodyl (DULCOLAX) suppository 10 mg, 10 mg, Rectal, Daily PRN, Redd, Jayce, DO    Calcium Replacement - Follow Nurse / BPA Driven Protocol, , Does not apply, PRN, Redd, Jayce, DO    cyclobenzaprine (FLEXERIL) tablet 10 mg, 10 mg, Oral, TID PRN, Redd, Jayce, DO, 10 mg at 11/07/24 0501    furosemide (LASIX) tablet 20 mg, 20 mg, Oral, Daily, Redd, Jayce, DO, 20 mg at 11/08/24 0830    guaiFENesin (MUCINEX) 12 hr tablet 1,200 mg, 1,200 mg, Oral, Q12H, Redd, Jayce, DO, 1,200 mg at 11/08/24 0830    hydrALAZINE (APRESOLINE) injection 10 mg, 10 mg, Intravenous, Q6H PRN, Redd, Jayce, DO, 10 mg at 11/07/24 1218    HYDROmorphone (DILAUDID) injection 0.5 mg, 0.5 mg, Intravenous, Q3H PRN, Redd, Jayce, DO, 0.5 mg at 11/07/24 1218    levothyroxine (SYNTHROID, LEVOTHROID) tablet 25 mcg, 25 mcg, Oral, Q AM, Redd, Jayce, DO, 25 mcg at 11/08/24 0607    lubiprostone (AMITIZA) capsule 24 mcg, 24 mcg, Oral, BID, Redd, Jayce, DO, 24 mcg at 11/08/24 0830    Magnesium Standard Dose Replacement - Follow Nurse / BPA Driven Protocol, , Does not apply, PRN, Redd, Jayce, DO    melatonin tablet 5 mg, 5 mg, Oral, Nightly PRN, Redd, Jayce, DO, 5 mg at 11/04/24 2052    ondansetron (ZOFRAN) injection 4 mg, 4 mg, Intravenous, Q6H PRN, Redd, Jayce, DO, 4 mg at 11/06/24 1029    orphenadrine (NORFLEX) injection 60 mg, 60 mg, Intravenous, Q12H, Redd, Jayce, DO, 60 mg at 11/08/24 1238    OXcarbazepine (TRILEPTAL) tablet 150 mg, 150 mg, Oral, BID, Redd, Jayce, DO, 150 mg at 11/08/24 0830    oxyCODONE (ROXICODONE)  immediate release tablet 10 mg, 10 mg, Oral, Q4H PRN, Redd, Jayce, DO, 10 mg at 11/08/24 0613    pantoprazole (PROTONIX) EC tablet 40 mg, 40 mg, Oral, Q AM, Redd, Jayce, DO, 40 mg at 11/08/24 0607    Phosphorus Replacement - Follow Nurse / BPA Driven Protocol, , Does not apply, PRN, Redd, Jayce, DO    potassium chloride (K-DUR,KLOR-CON) ER tablet 10 mEq, 10 mEq, Oral, Daily, Redd, Jayce, DO, 10 mEq at 11/08/24 0830    Potassium Replacement - Follow Nurse / BPA Driven Protocol, , Does not apply, PRN, Redd, Jayce, DO    risperiDONE (risperDAL) tablet 0.25 mg, 0.25 mg, Oral, Nightly, Redd, Jayce, DO, 0.25 mg at 11/07/24 2220    rosuvastatin (CRESTOR) tablet 30 mg, 30 mg, Oral, Daily, Redd, Jayce, DO, 30 mg at 11/08/24 0830    sertraline (ZOLOFT) tablet 200 mg, 200 mg, Oral, Daily, Redd, Jayce, DO, 200 mg at 11/08/24 0830    sodium chloride 0.9 % flush 10 mL, 10 mL, Intravenous, Q12H, Redd, Jayce, DO, 10 mL at 11/08/24 0831    sodium chloride 0.9 % flush 10 mL, 10 mL, Intravenous, PRN, Redd, Jayce, DO, 10 mL at 11/08/24 0252    sodium chloride 0.9 % infusion 40 mL, 40 mL, Intravenous, PRN, Redd, Jayce, DO    [Held by provider] sodium chloride tablet 2 g, 2 g, Oral, TID With Meals, Flaquita Whatley APRN    traZODone (DESYREL) tablet 25 mg, 25 mg, Oral, Nightly, Redd, Jayce, DO, 25 mg at 11/07/24 2218    The following portions of the patient's history were reviewed and updated as appropriate: allergies, current medications, past family history, past medical history, past social history, past surgical history, and problem list.      REVIEW OF PERTINENT MEDICAL DATA    Past Medical History:   Diagnosis Date    Bipolar 1 disorder     Breast cancer     Cancer     Disease of thyroid gland     High cholesterol     Hypertension      Past Surgical History:   Procedure Laterality Date    ABDOMINAL SURGERY      APPENDECTOMY      ENDOSCOPY N/A 9/25/2022    Procedure: ESOPHAGOGASTRODUODENOSCOPY with biopsy x 2 areas;   Surgeon: Deonte Wong MD;  Location: [facility] END[facility]COPY;  Service: Gastroenterology;  Laterality: N/A;  post: gastritis, duodinitis, nodule,     HYSTERECTOMY      MASTECTOMY      L side     Family History   Problem Relation Age of Onset    Cancer Mother      Social History     Socioeconomic History    Marital status:    Tobacco Use    Smoking status: Never     Passive exposure: Never    Smokeless tobacco: Never   Vaping Use    Vaping status: Never Used   Substance and Sexual Activity    Alcohol use: No    Drug use: No    Sexual activity: Defer         Review of Systems   Musculoskeletal:  Positive for arthralgias and back pain.         Vitals:    11/08/24 0244 11/08/24 0750 11/08/24 1140 11/08/24 1200   BP: 131/62 129/70 116/55 112/58   BP Location: Left arm Left arm  Left arm   Patient Position: Lying Lying  Lying   Pulse: 76 72 76 72   Resp: 17 15  15   Temp: 98.6 °F (37 °C) 98.2 °F (36.8 °C)  98.1 °F (36.7 °C)   TempSrc: Oral Oral  Oral   SpO2: 94% 90% 94% 93%   Weight:  109 kg (239 lb 13.8 oz)     Height:             Objective   Physical Exam  Musculoskeletal:         General: Tenderness present.        Back:            Imaging Reviewed:  MRI lumbar spine without contrast-11/5/2024  - Hx of  L2, L4 kyphoplasty  - Chronic L2, L3, L4, L5 fractures stable from prior.  - Chronic L1 compression fracture with mild height loss, new since 1/2/2024  - Mild central height loss at T12 with increased T2 signal consistent with edema consistent with acute to subacute compression fracture  - Increased T2 signal at T11 vertebral body without significant fracture line or height loss.  No bony retropulsion.  Acute to subacute compression fracture.  -Stable and probable benign hemangiomas in T12, T11  L4-5-mild to moderate bilateral neuroforaminal stenosis and moderate facet arthropathy  L5-S1-moderate to severe facet arthropathy.  Mild to moderate bilateral neuroforaminal stenosis.    Assessment:    1. Compression  fracture of T12 vertebra, initial encounter    2. Acute low back pain, unspecified back pain laterality, unspecified whether sciatica present    3. Compression fracture of L4 lumbar vertebra, closed, initial encounter    4. Compression fracture of T11 vertebra, initial encounter         Plan:   S/p  T11-T12 kyphoplasty with significantly improved pain and functional improvement.  Some residual pain from acute on chronic L1 compression fracture.  Overall she is doing better with requiring less frequent pain medications.  2.  Recommend working with physical therapy.  3.  Okay to discharge from pain management perspective if okay with primary care team.  4.  Pain management as per primary care team.        Jayce Redd DO  Pain Management   [facility]                       Electronically signed by Jayce Redd DO at 24 1327       Edouard Hong MD at 24 0899              [facility] MEDICINE SERVICE  DAILY PROGRESS NOTE    NAME: Morgan Quick  : 1955  MRN: [id number]      LOS: 2 days     PROVIDER OF SERVICE: Edouard Hong MD    Chief Complaint: Back pain    Subjective:     Interval History:  History taken from: patient chart RN    Back pain   Await kyphoplasty with pain management        Review of Systems:   Review of Systems  All negative except as above  Objective:     Vital Signs  Temp:  [97.8 °F (36.6 °C)-98.9 °F (37.2 °C)] 98.9 °F (37.2 °C)  Heart Rate:  [63-80] 73  Resp:  [18-28] 18  BP: (122-186)/(48-83) 157/74  Flow (L/min) (Oxygen Therapy):  [3] 3   Body mass index is 40.66 kg/m².    Physical Exam  Physical Exam  NAD.  AOx3  RRR S1-S2 audible  Lungs with fair air entry  Abdomen soft nontender nondistended            Diagnostic Data    Results from last 7 days   Lab Units 24  0046   WBC 10*3/mm3 9.87   HEMOGLOBIN g/dL 11.9*   HEMATOCRIT % 41.0   PLATELETS 10*3/mm3 219   GLUCOSE mg/dL 108*   CREATININE mg/dL 0.47*   BUN mg/dL 12   SODIUM mmol/L 137   POTASSIUM  mmol/L 3.6   ANION GAP mmol/L 10.5       MRI Lumbar Spine With Contrast    Result Date: 11/6/2024  Impression: 1.  Small foci of enhancement along the superior endplate of L3 immediately adjacent to Schmorl's node protrusion, as well as along the anterior inferior endplate of T12 adjacent to concavity/compression endplate deformity. These findings are favored to represent areas of reactive enhancement related to compression fractures, with static involvement thought less likely. Electronically Signed: Emily Gonzalez MD  11/6/2024 8:45 AM EST  Workstation ID: [id number]    MRI Lumbar Spine Without Contrast    Result Date: 11/5/2024  Impression: 1. Acute to subacute T12 compression fracture with mild height loss and no bony retropulsion, new since prior comparison. 2. T11 vertebral body edema significant height loss or fracture line which could reflect an acute to subacute nondisplaced fracture versus possible contusion or if there is history of trauma. 3. Chronic L1 compression fracture with mild height loss, new since 1/2/2024. 4. Prior L2 and L4 kyphoplasty. Chronic L2, L3, L4 and L5 fractures are stable from prior. 5. Multilevel lumbar spondylosis without high-grade thecal sac or neuroforaminal stenosis. Degenerative change at T12-L1 has slightly worsened from prior. Electronically Signed: Michael Leal MD  11/5/2024 1:35 PM EST  Workstation ID: [id number]       I reviewed the patient's new clinical results.  I reviewed the patient's new imaging results and agree with the interpretation.    Assessment/Plan:     Active and Resolved Problems  Active Hospital Problems    Diagnosis  POA    **Back pain [M54.9]  Yes    Compression fracture of T12 vertebra [S22.080A]  Unknown    Compression fracture of T11 vertebra [S22.080A]  Unknown      Resolved Hospital Problems   No resolved problems to display.       68-year-old female with history of hypertension, hyperlipidemia, history of breast cancer status postradiation,  chronic back pain status post kyphoplasty admitted to [facility] 11/4 with acute on chronic back pain     #Acute to subacute T 11/12 compression fracture  #Chronic L1 compression fracture  Seen by neurosurgery no acute surgical intervention planned.  TLSO brace out of bed  Pain management following .  Now plan for kyphoplasty today   PT/OT  Cautious with opioids     #History of mood disorder  Continue home medications     #Hypothyroidism  Continue levothyroxine     #Hyperlipidemia  Continue statins    VTE Prophylaxis:  Mechanical VTE prophylaxis orders are present.             Disposition Planning:     Barriers to Discharge: Medical workup  Anticipated Date of Discharge: 24 to 48 hours  Place of Discharge: Home versus rehab       Time: 40 minutes     Code Status and Medical Interventions: CPR (Attempt to Resuscitate); Full Support   Ordered at: 11/04/24 1719     Level Of Support Discussed With:    Patient     Code Status (Patient has no pulse and is not breathing):    CPR (Attempt to Resuscitate)     Medical Interventions (Patient has pulse or is breathing):    Full Support       Signature: Electronically signed by Edouard Hong MD, 11/07/24, 08:27 EST.  [facility] Hospitalist Team      Electronically signed by Edouard Hong MD at 11/07/24 1220       Jayce Redd DO at 11/07/24 0742            Subjective   Morgan Quick is a 68 y.o. female who is here for severe lower back pain after a fall and found to have acute T12 and T11 compression fractures.    Interval history on 11/7/2024-continues to have severe lower back pain with any activities and movement and requiring escalating doses of pain medications.    Lower back pain is 7/10 on VAS, at maximum is 10/10. Pain is sharp, shooting, and stabbing in nature. Pain is not referred. The pain is constant. The pain is improved by pain medications. The pain is worse with any movement.     Previous Injection:     Hx:   Presented to ER on 11/4/2024 with  severe lower back pain.  She states that yesterday she went to get up using her walker and felt severe left lower back pain and could not get up and fell back down in the chair.  History of kyphoplasties of L2, L4 vertebral level on 1/2/2024 with IR.  Patient is unable to ambulate due to severe pain and also requires external catheter for urination.  Pain, numbness and tingling in bilateral lower extremities-this is chronic.  Her main complaint is axial lower back pain.  She has been living in assisted living for about 1 year.  Denies any bowel or bladder incontinence since admitted to the hospital.  She does have chronic pain in lower back from L2-L4 but her main complaint is significant and severe pain at T11, T12 vertebral level.  Also has history of chronic neck pain.  She does have chronic bilateral leg pain as well.       PMH:   Bipolar 1, history of breast cancer s/p radiation about 2 years ago     Current Medications:   Roxicodone 10 mg q4H PRN  Dilaudid 0.5 mg PRN  Lidocaine 4% patch  Morphine 4 mg PRN        Past Medications:     Past Modalities:  TENS:                                                                          no                                                  Physical Therapy Within The Last 6 Months              no  Psychotherapy                                                            no  Massage Therapy                                                       no     Patient Complains Of:  Uro-Fecal Incontinence          no  Weight Gain/Loss                   no  Fever/Chills                             no  Weakness                               no                     Current Facility-Administered Medications:     sennosides-docusate (PERICOLACE) 8.6-50 MG per tablet 2 tablet, 2 tablet, Oral, BID PRN **AND** polyethylene glycol (MIRALAX) packet 17 g, 17 g, Oral, Daily PRN **AND** bisacodyl (DULCOLAX) EC tablet 5 mg, 5 mg, Oral, Daily PRN **AND** bisacodyl (DULCOLAX) suppository 10 mg, 10  mg, Rectal, Daily PRN, Lacho Vance DO    Calcium Replacement - Follow Nurse / BPA Driven Protocol, , Does not apply, PRN, Lacho Vance DO    cyclobenzaprine (FLEXERIL) tablet 10 mg, 10 mg, Oral, TID PRN, Flaquita Whatley APRN, 10 mg at 11/07/24 0501    furosemide (LASIX) tablet 20 mg, 20 mg, Oral, Daily, TripFlaquita beck APRN, 20 mg at 11/06/24 1023    guaiFENesin (MUCINEX) 12 hr tablet 1,200 mg, 1,200 mg, Oral, Q12H, Flaquita Whatley APRN, 1,200 mg at 11/06/24 2037    hydrALAZINE (APRESOLINE) injection 10 mg, 10 mg, Intravenous, Q6H PRN, Edouard Hong MD, 10 mg at 11/06/24 0919    HYDROmorphone (DILAUDID) injection 0.5 mg, 0.5 mg, Intravenous, Q3H PRN, Edouard Hong MD, 0.5 mg at 11/06/24 1829    levothyroxine (SYNTHROID, LEVOTHROID) tablet 25 mcg, 25 mcg, Oral, Q AM, Flaquita Whatley APRN, 25 mcg at 11/07/24 0502    lubiprostone (AMITIZA) capsule 24 mcg, 24 mcg, Oral, BID, Flaquita Whatley APRN, 24 mcg at 11/06/24 2037    Magnesium Standard Dose Replacement - Follow Nurse / BPA Driven Protocol, , Does not apply, PRN, Lacho Vance DO    melatonin tablet 5 mg, 5 mg, Oral, Nightly PRN, Lacho Vance DO, 5 mg at 11/04/24 2052    ondansetron (ZOFRAN) injection 4 mg, 4 mg, Intravenous, Q6H PRN, Lacho Vance DO, 4 mg at 11/06/24 1029    orphenadrine (NORFLEX) injection 60 mg, 60 mg, Intravenous, Q12H, Valencia Fu APRN, 60 mg at 11/07/24 0036    OXcarbazepine (TRILEPTAL) tablet 150 mg, 150 mg, Oral, BID, Flaquita Whatley APRN, 150 mg at 11/06/24 2038    oxyCODONE (ROXICODONE) immediate release tablet 10 mg, 10 mg, Oral, Q4H PRN, Edouard Hong MD, 10 mg at 11/07/24 0501    pantoprazole (PROTONIX) EC tablet 40 mg, 40 mg, Oral, Q AM, Flaquita Whatley APRN, 40 mg at 11/07/24 0502    Phosphorus Replacement - Follow Nurse / BPA Driven Protocol, , Does not apply, PRN, Lacho Vance,     potassium chloride  (K-DUR,KLOR-CON) ER tablet 10 mEq, 10 mEq, Oral, Daily, Tripure, Flaquita S, APRN, 10 mEq at 11/06/24 1022    potassium chloride (KLOR-CON M20) CR tablet 40 mEq, 40 mEq, Oral, Q4H, Edouard Hong MD, 40 mEq at 11/07/24 0427    Potassium Replacement - Follow Nurse / BPA Driven Protocol, , Does not apply, PRN, Hottman, Lacho Cheng, DO    risperiDONE (risperDAL) tablet 0.25 mg, 0.25 mg, Oral, Nightly, Tripure, Flaquita S, APRN, 0.25 mg at 11/06/24 2037    rosuvastatin (CRESTOR) tablet 30 mg, 30 mg, Oral, Daily, Tripure, Flaquita S, APRN, 30 mg at 11/06/24 1022    sertraline (ZOLOFT) tablet 200 mg, 200 mg, Oral, Daily, Tripure, Flaquita S, APRN, 200 mg at 11/06/24 1022    sodium chloride 0.9 % flush 10 mL, 10 mL, Intravenous, Q12H, Hottman, Lacho Cheng, DO, 10 mL at 11/06/24 2039    sodium chloride 0.9 % flush 10 mL, 10 mL, Intravenous, PRN, Hottman, Lacho Cheng, DO, 10 mL at 11/05/24 0050    sodium chloride 0.9 % infusion 40 mL, 40 mL, Intravenous, PRN, Hottman, Lacho Cheng, DO    [Held by provider] sodium chloride tablet 2 g, 2 g, Oral, TID With Meals, Tripure, Flaquita S, APRN    traZODone (DESYREL) tablet 25 mg, 25 mg, Oral, Nightly, Tripure, Flaquita S, APRN, 25 mg at 11/06/24 2038    The following portions of the patient's history were reviewed and updated as appropriate: allergies, current medications, past family history, past medical history, past social history, past surgical history, and problem list.      REVIEW OF PERTINENT MEDICAL DATA    Past Medical History:   Diagnosis Date    Bipolar 1 disorder     Breast cancer     Cancer     Disease of thyroid gland     High cholesterol     Hypertension      Past Surgical History:   Procedure Laterality Date    ABDOMINAL SURGERY      APPENDECTOMY      ENDOSCOPY N/A 9/25/2022    Procedure: ESOPHAGOGASTRODUODENOSCOPY with biopsy x 2 areas;  Surgeon: Deonte Wong MD;  [facility] ENDOSCOPY;  Service: Gastroenterology;  Laterality: N/A;  post:  gastritis, duodinitis, nodule,     HYSTERECTOMY      MASTECTOMY      L side     Family History   Problem Relation Age of Onset    Cancer Mother      Social History     Socioeconomic History    Marital status:    Tobacco Use    Smoking status: Never     Passive exposure: Never    Smokeless tobacco: Never   Vaping Use    Vaping status: Never Used   Substance and Sexual Activity    Alcohol use: No    Drug use: No    Sexual activity: Defer         Review of Systems   Musculoskeletal:  Positive for arthralgias and back pain.         Vitals:    11/07/24 0037 11/07/24 0038 11/07/24 0422 11/07/24 0426   BP:   (!) 186/83 157/74   BP Location:   Right arm Left arm   Patient Position:   Lying Lying   Pulse: 67 67 71 73   Resp:   18 18   Temp:   98.9 °F (37.2 °C)    TempSrc:   Oral    SpO2: 96% 95% 95% 97%   Weight:       Height:             Objective   Physical Exam  Musculoskeletal:         General: Tenderness present.        Back:            Imaging Reviewed:  MRI lumbar spine without contrast-11/5/2024  - Hx of  L2, L4 kyphoplasty  - Chronic L2, L3, L4, L5 fractures stable from prior.  - Chronic L1 compression fracture with mild height loss, new since 1/2/2024  - Mild central height loss at T12 with increased T2 signal consistent with edema consistent with acute to subacute compression fracture  - Increased T2 signal at T11 vertebral body without significant fracture line or height loss.  No bony retropulsion.  Acute to subacute compression fracture.  -Stable and probable benign hemangiomas in T12, T11  L4-5-mild to moderate bilateral neuroforaminal stenosis and moderate facet arthropathy  L5-S1-moderate to severe facet arthropathy.  Mild to moderate bilateral neuroforaminal stenosis.    Assessment:    1. Compression fracture of T12 vertebra, initial encounter    2. Acute low back pain, unspecified back pain laterality, unspecified whether sciatica present    3. Compression fracture of L4 lumbar vertebra, closed,  initial encounter    4. Compression fracture of T11 vertebra, initial encounter         Plan:   1.Patient has axial lower back pain and severe tenderness to palpation over the fractured vertebrae.  Imaging shows acute to subacute compression fracture.  Patient is currently hospitalized inpatient and requiring escalating doses of opioids without significant improvement in her pain.  Unable to do any activities due to severe pain.  Discussed with patient that she is good candidate for T11 and T12 kyphoplasty.  Benefits and risks were discussed with patient including but not limited to bleeding, infection, nerve damage, paralysis, pulmonary embolism, cement leak may requiring decompression surgery, death.  Patient understands and agrees with the plan and agrees to proceed with the procedure.  Patient's allergy to contrast dye was discussed.  Patient had tolerated kyphoplasty done in 2024 without any issues with intra balloon contrast dye.  Benefits of using contrast dye or with risk of using it and patient agreed to proceed.  Patient was consented.  2.  Current pain management as per primary team.      Site is marked and consent is signed and will proceed with kyphoplasty as scheduled.    Jayce Redd DO  Pain Management   [facility]                       Electronically signed by Jayce Redd DO at 24 1919       Consult Notes (last 48 hours)  Notes from 24 1536 through 24 1536   No notes of this type exist for this encounter.       Nutrition Notes (most recent note)    No notes exist for this encounter.       Speech Language Pathology Notes (most recent note)    No notes exist for this encounter.       Nayeli Mario, PT   Physical Therapist  Physical Therapy  Therapy Evaluation      Signed  Date of Service:  24 1040  Creation Time:  24 104     Signed        Expand All Collapse All  Patient Name: Morgan Quick               : 1955                      MRN:  [id number]                              Today's Date: 11/5/2024                                   Admit Date: 11/4/2024                        Visit Dx:   Visit Diagnosis       ICD-10-CM ICD-9-CM   1. Acute low back pain, unspecified back pain laterality, unspecified whether sciatica present  M54.50 724.2         Problem List       Patient Active Problem List   Diagnosis    Morbidly obese    Essential hypertension    Malignant neoplasm of overlapping sites of left breast in female, estrogen receptor positive    Primary cancer of left female breast    Abdominal pain, unspecified abdominal location    Bipolar 1 disorder    High cholesterol    Anorexia    Nausea    Low back pain    Back pain    Compression fracture of L2 lumbar vertebra, closed, initial encounter    Compression fracture of L4 lumbar vertebra, closed, initial encounter         Medical History        Past Medical History:   Diagnosis Date    Bipolar 1 disorder      Breast cancer      Cancer      Disease of thyroid gland      High cholesterol      Hypertension           Surgical History         Past Surgical History:   Procedure Laterality Date    ABDOMINAL SURGERY        APPENDECTOMY        ENDOSCOPY N/A 9/25/2022     Procedure: ESOPHAGOGASTRODUODENOSCOPY with biopsy x 2 areas;  Surgeon: Deonte Wong MD;  Location: [facility] ENDOSCOPY;  Service: Gastroenterology;  Laterality: N/A;  post: gastritis, duodinitis, nodule,     HYSTERECTOMY        MASTECTOMY         L side           General Information         Row Name 11/05/24 0949                 Physical Therapy Time and Intention     Document Type evaluation  -AO       Mode of Treatment physical therapy  -AO          Row Name 11/05/24 0949                 General Information     Patient Profile Reviewed yes  -AO       Prior Level of Function --  Typically MOD I w/ transfers from recliner (sleeps in recliner), to/from Rollator walker (sits on Rollator walker and uses feet to \"scoot around backwards\", " tpyically MOD I with bathing/dressing, hasn't ambulated in ~3-4 months  -AO       Existing Precautions/Restrictions fall;oxygen therapy device and L/min  -AO       Barriers to Rehab medically complex;previous functional deficit;impaired sensation  -AO          Row Name 11/05/24 0949                 Living Environment     People in Home facility resident  Pt resides with  in a shared room at [facility]  -AO          Row Name 11/05/24 0949                 Home Main Entrance     Number of Stairs, Main Entrance none  -AO          Row Name 11/05/24 0949                 Stairs Within Home, Primary     Number of Stairs, Within Home, Primary none  -AO          Row Name 11/05/24 0949                 Cognition     Orientation Status (Cognition) oriented x 4  -AO          Row Name 11/05/24 0949                 Safety Issues/Impairments Affecting Functional Mobility     Impairments Affecting Function (Mobility) strength;pain;endurance/activity tolerance;sensation/sensory awareness;shortness of breath  -AO                       User Key  (r) = Recorded By, (t) = Taken By, (c) = Cosigned By        Initials Name Provider Type     AO Nayeli Mario, PT Physical Therapist                            Mobility         Row Name 11/05/24 0949                 Bed Mobility     Bed Mobility rolling left;rolling right  -AO       Rolling Left [place] (Bed Mobility) maximum assist (25% patient effort)  -AO       Rolling Right [place] (Bed Mobility) maximum assist (25% patient effort)  -AO       Comment, (Bed Mobility) significant pain with rolling and repositioning in bed, unable to tolerate supine to/from sit transfer this date  -AO          Row Name 11/05/24 0949                 Bed-Chair Transfer     Bed-Chair [place] (Transfers) unable to assess  -AO          Row Name 11/05/24 0949                 Sit-Stand Transfer     Sit-Stand [place] (Transfers) unable to assess  -AO          Row Name 11/05/24 0949                  Gait/Stairs (Locomotion)     Comment, (Gait/Stairs) Pt has been non-ambulatory for ~3-4 months  -AO                       User Key  (r) = Recorded By, (t) = Taken By, (c) = Cosigned By        Initials Name Provider Type     Nayeli Oreilly, PT Physical Therapist                            Obj/Interventions         Row Name 11/05/24 0949                 Range of Motion Comprehensive     Comment, General Range of Motion unable to fully assess 2*/2 pain, grossly WFL during bed mobility  -AO          Row Name 11/05/24 0949                 Strength Comprehensive (MMT)     Comment, General Manual Muscle Testing (MMT) Assessment unable to fully assess 2*/2 pain, grossly WFL during bed mobility  -AO          Row Name 11/05/24 0949                 Sensory Assessment (Somatosensory)     Sensory Assessment (Somatosensory) other (see comments)  Pt c/o numbness/tingling BLE distal to hips  -AO                       User Key  (r) = Recorded By, (t) = Taken By, (c) = Cosigned By        Initials Name Provider Type     Nayeli Oreilly PT Physical Therapist                            Goals/Plan         Row Name 11/05/24 0949                 Bed Mobility Goal 1 (PT)     Activity/Assistive Device (Bed Mobility Goal 1, PT) bed mobility activities, all  -AO       [place] Level/Cues Needed (Bed Mobility Goal 1, PT) minimum assist (75% or more patient effort)  -AO       Time Frame (Bed Mobility Goal 1, PT) long term goal (LTG);2 weeks  -AO       Progress/Outcomes (Bed Mobility Goal 1, PT) goal not met  -AO          Row Name 11/05/24 0949                 Transfer Goal 1 (PT)     Activity/Assistive Device (Transfer Goal 1, PT) transfers, all;walker, rolling  -AO       [place] Level/Cues Needed (Transfer Goal 1, PT) minimum assist (75% or more patient effort)  -AO       Time Frame (Transfer Goal 1, PT) long term goal (LTG);2 weeks  -AO       Progress/Outcome (Transfer Goal 1, PT) goal not met  -[facility]  "Name 11/05/24 0949                 Gait Training Goal 1 (PT)     Activity/Assistive Device (Gait Training Goal 1, PT) gait (walking locomotion)  -AO       [place] Level (Gait Training Goal 1, PT) minimum assist (75% or more patient effort)  -AO       Distance (Gait Training Goal 1, PT) 3 ft  -AO       Time Frame (Gait Training Goal 1, PT) long term goal (LTG);2 weeks  -AO       Progress/Outcome (Gait Training Goal 1, PT) goal not met  -AO          Row Name 11/05/24 0949                 Therapy Assessment/Plan (PT)     Planned Therapy Interventions (PT) balance training;bed mobility training;gait training;home exercise program;neuromuscular re-education;patient/family education;strengthening;transfer training  -AO                    User Key  (r) = Recorded By, (t) = Taken By, (c) = Cosigned By        Initials Name Provider Type     AO Nayeli Mario, PT Physical Therapist                         Clinical Impression         Row Name 11/05/24 0949                 Pain     Pretreatment Pain Rating 10/10  -AO       Posttreatment Pain Rating 10/10  -AO       Pain Location back  -AO       Pain Side/Orientation lower;posterior  -AO       Pain Management Interventions nursing notified  -AO       Response to Pain Interventions no change per patient report  -AO          Row Name 11/05/24 0949                 Plan of Care Review     Plan of Care Reviewed With patient  -AO       Progress no change  -AO       Outcome Evaluation Pt is a 67 y/o F who presented to [facility] w/ c/o severe, acute low back pain with inability to move. Pt w/ chronic compression fractures at L1, L3 and L5 and with hx of vertebroplasty at L2 and L4, however, radiograph (-) for acute fx/dislocation, MRI pending. Pt describes pain as \"spasms with numbness in my legs\" and with nausea 2*/2 severity of pain. Pt is a resident of [facility] where she resides in a room with her  and is typically MOD I w/ transfers from recliner (sleeps in recliner), " "to/from Rollator walker (sits on Rollator walker and uses feet to \"scoot around backwards\", typically MOD I with bathing/dressing, hasn't ambulated in ~3-4 months, reports she's awaiting delivery of her wheelchair). During eval, pt with 10/10 LBP and with nausea (RN aware), able to tolerate only rolling/repositioning in bed, requiring MAX A with increased pain with all mobility. Neuro has ordered an MRI and will consult after results. At this time, pt not safe to return to [facility], significantly below baseline. Recommending SNF at d/c and plan to see 5x/week at [facility] for progression of mobility. PPE: gloves  -AO          Row Name 11/05/24 0949                 Therapy Assessment/Plan (PT)     Rehab Potential (PT) fair  -AO       Criteria for Skilled Interventions Met (PT) yes;skilled treatment is necessary  -AO       Therapy Frequency (PT) 5 times/wk  -AO       Predicted Duration of Therapy Intervention (PT) until d/c  -AO          Row Name 11/05/24 0949                 Vital Signs     Recovery Time vitals stable and WNL on 3L 02 (uses 3L at night at [facility])  -AO          Row Name 11/05/24 0949                 Positioning and Restraints     Pre-Treatment Position in bed  -AO       Post Treatment Position bed  -AO       In Bed notified nsg;side lying right;call light within reach;encouraged to call for assist;exit alarm on  -AO                       User Key  (r) = Recorded By, (t) = Taken By, (c) = Cosigned By        Initials Name Provider Type     AO Nayeli Mario, PT Physical Therapist                            Outcome Measures         Row Name 11/05/24 0949                 How much help from another person do you currently need...     Turning from your back to your side while in flat bed without using bedrails? 2  -AO       Moving from lying on back to sitting on the side of a flat bed without bedrails? 1  -AO       Moving to and from a bed to a chair (including a wheelchair)? 1  -AO       Standing up from a chair " "using your arms (e.g., wheelchair, bedside chair)? 1  -AO       Climbing 3-5 steps with a railing? 1  -AO       To walk in hospital room? 1  -AO       AM-PAC 6 Clicks Score (PT) 7  -AO       Highest Level of Mobility Goal 2 --> Bed activities/dependent transfer  -AO          Row Name 11/05/24 0949                 Functional Assessment     Outcome Measure Options AM-PAC 6 Clicks Basic Mobility (PT)  -AO                       User Key  (r) = Recorded By, (t) = Taken By, (c) = Cosigned By        Initials Name Provider Type     AO Nayeli Mario, PT Physical Therapist                          Physical Therapy Education            Title: PT OT SLP Therapies (Done)         Topic: Physical Therapy (Done)         Point: Mobility training (Done)         Learning Progress Summary             Patient Acceptance, E,TB, VU by AO at 11/5/2024 1037                            Point: Body mechanics (Done)         Learning Progress Summary             Patient Acceptance, E,TB, VU by AO at 11/5/2024 1037                            Point: Precautions (Done)         Learning Progress Summary             Patient Acceptance, E,TB, VU by AO at 11/5/2024 1037                                                User Key         Initials Effective Dates Name Provider Type Discipline     AO 06/16/21 -  Nayeli Mario, PT Physical Therapist PT                          PT Recommendation and Plan  Planned Therapy Interventions (PT): balance training, bed mobility training, gait training, home exercise program, neuromuscular re-education, patient/family education, strengthening, transfer training  Progress: no change  Outcome Evaluation: Pt is a 67 y/o F who presented to [facility] w/ c/o severe, acute low back pain with inability to move. Pt w/ chronic compression fractures at L1, L3 and L5 and with hx of vertebroplasty at L2 and L4, however, radiograph (-) for acute fx/dislocation, MRI pending. Pt describes pain as \"spasms with numbness in my legs\" and " "with nausea 2*/2 severity of pain. Pt is a resident of [facility] where she resides in a room with her  and is typically MOD I w/ transfers from recliner (sleeps in recliner), to/from Rollator walker (sits on Rollator walker and uses feet to \"scoot around backwards\", typically MOD I with bathing/dressing, hasn't ambulated in ~3-4 months, reports she's awaiting delivery of her wheelchair). During eval, pt with 10/10 LBP and with nausea (RN aware), able to tolerate only rolling/repositioning in bed, requiring MAX A with increased pain with all mobility. Neuro has ordered an MRI and will consult after results. At this time, pt not safe to return to [facility], significantly below baseline. Recommending SNF at d/c and plan to see 5x/week at [facility] for progression of mobility. PPE: gloves      Time Calculation:   PT Evaluation Complexity  History, PT Evaluation Complexity: 1-2 personal factors and/or comorbidities  Examination of Body Systems (PT Eval Complexity): total of 3 or more elements  Clinical Presentation (PT Evaluation Complexity): evolving  Clinical Decision Making (PT Evaluation Complexity): moderate complexity  Overall Complexity (PT Evaluation Complexity): moderate complexity       PT Charges         Row Name 11/05/24 1039                       Time Calculation     Start Time 0949  -AO         Stop Time 1004  -AO         Time Calculation (min) 15 min  -AO         PT Received On 11/05/24  -AO         PT - Next Appointment 11/06/24  -AO         PT Goal Re-Cert Due Date 11/19/24  -AO                 Time Calculation- PT     Total Timed Code Minutes- PT 0 minute(s)  -AO                      User Key  (r) = Recorded By, (t) = Taken By, (c) = Cosigned By        Initials Name Provider Type     Nayeli Oreilly, PT Physical Therapist                       Therapy Charges for Today         Code Description Service Date Service Provider Modifiers Qty     13770380944  PT EVAL MOD COMPLEXITY 3 11/5/2024 Fabiano" "Nayeli DUARTE, PT GP 1                PT G-Codes  Outcome Measure Options: AM-PAC 6 Clicks Basic Mobility (PT)  AM-PAC 6 Clicks Score (PT): 7  PT Discharge Summary  Anticipated Discharge Disposition (PT): skilled nursing facility     Nayeli Mario, PT               11/5/2024                                    José Luis Estrada PTA   Physical Therapist Assistant  Specialty:  Physical Therapy  Therapy Treatment Note      Signed  Date of Service:  11/08/24 1010  Creation Time:  11/08/24 1058     Signed        Subjective: Pt agreeable to therapeutic plan of care.     Objective:      Precautions - spinal precautions     Bed mobility - Max-A  Transfers - Max-A and Assist x 2 w/ B HHA  Ambulation -  N/A or Not attempted.     Vitals: WNL     Pain: Pt did not rate pain  Intervention for pain: N/A     Education: Provided education on the importance of mobility in the acute care setting, Verbal/Tactile Cues, Transfer Training, Energy conservation strategies, Post-Op Precautions, and HEP     Assessment: Morgan Quick presents with functional mobility impairments which indicate the need for skilled intervention. Pt required extended time and max A with cues for proper log roll technique to complete bed mobility. Pt able to perform stand pivot from bed to bedside chair with max A x2/ B HHA. Pt displayed forward flexed posture and difficulty taking side steps during transfer. Pt continues to be limited due to decreased activity tolerance and functional strength. Tolerating session today without incident. Will continue to follow and progress as tolerated.      Plan/Recommendations:   If medically appropriate, Moderate Intensity Therapy recommended post-acute care. This is recommended as therapy feels the patient would require 3-4 days per week and wouldn't tolerate \"3 hour daily\" rehab intensity. SNF would be the preferred choice. If the patient does not agree to SNF, arrange HH or OP depending on home bound status. If patient is " medically complex, consider LTACH. Pt requires no DME at discharge.      Pt desires Skilled Rehab placement at discharge. Pt cooperative; agreeable to therapeutic recommendations and plan of care.            Basic Mobility 6-click:  Rollin = Total, A lot = 2, A little = 3; 4 = None  Supine>Sit:      1 = Total, A lot = 2, A little = 3; 4 = None   Sit>Stand with arms:   1 = Total, A lot = 2, A little = 3; 4 = None  Bed>Chair:      1 = Total, A lot = 2, A little = 3; 4 = None  Ambulate in room:       1 = Total, A lot = 2, A little = 3; 4 = None  3-5 Steps with railin = Total, A lot = 2, A little = 3; 4 = None  Score: 7     Modified [place]: N/A = No pre-op stroke/TIA     Post-Tx Position: Up in Chair, Alarms activated, and Call light and personal items within reach  PPE: gloves and surgical mask     Therapy Charges for Today         Code Description Service Date Service Provider Modifiers Qty     85662305768 HC PT THERAPEUTIC ACT EA 15 MIN 2024 José Luis Estrada PTA GP 1     [id number] HC CAREGIVER TRAINING STRATEGIES &TQ EA ADDL 15 MIN 2024 José Luis Estrada PTA   1               PT Charges         Row Name 24 1057                       Time Calculation     Start Time 1010  -GE         Stop Time 1029  -GE         Time Calculation (min) 19 min  -GE         PT Received On 24  -GE         PT - Next Appointment 11/10/24  -GE                 Time Calculation- PT     Total Timed Code Minutes- PT 19 minute(s)  -GE                      User Key  (r) = Recorded By, (t) = Taken By, (c) = Cosigned By        Initials Name Provider Type     José Luis Resendiz PTA Physical Therapist Assistant                                                               Teodora Butler, OT   Occupational Therapist  Occupational Therapy  Therapy Treatment Note      Signed  Date of Service:  24  Creation Time:  24     Signed        Subjective: Pt agreeable to therapeutic plan of  "care.  Cognition: oriented to Person, Time, and Situation     Objective:      Precautions - spinal precautions     Bed Mobility: Max-A   Functional Transfers: Max-A and Assist x 2 with HHA      Balance: supported Mod-A  Functional Ambulation: Mod-A and Assist x 2 with steps towards chair      Lower Body Dressing: Max-A  ADL Position: edge of bed sitting  ADL Comments: don socks      Vitals: WNL     Pain: 0 VAS  Location:   Interventions for pain: N/A  Education: Provided education on the importance of mobility in the acute care setting, Verbal/Tactile Cues, ADL training, and Transfer Training        Assessment: Morgan Quick presents with ADL impairments affecting function including balance, endurance / activity tolerance, and strength. Pt required max A for log roll technique to get out of bed. She required max A x2 for STS from bed to chair. Pt far from baseline and would benefit from SNF at [place]. Demonstrated functioning below baseline abilities indicate the need for continued skilled intervention while inpatient. Tolerating session today without incident. Will continue to follow and progress as tolerated.      Plan/Recommendations:   Moderate Intensity Therapy recommended post-acute care. This is recommended as therapy feels the patient would require 3-4 days per week and wouldn't tolerate \"3 hour daily\" rehab intensity. SNF would be the preferred choice. If the patient does not agree to SNF, arrange HH or OP depending on home bound status. If patient is medically complex, consider LTACH.. Pt requires no DME at discharge.      Pt desires Skilled Rehab placement at discharge. Pt cooperative; agreeable to therapeutic recommendations and plan of care.      Modified [place]: N/A = No pre-op stroke/TIA     Post-Tx Position: Up in Chair, Alarms activated, and Call light and personal items within reach  PPE: gloves                       "

## 2024-11-08 NOTE — THERAPY TREATMENT NOTE
"Subjective: Pt agreeable to therapeutic plan of care.  Cognition: oriented to Person, Time, and Situation    Objective:     Precautions - spinal precautions    Bed Mobility: Max-A   Functional Transfers: Max-A and Assist x 2 with HHA      Balance: supported Mod-A  Functional Ambulation: Mod-A and Assist x 2 with steps towards chair     Lower Body Dressing: Max-A  ADL Position: edge of bed sitting  ADL Comments: don socks     Vitals: WNL    Pain: 0 VAS  Location:   Interventions for pain: N/A  Education: Provided education on the importance of mobility in the acute care setting, Verbal/Tactile Cues, ADL training, and Transfer Training      Assessment: Morgan Quick presents with ADL impairments affecting function including balance, endurance / activity tolerance, and strength. Pt required max A for log roll technique to get out of bed. She required max A x2 for STS from bed to chair. Pt far from baseline and would benefit from SNF at [place]. Demonstrated functioning below baseline abilities indicate the need for continued skilled intervention while inpatient. Tolerating session today without incident. Will continue to follow and progress as tolerated.     Plan/Recommendations:   Moderate Intensity Therapy recommended post-acute care. This is recommended as therapy feels the patient would require 3-4 days per week and wouldn't tolerate \"3 hour daily\" rehab intensity. SNF would be the preferred choice. If the patient does not agree to SNF, arrange HH or OP depending on home bound status. If patient is medically complex, consider LTACH.. Pt requires no DME at discharge.     Pt desires Skilled Rehab placement at discharge. Pt cooperative; agreeable to therapeutic recommendations and plan of care.     Modified [place]: N/A = No pre-op stroke/TIA    Post-Tx Position: Up in Chair, Alarms activated, and Call light and personal items within reach  PPE: gloves         "

## 2024-11-08 NOTE — CASE MANAGEMENT/SOCIAL WORK
Continued Stay Note  [facility]     Patient Name: Morgan Quick  MRN: [id number]  Today's Date: 11/8/2024    Admit Date: 11/4/2024    [facility] Accepted to [facility] with ready bed at discharge. Pharmacy changed to [facility]. Precert started and pending 11/08   Discharge Plan       Row Name 11/08/24 1552       Plan    [facility] Accepted to [facility] with ready bed at discharge. Pharmacy changed to [facility]. Precert started and pending 11/08    Plan Comments NADINE Alvarez sent in precert request and it is pending. CM informed [facility], Mrs. Quick, nursing and Dr. Hong. CM attempted to call her sister to inform her but no answer and unable to leave a voicemail.  CM left message for weekend CM to check on status of precert                          Marivel LEE,RN Case Manager  [facility]  Phone: Desk- [phone] cell- [phone]

## 2024-11-08 NOTE — PROGRESS NOTES
Subjective   Morgan Quick is a 68 y.o. female is status post kyphoplasty at T11 and T12 levels on 11/7/2024 for compression fractures.    11/8/2024-significantly improved pain.  She was able to sit in chair from her bed which she was unable to do before.  Able to move better in bed without having significant pain.  Not requiring frequent dosing of pain medications.    Interval history on 11/7/2024-continues to have severe lower back pain with any activities and movement and requiring escalating doses of pain medications.    Lower back pain is 7/10 on VAS, at maximum is 10/10. Pain is sharp, shooting, and stabbing in nature. Pain is not referred. The pain is constant. The pain is improved by pain medications. The pain is worse with any movement.     Previous Injection:     Hx:   Presented to ER on 11/4/2024 with severe lower back pain.  She states that yesterday she went to get up using her walker and felt severe left lower back pain and could not get up and fell back down in the chair.  History of kyphoplasties of L2, L4 vertebral level on 1/2/2024 with IR.  Patient is unable to ambulate due to severe pain and also requires external catheter for urination.  Pain, numbness and tingling in bilateral lower extremities-this is chronic.  Her main complaint is axial lower back pain.  She has been living in assisted living for about 1 year.  Denies any bowel or bladder incontinence since admitted to the hospital.  She does have chronic pain in lower back from L2-L4 but her main complaint is significant and severe pain at T11, T12 vertebral level.  Also has history of chronic neck pain.  She does have chronic bilateral leg pain as well.       PMH:   Bipolar 1, history of breast cancer s/p radiation about 2 years ago     Current Medications:   Roxicodone 10 mg q4H PRN  Dilaudid 0.5 mg PRN  Lidocaine 4% patch  Morphine 4 mg PRN        Past Medications:     Past Modalities:  TENS:                                                                           no                                                  Physical Therapy Within The Last 6 Months              no  Psychotherapy                                                            no  Massage Therapy                                                       no     Patient Complains Of:  Uro-Fecal Incontinence          no  Weight Gain/Loss                   no  Fever/Chills                             no  Weakness                               no                     Current Facility-Administered Medications:     sennosides-docusate (PERICOLACE) 8.6-50 MG per tablet 2 tablet, 2 tablet, Oral, BID PRN **AND** polyethylene glycol (MIRALAX) packet 17 g, 17 g, Oral, Daily PRN **AND** bisacodyl (DULCOLAX) EC tablet 5 mg, 5 mg, Oral, Daily PRN **AND** bisacodyl (DULCOLAX) suppository 10 mg, 10 mg, Rectal, Daily PRN, Redd, Jayce, DO    Calcium Replacement - Follow Nurse / BPA Driven Protocol, , Does not apply, PRN, Redd, Jayce, DO    cyclobenzaprine (FLEXERIL) tablet 10 mg, 10 mg, Oral, TID PRN, Redd, Jayce, DO, 10 mg at 11/07/24 0501    furosemide (LASIX) tablet 20 mg, 20 mg, Oral, Daily, Redd, Jayce, DO, 20 mg at 11/08/24 0830    guaiFENesin (MUCINEX) 12 hr tablet 1,200 mg, 1,200 mg, Oral, Q12H, Redd, Jayce, DO, 1,200 mg at 11/08/24 0830    hydrALAZINE (APRESOLINE) injection 10 mg, 10 mg, Intravenous, Q6H PRN, Redd, Jayce, DO, 10 mg at 11/07/24 1218    HYDROmorphone (DILAUDID) injection 0.5 mg, 0.5 mg, Intravenous, Q3H PRN, Redd, Jayce, DO, 0.5 mg at 11/07/24 1218    levothyroxine (SYNTHROID, LEVOTHROID) tablet 25 mcg, 25 mcg, Oral, Q AM, Redd, Jayce, DO, 25 mcg at 11/08/24 0607    lubiprostone (AMITIZA) capsule 24 mcg, 24 mcg, Oral, BID, Redd, Jayce, DO, 24 mcg at 11/08/24 0830    Magnesium Standard Dose Replacement - Follow Nurse / BPA Driven Protocol, , Does not apply, PRN, Redd, Jayce, DO    melatonin tablet 5 mg, 5 mg, Oral, Nightly PRN, Jayce Redd DO, 5 mg at 11/04/24  2052    ondansetron (ZOFRAN) injection 4 mg, 4 mg, Intravenous, Q6H PRN, Redd, Jayce, DO, 4 mg at 11/06/24 1029    orphenadrine (NORFLEX) injection 60 mg, 60 mg, Intravenous, Q12H, Redd, Jayce, DO, 60 mg at 11/08/24 1238    OXcarbazepine (TRILEPTAL) tablet 150 mg, 150 mg, Oral, BID, Redd, Jayce, DO, 150 mg at 11/08/24 0830    oxyCODONE (ROXICODONE) immediate release tablet 10 mg, 10 mg, Oral, Q4H PRN, Redd, Jayce, DO, 10 mg at 11/08/24 0613    pantoprazole (PROTONIX) EC tablet 40 mg, 40 mg, Oral, Q AM, Redd, Jayce, DO, 40 mg at 11/08/24 0607    Phosphorus Replacement - Follow Nurse / BPA Driven Protocol, , Does not apply, PRN, Redd, Jayce, DO    potassium chloride (K-DUR,KLOR-CON) ER tablet 10 mEq, 10 mEq, Oral, Daily, Redd, Jayce, DO, 10 mEq at 11/08/24 0830    Potassium Replacement - Follow Nurse / BPA Driven Protocol, , Does not apply, PRN, Redd, Jayce, DO    risperiDONE (risperDAL) tablet 0.25 mg, 0.25 mg, Oral, Nightly, Redd, Jayce, DO, 0.25 mg at 11/07/24 2220    rosuvastatin (CRESTOR) tablet 30 mg, 30 mg, Oral, Daily, Redd, Jayce, DO, 30 mg at 11/08/24 0830    sertraline (ZOLOFT) tablet 200 mg, 200 mg, Oral, Daily, Redd, Jayce, DO, 200 mg at 11/08/24 0830    sodium chloride 0.9 % flush 10 mL, 10 mL, Intravenous, Q12H, Redd, Jayce, DO, 10 mL at 11/08/24 0831    sodium chloride 0.9 % flush 10 mL, 10 mL, Intravenous, PRN, Redd, Jayce, DO, 10 mL at 11/08/24 0252    sodium chloride 0.9 % infusion 40 mL, 40 mL, Intravenous, PRN, Redd, Jayce, DO    [Held by provider] sodium chloride tablet 2 g, 2 g, Oral, TID With Meals, Flaquita Whatley APRN    traZODone (DESYREL) tablet 25 mg, 25 mg, Oral, Nightly, Redd, Jayce, DO, 25 mg at 11/07/24 9840    The following portions of the patient's history were reviewed and updated as appropriate: allergies, current medications, past family history, past medical history, past social history, past surgical history, and problem list.      REVIEW OF PERTINENT MEDICAL  DATA    Past Medical History:   Diagnosis Date    Bipolar 1 disorder     Breast cancer     Cancer     Disease of thyroid gland     High cholesterol     Hypertension      Past Surgical History:   Procedure Laterality Date    ABDOMINAL SURGERY      APPENDECTOMY      ENDOSCOPY N/A 9/25/2022    Procedure: ESOPHAGOGASTRODUODENOSCOPY with biopsy x 2 areas;  Surgeon: Deonte Wong MD;  Location: [facility] ENDOSCOPY;  Service: Gastroenterology;  Laterality: N/A;  post: gastritis, duodinitis, nodule,     HYSTERECTOMY      MASTECTOMY      L side     Family History   Problem Relation Age of Onset    Cancer Mother      Social History     Socioeconomic History    Marital status:    Tobacco Use    Smoking status: Never     Passive exposure: Never    Smokeless tobacco: Never   Vaping Use    Vaping status: Never Used   Substance and Sexual Activity    Alcohol use: No    Drug use: No    Sexual activity: Defer         Review of Systems   Musculoskeletal:  Positive for arthralgias and back pain.         Vitals:    11/08/24 0244 11/08/24 0750 11/08/24 1140 11/08/24 1200   BP: 131/62 129/70 116/55 112/58   BP Location: Left arm Left arm  Left arm   Patient Position: Lying Lying  Lying   Pulse: 76 72 76 72   Resp: 17 15  15   Temp: 98.6 °F (37 °C) 98.2 °F (36.8 °C)  98.1 °F (36.7 °C)   TempSrc: Oral Oral  Oral   SpO2: 94% 90% 94% 93%   Weight:  109 kg (239 lb 13.8 oz)     Height:             Objective   Physical Exam  Musculoskeletal:         General: Tenderness present.        Back:            Imaging Reviewed:  MRI lumbar spine without contrast-11/5/2024  - Hx of  L2, L4 kyphoplasty  - Chronic L2, L3, L4, L5 fractures stable from prior.  - Chronic L1 compression fracture with mild height loss, new since 1/2/2024  - Mild central height loss at T12 with increased T2 signal consistent with edema consistent with acute to subacute compression fracture  - Increased T2 signal at T11 vertebral body without significant fracture line or  height loss.  No bony retropulsion.  Acute to subacute compression fracture.  -Stable and probable benign hemangiomas in T12, T11  L4-5-mild to moderate bilateral neuroforaminal stenosis and moderate facet arthropathy  L5-S1-moderate to severe facet arthropathy.  Mild to moderate bilateral neuroforaminal stenosis.    Assessment:    1. Compression fracture of T12 vertebra, initial encounter    2. Acute low back pain, unspecified back pain laterality, unspecified whether sciatica present    3. Compression fracture of L4 lumbar vertebra, closed, initial encounter    4. Compression fracture of T11 vertebra, initial encounter         Plan:   S/p  T11-T12 kyphoplasty with significantly improved pain and functional improvement.  Some residual pain from acute on chronic L1 compression fracture.  Overall she is doing better with requiring less frequent pain medications.  2.  Recommend working with physical therapy.  3.  Okay to discharge from pain management perspective if okay with primary care team.  4.  Pain management as per primary care team.        Jayce Redd DO  Pain Management   [facility]

## 2024-11-08 NOTE — PROGRESS NOTES
[facility] MEDICINE SERVICE  DAILY PROGRESS NOTE    NAME: Morgan Quick  : 1955  MRN: [id number]      LOS: 3 days     PROVIDER OF SERVICE: Edouard Hong MD    Chief Complaint: Back pain    Subjective:     Interval History:  History taken from: patient chart RN    Pain is controlled  No sob  At home uses 3 L o2 o2        Review of Systems:   Review of Systems  All negative except as above   Objective:     Vital Signs  Temp:  [97.7 °F (36.5 °C)-98.7 °F (37.1 °C)] 98.1 °F (36.7 °C)  Heart Rate:  [67-88] 72  Resp:  [11-18] 15  BP: ()/(55-71) 112/58  Flow (L/min) (Oxygen Therapy):  [3-6] 4   Body mass index is 39.96 kg/m².    Physical Exam  Physical Exam  NAD.  AOx3  RRR S1-S2 audible  Lungs with fair air entry  Abdomen soft nontender nondistended            Diagnostic Data    Results from last 7 days   Lab Units 24  0300   WBC 10*3/mm3 9.14   HEMOGLOBIN g/dL 13.0   HEMATOCRIT % 43.8   PLATELETS 10*3/mm3 318   GLUCOSE mg/dL 121*   CREATININE mg/dL 0.49*   BUN mg/dL 15   SODIUM mmol/L 136   POTASSIUM mmol/L 4.2   ANION GAP mmol/L 10.2       XR Chest 1 View    Result Date: 2024  Impression: Chronic findings. No acute process. Electronically Signed: Valentine Carmichael MD  2024 9:26 AM EST  Workstation ID: [id number]       I reviewed the patient's new clinical results.  I reviewed the patient's new imaging results and agree with the interpretation.    Assessment/Plan:     Active and Resolved Problems  Active Hospital Problems    Diagnosis  POA    **Back pain [M54.9]  Yes    Compression fracture of T12 vertebra [S22.080A]  Unknown    Compression fracture of T11 vertebra [S22.080A]  Unknown      Resolved Hospital Problems   No resolved problems to display.       68-year-old female with history of hypertension, hyperlipidemia, history of breast cancer status postradiation, chronic back pain status post kyphoplasty admitted to [facility]  with acute on chronic back pain      #Acute to subacute T 11/12 compression fracture  #Chronic L1 compression fracture  Seen by neurosurgery no acute surgical intervention planned.  TLSO brace out of bed  Seen by pain management status post T11/12 kyphoplasty 11/7 with significant improvement in pain  Discontinue IV pain medications  Oxycodone as needed for pain   PT/OT> rehab  Cautious with opioids    #Chronic hypoxic respiratory failure  At baseline uses 3 L of supplemental oxygen while sleeping  Chest x-ray on 11/8 no acute findings  Wean down supplemental oxygen as needed to keep SpO2 more than 90      #History of mood disorder  Continue home medications     #Hypothyroidism  Continue levothyroxine     #Hyperlipidemia  Continue statins    VTE Prophylaxis:  Mechanical VTE prophylaxis orders are present.             Disposition Planning:     Barriers to Discharge: Placement  Anticipated Date of Discharge: 11/9  Place of Discharge: Rehab      Time: 40 minutes     Code Status and Medical Interventions: CPR (Attempt to Resuscitate); Full Support   Ordered at: 11/04/24 1719     Level Of Support Discussed With:    Patient     Code Status (Patient has no pulse and is not breathing):    CPR (Attempt to Resuscitate)     Medical Interventions (Patient has pulse or is breathing):    Full Support       Signature: Electronically signed by Edouard Hong MD, 11/08/24, 14:02 EST.  [facility]ist Team

## 2024-11-08 NOTE — PLAN OF CARE
Problem: Pain Acute  Goal: Optimal Pain Control and Function  Outcome: Progressing  Intervention: Optimize Psychosocial Wellbeing  Recent Flowsheet Documentation  Taken 11/7/2024 2036 by Laurence Gaines RN  Supportive Measures: active listening utilized  Diversional Activities: television  Intervention: Prevent or Manage Pain  Recent Flowsheet Documentation  Taken 11/8/2024 0045 by Laurence Gaines RN  Medication Review/Management: medications reviewed  Taken 11/7/2024 2210 by Laurence Gaines RN  Medication Review/Management: medications reviewed  Taken 11/7/2024 2036 by Laurence Gaines RN  Medication Review/Management: medications reviewed     Problem: Fall Injury Risk  Goal: Absence of Fall and Fall-Related Injury  Outcome: Progressing  Intervention: Identify and Manage Contributors  Recent Flowsheet Documentation  Taken 11/8/2024 0045 by Laurence Gaines RN  Medication Review/Management: medications reviewed  Taken 11/7/2024 2210 by Laurence Gaines RN  Medication Review/Management: medications reviewed  Taken 11/7/2024 2036 by Laurence Gaines RN  Medication Review/Management: medications reviewed  Self-Care Promotion: independence encouraged  Intervention: Promote Injury-Free Environment  Recent Flowsheet Documentation  Taken 11/8/2024 0045 by Laurence Gaines RN  Safety Promotion/Fall Prevention:   room organization consistent   clutter free environment maintained  Taken 11/7/2024 2210 by Laurence Gaines RN  Safety Promotion/Fall Prevention:   safety round/check completed   clutter free environment maintained  Taken 11/7/2024 2036 by Laurence Gaines RN  Safety Promotion/Fall Prevention:   safety round/check completed   clutter free environment maintained   nonskid shoes/slippers when out of bed   Goal Outcome Evaluation:      Absence of falls medicating for pain turning pt every 2 hours after surgery tolerating well

## 2024-11-08 NOTE — BRIEF OP NOTE
VERTEBROPLASTY  Progress Note    Morgan Quick  11/7/2024    Pre-op Diagnosis:   Compression fracture of T12 vertebra, initial encounter [S22.080A]  Compression fracture of T11 vertebra, initial encounter [S22.080A]       Post-Op Diagnosis Codes:     * Compression fracture of T12 vertebra, initial encounter [S22.080A]     * Compression fracture of T11 vertebra, initial encounter [S22.080A]    Procedure/CPT® Codes:  NH PERQ VERT AGMNTJ CAVITY CRTJ UNI/BI CANNULATION [45789]  NH PERQ VERT AGMNTJ CAVITY CRTJ UNI/BI CANNULJ EACH [71037]      Procedure(s):  kyphoplasty of t11 and t12 vertebrae              Surgeon(s):  Jayce Redd DO    Anesthesia: General    Staff:   Circulator: Pierre Collazo RN; Isauro Hernandez RN  Radiology Technologist: Audrey Lacy  Scrub Person: Allie Pedraza  Vendor Representative: Roopa Fletcher         Estimated Blood Loss: minimal    Urine Voided: * No values recorded between 11/7/2024  5:14 PM and 11/7/2024  6:58 PM *    Specimens:                None          Drains:   External Urinary Catheter (Active)   Daily Indications Daily output 11/07/24 1218   Site Assessment Clean;Skin intact 11/07/24 1218   Application/Removal skin care provided 11/07/24 0810   Catheter care complete Yes 11/07/24 0810       [REMOVED] External Urinary Catheter (Removed)       Findings:         Complications: None          Jayce Redd DO     Date: 11/7/2024  Time: 19:19 EST

## 2024-11-08 NOTE — PLAN OF CARE
"Assessment: Morgan Quick presents with ADL impairments affecting function including balance, endurance / activity tolerance, and strength. Pt required max A for log roll technique to get out of bed. She required max A x2 for STS from bed to chair. Pt far from baseline and would benefit from SNF at [place]. Demonstrated functioning below baseline abilities indicate the need for continued skilled intervention while inpatient. Tolerating session today without incident. Will continue to follow and progress as tolerated.     Plan/Recommendations:   Moderate Intensity Therapy recommended post-acute care. This is recommended as therapy feels the patient would require 3-4 days per week and wouldn't tolerate \"3 hour daily\" rehab intensity. SNF would be the preferred choice. If the patient does not agree to SNF, arrange HH or OP depending on home bound status. If patient is medically complex, consider LTACH.. Pt requires no DME at discharge.              "

## 2024-11-08 NOTE — ANESTHESIA POSTPROCEDURE EVALUATION
Patient: Morgan Quick    Procedure Summary       Date: 11/07/24 Room / Location: [facility] OR 03 / [facility] MAIN OR    Anesthesia Start: 1714 Anesthesia Stop: 1916    Procedure: kyphoplasty of t11 and t12 vertebrae (Bilateral: Spine Lumbar) Diagnosis:       Compression fracture of T12 vertebra, initial encounter      Compression fracture of T11 vertebra, initial encounter      (Compression fracture of T12 vertebra, initial encounter [S22.080A])      (Compression fracture of T11 vertebra, initial encounter [S22.080A])    Surgeons: Jayce Redd DO Provider: Evens Yousif MD    Anesthesia Type: general ASA Status: 3            Anesthesia Type: general    Vitals  Vitals Value Taken Time   /61 11/07/24 2009   Temp 97.7 °F (36.5 °C) 11/07/24 2009   Pulse 79 11/07/24 2010   Resp 13 11/07/24 2009   SpO2 88 % 11/07/24 2010   Vitals shown include unfiled device data.        Post Anesthesia Care and Evaluation    Patient location during evaluation: PACU  Patient participation: complete - patient participated  Level of consciousness: awake  Pain scale: See nurse's notes for pain score.  Pain management: adequate    Airway patency: patent  Anesthetic complications: No anesthetic complications  PONV Status: none  Cardiovascular status: acceptable  Respiratory status: acceptable and spontaneous ventilation  Hydration status: acceptable    Comments: Patient seen and examined postoperatively; vital signs stable; SpO2 greater than or equal to 90%; cardiopulmonary status stable; nausea/vomiting adequately controlled; pain adequately controlled; no apparent anesthesia complications; patient discharged from anesthesia care when discharge criteria were met

## 2024-11-08 NOTE — THERAPY TREATMENT NOTE
"Subjective: Pt agreeable to therapeutic plan of care.    Objective:     Precautions - spinal precautions    Bed mobility - Max-A  Transfers - Max-A and Assist x 2 w/ B HHA  Ambulation -  N/A or Not attempted.    Vitals: WNL    Pain: Pt did not rate pain  Intervention for pain: N/A    Education: Provided education on the importance of mobility in the acute care setting, Verbal/Tactile Cues, Transfer Training, Energy conservation strategies, Post-Op Precautions, and HEP    Assessment: Morgan Quick presents with functional mobility impairments which indicate the need for skilled intervention. Pt required extended time and max A with cues for proper log roll technique to complete bed mobility. Pt able to perform stand pivot from bed to bedside chair with max A x2/ B HHA. Pt displayed forward flexed posture and difficulty taking side steps during transfer. Pt continues to be limited due to decreased activity tolerance and functional strength. Tolerating session today without incident. Will continue to follow and progress as tolerated.     Plan/Recommendations:   If medically appropriate, Moderate Intensity Therapy recommended post-acute care. This is recommended as therapy feels the patient would require 3-4 days per week and wouldn't tolerate \"3 hour daily\" rehab intensity. SNF would be the preferred choice. If the patient does not agree to SNF, arrange HH or OP depending on home bound status. If patient is medically complex, consider LTACH. Pt requires no DME at discharge.     Pt desires Skilled Rehab placement at discharge. Pt cooperative; agreeable to therapeutic recommendations and plan of care.         Basic Mobility 6-click:  Rollin = Total, A lot = 2, A little = 3; 4 = None  Supine>Sit:   1 = Total, A lot = 2, A little = 3; 4 = None   Sit>Stand with arms:  1 = Total, A lot = 2, A little = 3; 4 = None  Bed>Chair:   1 = Total, A lot = 2, A little = 3; 4 = None  Ambulate in room:  1 = Total, A lot = " 2, A little = 3; 4 = None  3-5 Steps with railin = Total, A lot = 2, A little = 3; 4 = None  Score: 7    Modified Kevin: N/A = No pre-op stroke/TIA    Post-Tx Position: Up in Chair, Alarms activated, and Call light and personal items within reach  PPE: gloves and surgical mask    Therapy Charges for Today       Code Description Service Date Service Provider Modifiers Qty    68655991554 HC PT THERAPEUTIC ACT EA 15 MIN 2024 José Luis Estrada PTA GP 1    [id number]  CAREGIVER TRAINING STRATEGIES &TQ EA ADDL 15 MIN 2024 José Luis Estrada PTA  1           PT Charges       Row Name 24 1057             Time Calculation    Start Time 1010  -GE      Stop Time 1029  -GE      Time Calculation (min) 19 min  -GE      PT Received On 24  -GE      PT - Next Appointment 11/10/24  -GE         Time Calculation- PT    Total Timed Code Minutes- PT 19 minute(s)  -GE                User Key  (r) = Recorded By, (t) = Taken By, (c) = Cosigned By      Initials Name Provider Type    José Luis Resendiz PTA Physical Therapist Assistant

## 2024-11-09 VITALS
DIASTOLIC BLOOD PRESSURE: 71 MMHG | BODY MASS INDEX: 39.96 KG/M2 | TEMPERATURE: 99.3 F | SYSTOLIC BLOOD PRESSURE: 156 MMHG | HEIGHT: 65 IN | RESPIRATION RATE: 16 BRPM | HEART RATE: 77 BPM | WEIGHT: 239.86 LBS | OXYGEN SATURATION: 95 %

## 2024-11-09 LAB
ANION GAP SERPL CALCULATED.3IONS-SCNC: 10.1 MMOL/L (ref 5–15)
BASOPHILS # BLD AUTO: 0.03 10*3/MM3 (ref 0–0.2)
BASOPHILS NFR BLD AUTO: 0.3 % (ref 0–1.5)
BUN SERPL-MCNC: 18 MG/DL (ref 8–23)
BUN/CREAT SERPL: 37.5 (ref 7–25)
CALCIUM SPEC-SCNC: 9.4 MG/DL (ref 8.6–10.5)
CHLORIDE SERPL-SCNC: 97 MMOL/L (ref 98–107)
CO2 SERPL-SCNC: 28.9 MMOL/L (ref 22–29)
CREAT SERPL-MCNC: 0.48 MG/DL (ref 0.57–1)
DEPRECATED RDW RBC AUTO: 47 FL (ref 37–54)
EGFRCR SERPLBLD CKD-EPI 2021: 103.3 ML/MIN/1.73
EOSINOPHIL # BLD AUTO: 0.09 10*3/MM3 (ref 0–0.4)
EOSINOPHIL NFR BLD AUTO: 1 % (ref 0.3–6.2)
ERYTHROCYTE [DISTWIDTH] IN BLOOD BY AUTOMATED COUNT: 15.3 % (ref 12.3–15.4)
GLUCOSE BLDC GLUCOMTR-MCNC: 119 MG/DL (ref 70–105)
GLUCOSE SERPL-MCNC: 112 MG/DL (ref 65–99)
HCT VFR BLD AUTO: 41.6 % (ref 34–46.6)
HGB BLD-MCNC: 11.4 G/DL (ref 12–15.9)
IMM GRANULOCYTES # BLD AUTO: 0.02 10*3/MM3 (ref 0–0.05)
IMM GRANULOCYTES NFR BLD AUTO: 0.2 % (ref 0–0.5)
LARGE PLATELETS: NORMAL
LYMPHOCYTES # BLD AUTO: 0.86 10*3/MM3 (ref 0.7–3.1)
LYMPHOCYTES NFR BLD AUTO: 9.9 % (ref 19.6–45.3)
MCH RBC QN AUTO: 23.2 PG (ref 26.6–33)
MCHC RBC AUTO-ENTMCNC: 27.4 G/DL (ref 31.5–35.7)
MCV RBC AUTO: 84.6 FL (ref 79–97)
MONOCYTES # BLD AUTO: 0.74 10*3/MM3 (ref 0.1–0.9)
MONOCYTES NFR BLD AUTO: 8.5 % (ref 5–12)
NEUTROPHILS NFR BLD AUTO: 6.92 10*3/MM3 (ref 1.7–7)
NEUTROPHILS NFR BLD AUTO: 80.1 % (ref 42.7–76)
NEUTS VAC BLD QL SMEAR: NORMAL
NRBC BLD AUTO-RTO: 0 /100 WBC (ref 0–0.2)
PLATELET # BLD AUTO: 213 10*3/MM3 (ref 140–450)
PMV BLD AUTO: 11.4 FL (ref 6–12)
POTASSIUM SERPL-SCNC: 3.4 MMOL/L (ref 3.5–5.2)
RBC # BLD AUTO: 4.92 10*6/MM3 (ref 3.77–5.28)
RBC MORPH BLD: NORMAL
SODIUM SERPL-SCNC: 136 MMOL/L (ref 136–145)
WBC NRBC COR # BLD AUTO: 8.66 10*3/MM3 (ref 3.4–10.8)

## 2024-11-09 PROCEDURE — 25010000002 ORPHENADRINE CITRATE PER 60 MG: Performed by: STUDENT IN AN ORGANIZED HEALTH CARE EDUCATION/TRAINING PROGRAM

## 2024-11-09 PROCEDURE — 80048 BASIC METABOLIC PNL TOTAL CA: CPT | Performed by: STUDENT IN AN ORGANIZED HEALTH CARE EDUCATION/TRAINING PROGRAM

## 2024-11-09 PROCEDURE — 85007 BL SMEAR W/DIFF WBC COUNT: CPT | Performed by: STUDENT IN AN ORGANIZED HEALTH CARE EDUCATION/TRAINING PROGRAM

## 2024-11-09 PROCEDURE — 82948 REAGENT STRIP/BLOOD GLUCOSE: CPT

## 2024-11-09 PROCEDURE — 85025 COMPLETE CBC W/AUTO DIFF WBC: CPT | Performed by: STUDENT IN AN ORGANIZED HEALTH CARE EDUCATION/TRAINING PROGRAM

## 2024-11-09 RX ORDER — HYDROCODONE BITARTRATE AND ACETAMINOPHEN 10; 325 MG/1; MG/1
1 TABLET ORAL EVERY 8 HOURS PRN
Qty: 9 TABLET | Refills: 0 | Status: SHIPPED | OUTPATIENT
Start: 2024-11-09 | End: 2024-11-12

## 2024-11-09 RX ADMIN — Medication 10 ML: at 08:38

## 2024-11-09 RX ADMIN — TRAZODONE HYDROCHLORIDE 25 MG: 50 TABLET ORAL at 20:24

## 2024-11-09 RX ADMIN — ROSUVASTATIN 30 MG: 10 TABLET, FILM COATED ORAL at 08:36

## 2024-11-09 RX ADMIN — CYCLOBENZAPRINE 10 MG: 10 TABLET, FILM COATED ORAL at 02:54

## 2024-11-09 RX ADMIN — RISPERIDONE 0.25 MG: 0.25 TABLET, FILM COATED ORAL at 20:24

## 2024-11-09 RX ADMIN — OXCARBAZEPINE 150 MG: 150 TABLET, FILM COATED ORAL at 08:36

## 2024-11-09 RX ADMIN — Medication 5 MG: at 20:24

## 2024-11-09 RX ADMIN — POTASSIUM CHLORIDE 10 MEQ: 750 TABLET, EXTENDED RELEASE ORAL at 08:35

## 2024-11-09 RX ADMIN — LEVOTHYROXINE SODIUM 25 MCG: 25 TABLET ORAL at 05:23

## 2024-11-09 RX ADMIN — LUBIPROSTONE 24 MCG: 24 CAPSULE, GELATIN COATED ORAL at 08:36

## 2024-11-09 RX ADMIN — OXCARBAZEPINE 150 MG: 150 TABLET, FILM COATED ORAL at 20:24

## 2024-11-09 RX ADMIN — ORPHENADRINE CITRATE 60 MG: 30 INJECTION, SOLUTION INTRAMUSCULAR; INTRAVENOUS at 14:57

## 2024-11-09 RX ADMIN — OXYCODONE 10 MG: 5 TABLET ORAL at 20:24

## 2024-11-09 RX ADMIN — PANTOPRAZOLE SODIUM 40 MG: 40 TABLET, DELAYED RELEASE ORAL at 05:23

## 2024-11-09 RX ADMIN — GUAIFENESIN 1200 MG: 600 TABLET, EXTENDED RELEASE ORAL at 08:36

## 2024-11-09 RX ADMIN — GUAIFENESIN 1200 MG: 600 TABLET, EXTENDED RELEASE ORAL at 20:24

## 2024-11-09 RX ADMIN — ORPHENADRINE CITRATE 60 MG: 30 INJECTION, SOLUTION INTRAMUSCULAR; INTRAVENOUS at 00:59

## 2024-11-09 RX ADMIN — Medication 10 ML: at 20:25

## 2024-11-09 RX ADMIN — LUBIPROSTONE 24 MCG: 24 CAPSULE, GELATIN COATED ORAL at 20:24

## 2024-11-09 RX ADMIN — SERTRALINE 200 MG: 100 TABLET, FILM COATED ORAL at 08:36

## 2024-11-09 RX ADMIN — OXYCODONE 10 MG: 5 TABLET ORAL at 02:56

## 2024-11-09 RX ADMIN — FUROSEMIDE 20 MG: 20 TABLET ORAL at 08:36

## 2024-11-09 NOTE — CASE MANAGEMENT/SOCIAL WORK
Continued Stay Note   [facility]     Patient Name: Morgan Quick  MRN: [id number]  Today's Date: 11/9/2024    Admit Date: 11/4/2024    Plan: From [facility]. Accepted to [facility]. Precert APPROVED 11/8-11/12. PASRR approved.   Discharge Plan       Row Name 11/09/24 1737       Plan    Plan From [facility]. Accepted to [facility]. Precert APPROVED 11/8-11/12. PASRR approved.    Plan Comments Pre-cert approved 11/8-11/12. CM notified Julia salinas that pre-cert was approved and discharge orders are placed. Per liaison, patient's bed is ready and she can come today. Nursing notified. Facility does not have weekend transport. CM requested [facility] and medical neccessity submitted.                  Expected Discharge Date and Time       Expected Discharge Date Expected Discharge Time    Nov 9, 2024           Ginger Esparza RN     Office: [phone]  Fax: [phone]

## 2024-11-09 NOTE — CASE MANAGEMENT/SOCIAL WORK
"Physicians Statement of Medical Necessity for  Ambulance Transportation    GENERAL INFORMATION     Name: Morgan Quick  YOB: 1955  Medicare #: [id number]   Transport Date: 11/9/2024 (Valid for round trips this date, or for scheduled repetitive trips for 60 days from the date signed below.)  Origin: [facility]  Destination: [facility]  Is the Patient's stay covered under Medicare Part A (PPS/DRG?)Yes  Closest appropriate facility? Yes  If this a hosp-hosp transfer? No  Is this a hospice patient? No    MEDICAL NECESSITY QUESTIONAIRE    Ambulance Transportation is medically necessary only if other means of transportation are contraindicated or would be potentially harmful to the patient.  To meet this requirement, the patient must be either \"bed confined\" or suffer from a condition such that transport by means other than an ambulance is contraindicated by the patient's condition.  The following questions must be answered by the healthcare professional signing below for this form to be valid:     1) Describe the MEDICAL CONDITION (physical and/or mental) of this patient AT THE TIME OF AMBULANCE TRANSPORT that requires the patient to be transported in an ambulance, and why transport by other means is contraindicated by the patient's condition:     109kg, hx T 11/12 compression fracture, thoracic spine wounds, max assist for bed mobility, max assist x2 for sit to stand, 4LPM O2.     Past Medical History:   Diagnosis Date    Bipolar 1 disorder     Breast cancer     Cancer     Disease of thyroid gland     High cholesterol     Hypertension       Past Surgical History:   Procedure Laterality Date    ABDOMINAL SURGERY      APPENDECTOMY      ENDOSCOPY N/A 9/25/2022    Procedure: ESOPHAGOGASTRODUODENOSCOPY with biopsy x 2 areas;  Surgeon: Deonte Wong MD;  Location: [facility] ENDOSCOPY;  Service: Gastroenterology;  Laterality: N/A;  post: gastritis, duodinitis, nodule,     HYSTERECTOMY      " "MASTECTOMY      L side      2) Is this patient \"bed confined\" as defined below?Yes   To be \"bed confined\" the patient must satisfy all three of the following criteria:  (1) unable to get up from bed without assistance; AND (2) unable to ambulate;  AND (3) unable to sit in a chair or wheelchair.  3) Can this patient safely be transported by car or wheelchair van (I.e., may safely sit during transport, without an attendant or monitoring?)No   4. In addition to completing questions 1-3 above, please check any of the following conditions that apply*:          *Note: supporting documentation for any boxes checked must be maintained in the patient's medical records Unable to tolerate seated position for time needed to transport and Unable to sit in a chair or wheelchair due to decubitus ulcers or other wounds      SIGNATURE OF PHYSICIAN OR OTHER AUTHORIZED HEALTHCARE PROFESSIONAL    I certify that the above information is true and correct based on my evaluation of this patient, and represent that the patient requires transport by ambulance and that other forms of transport are contraindicated.  I understand that this information will be used by the Centers for Medicare and Medicaid Services (CMS) to support the determiniation of medical necessity for ambulance services, and I represent that I have personal knowledge of the patient's condition at the time of transport.    x   If this box is checked, I also certify that the patient is physically or mentally incapable of signing the ambulance service's claim form and that the institution with which I am affiliated has furnished care, services or assistance to the patient.  My signature below is made on behalf of the patient pursuant to 42 .36(b)(4). In accordance with 42 .37, the specific reason(s) that the patient is physically or mentally incapable of signing the claim for is as follows:     Signature of Physician or Healthcare Professional Ginger Esparza, " LORI,  Date/Time:   11/9/2024 2829     (For Scheduled repetitive transport, this form is not valid for transports performed more than 60 days after this date).              Ginger Esparza RN Case Manager                                                                                                                               --------------------------------------------------------------------------------------------  Printed Name and Credentials of Physician or Authorized Healthcare Professional     *Form must be signed by patient's attending physician for scheduled, repetitive transports,.  For non-repetitive ambulance transports, if unable to obtain the signature of the attending physician, any of the following may sign (please select below):     Physician  Clinical Nurse Specialist  Registered Nurse x    Physician Assistant  Discharge Planner  Licensed Practical Nurse     Nurse Practitioner   x

## 2024-11-09 NOTE — PLAN OF CARE
Problem: Adult Inpatient Plan of Care  Goal: Plan of Care Review  Outcome: Progressing  Flowsheets (Taken 11/9/2024 0427)  Progress: improving  Plan of Care Reviewed With: patient  Goal: Patient-Specific Goal (Individualized)  Outcome: Progressing  Goal: Absence of Hospital-Acquired Illness or Injury  Outcome: Progressing  Intervention: Identify and Manage Fall Risk  Recent Flowsheet Documentation  Taken 11/9/2024 0200 by Teetee Roberson RN  Safety Promotion/Fall Prevention:   activity supervised   clutter free environment maintained   fall prevention program maintained   lighting adjusted   nonskid shoes/slippers when out of bed   room organization consistent   safety round/check completed  Taken 11/9/2024 0000 by Teetee Roberson RN  Safety Promotion/Fall Prevention:   activity supervised   clutter free environment maintained   fall prevention program maintained   lighting adjusted   nonskid shoes/slippers when out of bed   room organization consistent   safety round/check completed  Taken 11/8/2024 2200 by Teetee Roberson RN  Safety Promotion/Fall Prevention:   activity supervised   clutter free environment maintained   fall prevention program maintained   lighting adjusted   nonskid shoes/slippers when out of bed   room organization consistent   safety round/check completed  Taken 11/8/2024 2000 by Teetee Roberson RN  Safety Promotion/Fall Prevention:   activity supervised   clutter free environment maintained   fall prevention program maintained   lighting adjusted   nonskid shoes/slippers when out of bed   room organization consistent   safety round/check completed  Intervention: Prevent Skin Injury  Recent Flowsheet Documentation  Taken 11/8/2024 2000 by Teetee Roberson RN  Body Position:   side-lying   head facing, right  Intervention: Prevent Infection  Recent Flowsheet Documentation  Taken 11/9/2024 0200 by Teetee Roberson RN  Infection Prevention:   rest/sleep promoted   single patient room  provided  Taken 11/9/2024 0000 by Teetee Roberson RN  Infection Prevention:   rest/sleep promoted   single patient room provided  Taken 11/8/2024 2200 by Teetee Roberson RN  Infection Prevention:   rest/sleep promoted   single patient room provided  Taken 11/8/2024 2000 by Teetee Roberson RN  Infection Prevention:   rest/sleep promoted   single patient room provided  Goal: Optimal Comfort and Wellbeing  Outcome: Progressing  Intervention: Monitor Pain and Promote Comfort  Recent Flowsheet Documentation  Taken 11/9/2024 0326 by Teetee Roberson RN  Pain Management Interventions: pain medication given  Taken 11/9/2024 0000 by Teetee Roberson RN  Pain Management Interventions: pain medication given  Taken 11/8/2024 2100 by Teetee Roberson RN  Pain Management Interventions:   pain medication given   pillow support provided  Intervention: Provide Person-Centered Care  Recent Flowsheet Documentation  Taken 11/9/2024 0000 by Teetee Roberson RN  Trust Relationship/Rapport:   care explained   questions answered   questions encouraged  Taken 11/8/2024 2000 by Teetee Roberson RN  Trust Relationship/Rapport:   care explained   questions encouraged   questions answered  Goal: Readiness for Transition of Care  Outcome: Progressing     Problem: Skin Injury Risk Increased  Goal: Skin Health and Integrity  Outcome: Progressing  Intervention: Optimize Skin Protection  Recent Flowsheet Documentation  Taken 11/9/2024 0200 by Teetee Roberson RN  Activity Management: bedrest  Head of Bed (HOB) Positioning: HOB elevated  Taken 11/9/2024 0000 by Teetee Roberson RN  Activity Management: bedrest  Head of Bed (HOB) Positioning: HOB elevated  Taken 11/8/2024 2200 by Teetee Roberson RN  Activity Management: bedrest  Head of Bed (HOB) Positioning: HOB flat  Taken 11/8/2024 2000 by Teetee Roberson RN  Activity Management: bedrest  Head of Bed (HOB) Positioning: HOB flat     Problem: Comorbidity Management  Goal: Maintenance  of Behavioral Health Symptom Control  Outcome: Progressing  Intervention: Maintain Behavioral Health Symptom Control  Recent Flowsheet Documentation  Taken 11/9/2024 0200 by Teetee Roberson RN  Medication Review/Management: medications reviewed  Taken 11/9/2024 0000 by Teetee Roberson RN  Medication Review/Management: medications reviewed  Taken 11/8/2024 2200 by Teetee Roberson RN  Medication Review/Management: medications reviewed  Taken 11/8/2024 2000 by Teetee Roberson RN  Medication Review/Management: medications reviewed  Goal: Blood Pressure in Desired Range  Outcome: Progressing  Intervention: Maintain Blood Pressure Management  Recent Flowsheet Documentation  Taken 11/9/2024 0200 by Teetee Roberson RN  Medication Review/Management: medications reviewed  Taken 11/9/2024 0000 by Teetee Roberson RN  Medication Review/Management: medications reviewed  Taken 11/8/2024 2200 by Teetee Roberson RN  Medication Review/Management: medications reviewed  Taken 11/8/2024 2000 by Teetee Roberson RN  Medication Review/Management: medications reviewed     Problem: Pain Acute  Goal: Optimal Pain Control and Function  Outcome: Progressing  Intervention: Optimize Psychosocial Wellbeing  Recent Flowsheet Documentation  Taken 11/9/2024 0000 by Teetee Roberson RN  Supportive Measures: active listening utilized  Diversional Activities:   smartphone   television  Taken 11/8/2024 2000 by Teetee Roberson RN  Supportive Measures: active listening utilized  Diversional Activities:   smartphone   television  Intervention: Develop Pain Management Plan  Recent Flowsheet Documentation  Taken 11/9/2024 0326 by Teetee Roberson RN  Pain Management Interventions: pain medication given  Taken 11/9/2024 0000 by Teetee Roberson RN  Pain Management Interventions: pain medication given  Taken 11/8/2024 2100 by Teetee Roberson RN  Pain Management Interventions:   pain medication given   pillow support  provided  Intervention: Prevent or Manage Pain  Recent Flowsheet Documentation  Taken 11/9/2024 0200 by Teetee Roberson RN  Medication Review/Management: medications reviewed  Taken 11/9/2024 0000 by Teetee Roberson RN  Medication Review/Management: medications reviewed  Taken 11/8/2024 2200 by Teetee Roberson RN  Medication Review/Management: medications reviewed  Taken 11/8/2024 2000 by Teetee Roberson RN  Medication Review/Management: medications reviewed     Problem: Fall Injury Risk  Goal: Absence of Fall and Fall-Related Injury  Outcome: Progressing  Intervention: Identify and Manage Contributors  Recent Flowsheet Documentation  Taken 11/9/2024 0200 by Teetee Roberson RN  Medication Review/Management: medications reviewed  Taken 11/9/2024 0000 by Teetee Roberson RN  Medication Review/Management: medications reviewed  Self-Care Promotion: BADL personal objects within reach  Taken 11/8/2024 2200 by Teetee Roberson RN  Medication Review/Management: medications reviewed  Self-Care Promotion: BADL personal objects within reach  Taken 11/8/2024 2000 by Teetee Roberson RN  Medication Review/Management: medications reviewed  Self-Care Promotion: BADL personal objects within reach  Intervention: Promote Injury-Free Environment  Recent Flowsheet Documentation  Taken 11/9/2024 0200 by Teetee Roberson RN  Safety Promotion/Fall Prevention:   activity supervised   clutter free environment maintained   fall prevention program maintained   lighting adjusted   nonskid shoes/slippers when out of bed   room organization consistent   safety round/check completed  Taken 11/9/2024 0000 by Teetee Roberson RN  Safety Promotion/Fall Prevention:   activity supervised   clutter free environment maintained   fall prevention program maintained   lighting adjusted   nonskid shoes/slippers when out of bed   room organization consistent   safety round/check completed  Taken 11/8/2024 2200 by Teetee Roberson RN  Safety  Promotion/Fall Prevention:   activity supervised   clutter free environment maintained   fall prevention program maintained   lighting adjusted   nonskid shoes/slippers when out of bed   room organization consistent   safety round/check completed  Taken 11/8/2024 2000 by Teetee Roberson RN  Safety Promotion/Fall Prevention:   activity supervised   clutter free environment maintained   fall prevention program maintained   lighting adjusted   nonskid shoes/slippers when out of bed   room organization consistent   safety round/check completed   Goal Outcome Evaluation:  Plan of Care Reviewed With: patient        Progress: improving

## 2024-11-09 NOTE — PLAN OF CARE
Goal Outcome Evaluation:              Outcome Evaluation: Patient is to DC to [facility] and rehab for therapy. Patient is agreeable with plan of care. Awaiting ambulance transport. will continue to monitor

## 2024-11-09 NOTE — SIGNIFICANT NOTE
11/09/24 1155   Post Acute Pre-Cert Documentation   Date Post Acute Pre-Cert Completed 11/09/24   All Clinicals Submitted? Yes   Had Accepting Facility at Time of Submission Yes   Response Received from Insurance? Approval   Response Communicated to:    Post Acute Pre-Cert Initiated Comment PACO RN checked precert status on carelon- approved 11/8/24 -11/12/24 - cm made aware        MED 3/CMED PAV 3/CC3F

## 2024-11-09 NOTE — DISCHARGE SUMMARY
[facility] Medicine Services  Discharge Summary    Date of Service: 2024  Patient Name: Morgan Quick  : 1955  MRN: [id number]    Date of Admission: 2024  Discharge Diagnosis: #Acute to subacute T 11/12 compression fracture  #Chronic L1 compression fracture  Date of Discharge: 2024  Primary Care Physician: Wayne Saavedra MD      Presenting Problem:   Back pain [M54.9]    Active and Resolved Hospital Problems:  Active Hospital Problems    Diagnosis POA    **Back pain [M54.9] Yes    Compression fracture of T12 vertebra [S22.080A] Unknown    Compression fracture of T11 vertebra [S22.080A] Unknown      Resolved Hospital Problems   No resolved problems to display.         Hospital Course     HPI:  Admitting team HPI   Morgan Quick is a 68 y.o. female with a CMH of hypertension, hyperlipidemia, history of breast cancer s/p radiation, chronic back pain with history of kyphoplasty who presented to [facility] on 2024 with back pain.  On ED workup, x-ray of lumbar spine with chronic appearing compression of L1-L5 with vertebroplasty at L1-2 and L4, no acute lumbar spine findings.  Neurosurgery was consulted and MRI was ordered.     Hospital Course:  68-year-old female with history of hypertension, hyperlipidemia, history of breast cancer status postradiation, chronic back pain status post kyphoplasty admitted to [facility]  with acute on chronic back pain     #Acute to subacute T 11/12 compression fracture  #Chronic L1 compression fracture  Seen by neurosurgery no acute surgical intervention planned.  TLSO brace out of bed  Seen by pain management status post T11/12 kyphoplasty  with significant improvement in pain     PT/OT> rehab  Continue home dose of opioids     #Chronic hypoxic respiratory failure  At baseline uses 3 L of supplemental oxygen while sleeping  Chest x-ray on  no acute findings  Wean down supplemental oxygen as needed to keep  SpO2 more than 90        #History of mood disorder  Continue home medications     #Hypothyroidism  Continue levothyroxine     #Hyperlipidemia  Continue statins        DISCHARGE Follow Up Recommendations for labs and diagnostics:   TLSO brace when out of bed.  Follow-up with neurosurgery          Day of Discharge     Vital Signs:  Temp:  [97.9 °F (36.6 °C)-98.6 °F (37 °C)] 98.5 °F (36.9 °C)  Heart Rate:  [67-75] 74  Resp:  [15-18] 17  BP: (117-147)/(60-72) 144/72  Flow (L/min) (Oxygen Therapy):  [4] 4    Physical Exam:  Physical Exam   NAD.  AOx3  RRR S1-S2 audible  Lungs with fair air entry  Abdomen soft nontender nondistended          Pertinent  and/or Most Recent Results     LAB RESULTS:      Lab 11/09/24  0020 11/08/24  0300 11/07/24  0046 11/06/24 0425 11/05/24 0425   WBC 8.66 9.14 9.87 9.83 8.67   HEMOGLOBIN 11.4* 13.0 11.9* 12.3 12.2   HEMATOCRIT 41.6 43.8 41.0 41.9 43.3   PLATELETS 213 318 219 268 224   NEUTROS ABS 6.92 8.57* 8.20* 8.64* 7.62*   IMMATURE GRANS (ABS) 0.02 0.06* 0.03 0.03 0.02   LYMPHS ABS 0.86 0.22* 0.71 0.46* 0.51*   MONOS ABS 0.74 0.28 0.82 0.65 0.46   EOS ABS 0.09 0.00 0.10 0.04 0.04   MCV 84.6 83.4 83.8 83.6 86.6         Lab 11/09/24  0020 11/08/24  0300 11/07/24  1330 11/07/24  0046 11/06/24  0425 11/05/24  0425   SODIUM 136 136  --  137 136 142   POTASSIUM 3.4* 4.2 4.3 3.6 3.9 3.9   CHLORIDE 97* 96*  --  97* 100 104   CO2 28.9 29.8*  --  29.5* 24.0 27.5   ANION GAP 10.1 10.2  --  10.5 12.0 10.5   BUN 18 15  --  12 8 9   CREATININE 0.48* 0.49*  --  0.47* 0.46* 0.51*   EGFR 103.3 102.8  --  103.8 104.4 101.8   GLUCOSE 112* 121*  --  108* 93 114*   CALCIUM 9.4 9.8  --  9.6 9.5 9.4   HEMOGLOBIN A1C  --   --   --   --   --  6.19*                         Brief Urine Lab Results       None          Microbiology Results (last 10 days)       ** No results found for the last 240 hours. **            XR Chest 1 View    Result Date: 11/8/2024  Impression: Impression: Chronic findings. No acute  process. Electronically Signed: Valentine Carmichael MD  11/8/2024 9:26 AM EST  Workstation ID: [id number]    MRI Lumbar Spine With Contrast    Result Date: 11/6/2024  Impression: Impression: 1.  Small foci of enhancement along the superior endplate of L3 immediately adjacent to Schmorl's node protrusion, as well as along the anterior inferior endplate of T12 adjacent to concavity/compression endplate deformity. These findings are favored to represent areas of reactive enhancement related to compression fractures, with static involvement thought less likely. Electronically Signed: Emily Gonzalez MD  11/6/2024 8:45 AM EST  Workstation ID: [id number]    MRI Lumbar Spine Without Contrast    Result Date: 11/5/2024  Impression: Impression: 1. Acute to subacute T12 compression fracture with mild height loss and no bony retropulsion, new since prior comparison. 2. T11 vertebral body edema significant height loss or fracture line which could reflect an acute to subacute nondisplaced fracture versus possible contusion or if there is history of trauma. 3. Chronic L1 compression fracture with mild height loss, new since 1/2/2024. 4. Prior L2 and L4 kyphoplasty. Chronic L2, L3, L4 and L5 fractures are stable from prior. 5. Multilevel lumbar spondylosis without high-grade thecal sac or neuroforaminal stenosis. Degenerative change at T12-L1 has slightly worsened from prior. Electronically Signed: Michael Leal MD  11/5/2024 1:35 PM EST  Workstation ID: [id number]    XR Spine Lumbar Complete 4+VW    Result Date: 11/4/2024  Impression: Chronic appearing compression form is of L1-L5 with vertebroplasty at L2 and L4. No acute lumbar spine findings. Electronically Signed: Emily Gonzalez MD  11/4/2024 3:57 PM EST  Workstation ID: [id number]             Results for orders placed during the hospital encounter of 09/24/22    Adult Transthoracic Echo Complete W/ Cont if Necessary Per Protocol    Interpretation Summary  · Estimated left ventricular EF  was in agreement with the calculated left ventricular EF. Left ventricular ejection fraction appears to be 56 - 60%. Left ventricular systolic function is normal.  · There is calcification of the aortic valve mainly affecting the non-coronary, left coronary and right coronary cusp(s).  · The study is technically difficult for diagnosis.      Labs Pending at Discharge:  Pending Results       None            Procedures Performed  Procedure(s):  kyphoplasty of t11 and t12 vertebrae  11/07 1911 GA PERQ VERT AGMNTJ CAVITY CRTJ UNI/BI CANNULATION, GA PERQ VERT AGMNTJ CAVITY CRTJ UNI/BI CANNULJ EACH      Consults:   Consults       Date and Time Order Name Status Description    11/5/2024  3:16 PM Inpatient Hospitalist Consult      11/4/2024  5:41 PM Inpatient Neurosurgery Consult Completed               Discharge Details        Discharge Medications        Changes to Medications        Instructions Start Date   HYDROcodone-acetaminophen  MG per tablet  Commonly known as: NORCO  What changed:   when to take this  reasons to take this   1 tablet, Oral, Every 8 Hours PRN             Continue These Medications        Instructions Start Date   furosemide 20 MG tablet  Commonly known as: LASIX   20 mg, Oral, Daily      guaiFENesin 600 MG 12 hr tablet  Commonly known as: MUCINEX   1,200 mg, Oral, Every 12 Hours Scheduled      levothyroxine 25 MCG tablet  Commonly known as: SYNTHROID, LEVOTHROID   25 mcg, Oral, Every Early Morning      lubiprostone 24 MCG capsule  Commonly known as: AMITIZA   24 mcg, 2 Times Daily      OXcarbazepine 150 MG tablet  Commonly known as: TRILEPTAL   150 mg, Oral, 2 Times Daily      pantoprazole 40 MG EC tablet  Commonly known as: PROTONIX   40 mg, Oral, Every Early Morning      potassium chloride 10 MEQ CR capsule  Commonly known as: MICRO-K   10 mEq, Daily      risperiDONE 0.25 MG tablet  Commonly known as: risperDAL   0.25 mg, Oral, Nightly      rosuvastatin 10 MG tablet  Commonly known as:  CRESTOR   30 mg, Daily      sertraline 100 MG tablet  Commonly known as: ZOLOFT   200 mg, Oral, Daily      sodium chloride 1 g tablet   2 g, Oral, 3 Times Daily With Meals      traZODone 50 MG tablet  Commonly known as: DESYREL   25 mg, Nightly               Allergies   Allergen Reactions    Contrast Dye (Echo Or Unknown Ct/Mr) Shortness Of Breath     Pt states she had trouble breathing when she had contrast in the past.    Iodinated Contrast Media Hives         Discharge Disposition:   Skilled Nursing Facility (DC - External)    Diet:  Hospital:  Diet Order   Procedures    Diet: Regular/House; Fluid Consistency: Thin (IDDSI 0)         Discharge Activity:         CODE STATUS:  Code Status and Medical Interventions: CPR (Attempt to Resuscitate); Full Support   Ordered at: 11/04/24 1630     Level Of Support Discussed With:    Patient     Code Status (Patient has no pulse and is not breathing):    CPR (Attempt to Resuscitate)     Medical Interventions (Patient has pulse or is breathing):    Full Support         No future appointments.        Time spent on Discharge including face to face service:  40 minutes    Signature: Electronically signed by Edouard Hong MD, 11/09/24, 12:27 EST.  [facility] Hospitalist Team

## 2024-11-10 NOTE — NURSING NOTE
Detailed report has been given to Dom, RN at [facility].     Patient is AOX4, on 4L NC, All night meds has been given. Paper works and patient belongings has been given to EMS.

## 2024-11-11 ENCOUNTER — TELEPHONE (OUTPATIENT)
Dept: PAIN MEDICINE | Facility: CLINIC | Age: 69
End: 2024-11-11
Payer: MEDICARE

## 2024-11-11 NOTE — CASE MANAGEMENT/SOCIAL WORK
Case Management Discharge Note      Final Note: [facility] Rehab         Selected Continued Care - Discharged on 11/10/2024 Admission date: 11/4/2024 - Discharge disposition: Skilled Nursing Facility (DC - External)      Destination Coordination complete.      Service Provider Services Address Phone Fax Patient Preferred    [facility] [address], [place] [address] [phone] --                      Transportation Services  Ambulance: [facility]    Final Discharge Disposition Code: 03 - skilled nursing facility (SNF)

## 2024-11-11 NOTE — TELEPHONE ENCOUNTER
Yes she need TLSO for L1 compression fracture that is acute on chronic for next 6 weeks. We will send a referral for TLSO.

## 2024-11-11 NOTE — TELEPHONE ENCOUNTER
Lizeth from [facility] [place] is calling because patients discharge papers mentioned that she needed a TLSO brace but patient does not currently have one. Lizeth was told that the hospital was to supply this but has not seen it. Since she is not a current patient at our office, how can she get this brace or does she need it?

## 2024-11-12 ENCOUNTER — TELEPHONE (OUTPATIENT)
Dept: NEUROSURGERY | Facility: CLINIC | Age: 69
End: 2024-11-12
Payer: MEDICARE

## 2024-11-12 NOTE — TELEPHONE ENCOUNTER
Lizeth the  at the rehab facility called to let me know the patient has no brace and was supposed to have one per last note from  and trisha. I gave her jills number to see if she can reach her about her brace.

## 2024-11-14 ENCOUNTER — TELEPHONE (OUTPATIENT)
Dept: NEUROSURGERY | Facility: CLINIC | Age: 69
End: 2024-11-14
Payer: MEDICARE

## 2024-11-14 NOTE — TELEPHONE ENCOUNTER
Caller: DUONG @ [facility] [place]    Relationship:     Best call back number: [phone]    What was the call regarding: DUONG @ On license of [facility] CALLED TO SCHEDULE A HOSPITAL FOLLOW UP APPT W DR VALDEZ FOR A Compression fracture of L4 lumbar vertebra, closed, initial encounter    PATIENT WAS SEEN BY JUAN LUIS STEVENSON ON 11.5.24 - IN OV NOTES STATES FOR THE PATIENT TO FOLLOW UP WITH BEVERLY LINO - DO NOT SEE ANY  FOLLOW UP INSTRUCTIONS FOR NS    PLEASE ADVISE IF PATIENT IS NEEDING FOLLOW UP APPT    THANK YOU    PLEASE CALL DUONG TO SCHEDULE [phone]

## 2024-11-15 NOTE — TELEPHONE ENCOUNTER
Called to relay the message with Sully from the rehab. She stated she didn't understand. I said from the note I can see from  and Kasia there is nothing surgical for us that if the pain keeps on she needs to see pain management and conceder a kyphoplsty.

## 2024-11-25 ENCOUNTER — OFFICE VISIT (OUTPATIENT)
Dept: PAIN MEDICINE | Facility: CLINIC | Age: 69
End: 2024-11-25
Payer: MEDICARE

## 2024-11-25 VITALS
BODY MASS INDEX: 41.15 KG/M2 | HEART RATE: 76 BPM | WEIGHT: 247 LBS | RESPIRATION RATE: 18 BRPM | OXYGEN SATURATION: 98 % | SYSTOLIC BLOOD PRESSURE: 132 MMHG | DIASTOLIC BLOOD PRESSURE: 78 MMHG

## 2024-11-25 DIAGNOSIS — M47.812 CERVICAL SPONDYLOSIS: Primary | ICD-10-CM

## 2024-11-25 DIAGNOSIS — S22.080D COMPRESSION FRACTURE OF T11 VERTEBRA WITH ROUTINE HEALING, SUBSEQUENT ENCOUNTER: ICD-10-CM

## 2024-11-25 DIAGNOSIS — S22.080D COMPRESSION FRACTURE OF T12 VERTEBRA WITH ROUTINE HEALING, SUBSEQUENT ENCOUNTER: ICD-10-CM

## 2024-11-25 DIAGNOSIS — S32.020A COMPRESSION FRACTURE OF L2 LUMBAR VERTEBRA, CLOSED, INITIAL ENCOUNTER: ICD-10-CM

## 2024-11-25 PROCEDURE — 1126F AMNT PAIN NOTED NONE PRSNT: CPT | Performed by: STUDENT IN AN ORGANIZED HEALTH CARE EDUCATION/TRAINING PROGRAM

## 2024-11-25 PROCEDURE — 1159F MED LIST DOCD IN RCRD: CPT | Performed by: STUDENT IN AN ORGANIZED HEALTH CARE EDUCATION/TRAINING PROGRAM

## 2024-11-25 PROCEDURE — 99214 OFFICE O/P EST MOD 30 MIN: CPT | Performed by: STUDENT IN AN ORGANIZED HEALTH CARE EDUCATION/TRAINING PROGRAM

## 2024-11-25 PROCEDURE — 1160F RVW MEDS BY RX/DR IN RCRD: CPT | Performed by: STUDENT IN AN ORGANIZED HEALTH CARE EDUCATION/TRAINING PROGRAM

## 2024-11-25 PROCEDURE — G2211 COMPLEX E/M VISIT ADD ON: HCPCS | Performed by: STUDENT IN AN ORGANIZED HEALTH CARE EDUCATION/TRAINING PROGRAM

## 2024-11-25 PROCEDURE — 3075F SYST BP GE 130 - 139MM HG: CPT | Performed by: STUDENT IN AN ORGANIZED HEALTH CARE EDUCATION/TRAINING PROGRAM

## 2024-11-25 PROCEDURE — 3078F DIAST BP <80 MM HG: CPT | Performed by: STUDENT IN AN ORGANIZED HEALTH CARE EDUCATION/TRAINING PROGRAM

## 2024-11-25 RX ORDER — EMPAGLIFLOZIN 10 MG/1
TABLET, FILM COATED ORAL
COMMUNITY
Start: 2024-10-28

## 2025-01-15 NOTE — CONSULTS
----- Message from Jenny LABOY [facility]    Thank you,  Jenny Leo PG [facility] PRIMARY CARE   [facility] NEUROSURGERY CONSULT NOTE    Patient name: Morgan Quick  Referring Provider: Lacho Vance DO   Reason for Consultation:     back pain, previous kyphoplasty       Patient Care Team:  Wayne Saavedra MD as PCP - General (Internal Medicine)    Chief complaint: Back pain    Subjective .     History of present illness:    Patient is a 68 y.o. female who has a history of prior compression fractures and 2 kyphoplasties performed at L2 and L4.  Patient states yesterday she went to go get up using her walker and had severe back pain and could not get up.  She states that she fell back down in her chair but did not hurt herself.  She denies any other recent falls or traumatic incidents.  Patient had a DEXA scan back in 2011, which was normal. She is hooked up to an external catheter only due to the fact that she is unable to get up at this time to go to the bathroom.  Her caretaker was in the room with her and states that on October 22 she was able to walk without any assistance.  Since then the care taker states that she is to have been having to use her walker and has just been having more difficult time walking and has had increased back pain since then. She states that she has pain and numbness and tingling in her lower extremities.  She denies any bowel or bladder incontinence or saddle anesthesia.    Review of Systems  Review of Systems   Constitutional:  Positive for activity change.   Musculoskeletal:  Positive for back pain.   Neurological:  Positive for numbness.       History  PAST MEDICAL HISTORY  Past Medical History:   Diagnosis Date    Bipolar 1 disorder     Breast cancer     Cancer     Disease of thyroid gland     High cholesterol     Hypertension        PAST SURGICAL HISTORY  Past Surgical History:   Procedure Laterality Date    ABDOMINAL SURGERY      APPENDECTOMY      ENDOSCOPY N/A 9/25/2022    Procedure: ESOPHAGOGASTRODUODENOSCOPY with biopsy x 2 areas;  Surgeon: Deonte Wong MD;   Location: [facility] ENDOSCOPY;  Service: Gastroenterology;  Laterality: N/A;  post: gastritis, duodinitis, nodule,     HYSTERECTOMY      MASTECTOMY      L side       FAMILY HISTORY  Family History   Problem Relation Age of Onset    Cancer Mother        SOCIAL HISTORY  Social History     Tobacco Use    Smoking status: Never     Passive exposure: Never    Smokeless tobacco: Never   Vaping Use    Vaping status: Never Used   Substance Use Topics    Alcohol use: No    Drug use: No         Allergies:  Contrast dye (echo or unknown ct/mr) and Iodinated contrast media    MEDICATIONS:  Medications Prior to Admission   Medication Sig Dispense Refill Last Dose/Taking    furosemide (LASIX) 20 MG tablet Take 1 tablet by mouth Daily. 30 tablet 0 11/4/2024 at  9:00 AM    guaiFENesin (MUCINEX) 600 MG 12 hr tablet Take 2 tablets by mouth Every 12 (Twelve) Hours. 20 tablet 0 11/4/2024 Morning    HYDROcodone-acetaminophen (NORCO)  MG per tablet Take 1 tablet by mouth 4 (Four) Times a Day.   11/4/2024 Morning    levothyroxine (SYNTHROID, LEVOTHROID) 25 MCG tablet Take 1 tablet by mouth Every Morning. 30 tablet 0 11/4/2024 Morning    lubiprostone (AMITIZA) 24 MCG capsule Take 1 capsule by mouth 2 (Two) Times a Day.   11/4/2024 Noon    OXcarbazepine (TRILEPTAL) 150 MG tablet Take 1 tablet by mouth 2 (Two) Times a Day. 60 tablet 0 11/4/2024 Noon    pantoprazole (PROTONIX) 40 MG EC tablet Take 1 tablet by mouth Every Morning. 30 tablet 0 11/4/2024 Morning    potassium chloride (MICRO-K) 10 MEQ CR capsule Take 1 capsule by mouth Daily.   11/4/2024 Morning    risperiDONE (risperDAL) 0.25 MG tablet Take 1 tablet by mouth Every Night. 30 tablet 0 11/3/2024 Bedtime    rosuvastatin (CRESTOR) 10 MG tablet Take 3 tablets by mouth Daily.   11/4/2024 Morning    sertraline (ZOLOFT) 100 MG tablet Take 2 tablets by mouth Daily. 30 tablet 0 11/4/2024 Morning    sodium chloride 1 g tablet Take 2 tablets by mouth 3 (Three) Times a Day With Meals. 30  tablet 0 11/4/2024 Noon    traZODone (DESYREL) 50 MG tablet Take 0.5 tablets by mouth Every Night.   11/4/2024 Evening         Current Facility-Administered Medications:     sennosides-docusate (PERICOLACE) 8.6-50 MG per tablet 2 tablet, 2 tablet, Oral, BID PRN **AND** polyethylene glycol (MIRALAX) packet 17 g, 17 g, Oral, Daily PRN **AND** bisacodyl (DULCOLAX) EC tablet 5 mg, 5 mg, Oral, Daily PRN **AND** bisacodyl (DULCOLAX) suppository 10 mg, 10 mg, Rectal, Daily PRN, Lacho Vance DO    Calcium Replacement - Follow Nurse / BPA Driven Protocol, , Does not apply, PRN, Lacho Vance, DO    cyclobenzaprine (FLEXERIL) tablet 10 mg, 10 mg, Oral, TID PRN, Tripure, Flaquita S, APRN, 10 mg at 11/05/24 0925    furosemide (LASIX) tablet 20 mg, 20 mg, Oral, Daily, Tripure, Flaquita S, APRN, 20 mg at 11/05/24 0925    guaiFENesin (MUCINEX) 12 hr tablet 1,200 mg, 1,200 mg, Oral, Q12H, Tripure, Flaquita S, APRN    levothyroxine (SYNTHROID, LEVOTHROID) tablet 25 mcg, 25 mcg, Oral, Q AM, Tripure, Flaquita S, APRN, 25 mcg at 11/05/24 0925    lubiprostone (AMITIZA) capsule 24 mcg, 24 mcg, Oral, BID, Tripure, Flaquita S, APRN    Magnesium Standard Dose Replacement - Follow Nurse / BPA Driven Protocol, , Does not apply, PRN, Lacho Vance DO    melatonin tablet 5 mg, 5 mg, Oral, Nightly PRN, Lacho Vance DO, 5 mg at 11/04/24 2052    ondansetron (ZOFRAN) injection 4 mg, 4 mg, Intravenous, Q6H PRN, Lacho Vance DO, 4 mg at 11/05/24 0726    orphenadrine (NORFLEX) injection 60 mg, 60 mg, Intravenous, Q12H, Valencia Fu APRN, 60 mg at 11/05/24 1358    OXcarbazepine (TRILEPTAL) tablet 150 mg, 150 mg, Oral, BID, Flaquita Whatley APRN, 150 mg at 11/05/24 0926    oxyCODONE (ROXICODONE) immediate release tablet 10 mg, 10 mg, Oral, Q4H PRN, Flaquita Whatley, APRN    pantoprazole (PROTONIX) EC tablet 40 mg, 40 mg, Oral, Q AM, TripShiva becka S, APRN, 40 mg at 11/05/24 0925    Phosphorus  Replacement - Follow Nurse / BPA Driven Protocol, , Does not apply, PRN, Hottman Lacho Cheng, DO    potassium chloride (K-DUR,KLOR-CON) ER tablet 10 mEq, 10 mEq, Oral, Daily, Tripure, Flaquita S, APRN, 10 mEq at 11/05/24 0925    Potassium Replacement - Follow Nurse / BPA Driven Protocol, , Does not apply, PRN, Hottman, Lacho Cheng, DO    risperiDONE (risperDAL) tablet 0.25 mg, 0.25 mg, Oral, Nightly, Tripure, Flaquita S, APRN    rosuvastatin (CRESTOR) tablet 30 mg, 30 mg, Oral, Daily, Tripure, Flaquita S, APRN, 30 mg at 11/05/24 0925    sertraline (ZOLOFT) tablet 200 mg, 200 mg, Oral, Daily, Tripure, Flaquita S, APRN, 200 mg at 11/05/24 0925    sodium chloride 0.9 % flush 10 mL, 10 mL, Intravenous, Q12H, Hottmdaniel, Lacho Cheng, DO, 10 mL at 11/05/24 0926    sodium chloride 0.9 % flush 10 mL, 10 mL, Intravenous, PRN, Hottman, Lacho Cheng, DO, 10 mL at 11/05/24 0050    sodium chloride 0.9 % infusion 40 mL, 40 mL, Intravenous, PRN, Hottman, Lacho Cheng, DO    [Held by provider] sodium chloride tablet 2 g, 2 g, Oral, TID With Meals, Tripure, Flaquita S, APRN    traZODone (DESYREL) tablet 25 mg, 25 mg, Oral, Nightly, Tripure, Flaquita S, APRN      Objective     Results Review:  LABS:  Results from last 7 days   Lab Units 11/05/24  0425   WBC 10*3/mm3 8.67   HEMOGLOBIN g/dL 12.2   HEMATOCRIT % 43.3   PLATELETS 10*3/mm3 224     Results from last 7 days   Lab Units 11/05/24  0425   SODIUM mmol/L 142   POTASSIUM mmol/L 3.9   CHLORIDE mmol/L 104   CO2 mmol/L 27.5   BUN mg/dL 9   CREATININE mg/dL 0.51*   CALCIUM mg/dL 9.4   GLUCOSE mg/dL 114*         DIAGNOSTICS:  MRI LUMBAR SPINE WO CONTRAST     Date of Exam: 11/5/2024 11:58 AM EST     Indication: back pain.     Comparison: MRI lumbar spine 1/2/2024     Technique:  Routine multiplanar/multisequence sequence images of the lumbar spine were obtained without contrast administration.          Findings:  5 nonrib-bearing lumbar-type vertebral bodies. Status post L2 and L4  kyphoplasty. Degree of vertebral body height loss stable from prior comparison. Stable mild to moderate chronic height loss at L3. Stable mild height loss at L5. There is mild central   vertebral artery height loss at L1 new from prior comparison without associated edema. There is mild central height loss at T12 with associated increased T2 signal consistent with edema. There is increased T2 signal within T11 vertebral body without   appreciable fracture line or height loss. No bony retropulsion. There is multilevel degenerative disc height loss and disc desiccation. No significant Modic endplate change. Conus terminates at L1 without intrinsic or extrinsic mass. Stable probable   benign hemangiomas within T12 and T11. Mild symmetric paraspinous muscle atrophy. Small simple appearing left upper pole renal cyst. Degenerative osteoarthritis of the SI joints. Level-by-level analysis detailed below.     T12-L1: Tiny concentric disc bulge with mild facet arthropathy. Disc bulge has slightly worsened from prior exam resulting in mild thecal sac stenosis. Mild bilateral neuroforaminal stenosis also worsened from prior.     L1-L2: Tiny concentric disc bulge. Mild facet arthropathy. Findings in combination with mild dorsal epidural lipomatosis results in mild stable thecal sac stenosis and mild stable bilateral neuroforaminal stenosis.     L2-L3: Posterior disc osteophyte complex with mild facet arthropathy and ligamentum flavum thickening in combination with minimal dorsal epidural lipomatosis results in mild thecal sac stenosis stable from prior. There is mild right and more mild to   moderate left neuroforaminal stenosis stable from prior.     L3-L4: Tiny concentric disc bulge with moderate facet arthropathy and mild ligamentum flavum thickening. Minimal resulting thecal sac stenosis stable from prior. Mild right and mild to moderate left neuroforaminal stenosis stable from prior.     L4-L5: Tiny concentric disc bulge  with moderate facet hypertrophy and mild ligamentum flavum thickening. No significant thecal sac stenosis. Mild to moderate bilateral neuroforaminal stenosis stable from prior.     L5-S1: Tiny concentric disc bulge with posterior annular fissure stable from prior. Moderate to severe facet arthropathy. No thecal sac stenosis. Mild to moderate bilateral neuroforaminal stenosis, left greater than right stable from prior.        IMPRESSION:  Impression:  1. Acute to subacute T12 compression fracture with mild height loss and no bony retropulsion, new since prior comparison.  2. T11 vertebral body edema significant height loss or fracture line which could reflect an acute to subacute nondisplaced fracture versus possible contusion or if there is history of trauma.  3. Chronic L1 compression fracture with mild height loss, new since 1/2/2024.  4. Prior L2 and L4 kyphoplasty. Chronic L2, L3, L4 and L5 fractures are stable from prior.  5. Multilevel lumbar spondylosis without high-grade thecal sac or neuroforaminal stenosis. Degenerative change at T12-L1 has slightly worsened from prior.       Results Review:   I reviewed the patient's new clinical results.  I personally viewed patient's chart and imaging    Vital Signs   Temp:  [97.2 °F (36.2 °C)-98.4 °F (36.9 °C)] 97.2 °F (36.2 °C)  Heart Rate:  [58-74] 70  Resp:  [15-20] 15  BP: ()/(55-83) 152/76    Physical Exam:  Physical Exam  Vitals reviewed.   Eyes:      Extraocular Movements: Extraocular movements intact.      Conjunctiva/sclera: Conjunctivae normal.      Pupils: Pupils are equal, round, and reactive to light.   Musculoskeletal:      Cervical back: Normal range of motion.      Comments: She can move her legs, but moving them too much causes her back to hurt   Skin:     General: Skin is warm and dry.   Neurological:      General: No focal deficit present.      Mental Status: She is alert.   Psychiatric:         Mood and Affect: Mood normal.         Speech:  Speech normal.       Neurological Exam  Mental Status  Alert. Level of consciousness: Patient was alert but given pain medication prior to me speaking with her.  She was dozing in and out. Speech is normal.    Cranial Nerves  CN III, IV, VI: Extraocular movements intact bilaterally. Pupils equal round and reactive to light bilaterally.    Motor  Normal muscle bulk throughout.  Patient is physical exam was limited due to the amount of pain that she was in moving her legs.  She does have good strength in her feet and has no dropfoot at this time..    Sensory  Sensation is intact to light touch, pinprick, vibration and proprioception in all four extremities.    Gait    Not tested.      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  Patient denies any recent falls or trauma that led to this back pain.  She does have a history of breast cancer.  I did order another MRI with contrast to rule out potential metastasis to the spine she denies any traumatic incident leading to this back pain.    Patient had an MRI without contrast that shows an acute to subacute T12 compression fracture with no retropulsion in addition to another subacute to acute compression fracture at T11.  She has chronic pression fractures throughout her lumbar spine.    Due to the amount of pain she is in I consulted pain management to potentially evaluate the patient for additional kyphoplasty's at T11 and T12.  Hopefully they are able to perform the kyphoplasty's and help relieve her  "symptoms.    Due to the amount of pain she is having her physical exam was limited.  From what I did get she has good strength in her lower extremities.  She has full sensation.  Just any slight movement of her legs does cause severe pain in her low back.  Otherwise, she is neurologically intact.    Please reach out any questions or concerns.        PLAN:   Compression fractures  T11 and T12  -Consult pain management for potential kyphoplasty  -MRI with contrast to rule out metastasis to spine  -Continue pain regimen per primary    I discussed the patient's findings and my recommendations with patient, family, nursing staff, and Dr. Lemus    During patient visit, I utilized appropriate personal protective equipment including gloves and mask.  Mask used was standard procedure mask. Appropriate PPE was worn during the entire visit.  Hand hygiene was completed before and after.     Kasia Ceballos PA-C  11/05/24  15:10 EST    \"Dictated utilizing Dragon dictation\".    "

## 2025-01-17 ENCOUNTER — TELEPHONE (OUTPATIENT)
Dept: PAIN MEDICINE | Facility: CLINIC | Age: 70
End: 2025-01-17
Payer: MEDICARE

## 2025-01-17 DIAGNOSIS — M47.812 CERVICAL SPONDYLOSIS: Primary | ICD-10-CM

## 2025-01-17 NOTE — TELEPHONE ENCOUNTER
Caller: Arsen Barbosa    Relationship: Emergency Contact    Best call back number: [phone]      What was the call regarding: PT HAS XRAY ORDER FOR CERVICAL SPINE ORDERED BY DR MAURER. PT'S SISTER REQUESTING FOR THAT ORDER TO BE SENT [facility] [address] IN [place]. PT HAS MAMMOGRAM AT [facility] ON 01/29/25 AND WOULD LIKE TO GET THE XRAY DONE THAT DAY AS WELL. PLEASE CONTACT PT'S SISTER ABOVE TO CONFIRM IT WAS SENT.

## 2025-03-05 NOTE — PROGRESS NOTES
Subjective   Morgan Quick is a 69 y.o. female is here for follow up for lower back and neck pain.  On previous visit cervical x-ray was ordered.  No follow-up since 11/2024.  She was also recommended to work with physical therapy for neck pain.  She has been doing physical therapy and home exercises which has improved her neck pain significantly.  Her main complaint today is bilateral knee pain.  She had been obtaining bilateral knee steroid injection by her PCP/NP lasting less than 1 month.  She is interested in trying viscosupplementation injections which is reasonable.  No DEXA scan has been done for this patient.  She is not on any treatment for osteoporosis as well.  History of compression fractures previously.    On last visit:     Bilateral knee pain is 4/10 on VAS, at maximum 5/10.  Worse on weightbearing.  Improves with rest.  Short-term relief with steroid intra-articular injections.    Lower back  pain is 0/10 on VAS.     Neck pain is 0/10 on VAS. Dull, achy axial neck pain without any radiculopathy. Worse with turning her head.     Previous Injection:   11/7/2024-T11, T12 kyphoplasty-significant improvement in pain and functional improvement.      Previous Injection:      Hx: Presented to ER on 11/4/2024 with severe lower back pain.  She states that yesterday she went to get up using her walker and felt severe left lower back pain and could not get up and fell back down in the chair.  History of kyphoplasties of L2, L4 vertebral level on 1/2/2024 with IR.  Patient is unable to ambulate due to severe pain and also requires external catheter for urination.  Pain, numbness and tingling in bilateral lower extremities-this is chronic.  Her main complaint is axial lower back pain.  She has been living in assisted living for about 1 year.  Denies any bowel or bladder incontinence since admitted to the hospital.  She does have chronic pain in lower back from L2-L4 but her main complaint is significant and  severe pain at T11, T12 vertebral level.  Also has history of chronic neck pain.  She does have chronic bilateral leg pain as well.        PMH:   Bipolar 1, history of breast cancer s/p radiation about 2 years ago     Current Medications:   Roxicodone 10 mg q4H PRN  Dilaudid 0.5 mg PRN  Lidocaine 4% patch  Morphine 4 mg PRN        Past Medications:     Past Modalities:  TENS:                                                                          no                                                  Physical Therapy Within The Last 6 Months              Yes  Psychotherapy                                                            no  Massage Therapy                                                       no     Patient Complains Of:  Uro-Fecal Incontinence          no  Weight Gain/Loss                   no  Fever/Chills                             no  Weakness                               no                Current Outpatient Medications:     cyclobenzaprine (FLEXERIL) 5 MG tablet, , Disp: , Rfl:     furosemide (LASIX) 20 MG tablet, Take 1 tablet by mouth Daily., Disp: 30 tablet, Rfl: 0    guaiFENesin (MUCINEX) 600 MG 12 hr tablet, Take 2 tablets by mouth Every 12 (Twelve) Hours., Disp: 20 tablet, Rfl: 0    HYDROcodone-acetaminophen (NORCO)  MG per tablet, , Disp: , Rfl:     Jardiance 10 MG tablet tablet, , Disp: , Rfl:     levothyroxine (SYNTHROID, LEVOTHROID) 25 MCG tablet, Take 1 tablet by mouth Every Morning., Disp: 30 tablet, Rfl: 0    lubiprostone (AMITIZA) 24 MCG capsule, Take 1 capsule by mouth 2 (Two) Times a Day., Disp: , Rfl:     OXcarbazepine (TRILEPTAL) 150 MG tablet, Take 1 tablet by mouth 2 (Two) Times a Day., Disp: 60 tablet, Rfl: 0    pantoprazole (PROTONIX) 40 MG EC tablet, Take 1 tablet by mouth Every Morning., Disp: 30 tablet, Rfl: 0    potassium chloride (MICRO-K) 10 MEQ CR capsule, Take 1 capsule by mouth Daily., Disp: , Rfl:     risperiDONE (risperDAL) 0.25 MG tablet, Take 1 tablet by mouth  Every Night., Disp: 30 tablet, Rfl: 0    rosuvastatin (CRESTOR) 10 MG tablet, Take 3 tablets by mouth Daily., Disp: , Rfl:     sertraline (ZOLOFT) 100 MG tablet, Take 2 tablets by mouth Daily., Disp: 30 tablet, Rfl: 0    sodium chloride 1 g tablet, Take 2 tablets by mouth 3 (Three) Times a Day With Meals., Disp: 30 tablet, Rfl: 0    traZODone (DESYREL) 50 MG tablet, Take 0.5 tablets by mouth Every Night., Disp: , Rfl:     The following portions of the patient's history were reviewed and updated as appropriate: allergies, current medications, past family history, past medical history, past social history, past surgical history, and problem list.      REVIEW OF PERTINENT MEDICAL DATA    Past Medical History:   Diagnosis Date    Bipolar 1 disorder     Breast cancer     Cancer     Disease of thyroid gland     High cholesterol     Hypertension      Past Surgical History:   Procedure Laterality Date    ABDOMINAL SURGERY      APPENDECTOMY      ENDOSCOPY N/A 9/25/2022    Procedure: ESOPHAGOGASTRODUODENOSCOPY with biopsy x 2 areas;  Surgeon: Deonte Wong MD;  Location: [facility] ENDOSCOPY;  Service: Gastroenterology;  Laterality: N/A;  post: gastritis, duodinitis, nodule,     HYSTERECTOMY      MASTECTOMY      L side    VERTEBROPLASTY Bilateral 11/7/2024    Procedure: kyphoplasty of t11 and t12 vertebrae;  Surgeon: Jayce Redd DO;  Location: [facility] MAIN OR;  Service: Pain Management;  Laterality: Bilateral;     Family History   Problem Relation Age of Onset    Cancer Mother      Social History     Socioeconomic History    Marital status:    Tobacco Use    Smoking status: Never     Passive exposure: Never    Smokeless tobacco: Never   Vaping Use    Vaping status: Never Used   Substance and Sexual Activity    Alcohol use: No    Drug use: No    Sexual activity: Defer         Review of Systems   Musculoskeletal:  Positive for arthralgias, back pain and neck pain.         Vitals:    03/06/25 1411   BP: 120/73   Pulse: 71    Resp: 16   SpO2: 97%   Weight: 108 kg (239 lb)   PainSc: 0-No pain           Objective   Physical Exam  Neck:     Musculoskeletal:         General: Tenderness present.        Legs:            Imaging Reviewed:  MRI lumbar spine without contrast-11/5/2024  - Hx of  L2, L4 kyphoplasty  - Chronic L2, L3, L4, L5 fractures stable from prior.  - Chronic L1 compression fracture with mild height loss, new since 1/2/2024  - Mild central height loss at T12 with increased T2 signal consistent with edema consistent with acute to subacute compression fracture  - Increased T2 signal at T11 vertebral body without significant fracture line or height loss.  No bony retropulsion.  Acute to subacute compression fracture.  -Stable and probable benign hemangiomas in T12, T11  L4-5-mild to moderate bilateral neuroforaminal stenosis and moderate facet arthropathy  L5-S1-moderate to severe facet arthropathy.  Mild to moderate bilateral neuroforaminal stenosis.    Cervical x-ray-11/25/2024  - Multilevel degenerative changes.  - Bilateral facet arthropathy    Assessment:    1. Primary osteoarthritis of both knees    2. Compression fracture of L2 lumbar vertebra, closed, initial encounter    3. Chronic pain of both knees           Plan:   S/p  T11-T12 kyphoplasty with significantly improved pain and functional improvement.  Some residual pain from acute on chronic L1 compression fracture.  Overall she is doing better with functional improvement.  Obtain DEXA scan-order sent on 3/6/2025 by radiology as requested by patient.  She will most likely need treatment for osteoporosis.  I will defer this to PCP or endocrinology.  2.  Patient has pain in the knees bilateral, Xray knee shows osteoarthritis. Discussed b/l knee Synvisc injection as she had short term relief with steroid injection. Discussed the possibility of infection, bleeding, nerve damage, headache, increased pain, paraplegia. Patient understands and agrees.   3.  Obtain new  bilateral knee x-rays-order sent on 3/6/2025.  4.  Neck pain is significantly improved with home exercises and physical therapy.  Recommend continuing that.  May consider MBB in future if needed.    RTC for bilateral Synvisc injection with ultrasound and then 4 weeks follow-up.        Jayce Redd DO  Pain Management   [facility]                   INSPECT REPORT    As part of the patient's treatment plan, I may be prescribing controlled substances. The patient has been made aware of appropriate use of such medications, including potential risk of somnolence, limited ability to drive and/or work safely, and the potential for dependence or overdose. It has also been made clear that these medications are for use by this patient only, without concomitant use of alcohol or other substances unless prescribed.     Patient has completed prescribing agreement detailing terms of continued prescribing of controlled substances, including monitoring INSPECT reports, urine drug screening, and pill counts if necessary. The patient is aware that inappropriate use will results in cessation of prescribing such medications.    INSPECT report has been reviewed and scanned into the patient's chart.

## 2025-03-06 ENCOUNTER — OFFICE VISIT (OUTPATIENT)
Dept: PAIN MEDICINE | Facility: CLINIC | Age: 70
End: 2025-03-06
Payer: MEDICARE

## 2025-03-06 VITALS
RESPIRATION RATE: 16 BRPM | OXYGEN SATURATION: 97 % | DIASTOLIC BLOOD PRESSURE: 73 MMHG | BODY MASS INDEX: 39.82 KG/M2 | SYSTOLIC BLOOD PRESSURE: 120 MMHG | HEART RATE: 71 BPM | WEIGHT: 239 LBS

## 2025-03-06 DIAGNOSIS — G89.29 CHRONIC PAIN OF BOTH KNEES: ICD-10-CM

## 2025-03-06 DIAGNOSIS — M17.0 PRIMARY OSTEOARTHRITIS OF BOTH KNEES: Primary | ICD-10-CM

## 2025-03-06 DIAGNOSIS — M25.562 CHRONIC PAIN OF BOTH KNEES: ICD-10-CM

## 2025-03-06 DIAGNOSIS — M25.561 CHRONIC PAIN OF BOTH KNEES: ICD-10-CM

## 2025-03-06 DIAGNOSIS — S32.020A COMPRESSION FRACTURE OF L2 LUMBAR VERTEBRA, CLOSED, INITIAL ENCOUNTER: ICD-10-CM

## 2025-03-06 RX ORDER — HYDROCODONE BITARTRATE AND ACETAMINOPHEN 10; 325 MG/1; MG/1
TABLET ORAL
COMMUNITY
Start: 2025-02-18

## 2025-03-06 RX ORDER — CYCLOBENZAPRINE HCL 5 MG
TABLET ORAL
COMMUNITY
Start: 2025-02-09

## 2025-03-18 ENCOUNTER — APPOINTMENT (OUTPATIENT)
Dept: GENERAL RADIOLOGY | Facility: HOSPITAL | Age: 70
DRG: 194 | End: 2025-03-18
Payer: MEDICARE

## 2025-03-18 ENCOUNTER — HOSPITAL ENCOUNTER (INPATIENT)
Facility: HOSPITAL | Age: 70
LOS: 1 days | Discharge: HOME | DRG: 194 | End: 2025-03-20
Attending: EMERGENCY MEDICINE | Admitting: FAMILY MEDICINE
Payer: MEDICARE

## 2025-03-18 ENCOUNTER — APPOINTMENT (OUTPATIENT)
Dept: CT IMAGING | Facility: HOSPITAL | Age: 70
DRG: 194 | End: 2025-03-18
Payer: MEDICARE

## 2025-03-18 DIAGNOSIS — L03.213 PERIORBITAL CELLULITIS OF LEFT EYE: ICD-10-CM

## 2025-03-18 DIAGNOSIS — R09.02 HYPOXIA: Primary | ICD-10-CM

## 2025-03-18 LAB
ANION GAP SERPL CALCULATED.3IONS-SCNC: 12 MMOL/L (ref 5–15)
ARTERIAL PATENCY WRIST A: POSITIVE
ATMOSPHERIC PRESS: ABNORMAL MM[HG]
B PARAPERT DNA SPEC QL NAA+PROBE: NOT DETECTED
B PERT DNA SPEC QL NAA+PROBE: NOT DETECTED
BASE EXCESS BLDA CALC-SCNC: 2 MMOL/L (ref 0–3)
BASOPHILS # BLD AUTO: 0.03 10*3/MM3 (ref 0–0.2)
BASOPHILS NFR BLD AUTO: 0.3 % (ref 0–1.5)
BDY SITE: ABNORMAL
BUN SERPL-MCNC: 7 MG/DL (ref 8–23)
BUN/CREAT SERPL: 12.3 (ref 7–25)
C PNEUM DNA NPH QL NAA+NON-PROBE: NOT DETECTED
CALCIUM SPEC-SCNC: 9.3 MG/DL (ref 8.6–10.5)
CHLORIDE SERPL-SCNC: 104 MMOL/L (ref 98–107)
CO2 BLDA-SCNC: 28.9 MMOL/L (ref 22–29)
CO2 SERPL-SCNC: 22 MMOL/L (ref 22–29)
CREAT SERPL-MCNC: 0.57 MG/DL (ref 0.57–1)
DEPRECATED RDW RBC AUTO: 46.7 FL (ref 37–54)
EGFRCR SERPLBLD CKD-EPI 2021: 98.5 ML/MIN/1.73
EOSINOPHIL # BLD AUTO: 0.07 10*3/MM3 (ref 0–0.4)
EOSINOPHIL NFR BLD AUTO: 0.6 % (ref 0.3–6.2)
ERYTHROCYTE [DISTWIDTH] IN BLOOD BY AUTOMATED COUNT: 15.7 % (ref 12.3–15.4)
FLUAV SUBTYP SPEC NAA+PROBE: NOT DETECTED
FLUBV RNA ISLT QL NAA+PROBE: NOT DETECTED
GLUCOSE SERPL-MCNC: 144 MG/DL (ref 65–99)
HADV DNA SPEC NAA+PROBE: NOT DETECTED
HCO3 BLDA-SCNC: 27.5 MMOL/L (ref 21–28)
HCOV 229E RNA SPEC QL NAA+PROBE: NOT DETECTED
HCOV HKU1 RNA SPEC QL NAA+PROBE: NOT DETECTED
HCOV NL63 RNA SPEC QL NAA+PROBE: NOT DETECTED
HCOV OC43 RNA SPEC QL NAA+PROBE: NOT DETECTED
HCT VFR BLD AUTO: 38.2 % (ref 34–46.6)
HEMODILUTION: NO
HGB BLD-MCNC: 11 G/DL (ref 12–15.9)
HMPV RNA NPH QL NAA+NON-PROBE: NOT DETECTED
HOLD SPECIMEN: NORMAL
HPIV1 RNA ISLT QL NAA+PROBE: NOT DETECTED
HPIV2 RNA SPEC QL NAA+PROBE: NOT DETECTED
HPIV3 RNA NPH QL NAA+PROBE: NOT DETECTED
HPIV4 P GENE NPH QL NAA+PROBE: NOT DETECTED
IMM GRANULOCYTES # BLD AUTO: 0.05 10*3/MM3 (ref 0–0.05)
IMM GRANULOCYTES NFR BLD AUTO: 0.4 % (ref 0–0.5)
INHALED O2 CONCENTRATION: 40 %
LYMPHOCYTES # BLD AUTO: 1.57 10*3/MM3 (ref 0.7–3.1)
LYMPHOCYTES NFR BLD AUTO: 13.3 % (ref 19.6–45.3)
M PNEUMO IGG SER IA-ACNC: NOT DETECTED
MAGNESIUM SERPL-MCNC: 2.2 MG/DL (ref 1.6–2.4)
MCH RBC QN AUTO: 23.5 PG (ref 26.6–33)
MCHC RBC AUTO-ENTMCNC: 28.8 G/DL (ref 31.5–35.7)
MCV RBC AUTO: 81.4 FL (ref 79–97)
MODALITY: ABNORMAL
MONOCYTES # BLD AUTO: 1 10*3/MM3 (ref 0.1–0.9)
MONOCYTES NFR BLD AUTO: 8.5 % (ref 5–12)
NEUTROPHILS NFR BLD AUTO: 76.9 % (ref 42.7–76)
NEUTROPHILS NFR BLD AUTO: 9.07 10*3/MM3 (ref 1.7–7)
NRBC BLD AUTO-RTO: 0 /100 WBC (ref 0–0.2)
NT-PROBNP SERPL-MCNC: 68.6 PG/ML (ref 0–900)
PCO2 BLDA: 45.8 MM HG (ref 35–48)
PH BLDA: 7.39 PH UNITS (ref 7.35–7.45)
PLATELET # BLD AUTO: 275 10*3/MM3 (ref 140–450)
PMV BLD AUTO: 9.4 FL (ref 6–12)
PO2 BLD: 184 MM[HG] (ref 0–500)
PO2 BLDA: 73.7 MM HG (ref 83–108)
POTASSIUM SERPL-SCNC: 4.1 MMOL/L (ref 3.5–5.2)
RBC # BLD AUTO: 4.69 10*6/MM3 (ref 3.77–5.28)
RHINOVIRUS RNA SPEC NAA+PROBE: NOT DETECTED
RSV RNA NPH QL NAA+NON-PROBE: NOT DETECTED
SAO2 % BLDCOA: 94.3 % (ref 94–98)
SARS-COV-2 RNA RESP QL NAA+PROBE: NOT DETECTED
SODIUM SERPL-SCNC: 138 MMOL/L (ref 136–145)
WBC NRBC COR # BLD AUTO: 11.79 10*3/MM3 (ref 3.4–10.8)

## 2025-03-18 PROCEDURE — 0202U NFCT DS 22 TRGT SARS-COV-2: CPT

## 2025-03-18 PROCEDURE — 83880 ASSAY OF NATRIURETIC PEPTIDE: CPT

## 2025-03-18 PROCEDURE — 36415 COLL VENOUS BLD VENIPUNCTURE: CPT

## 2025-03-18 PROCEDURE — 70450 CT HEAD/BRAIN W/O DYE: CPT

## 2025-03-18 PROCEDURE — 99285 EMERGENCY DEPT VISIT HI MDM: CPT

## 2025-03-18 PROCEDURE — 80048 BASIC METABOLIC PNL TOTAL CA: CPT

## 2025-03-18 PROCEDURE — 71045 X-RAY EXAM CHEST 1 VIEW: CPT

## 2025-03-18 PROCEDURE — 82803 BLOOD GASES ANY COMBINATION: CPT

## 2025-03-18 PROCEDURE — G0378 HOSPITAL OBSERVATION PER HR: HCPCS

## 2025-03-18 PROCEDURE — 85025 COMPLETE CBC W/AUTO DIFF WBC: CPT

## 2025-03-18 PROCEDURE — 36600 WITHDRAWAL OF ARTERIAL BLOOD: CPT

## 2025-03-18 PROCEDURE — 83735 ASSAY OF MAGNESIUM: CPT

## 2025-03-18 PROCEDURE — 63710000001 ONDANSETRON ODT 4 MG TABLET DISPERSIBLE

## 2025-03-18 RX ORDER — FUROSEMIDE 40 MG/1
40 TABLET ORAL DAILY
COMMUNITY

## 2025-03-18 RX ORDER — ONDANSETRON 4 MG/1
1-2 TABLET, FILM COATED ORAL EVERY 6 HOURS PRN
COMMUNITY

## 2025-03-18 RX ORDER — ACETAMINOPHEN 325 MG/1
650 TABLET ORAL EVERY 4 HOURS PRN
Status: DISCONTINUED | OUTPATIENT
Start: 2025-03-18 | End: 2025-03-20 | Stop reason: HOSPADM

## 2025-03-18 RX ORDER — CETIRIZINE HYDROCHLORIDE 10 MG/1
10 TABLET ORAL NIGHTLY
Status: DISCONTINUED | OUTPATIENT
Start: 2025-03-18 | End: 2025-03-20 | Stop reason: HOSPADM

## 2025-03-18 RX ORDER — IPRATROPIUM BROMIDE AND ALBUTEROL SULFATE 2.5; .5 MG/3ML; MG/3ML
3 SOLUTION RESPIRATORY (INHALATION) EVERY 4 HOURS PRN
Status: DISCONTINUED | OUTPATIENT
Start: 2025-03-18 | End: 2025-03-20 | Stop reason: HOSPADM

## 2025-03-18 RX ORDER — CEPHALEXIN 500 MG/1
500 CAPSULE ORAL ONCE
Status: COMPLETED | OUTPATIENT
Start: 2025-03-18 | End: 2025-03-18

## 2025-03-18 RX ORDER — LEVOTHYROXINE SODIUM 25 UG/1
25 TABLET ORAL
Status: DISCONTINUED | OUTPATIENT
Start: 2025-03-19 | End: 2025-03-20 | Stop reason: HOSPADM

## 2025-03-18 RX ORDER — ROSUVASTATIN CALCIUM 10 MG/1
10 TABLET, COATED ORAL DAILY
COMMUNITY

## 2025-03-18 RX ORDER — FOLIC ACID 1 MG/1
1 TABLET ORAL DAILY
COMMUNITY

## 2025-03-18 RX ORDER — CYCLOBENZAPRINE HCL 5 MG
5 TABLET ORAL 3 TIMES DAILY PRN
COMMUNITY

## 2025-03-18 RX ORDER — CETIRIZINE HYDROCHLORIDE 10 MG/1
10 TABLET ORAL
COMMUNITY

## 2025-03-18 RX ORDER — ROSUVASTATIN CALCIUM 10 MG/1
20 TABLET, COATED ORAL DAILY
Status: DISCONTINUED | OUTPATIENT
Start: 2025-03-19 | End: 2025-03-20 | Stop reason: HOSPADM

## 2025-03-18 RX ORDER — TRAZODONE HYDROCHLORIDE 50 MG/1
50 TABLET ORAL NIGHTLY
Status: DISCONTINUED | OUTPATIENT
Start: 2025-03-18 | End: 2025-03-20 | Stop reason: HOSPADM

## 2025-03-18 RX ORDER — OXCARBAZEPINE 150 MG/1
150 TABLET, FILM COATED ORAL 2 TIMES DAILY
Status: DISCONTINUED | OUTPATIENT
Start: 2025-03-18 | End: 2025-03-20 | Stop reason: HOSPADM

## 2025-03-18 RX ORDER — SODIUM CHLORIDE 1 G/1
2 TABLET ORAL
Status: DISCONTINUED | OUTPATIENT
Start: 2025-03-18 | End: 2025-03-20 | Stop reason: HOSPADM

## 2025-03-18 RX ORDER — RISPERIDONE 0.25 MG/1
0.25 TABLET ORAL NIGHTLY
Status: DISCONTINUED | OUTPATIENT
Start: 2025-03-18 | End: 2025-03-20 | Stop reason: HOSPADM

## 2025-03-18 RX ORDER — FUROSEMIDE 40 MG/1
40 TABLET ORAL DAILY
Status: DISCONTINUED | OUTPATIENT
Start: 2025-03-19 | End: 2025-03-20 | Stop reason: HOSPADM

## 2025-03-18 RX ORDER — ROSUVASTATIN CALCIUM 20 MG/1
20 TABLET, COATED ORAL DAILY
COMMUNITY

## 2025-03-18 RX ORDER — PANTOPRAZOLE SODIUM 40 MG/1
40 TABLET, DELAYED RELEASE ORAL DAILY
Status: DISCONTINUED | OUTPATIENT
Start: 2025-03-19 | End: 2025-03-20 | Stop reason: HOSPADM

## 2025-03-18 RX ORDER — SODIUM CHLORIDE 0.9 % (FLUSH) 0.9 %
10 SYRINGE (ML) INJECTION EVERY 12 HOURS SCHEDULED
Status: DISCONTINUED | OUTPATIENT
Start: 2025-03-18 | End: 2025-03-20 | Stop reason: HOSPADM

## 2025-03-18 RX ORDER — AMOXICILLIN 250 MG
1 CAPSULE ORAL 2 TIMES DAILY
Status: DISCONTINUED | OUTPATIENT
Start: 2025-03-18 | End: 2025-03-20 | Stop reason: HOSPADM

## 2025-03-18 RX ORDER — AMOXICILLIN 250 MG
1 CAPSULE ORAL 2 TIMES DAILY
COMMUNITY

## 2025-03-18 RX ORDER — SERTRALINE HYDROCHLORIDE 100 MG/1
200 TABLET, FILM COATED ORAL DAILY
Status: DISCONTINUED | OUTPATIENT
Start: 2025-03-19 | End: 2025-03-20 | Stop reason: HOSPADM

## 2025-03-18 RX ORDER — FOLIC ACID 1 MG/1
1 TABLET ORAL DAILY
Status: DISCONTINUED | OUTPATIENT
Start: 2025-03-19 | End: 2025-03-20 | Stop reason: HOSPADM

## 2025-03-18 RX ORDER — ONDANSETRON 4 MG/1
4 TABLET, ORALLY DISINTEGRATING ORAL EVERY 6 HOURS PRN
Status: DISCONTINUED | OUTPATIENT
Start: 2025-03-18 | End: 2025-03-20 | Stop reason: HOSPADM

## 2025-03-18 RX ORDER — HYDROCODONE BITARTRATE AND ACETAMINOPHEN 10; 325 MG/1; MG/1
1 TABLET ORAL 2 TIMES DAILY
Refills: 0 | Status: DISCONTINUED | OUTPATIENT
Start: 2025-03-18 | End: 2025-03-20 | Stop reason: HOSPADM

## 2025-03-18 RX ORDER — CYCLOBENZAPRINE HCL 10 MG
5 TABLET ORAL 3 TIMES DAILY PRN
Status: DISCONTINUED | OUTPATIENT
Start: 2025-03-18 | End: 2025-03-20 | Stop reason: HOSPADM

## 2025-03-18 RX ORDER — ACETAMINOPHEN 160 MG/5ML
650 SOLUTION ORAL EVERY 4 HOURS PRN
Status: DISCONTINUED | OUTPATIENT
Start: 2025-03-18 | End: 2025-03-20 | Stop reason: HOSPADM

## 2025-03-18 RX ORDER — ACETAMINOPHEN 650 MG/1
650 SUPPOSITORY RECTAL EVERY 4 HOURS PRN
Status: DISCONTINUED | OUTPATIENT
Start: 2025-03-18 | End: 2025-03-20 | Stop reason: HOSPADM

## 2025-03-18 RX ORDER — SODIUM CHLORIDE 0.9 % (FLUSH) 0.9 %
10 SYRINGE (ML) INJECTION AS NEEDED
Status: DISCONTINUED | OUTPATIENT
Start: 2025-03-18 | End: 2025-03-20 | Stop reason: HOSPADM

## 2025-03-18 RX ORDER — SODIUM CHLORIDE 9 MG/ML
40 INJECTION, SOLUTION INTRAVENOUS AS NEEDED
Status: DISCONTINUED | OUTPATIENT
Start: 2025-03-18 | End: 2025-03-20 | Stop reason: HOSPADM

## 2025-03-18 RX ORDER — POTASSIUM CHLORIDE 750 MG/1
10 TABLET, FILM COATED, EXTENDED RELEASE ORAL DAILY
Status: DISCONTINUED | OUTPATIENT
Start: 2025-03-19 | End: 2025-03-20 | Stop reason: HOSPADM

## 2025-03-18 RX ORDER — LUBIPROSTONE 24 UG/1
24 CAPSULE ORAL 2 TIMES DAILY
Status: DISCONTINUED | OUTPATIENT
Start: 2025-03-18 | End: 2025-03-20 | Stop reason: HOSPADM

## 2025-03-18 RX ORDER — ROSUVASTATIN CALCIUM 10 MG/1
10 TABLET, COATED ORAL DAILY
Status: DISCONTINUED | OUTPATIENT
Start: 2025-03-19 | End: 2025-03-20 | Stop reason: HOSPADM

## 2025-03-18 RX ADMIN — RISPERIDONE 0.25 MG: 0.25 TABLET, FILM COATED ORAL at 21:21

## 2025-03-18 RX ADMIN — LUBIPROSTONE 24 MCG: 24 CAPSULE, GELATIN COATED ORAL at 21:22

## 2025-03-18 RX ADMIN — TRAZODONE HYDROCHLORIDE 50 MG: 50 TABLET ORAL at 21:21

## 2025-03-18 RX ADMIN — SENNOSIDES AND DOCUSATE SODIUM 1 TABLET: 50; 8.6 TABLET ORAL at 21:21

## 2025-03-18 RX ADMIN — CETIRIZINE HYDROCHLORIDE 10 MG: 10 TABLET, FILM COATED ORAL at 21:21

## 2025-03-18 RX ADMIN — HYDROCODONE BITARTRATE AND ACETAMINOPHEN 1 TABLET: 10; 325 TABLET ORAL at 21:22

## 2025-03-18 RX ADMIN — Medication 10 ML: at 13:11

## 2025-03-18 RX ADMIN — Medication 10 ML: at 21:22

## 2025-03-18 RX ADMIN — OXCARBAZEPINE 150 MG: 150 TABLET, FILM COATED ORAL at 21:21

## 2025-03-18 RX ADMIN — ONDANSETRON 4 MG: 4 TABLET, ORALLY DISINTEGRATING ORAL at 21:29

## 2025-03-18 RX ADMIN — CEPHALEXIN 500 MG: 500 CAPSULE ORAL at 13:10

## 2025-03-18 NOTE — DISCHARGE PLACEMENT REQUEST
"Jerrod Wasserman (69 y.o. Female)       Date of Birth   1955    Social Security Number       Address   [facility] [place] [address]    Home Phone   [phone]    MRN   [id number]       Sabianism   [facility]    Marital Status                               Admission Date   3/18/2025    Admission Type   Emergency    Admitting Provider   Sergio Walker DO    Attending Provider   Sathish Seals MD    Department, Room/Bed   [facility] EMERGENCY DEPARTMENT, [facility]/C       Discharge Date       Discharge Disposition       Discharge Destination                                 Attending Provider: Sathish Seals MD    Allergies: Contrast Dye (Echo Or Unknown Ct/mr), Iodinated Contrast Media    Isolation: None   Infection: None   Code Status: CPR    Ht: 165 cm (64.96\")   Wt: 108 kg (239 lb)    Admission Cmt: None   Principal Problem: Hypoxia [R09.02]                   Active Insurance as of 3/18/2025       Primary Coverage       Payor Plan Insurance Group Employer/Plan Group    ANTHEM MEDICARE REPLACEMENT ANTHEM MEDICARE ADVANTAGE SNP [id number]       Payor Plan Address Payor Plan Phone Number Payor Plan Fax Number Effective Dates    [address] [phone]  1/1/2025 - None Entered    [facility] [address]         Subscriber Name Subscriber Birth Date Member ID       JERROD WASSERMAN 1955 [id number]               Secondary Coverage       Payor Plan Insurance Group Employer/Plan Group    ANTHEM MEDICAID ANTHEM PATHWAYS IN        Payor Plan Address Payor Plan Phone Number Payor Plan Fax Number Effective Dates    [address]   11/4/2024 - None Entered    [facility] [address]         Subscriber Name Subscriber Birth Date Member ID       JERROD WASSERMAN 1955 [id number]                     Emergency Contacts        (Rel.) Home Phone Work Phone Mobile Phone    JERI JULIAN (Spouse) [phone] -- " [phone]    Arsen Barbosa (Sister) -- -- [phone]

## 2025-03-18 NOTE — CASE MANAGEMENT/SOCIAL WORK
Discharge Planning Assessment   [place]     Patient Name: Morgan Qucik  MRN: [id number]  Today's Date: 3/18/2025    Admit Date: 3/18/2025    Plan: From [facility]. Sister for d/c transport.   Discharge Needs Assessment       Row Name 03/18/25 1502       Living Environment    People in Home facility resident  [facility]    Current Living Arrangements assisted living facility    Potentially Unsafe Housing Conditions none    In the past 12 months has the electric, gas, oil, or water company threatened to shut off services in your home? No    Primary Care Provided by self;other (see comments)  Facility Staff    Provides Primary Care For no one    Family Caregiver if Needed sibling(s)    Family Caregiver Names Sister Arsen    Quality of Family Relationships helpful;involved;supportive    Able to Return to Prior Arrangements yes       Resource/Environmental Concerns    Resource/Environmental Concerns none    Transportation Concerns none       Transportation Needs    In the past 12 months, has lack of transportation kept you from medical appointments or from getting medications? no    In the past 12 months, has lack of transportation kept you from meetings, work, or from getting things needed for daily living? No       Food Insecurity    Within the past 12 months, you worried that your food would run out before you got the money to buy more. Never true    Within the past 12 months, the food you bought just didn't last and you didn't have money to get more. Never true       Transition Planning    Patient/Family Anticipates Transition to long-term care facility    Patient/Family Anticipated Services at Transition none    Transportation Anticipated health plan transportation;family or friend will provide       Discharge Needs Assessment    Readmission Within the Last 30 Days no previous admission in last 30 days    Equipment Currently Used at Home oxygen;rollator  Home O2 HS @ 3.5L    Concerns to be Addressed no  discharge needs identified;denies needs/concerns at this time    Do you want help finding or keeping work or a job? I do not need or want help    Do you want help with school or training? For example, starting or completing job training or getting a high school diploma, GED or equivalent No    Anticipated Changes Related to Illness none    Equipment Needed After Discharge none    Provided Post Acute Provider List? N/A    Provided Post Acute Provider Quality & Resource List? N/A    Offered/Gave Vendor List no                   Discharge Plan       Row Name 03/18/25 1506       Plan    Plan From [facility]. Sister for d/c transport.    Patient/Family in Agreement with Plan yes    Provided Post Acute Provider List? N/A    Provided Post Acute Provider Quality & Resource List? N/A    Plan Comments CM met with patient and sister Arsen at the bedside to review discharge planning. Patient lives at [facility], is IADLs but doesn't drive. Sister, Arsen, to transport patient at d/c. Confirmed PCP, insurance, and pharmacy. Patient declines M2B. Patient denies any difficulty affording food, utilities, or medications. Patient is not current with any HHC/PT services. DC Barriers: 4L NC, elevated WBC                  Continued Care and Services - Admitted Since 3/18/2025       Destination       Service Provider Request Status Services Address Phone Fax Patient Preferred    [facility] Pending - Request Sent -- [address] [place] [address] [phone] --                   Demographic Summary       Row Name 03/18/25 1500       General Information    Admission Type observation    Arrived From emergency department    Required Notices Provided Observation Status Notice    Referral Source admission list    Reason for Consult discharge planning    Preferred Language English       Contact Information    Permission Granted to Share Info With     Contact Information  Obtained for                    Functional Status       Row Name 03/18/25 1501       Functional Status    Usual Activity Tolerance moderate    Current Activity Tolerance fair       Functional Status, IADL    Medications assistive equipment    Meal Preparation assistive equipment    Housekeeping assistive equipment    Laundry assistive equipment    Shopping assistive equipment    If for any reason you need help with day-to-day activities such as bathing, preparing meals, shopping, managing finances, etc., do you get the help you need? I get all the help I need       Mental Status    General Appearance WDL WDL       Mental Status Summary    Recent Changes in Mental Status/Cognitive Functioning no changes              Patient Forms       Row Name 03/18/25 1428       Patient Forms    Important Message from Medicare (IMM) Delivered  MOON per Reg 3/18               Marybeth oCats RN     [phone]  [email or web address]

## 2025-03-18 NOTE — ED PROVIDER NOTES
Subjective   History of Present Illness  Chief Complaint: Eye swelling      HPI: Patient is a 69-year-old female presents by EMS with complaints of left thigh swelling and redness states that she awoke with the symptoms this morning.  She does wear glasses she denies double vision, wears glasses.  No known injury.  She denies recent medication change no detergents or creams or lotions.  Known history of RIGOBERTO, hypertension high cholesterol thyroid dysfunction bipolar 1, breast cancer.    EMS suspected allergic reaction and administered epinephrine and route    PCP: Parul    History provided by:  Patient      Review of Systems  See above as noted    Past Medical History:   Diagnosis Date    Bipolar 1 disorder     Breast cancer     Cancer     Disease of thyroid gland     High cholesterol     Hypertension        Allergies   Allergen Reactions    Contrast Dye (Echo Or Unknown Ct/Mr) Shortness Of Breath     Pt states she had trouble breathing when she had contrast in the past.    Iodinated Contrast Media Hives       Past Surgical History:   Procedure Laterality Date    ABDOMINAL SURGERY      APPENDECTOMY      ENDOSCOPY N/A 9/25/2022    Procedure: ESOPHAGOGASTRODUODENOSCOPY with biopsy x 2 areas;  Surgeon: Deonte Wong MD;  Location: [facility] ENDOSCOPY;  Service: Gastroenterology;  Laterality: N/A;  post: gastritis, duodinitis, nodule,     HYSTERECTOMY      MASTECTOMY      L side    VERTEBROPLASTY Bilateral 11/7/2024    Procedure: kyphoplasty of t11 and t12 vertebrae;  Surgeon: Jayce Redd DO;  Location: [facility] MAIN OR;  Service: Pain Management;  Laterality: Bilateral;       Family History   Problem Relation Age of Onset    Cancer Mother        Social History     Socioeconomic History    Marital status:    Tobacco Use    Smoking status: Never     Passive exposure: Never    Smokeless tobacco: Never   Vaping Use    Vaping status: Never Used   Substance and Sexual Activity    Alcohol use: No    Drug use: No     Sexual activity: Defer           Objective   Physical Exam  Vitals reviewed.   Constitutional:       Appearance: She is obese.   HENT:      Head: Normocephalic.        Comments: Abrasion noted to the left scalp there is surrounding erythema, streaking that radiates into the left eyelid, there is surrounding soft tissue swelling.  EOM intact there is no evidence of entrapment no pain with eye movement.  Area is blanchable it is warm to the touch.     Mouth/Throat:      Mouth: Mucous membranes are moist.   Cardiovascular:      Rate and Rhythm: Normal rate.      Pulses: Normal pulses.      Heart sounds: Normal heart sounds. No murmur heard.  Pulmonary:      Effort: Pulmonary effort is normal.      Breath sounds: Normal breath sounds. No stridor. No wheezing.   Neurological:      General: No focal deficit present.      Mental Status: She is alert and oriented to person, place, and time.         Procedures           ED Course  ED Course as of 03/18/25 1433   Tue Mar 18, 2025   1128 pO2, Arterial(!): 73.7 [BH]      ED Course User Index  [BH] Joan Baird APRN      /45   Pulse 73   Temp 98.4 °F (36.9 °C)   Resp 14   LMP  (LMP Unknown)   SpO2 96%   Labs Reviewed   BASIC METABOLIC PANEL - Abnormal; Notable for the following components:       Result Value    Glucose 144 (*)     BUN 7 (*)     All other components within normal limits    Narrative:     GFR Categories in Chronic Kidney Disease (CKD)      GFR Category          GFR (mL/min/1.73)    Interpretation  G1                     90 or greater         Normal or high (1)  G2                      60-89                Mild decrease (1)  G3a                   45-59                Mild to moderate decrease  G3b                   30-44                Moderate to severe decrease  G4                    15-29                Severe decrease  G5                    14 or less           Kidney failure          (1)In the absence of evidence of kidney disease, neither  GFR category G1 or G2 fulfill the criteria for CKD.    eGFR calculation 2021 CKD-EPI creatinine equation, which does not include race as a factor   CBC WITH AUTO DIFFERENTIAL - Abnormal; Notable for the following components:    WBC 11.79 (*)     Hemoglobin 11.0 (*)     MCH 23.5 (*)     MCHC 28.8 (*)     RDW 15.7 (*)     Neutrophil % 76.9 (*)     Lymphocyte % 13.3 (*)     Neutrophils, Absolute 9.07 (*)     Monocytes, Absolute 1.00 (*)     All other components within normal limits   BLOOD GAS, ARTERIAL - Abnormal; Notable for the following components:    pO2, Arterial 73.7 (*)     All other components within normal limits   RESPIRATORY PANEL PCR W/ COVID-19 (SARS-COV-2), NP SWAB IN UTM/VTP, 2 HR TAT - Normal    Narrative:     In the setting of a positive respiratory panel with a viral infection PLUS a negative procalcitonin without other underlying concern for bacterial infection, consider observing off antibiotics or discontinuation of antibiotics and continue supportive care. If the respiratory panel is positive for atypical bacterial infection (Bordetella pertussis, Chlamydophila pneumoniae, or Mycoplasma pneumoniae), consider antibiotic de-escalation to target atypical bacterial infection.   BNP (IN-HOUSE) - Normal    Narrative:     This assay is used as an aid in the diagnosis of individuals suspected of having heart failure. It can be used as an aid in the diagnosis of acute decompensated heart failure (ADHF) in patients presenting with signs and symptoms of ADHF to the emergency department (ED). In addition, NT-proBNP of <300 pg/mL indicates ADHF is not likely.    Age Range Result Interpretation  NT-proBNP Concentration (pg/mL:      <50             Positive            >450                   Gray                 300-450                    Negative             <300    50-75           Positive            >900                  Gray                300-900                  Negative            <300      >75              Positive            >1800                  Gray                300-1800                  Negative            <300   MAGNESIUM - Normal   BLOOD GAS, ARTERIAL   CBC AND DIFFERENTIAL    Narrative:     The following orders were created for panel order CBC & Differential.  Procedure                               Abnormality         Status                     ---------                               -----------         ------                     CBC Auto Differential[[id number]]        Abnormal            Final result               Scan Slide[[id number]]                                                                    Please view results for these tests on the individual orders.   EXTRA TUBES    Narrative:     The following orders were created for panel order Extra Tubes.  Procedure                               Abnormality         Status                     ---------                               -----------         ------                     Green Top (Gel)[[id number]]                                  Final result                 Please view results for these tests on the individual orders.   GREEN TOP     Medications   sodium chloride 0.9 % flush 10 mL (has no administration in time range)   sodium chloride 0.9 % flush 10 mL (10 mL Intravenous Given 3/18/25 1311)   sodium chloride 0.9 % infusion 40 mL (has no administration in time range)   Potassium Replacement - Follow Nurse / BPA Driven Protocol (has no administration in time range)   Magnesium Standard Dose Replacement - Follow Nurse / BPA Driven Protocol (has no administration in time range)   Phosphorus Replacement - Follow Nurse / BPA Driven Protocol (has no administration in time range)   Calcium Replacement - Follow Nurse / BPA Driven Protocol (has no administration in time range)   acetaminophen (TYLENOL) tablet 650 mg (has no administration in time range)     Or   acetaminophen (TYLENOL) 160 MG/5ML oral solution 650 mg (has no administration in time range)      Or   acetaminophen (TYLENOL) suppository 650 mg (has no administration in time range)   ipratropium-albuterol (DUO-NEB) nebulizer solution 3 mL (has no administration in time range)   cephalexin (KEFLEX) capsule 500 mg (500 mg Oral Given 3/18/25 1310)     CT Head Without Contrast  Result Date: 3/18/2025  Impression: 1.No acute intracranial findings. 2.Left periorbital soft tissue swelling. Electronically Signed: John Lan MD  3/18/2025 1:36 PM EDT  Workstation ID: [id number]    XR Chest 1 View  Result Date: 3/18/2025  Impression: 1.New airspace disease in the right upper lung field. 2.Cardiomegaly. Electronically Signed: John Lan MD  3/18/2025 10:40 AM EDT  Workstation ID: [id number]                                                     Medical Decision Making  Patient presented with above complaints, no physical exam was completed, placed on a cardiac monitor and an IV was established white blood cell count slightly elevated 11.79.  EMS suspected patient was having an allergic reaction on physical exam there is an abrasion to the left scalp with surrounding erythema, soft tissue swelling with streaking into the patient's left upper lid consistent with cellulitis she was started on Keflex.  She had no pain with eye movement, no reported double vision, no nystagmus.  Throughout her stay she had episodes of hypoxia with O2 sats 82% on room air she has no history of COPD asthma or bronchitis, denies recent illness or exposure she also has no reported shortness of breath or chest pain she was placed on 4 L nasal cannula with an ABG obtained which did note hypoxia with O2 at 73.  proBNP was in normal limits no documented history of congestive heart failure.  Respiratory panel negative.  Chest x-ray noted new airspace disease in the right upper lung with cardiomegaly but no evidence of pulmonary edema, pleural effusions or pneumonia.  Patient will be admitted for continued monitoring and evaluation of persistent  hypoxia.  At bedside have updated the patient regarding the ED findings and plan for admission, no further questions or complaints.      Chart review:  3/6/2025 outpatient visit with pain management related to bilateral osteoarthritis    Labs: Reviewed by myself    Radiology: Imaging reviewed by me and interpreted by radiologist.    Medications Medications  sodium chloride 0.9 % flush 10 mL (has no administration in time range)    Part of this note may be an electronic transcription/translation of spoken language to printed text using the Dragon Dictation System.    Appropriate PPE worn during exam.      Note Disclaimer: At [facility], we believe that sharing information builds trust and better  relationships. You are receiving this note because you recently visited [facility]. It is possible you will see health information before a provider has talked with you about it. This kind of information can be easy to misunderstand. To help you fully understand what it means for your health, we urge you to discuss this note with your provider.      Problems Addressed:  Hypoxia: complicated acute illness or injury  Periorbital cellulitis of left eye: complicated acute illness or injury    Amount and/or Complexity of Data Reviewed  Labs: ordered. Decision-making details documented in ED Course.  Radiology: ordered and independent interpretation performed. Decision-making details documented in ED Course.    Risk  Prescription drug management.  Decision regarding hospitalization.        Final diagnoses:   Hypoxia   Periorbital cellulitis of left eye       ED Disposition  ED Disposition       ED Disposition   Decision to Admit    Condition   --    Comment   Level of Care: Telemetry [5]   Diagnosis: Hypoxia [266081]   Admitting Physician: HUMAIRA FUENTES [[id number]]                 No follow-up provider specified.       Medication List        ASK your doctor about these medications      cyclobenzaprine 5 MG  tablet  Commonly known as: FLEXERIL  Ask about: Which instructions should I use?     furosemide 40 MG tablet  Commonly known as: LASIX  Ask about: Which instructions should I use?     * rosuvastatin 10 MG tablet  Commonly known as: CRESTOR  Ask about: Which instructions should I use?     * rosuvastatin 20 MG tablet  Commonly known as: CRESTOR  Ask about: Which instructions should I use?           * This list has 2 medication(s) that are the same as other medications prescribed for you. Read the directions carefully, and ask your doctor or other care provider to review them with you.                     Joan Baird, APRN  03/18/25 0425

## 2025-03-18 NOTE — H&P
[facility] Medicine Services  History & Physical    Patient Name: Morgan Quick  : 1955  MRN: [id number]  Primary Care Physician:  Ruddy Teran MD  Date of admission: 3/18/2025  Date and Time of Service: 3/18/2025 at 1220    Subjective      Chief Complaint: left eye swelling and redness    History of Present Illness: Morgan Quick is a 69 y.o. female with a CMH of hypertension, hyperlipidemia, hypothyroidism, RIGOBERTO, nighttime oxygen use - 3.5 : via nasal cannula, bipolar 1 disorder, GERD, history of breast cancer s/p radiation, chronic back pain with history of kyphoplasty who presented to [facility] on 3/18/2025 with  left eye swelling, hypoxia.  Patient reports she woke up this morning with her left eye swollen and red.  Patient denies double vision, vision loss.  Patient does wear glasses.  Patient denied use of any new medications, detergents, or cosmetics. Patient reported she had an ingrown hair on the left side of her scalp that she picked and squeezed and pus and blood came out.  EMS reported patient had oxygen saturation in low 80s on room air, suspected allergic reaction that administered epinephrine en route to emergency department.  Patient reported she has been compliant with her nighttime oxygen use.     In the ED: pertinent labs include , WBC 11.79, Hb 11.0.  ABG with pO2 73.7, otherwise within normal limits.  CXR showed new airspace disease in the right upper lung field. Patient received cephalexin 500 mg p.o. Hospitalist team to admit for further management.      Review of Systems   Constitutional:  Negative for chills and fever.   Eyes:  Positive for redness. Negative for photophobia, pain, discharge, itching and visual disturbance.        Eye swelling.   Respiratory:  Negative for shortness of breath.    Cardiovascular:  Negative for chest pain, palpitations and leg swelling.   Gastrointestinal:  Negative for diarrhea, nausea and vomiting.    Genitourinary:  Negative for dysuria and hematuria.   Skin:  Positive for wound.        Abrasion on scalp       Personal History     Past Medical History:   Diagnosis Date    Bipolar 1 disorder     Breast cancer     Cancer     Disease of thyroid gland     High cholesterol     Hypertension        Past Surgical History:   Procedure Laterality Date    ABDOMINAL SURGERY      APPENDECTOMY      ENDOSCOPY N/A 9/25/2022    Procedure: ESOPHAGOGASTRODUODENOSCOPY with biopsy x 2 areas;  Surgeon: Deonte Wong MD;  Location: [facility] ENDOSCOPY;  Service: Gastroenterology;  Laterality: N/A;  post: gastritis, duodinitis, nodule,     HYSTERECTOMY      MASTECTOMY      L side    VERTEBROPLASTY Bilateral 11/7/2024    Procedure: kyphoplasty of t11 and t12 vertebrae;  Surgeon: Jayce Redd DO;  Location: [facility] MAIN OR;  Service: Pain Management;  Laterality: Bilateral;       Family History: family history includes Cancer in her mother. Otherwise pertinent FHx was reviewed and not pertinent to current issue.    Social History:  reports that she has never smoked. She has never been exposed to tobacco smoke. She has never used smokeless tobacco. She reports that she does not drink alcohol and does not use drugs.    Home Medications:  Prior to Admission Medications       Prescriptions Last Dose Informant Patient Reported? Taking?    cyclobenzaprine (FLEXERIL) 5 MG tablet   Yes No    furosemide (LASIX) 20 MG tablet   No No    Take 1 tablet by mouth Daily.    guaiFENesin (MUCINEX) 600 MG 12 hr tablet   No No    Take 2 tablets by mouth Every 12 (Twelve) Hours.    HYDROcodone-acetaminophen (NORCO)  MG per tablet   Yes No    Jardiance 10 MG tablet tablet   Yes No    levothyroxine (SYNTHROID, LEVOTHROID) 25 MCG tablet   No No    Take 1 tablet by mouth Every Morning.    lubiprostone (AMITIZA) 24 MCG capsule   Yes No    Take 1 capsule by mouth 2 (Two) Times a Day.    OXcarbazepine (TRILEPTAL) 150 MG tablet   No No    Take 1 tablet by  mouth 2 (Two) Times a Day.    pantoprazole (PROTONIX) 40 MG EC tablet   No No    Take 1 tablet by mouth Every Morning.    potassium chloride (MICRO-K) 10 MEQ CR capsule   Yes No    Take 1 capsule by mouth Daily.    risperiDONE (risperDAL) 0.25 MG tablet   No No    Take 1 tablet by mouth Every Night.    rosuvastatin (CRESTOR) 10 MG tablet   Yes No    Take 3 tablets by mouth Daily.    sertraline (ZOLOFT) 100 MG tablet   No No    Take 2 tablets by mouth Daily.    sodium chloride 1 g tablet   No No    Take 2 tablets by mouth 3 (Three) Times a Day With Meals.    traZODone (DESYREL) 50 MG tablet   Yes No    Take 0.5 tablets by mouth Every Night.              Allergies:  Allergies   Allergen Reactions    Contrast Dye (Echo Or Unknown Ct/Mr) Shortness Of Breath     Pt states she had trouble breathing when she had contrast in the past.    Iodinated Contrast Media Hives       Objective      Vitals:   Temp:  [98.4 °F (36.9 °C)] 98.4 °F (36.9 °C)  Heart Rate:  [71-87] 73  Resp:  [14] 14  BP: (100-137)/(41-76) 113/45  There is no height or weight on file to calculate BMI.  Physical Exam  Constitutional:       Appearance: She is obese.   HENT:      Head: Normocephalic. Abrasion and left periorbital erythema present.        Comments: Redness and mild swelling from ingrown hair that patient picked.     Nose: Nose normal.      Mouth/Throat:      Mouth: Mucous membranes are moist.      Pharynx: Oropharynx is clear.   Eyes:      General: No visual field deficit.        Left eye: No discharge or hordeolum.      Extraocular Movements: Extraocular movements intact.      Conjunctiva/sclera: Conjunctivae normal.      Pupils: Pupils are equal, round, and reactive to light.      Comments: Left eyelid with redness and mild swelling.   Cardiovascular:      Rate and Rhythm: Normal rate and regular rhythm.      Pulses: Normal pulses.      Heart sounds: Normal heart sounds.   Pulmonary:      Effort: Pulmonary effort is normal.      Breath sounds:  Normal breath sounds.   Abdominal:      General: Bowel sounds are normal.      Palpations: Abdomen is soft.   Musculoskeletal:         General: Normal range of motion.      Cervical back: Neck supple.   Skin:     General: Skin is warm and dry.   Neurological:      General: No focal deficit present.      Mental Status: She is alert and oriented to person, place, and time.   Psychiatric:         Mood and Affect: Mood normal.         Behavior: Behavior normal.         Thought Content: Thought content normal.         Judgment: Judgment normal.         Diagnostic Data:  Lab Results (last 24 hours)       Procedure Component Value Units Date/Time    Magnesium [[id number]]  (Normal) Collected: 03/18/25 1025    Specimen: Blood Updated: 03/18/25 1256     Magnesium 2.2 mg/dL     Respiratory Panel PCR w/COVID-19(SARS-CoV-2) KIRSTEN/JOO/VINCENZO/PAD/COR/YESSY In-House, NP Swab in UTM/VTM, 2 HR TAT - Swab, Nasopharynx [[id number]]  (Normal) Collected: 03/18/25 1139    Specimen: Swab from Nasopharynx Updated: 03/18/25 1227     ADENOVIRUS, PCR Not Detected     Coronavirus 229E Not Detected     Coronavirus HKU1 Not Detected     Coronavirus NL63 Not Detected     Coronavirus OC43 Not Detected     COVID19 Not Detected     Human Metapneumovirus Not Detected     Human Rhinovirus/Enterovirus Not Detected     Influenza A PCR Not Detected     Influenza B PCR Not Detected     Parainfluenza Virus 1 Not Detected     Parainfluenza Virus 2 Not Detected     Parainfluenza Virus 3 Not Detected     Parainfluenza Virus 4 Not Detected     RSV, PCR Not Detected     Bordetella pertussis pcr Not Detected     Bordetella parapertussis PCR Not Detected     Chlamydophila pneumoniae PCR Not Detected     Mycoplasma pneumo by PCR Not Detected    Narrative:      In the setting of a positive respiratory panel with a viral infection PLUS a negative procalcitonin without other underlying concern for bacterial infection, consider observing off antibiotics or discontinuation of  antibiotics and continue supportive care. If the respiratory panel is positive for atypical bacterial infection (Bordetella pertussis, Chlamydophila pneumoniae, or Mycoplasma pneumoniae), consider antibiotic de-escalation to target atypical bacterial infection.    BNP [[id number]]  (Normal) Collected: 03/18/25 1025    Specimen: Blood Updated: 03/18/25 1151     proBNP 68.6 pg/mL     Narrative:      This assay is used as an aid in the diagnosis of individuals suspected of having heart failure. It can be used as an aid in the diagnosis of acute decompensated heart failure (ADHF) in patients presenting with signs and symptoms of ADHF to the emergency department (ED). In addition, NT-proBNP of <300 pg/mL indicates ADHF is not likely.    Age Range Result Interpretation  NT-proBNP Concentration (pg/mL:      <50             Positive            >450                   Gray                 300-450                    Negative             <300    50-75           Positive            >900                  Gray                300-900                  Negative            <300      >75             Positive            >1800                  Gray                300-1800                  Negative            <300    Blood Gas, Arterial - [[id number]]  (Abnormal) Collected: 03/18/25 1114    Specimen: Arterial Blood Updated: 03/18/25 1118     Site Right Radial     Keegan's Test Positive     pH, Arterial 7.387 pH units      pCO2, Arterial 45.8 mm Hg      pO2, Arterial 73.7 mm Hg      HCO3, Arterial 27.5 mmol/L      Base Excess, Arterial 2.0 mmol/L      Comment: Serial Number: [id number]:  [id number]        O2 Saturation, Arterial 94.3 %      CO2 Content 28.9 mmol/L      Barometric Pressure for Blood Gas --     Comment: N/A        Modality Cannula     FIO2 40 %      Hemodilution No     PO2/FIO2 184    Extra Tubes [[id number]] Collected: 03/18/25 1025    Specimen: Blood Updated: 03/18/25 1045    Narrative:      The following orders were created  for panel order Extra Tubes.  Procedure                               Abnormality         Status                     ---------                               -----------         ------                     Green Top (Gel)[[id number]]                                  Final result                 Please view results for these tests on the individual orders.    Green Top (Gel) [[id number]] Collected: 03/18/25 1025    Specimen: Blood Updated: 03/18/25 1045     Extra Tube Hold for add-ons.     Comment: Auto resulted.       CBC & Differential [[id number]]  (Abnormal) Collected: 03/18/25 1007    Specimen: Blood Updated: 03/18/25 1036    Narrative:      The following orders were created for panel order CBC & Differential.  Procedure                               Abnormality         Status                     ---------                               -----------         ------                     CBC Auto Differential[[id number]]        Abnormal            Final result               Scan Slide[[id number]]                                                                    Please view results for these tests on the individual orders.    CBC Auto Differential [[id number]]  (Abnormal) Collected: 03/18/25 1025    Specimen: Blood Updated: 03/18/25 1036     WBC 11.79 10*3/mm3      RBC 4.69 10*6/mm3      Hemoglobin 11.0 g/dL      Hematocrit 38.2 %      MCV 81.4 fL      MCH 23.5 pg      MCHC 28.8 g/dL      RDW 15.7 %      RDW-SD 46.7 fl      MPV 9.4 fL      Platelets 275 10*3/mm3      Neutrophil % 76.9 %      Lymphocyte % 13.3 %      Monocyte % 8.5 %      Eosinophil % 0.6 %      Basophil % 0.3 %      Immature Grans % 0.4 %      Neutrophils, Absolute 9.07 10*3/mm3      Lymphocytes, Absolute 1.57 10*3/mm3      Monocytes, Absolute 1.00 10*3/mm3      Eosinophils, Absolute 0.07 10*3/mm3      Basophils, Absolute 0.03 10*3/mm3      Immature Grans, Absolute 0.05 10*3/mm3      nRBC 0.0 /100 WBC     Basic Metabolic Panel [[id number]]  (Abnormal)  Collected: 03/18/25 1007    Specimen: Blood Updated: 03/18/25 1034     Glucose 144 mg/dL      BUN 7 mg/dL      Creatinine 0.57 mg/dL      Sodium 138 mmol/L      Potassium 4.1 mmol/L      Chloride 104 mmol/L      CO2 22.0 mmol/L      Calcium 9.3 mg/dL      BUN/Creatinine Ratio 12.3     Anion Gap 12.0 mmol/L      eGFR 98.5 mL/min/1.73     Narrative:      GFR Categories in Chronic Kidney Disease (CKD)      GFR Category          GFR (mL/min/1.73)    Interpretation  G1                     90 or greater         Normal or high (1)  G2                      60-89                Mild decrease (1)  G3a                   45-59                Mild to moderate decrease  G3b                   30-44                Moderate to severe decrease  G4                    15-29                Severe decrease  G5                    14 or less           Kidney failure          (1)In the absence of evidence of kidney disease, neither GFR category G1 or G2 fulfill the criteria for CKD.    eGFR calculation 2021 CKD-EPI creatinine equation, which does not include race as a factor             Imaging Results (Last 24 Hours)       Procedure Component Value Units Date/Time    CT Head Without Contrast [[id number]] Collected: 03/18/25 1334     Updated: 03/18/25 1338    Narrative:      CT HEAD WO CONTRAST    Date of Exam: 3/18/2025 12:40 PM EDT    Indication: left periorbital cellulitis.    Comparison: None available.    Technique: Axial CT images were obtained of the head without contrast administration.  Coronal reconstructions were performed.  Automated exposure control and iterative reconstruction methods were used.      Findings:  The ventricles and CSF containing spaces are normal in size and configuration. No intra or extra-axial mass lesions, fluid collections, or mass effect are seen. No focal areas of low-attenuation or acute intracranial hemorrhage. There is left periorbital   soft tissue swelling. The globe and retrobulbar are structures  appear normal. There is no evidence of retrobulbar are extension. The subjacent bony structures appear unremarkable. The paranasal sinuses and mastoid air cells are clear.      Impression:      Impression:  1.No acute intracranial findings.  2.Left periorbital soft tissue swelling.        Electronically Signed: John Lan MD    3/18/2025 1:36 PM EDT    Workstation ID: [id number]    XR Chest 1 View [[id number]] Collected: 03/18/25 1039     Updated: 03/18/25 1042    Narrative:      XR CHEST 1 VW    Date of Exam: 3/18/2025 10:36 AM EDT    Indication: hypoxia    Comparison: 11/8/2024    Findings:  Heart size is enlarged. Pulmonary vascular markings appear normal. There is new airspace disease in the right upper lung field. The lung bases are incompletely penetrated but appear clear.      Impression:      Impression:  1.New airspace disease in the right upper lung field.  2.Cardiomegaly.        Electronically Signed: John Lan MD    3/18/2025 10:40 AM EDT    Workstation ID: [id number]              Assessment & Plan        This is a 69 y.o. female with:    Active and Resolved Problems  Active Hospital Problems    Diagnosis  POA    **Hypoxia [R09.02]  Yes      Resolved Hospital Problems   No resolved problems to display.       Periorbital cellulitis  - CT head ordered  - No vision changes, pain of globe  - WBC 11.79  - Continue PO cephalexin 500 mg QID x 7 days  - Follow AM CBC    Hypoxia  Nighttime O2 use  - Patient uses nighttime O2 via nasal cannula, 3.5 L  - CXR: New airspace disease in the right upper lung field  - RPP negative  - ABG: pO2 73.7, otherwise normal  - Titrate O2 to keep O2 sat >92%  - Bronchodilators: duonebs Q6H PRN  - Continuous pulse ox     Hypertension  - Continue home medications  - Monitor BP per order    Hyperlipidemia  - Continue statin    Bipolar I disorder  - Continue home medications  - Continue supportive care    Chronic back pain  - Continue home medication  - Continue supportive care      GERD  - Continue PPI    Home medications for chronic conditions will be restarted as appropriate when verified by pharmacy.       VTE Prophylaxis:  Mechanical VTE prophylaxis orders are present.        The patient desires to be as follows:    CODE STATUS:    Code Status (Patient has no pulse and is not breathing): CPR (Attempt to Resuscitate)  Medical Interventions (Patient has pulse or is breathing): Full Support        Maximino Bejaraon, spouse, who can be contacted at [phone], is the designated person to make medical decisions on the patient's behalf if She is incapable of doing so. This was clarified with patient and/or next of kin on 3/18/2025 during the course of this H&P.    Admission Status:  I believe this patient meets observation status.    Expected Length of Stay: Less than 48 hours    PDMP and Medication Dispenses via Sidebar reviewed and consistent with patient reported medications.    I discussed the patient's findings and my recommendations with patient.      Signature:     This document has been electronically signed by NIDIA Corbin on March 18, 2025 14:18 EDT   [facility] Hospitalist Team

## 2025-03-19 LAB
ANION GAP SERPL CALCULATED.3IONS-SCNC: 10.7 MMOL/L (ref 5–15)
BASOPHILS # BLD AUTO: 0.02 10*3/MM3 (ref 0–0.2)
BASOPHILS NFR BLD AUTO: 0.2 % (ref 0–1.5)
BUN SERPL-MCNC: 9 MG/DL (ref 8–23)
BUN/CREAT SERPL: 16.7 (ref 7–25)
CALCIUM SPEC-SCNC: 9.3 MG/DL (ref 8.6–10.5)
CHLORIDE SERPL-SCNC: 102 MMOL/L (ref 98–107)
CO2 SERPL-SCNC: 27.3 MMOL/L (ref 22–29)
CREAT SERPL-MCNC: 0.54 MG/DL (ref 0.57–1)
DEPRECATED RDW RBC AUTO: 46.5 FL (ref 37–54)
EGFRCR SERPLBLD CKD-EPI 2021: 99.8 ML/MIN/1.73
EOSINOPHIL # BLD AUTO: 0.1 10*3/MM3 (ref 0–0.4)
EOSINOPHIL NFR BLD AUTO: 1.1 % (ref 0.3–6.2)
ERYTHROCYTE [DISTWIDTH] IN BLOOD BY AUTOMATED COUNT: 15.8 % (ref 12.3–15.4)
GLUCOSE SERPL-MCNC: 105 MG/DL (ref 65–99)
HCT VFR BLD AUTO: 39.7 % (ref 34–46.6)
HGB BLD-MCNC: 11.3 G/DL (ref 12–15.9)
IMM GRANULOCYTES # BLD AUTO: 0.02 10*3/MM3 (ref 0–0.05)
IMM GRANULOCYTES NFR BLD AUTO: 0.2 % (ref 0–0.5)
LYMPHOCYTES # BLD AUTO: 1.06 10*3/MM3 (ref 0.7–3.1)
LYMPHOCYTES NFR BLD AUTO: 11.6 % (ref 19.6–45.3)
MAGNESIUM SERPL-MCNC: 2.2 MG/DL (ref 1.6–2.4)
MCH RBC QN AUTO: 23.2 PG (ref 26.6–33)
MCHC RBC AUTO-ENTMCNC: 28.5 G/DL (ref 31.5–35.7)
MCV RBC AUTO: 81.5 FL (ref 79–97)
MONOCYTES # BLD AUTO: 0.85 10*3/MM3 (ref 0.1–0.9)
MONOCYTES NFR BLD AUTO: 9.3 % (ref 5–12)
NEUTROPHILS NFR BLD AUTO: 7.06 10*3/MM3 (ref 1.7–7)
NEUTROPHILS NFR BLD AUTO: 77.6 % (ref 42.7–76)
NRBC BLD AUTO-RTO: 0 /100 WBC (ref 0–0.2)
PHOSPHATE SERPL-MCNC: 4.1 MG/DL (ref 2.5–4.5)
PLATELET # BLD AUTO: 231 10*3/MM3 (ref 140–450)
PMV BLD AUTO: 9.3 FL (ref 6–12)
POTASSIUM SERPL-SCNC: 4 MMOL/L (ref 3.5–5.2)
RBC # BLD AUTO: 4.87 10*6/MM3 (ref 3.77–5.28)
SODIUM SERPL-SCNC: 140 MMOL/L (ref 136–145)
WBC NRBC COR # BLD AUTO: 9.11 10*3/MM3 (ref 3.4–10.8)

## 2025-03-19 PROCEDURE — 97161 PT EVAL LOW COMPLEX 20 MIN: CPT

## 2025-03-19 PROCEDURE — 85025 COMPLETE CBC W/AUTO DIFF WBC: CPT

## 2025-03-19 PROCEDURE — 25010000002 AMPICILLIN-SULBACTAM PER 1.5 G: Performed by: INTERNAL MEDICINE

## 2025-03-19 PROCEDURE — 63710000001 ONDANSETRON ODT 4 MG TABLET DISPERSIBLE

## 2025-03-19 PROCEDURE — 80048 BASIC METABOLIC PNL TOTAL CA: CPT

## 2025-03-19 PROCEDURE — 83735 ASSAY OF MAGNESIUM: CPT

## 2025-03-19 PROCEDURE — 84100 ASSAY OF PHOSPHORUS: CPT

## 2025-03-19 RX ADMIN — ROSUVASTATIN CALCIUM 10 MG: 10 TABLET, FILM COATED ORAL at 08:26

## 2025-03-19 RX ADMIN — LUBIPROSTONE 24 MCG: 24 CAPSULE, GELATIN COATED ORAL at 08:26

## 2025-03-19 RX ADMIN — TRAZODONE HYDROCHLORIDE 50 MG: 50 TABLET ORAL at 21:14

## 2025-03-19 RX ADMIN — PANTOPRAZOLE SODIUM 40 MG: 40 TABLET, DELAYED RELEASE ORAL at 08:26

## 2025-03-19 RX ADMIN — AMPICILLIN SODIUM AND SULBACTAM SODIUM 3 G: 2; 1 INJECTION, POWDER, FOR SOLUTION INTRAMUSCULAR; INTRAVENOUS at 10:37

## 2025-03-19 RX ADMIN — AMPICILLIN SODIUM AND SULBACTAM SODIUM 3 G: 2; 1 INJECTION, POWDER, FOR SOLUTION INTRAMUSCULAR; INTRAVENOUS at 15:25

## 2025-03-19 RX ADMIN — SENNOSIDES AND DOCUSATE SODIUM 1 TABLET: 50; 8.6 TABLET ORAL at 08:26

## 2025-03-19 RX ADMIN — RISPERIDONE 0.25 MG: 0.25 TABLET, FILM COATED ORAL at 21:14

## 2025-03-19 RX ADMIN — SERTRALINE HYDROCHLORIDE 200 MG: 100 TABLET, FILM COATED ORAL at 08:25

## 2025-03-19 RX ADMIN — LEVOTHYROXINE SODIUM 25 MCG: 0.03 TABLET ORAL at 05:08

## 2025-03-19 RX ADMIN — AMPICILLIN SODIUM AND SULBACTAM SODIUM 3 G: 2; 1 INJECTION, POWDER, FOR SOLUTION INTRAMUSCULAR; INTRAVENOUS at 21:15

## 2025-03-19 RX ADMIN — ROSUVASTATIN CALCIUM 20 MG: 10 TABLET, FILM COATED ORAL at 08:25

## 2025-03-19 RX ADMIN — Medication 10 ML: at 08:26

## 2025-03-19 RX ADMIN — CETIRIZINE HYDROCHLORIDE 10 MG: 10 TABLET, FILM COATED ORAL at 21:15

## 2025-03-19 RX ADMIN — HYDROCODONE BITARTRATE AND ACETAMINOPHEN 1 TABLET: 10; 325 TABLET ORAL at 21:14

## 2025-03-19 RX ADMIN — ONDANSETRON 4 MG: 4 TABLET, ORALLY DISINTEGRATING ORAL at 21:28

## 2025-03-19 RX ADMIN — SENNOSIDES AND DOCUSATE SODIUM 1 TABLET: 50; 8.6 TABLET ORAL at 21:15

## 2025-03-19 RX ADMIN — FUROSEMIDE 40 MG: 40 TABLET ORAL at 08:25

## 2025-03-19 RX ADMIN — HYDROCODONE BITARTRATE AND ACETAMINOPHEN 1 TABLET: 10; 325 TABLET ORAL at 08:26

## 2025-03-19 RX ADMIN — Medication 10 ML: at 21:17

## 2025-03-19 RX ADMIN — OXCARBAZEPINE 150 MG: 150 TABLET, FILM COATED ORAL at 08:26

## 2025-03-19 RX ADMIN — POTASSIUM CHLORIDE 10 MEQ: 750 TABLET, EXTENDED RELEASE ORAL at 08:26

## 2025-03-19 RX ADMIN — ONDANSETRON 4 MG: 4 TABLET, ORALLY DISINTEGRATING ORAL at 08:26

## 2025-03-19 RX ADMIN — OXCARBAZEPINE 150 MG: 150 TABLET, FILM COATED ORAL at 21:14

## 2025-03-19 RX ADMIN — LUBIPROSTONE 24 MCG: 24 CAPSULE, GELATIN COATED ORAL at 21:14

## 2025-03-19 RX ADMIN — FOLIC ACID 1 MG: 1 TABLET ORAL at 08:26

## 2025-03-19 NOTE — PLAN OF CARE
Goal Outcome Evaluation:  Plan of Care Reviewed With: patient        Progress: no change  Outcome Evaluation: Slept most of shift in recliner. O2 at 3L in use. Routine meds given for pain. Will continue to monitor.

## 2025-03-19 NOTE — PLAN OF CARE
Goal Outcome Evaluation:         Patient alert and oriented. Voices needs. IV antibiotics started. Patient request to stay in recliner and does not want to get in bed at all.

## 2025-03-19 NOTE — PLAN OF CARE
Goal Outcome Evaluation:  Plan of Care Reviewed With: patient  Morgan Quick  presents as a 68 y/o F admitted to [facility] on 3/18/25 with left eye swelling and redness. CT head (-). PMHx: obesity, hx breast CA, hx spinal compression fxs. Pt A and O x 4 with O2 at 3 L ( she uses O2 at night typically). Pt resides at [facility]. She uses a rollator for mobility and sleeps in a recliner. This date, pt transferred with supervision and she ambulated 165 feet with rolling walker with SBA of 1 with stable vitals. Pt is at her baseline for mobility and PT will sign-off and complete PT orders. PT recommendation is [facility].        Anticipated Discharge Disposition (PT): assisted living

## 2025-03-19 NOTE — CASE MANAGEMENT/SOCIAL WORK
Continued Stay Note  DANIELA Damico     Patient Name: Morgan Quick  MRN: [id number]  Today's Date: 3/19/2025    Admit Date: 3/18/2025    Plan: DC PLAN: From [facility]. Current home 02 3L at night only. Watch for new 6 min walk.       Discharge Plan       Row Name 03/19/25 1424       Plan    Plan DC PLAN: From [facility]. Current home 02 3L at night only. Watch for new 6 min walk.        Patient/Family in Agreement with Plan yes    Plan Comments Patients sister to transport at discharge, 82% RA, watch for new 6 min walk. Duonebs. Possible discharge for tomorrow.                      Expected Discharge Date and Time       Expected Discharge Date Expected Discharge Time    Mar 20, 2025           Brittany Guillaume RN    Case Management  [phone]

## 2025-03-19 NOTE — PROGRESS NOTES
[facility] MEDICINE SERVICE  DAILY PROGRESS NOTE    NAME: Morgan Quick  : 1955  MRN: [id number]      LOS: 0 days     PROVIDER OF SERVICE: Don Edmondson MD    Chief Complaint: Hypoxia    Subjective:     Interval History: Patient states her left hand and periorbital swelling is somewhat better today.  Denies any significant pain, fevers.        Review of Systems:   Review of Systems    Objective:     Vital Signs  Temp:  [97 °F (36.1 °C)-99.3 °F (37.4 °C)] 98.3 °F (36.8 °C)  Heart Rate:  [71-82] 76  Resp:  [11-20] 16  BP: ()/(43-85) 124/60  Flow (L/min) (Oxygen Therapy):  [3-4] 3   There is no height or weight on file to calculate BMI.    Physical Exam  Physical Exam  General Appearance:  Alert, cooperative, no distress, appears stated age  Head:  Periorbital edema and erythema of the left eye as well as mild erythema of the left temporal area  Eyes:  PERRL, conjunctiva/corneas clear, EOM's intact, fundi benign, both eyes  Ears:  Normal TM's and external ear canals, both ears  Nose: Nares normal, septum midline, mucosa normal, no drainage or sinus tenderness  Throat: Lips, mucosa, and tongue normal; teeth and gums normal  Neck: Supple, symmetrical, trachea midline, no adenopathy, thyroid: not enlarged, symmetric, no tenderness/mass/nodules, no carotid bruit or JVD  Lungs:   Clear to auscultation bilaterally, respirations unlabored  Heart:  Regular rate and rhythm, S1, S2 normal, no murmur, rub or gallop  Abdomen:  Soft, non-tender, bowel sounds active all four quadrants,  no masses, no organomegaly  Extremities: Extremities normal, atraumatic, no cyanosis or edema  Pulses: 2+ and symmetric  Skin: Skin color, texture, turgor normal, no rashes or lesions  Neurologic: Normal         Diagnostic Data    Results from last 7 days   Lab Units 25  0331   WBC 10*3/mm3 9.11   HEMOGLOBIN g/dL 11.3*   HEMATOCRIT % 39.7   PLATELETS 10*3/mm3 231   GLUCOSE mg/dL 105*   CREATININE mg/dL 0.54*   BUN  mg/dL 9   SODIUM mmol/L 140   POTASSIUM mmol/L 4.0   ANION GAP mmol/L 10.7       CT Head Without Contrast  Result Date: 3/18/2025  Impression: 1.No acute intracranial findings. 2.Left periorbital soft tissue swelling. Electronically Signed: John Lan MD  3/18/2025 1:36 PM EDT  Workstation ID: [id number]    XR Chest 1 View  Result Date: 3/18/2025  Impression: 1.New airspace disease in the right upper lung field. 2.Cardiomegaly. Electronically Signed: John Lan MD  3/18/2025 10:40 AM EDT  Workstation ID: [id number]        I reviewed the patient's new clinical results.    Assessment/Plan:     Active and Resolved Problems  Active Hospital Problems    Diagnosis  POA    **Hypoxia [R09.02]  Yes      Resolved Hospital Problems   No resolved problems to display.       Left periorbital and facial cellulitis  -Patient does have erythema and edema of the left temporal and periorbital area  -Patient did have an ingrown hair which she states she pulled out but then was draining some purulent material and extended down to her left side of her face  -Initiated on IV Unasyn  -Follow-up blood cultures  -Pain control    Probable community-acquired pneumonia, unspecified organism  -Initiated on IV Unasyn as above  -Follow-up cultures    Acute hypoxia  -Patient does use nighttime O2 but currently requiring O2 during the day  -Could be related to underlying pneumonia  -Continue treatment as above with antibiotics and wean O2 as tolerated  -Plan for walking oximetry prior to discharge  -Continue pulmonary toileting    Hypertension  Resume home BP medications    Dyslipidemia  -Continue statin therapy    Bipolar disorder  -Continue home medications    Hypothyroidism  -Continue home Synthroid dose    VTE Prophylaxis:  Mechanical VTE prophylaxis orders are present.             Disposition Planning:     Barriers to Discharge: Improvement in cellulitis  Anticipated Date of Discharge: 3/20  Place of Discharge: Home      Time: 45 minutes      Code Status and Medical Interventions: CPR (Attempt to Resuscitate); Full Support   Ordered at: 03/18/25 1418     Code Status (Patient has no pulse and is not breathing):    CPR (Attempt to Resuscitate)     Medical Interventions (Patient has pulse or is breathing):    Full Support       Signature: Electronically signed by Don Edmondson MD, 03/19/25, 13:26 EDT.  [facility]

## 2025-03-19 NOTE — THERAPY EVALUATION
Patient Name: Morgan Quick  : 1955    MRN: [id number]                              Today's Date: 3/19/2025       Admit Date: 3/18/2025    Visit Dx:     ICD-10-CM ICD-9-CM   1. Hypoxia  R09.02 799.02   2. Periorbital cellulitis of left eye  L03.213 682.0     Patient Active Problem List   Diagnosis    Morbidly obese    Essential hypertension    Malignant neoplasm of overlapping sites of left breast in female, estrogen receptor positive    Primary cancer of left female breast    Abdominal pain, unspecified abdominal location    Bipolar 1 disorder    High cholesterol    Anorexia    Nausea    Low back pain    Back pain    Compression fracture of L2 lumbar vertebra, closed, initial encounter    Compression fracture of L4 lumbar vertebra, closed, initial encounter    Compression fracture of T12 vertebra    Compression fracture of T11 vertebra    Hypoxia     Past Medical History:   Diagnosis Date    Bipolar 1 disorder     Breast cancer     Cancer     Disease of thyroid gland     High cholesterol     Hypertension      Past Surgical History:   Procedure Laterality Date    ABDOMINAL SURGERY      APPENDECTOMY      BREAST LUMPECTOMY Left     ENDOSCOPY N/A 2022    Procedure: ESOPHAGOGASTRODUODENOSCOPY with biopsy x 2 areas;  Surgeon: Deonte Wong MD;  Location: [facility] ENDOSCOPY;  Service: Gastroenterology;  Laterality: N/A;  post: gastritis, duodinitis, nodule,     HYSTERECTOMY      VERTEBROPLASTY Bilateral 2024    Procedure: kyphoplasty of t11 and t12 vertebrae;  Surgeon: Jayce Redd DO;  Location: [facility] MAIN OR;  Service: Pain Management;  Laterality: Bilateral;      General Information       Row Name 25 1325          Physical Therapy Time and Intention    Document Type evaluation  -BR     Mode of Treatment physical therapy  -BR       Row Name 25 1325          General Information    Patient Profile Reviewed yes  -BR     Prior Level of Function independent:;all household  Patient: Suleman Marquis  : 1935    Encounter Date: 2024    PROGRESS NOTE    Subjective  Chief complaint: Suleman Marquis is a 88 y.o. female who is an acute skilled patient being seen and evaluated for weakness    HPI: Remains in therapy.   Is in bed wearing oxygen.  Actively responding.  She reports that she is comfortable.  Denies any chest pain or tightness.  Nursing reports her appetite is consistent.   reports there is scheduled plans for her to discharge home with her son.   HPI      Objective  Vital signs: 112/65-72-18-97.3     Physical Exam  Vitals and nursing note reviewed.   Constitutional:       General: She is not in acute distress.     Appearance: She is well-groomed. She is cachectic.   Eyes:      Extraocular Movements: Extraocular movements intact.   Cardiovascular:      Rate and Rhythm: Normal rate and regular rhythm.   Pulmonary:      Effort: Pulmonary effort is normal.      Breath sounds: Normal breath sounds.      Comments: Lungs clear   Abdominal:      General: Bowel sounds are normal.      Palpations: Abdomen is soft.   Musculoskeletal:      Cervical back: Neck supple.      Right lower leg: No edema.      Left lower leg: No edema.   Neurological:      Mental Status: She is alert.   Psychiatric:         Mood and Affect: Mood normal.         Behavior: Behavior is cooperative.         Assessment/Plan  Problem List Items Addressed This Visit       Hypertension     Monitor BP  Metoprolol  BP at goal         GERD (gastroesophageal reflux disease)     PPI  Monitor for GI symptoms         CHF (congestive heart failure) (Multi)     Echo revealed EF of 68% 2024  Lasix  Monitor for shortness of breath, stable         Weakness - Primary     Continue working towards goals in therapy  Monitor endurance          History of pulmonary embolus (PE)     Remains on coumadin   INR monitored   OXYGEN THERAPY PRN          Alteration in skin integrity     Chest wound   Wound care  mobility;gait;transfer  Pt uses a rollator at baseline. Pt sleeps in a recliner.  -BR     Existing Precautions/Restrictions oxygen therapy device and L/min  -BR       Row Name 03/19/25 1325          Living Environment    Current Living Arrangements assisted living facility  [facility]  -BR     People in Home facility resident  -BR       Row Name 03/19/25 1325          Home Main Entrance    Number of Stairs, Main Entrance none  -BR       Row Name 03/19/25 1325          Stairs Within Home, Primary    Number of Stairs, Within Home, Primary none  -BR       Row Name 03/19/25 1325          Cognition    Orientation Status (Cognition) oriented x 4  -BR               User Key  (r) = Recorded By, (t) = Taken By, (c) = Cosigned By      Initials Name Provider Type    BR Eve Agee PT Physical Therapist                   Mobility       Row Name 03/19/25 1326          Bed Mobility    Comment, (Bed Mobility) Pt was up in recliner .  -BR       Row Name 03/19/25 1326          Sit-Stand Transfer    Sit-Stand [place] (Transfers) set up  -BR     Assistive Device (Sit-Stand Transfers) walker, front-wheeled  -BR       Row Name 03/19/25 1326          Gait/Stairs (Locomotion)    [place] Level (Gait) supervision  -BR     Assistive Device (Gait) walker, front-wheeled  -BR     Patient was able to Ambulate yes  -BR     Distance in Feet (Gait) 165  -BR     Deviations/Abnormal Patterns (Gait) aydin decreased  -BR     Bilateral Gait Deviations forward flexed posture  -BR               User Key  (r) = Recorded By, (t) = Taken By, (c) = Cosigned By      Initials Name Provider Type    Eve Woo PT Physical Therapist                   Obj/Interventions       Row Name 03/19/25 1327          Range of Motion Comprehensive    General Range of Motion bilateral lower extremity ROM WFL  -BR       Row Name 03/19/25 1327          Strength Comprehensive (MMT)    Comment, General Manual Muscle Testing (MMT) Assessment BLE  provider to follow in the facility            Malnutrition (Multi)     Malnutrition HX advanced cancer  Monitor intake   Dietitian to evaluate  Taking Supplements   Labs             Medications, treatments, and labs reviewed  Continue medications and treatments as listed in EMR    DAVID Pressley      Electronically Signed By: DAVID Pressley   5/25/24 12:06 PM   strength grossly 4/5  -BR       Row Name 03/19/25 1327          Balance    Balance Assessment sitting static balance;standing static balance  -BR     Static Sitting Balance independent  -BR     Position, Sitting Balance unsupported;sitting in chair  -BR     Static Standing Balance standby assist  -BR     Position/Device Used, Standing Balance unsupported  -BR       Row Name 03/19/25 1327          Sensory Assessment (Somatosensory)    Sensory Assessment (Somatosensory) sensation intact  -BR               User Key  (r) = Recorded By, (t) = Taken By, (c) = Cosigned By      Initials Name Provider Type    BR Eve Agee, PT Physical Therapist                   Goals/Plan    No documentation.                  Clinical Impression       Row Name 03/19/25 1327          Pain    Pretreatment Pain Rating 0/10 - no pain  -BR     Posttreatment Pain Rating 0/10 - no pain  -BR       Row Name 03/19/25 1333 03/19/25 1327       Plan of Care Review    Plan of Care Reviewed With -- patient  -BR    Outcome Evaluation Morgan Quick  presents as a 68 y/o F admitted to [facility] on 3/18/25 with left eye swelling and redness. CT head (-). PMHx: obesity, hx breast CA, hx spinal compression fxs. Pt A and O x 4 with O2 at 3 L ( she uses O2 at night typically). Pt resides at [facility]. She uses a rollator for mobility and sleeps in a recliner. This date, pt transferred with supervision and she ambulated 165 feet with rolling walker with SBA of 1 with stable vitals. Pt is at her baseline for mobility and PT will sign-off and complete PT orders. PT recommendation is [facility].  -BR --      Row Name 03/19/25 1327          Therapy Assessment/Plan (PT)    Criteria for Skilled Interventions Met (PT) no;no problems identified which require skilled intervention  -BR     Therapy Frequency (PT) evaluation only  -BR       Row Name 03/19/25 1327          Vital Signs    Pretreatment Heart Rate (beats/min) 82  -BR     Posttreatment Heart Rate (beats/min)  81  -BR     Pre SpO2 (%) 92  -BR     O2 Delivery Pre Treatment nasal cannula  3L  -BR     Intra SpO2 (%) 94  -BR     O2 Delivery Intra Treatment nasal cannula  3L  -BR     Post SpO2 (%) 92  -BR     O2 Delivery Post Treatment nasal cannula  3L  -BR     Pre Patient Position Sitting  -BR     Intra Patient Position Standing  -BR     Post Patient Position Sitting  -BR       Row Name 03/19/25 1327          Positioning and Restraints    Pre-Treatment Position sitting in chair/recliner  -BR     Post Treatment Position chair  -BR     In Chair notified nsg;reclined;call light within reach;encouraged to call for assist  -BR               User Key  (r) = Recorded By, (t) = Taken By, (c) = Cosigned By      Initials Name Provider Type    Eve Woo PT Physical Therapist                   Outcome Measures       Row Name 03/19/25 1333          How much help from another person do you currently need...    Turning from your back to your side while in flat bed without using bedrails? 4  -BR     Moving from lying on back to sitting on the side of a flat bed without bedrails? 4  -BR     Moving to and from a bed to a chair (including a wheelchair)? 4  -BR     Standing up from a chair using your arms (e.g., wheelchair, bedside chair)? 4  -BR     Climbing 3-5 steps with a railing? 3  -BR     To walk in hospital room? 4  -BR     AM-PAC 6 Clicks Score (PT) 23  -BR     Highest Level of Mobility Goal 7 --> Walk 25 feet or more  -BR       Row Name 03/19/25 2743          Functional Assessment    Outcome Measure Options AM-PAC 6 Clicks Basic Mobility (PT)  -BR               User Key  (r) = Recorded By, (t) = Taken By, (c) = Cosigned By      Initials Name Provider Type    Eve Woo PT Physical Therapist                                 Physical Therapy Education       Title: PT OT SLP Therapies (Done)       Topic: Physical Therapy (Done)       Point: Mobility training (Done)       Learning Progress Summary            Patient  Acceptance, E,D, VU,DU by BR at 3/19/2025 1333                      Point: Body mechanics (Done)       Learning Progress Summary            Patient Acceptance, E,D, VU,DU by BR at 3/19/2025 1333                      Point: Precautions (Done)       Learning Progress Summary            Patient Acceptance, E,D, VU,DU by BR at 3/19/2025 1333                                      User Key       Initials Effective Dates Name Provider Type Discipline     02/01/22 -  Eve Agee, PT Physical Therapist PT                  PT Recommendation and Plan     Outcome Evaluation: Morgan Quick  presents as a 70 y/o F admitted to [facility] on 3/18/25 with left eye swelling and redness. CT head (-). PMHx: obesity, hx breast CA, hx spinal compression fxs. Pt A and O x 4 with O2 at 3 L ( she uses O2 at night typically). Pt resides at [facility]. She uses a rollator for mobility and sleeps in a recliner. This date, pt transferred with supervision and she ambulated 165 feet with rolling walker with SBA of 1 with stable vitals. Pt is at her baseline for mobility and PT will sign-off and complete PT orders. PT recommendation is [facility].     Time Calculation:         PT Charges       Row Name 03/19/25 1334             Time Calculation    Start Time 0900  -BR      Stop Time 0930  -BR      Time Calculation (min) 30 min  -BR      PT Received On 03/19/25  -BR         Time Calculation- PT    Total Timed Code Minutes- PT 0 minute(s)  -BR                User Key  (r) = Recorded By, (t) = Taken By, (c) = Cosigned By      Initials Name Provider Type    BR Eve Agee PT Physical Therapist                  Therapy Charges for Today       Code Description Service Date Service Provider Modifiers Qty    14094530314 HC PT EVAL LOW COMPLEXITY 4 3/19/2025 Eve Agee, PT GP 1            PT G-Codes  Outcome Measure Options: AM-PAC 6 Clicks Basic Mobility (PT)  AM-PAC 6 Clicks Score (PT): 23  PT Discharge Summary  Anticipated Discharge  Disposition (PT): assisted living    Eve Agee, PT  3/19/2025

## 2025-03-20 VITALS
SYSTOLIC BLOOD PRESSURE: 113 MMHG | OXYGEN SATURATION: 91 % | HEART RATE: 81 BPM | DIASTOLIC BLOOD PRESSURE: 63 MMHG | RESPIRATION RATE: 14 BRPM | TEMPERATURE: 97.4 F

## 2025-03-20 PROCEDURE — 63710000001 ONDANSETRON ODT 4 MG TABLET DISPERSIBLE

## 2025-03-20 PROCEDURE — 25010000002 AMPICILLIN-SULBACTAM PER 1.5 G: Performed by: INTERNAL MEDICINE

## 2025-03-20 PROCEDURE — 94618 PULMONARY STRESS TESTING: CPT

## 2025-03-20 RX ADMIN — HYDROCODONE BITARTRATE AND ACETAMINOPHEN 1 TABLET: 10; 325 TABLET ORAL at 09:01

## 2025-03-20 RX ADMIN — SENNOSIDES AND DOCUSATE SODIUM 1 TABLET: 50; 8.6 TABLET ORAL at 09:01

## 2025-03-20 RX ADMIN — POTASSIUM CHLORIDE 10 MEQ: 750 TABLET, EXTENDED RELEASE ORAL at 09:01

## 2025-03-20 RX ADMIN — FOLIC ACID 1 MG: 1 TABLET ORAL at 09:01

## 2025-03-20 RX ADMIN — ONDANSETRON 4 MG: 4 TABLET, ORALLY DISINTEGRATING ORAL at 09:00

## 2025-03-20 RX ADMIN — SERTRALINE HYDROCHLORIDE 200 MG: 100 TABLET, FILM COATED ORAL at 09:01

## 2025-03-20 RX ADMIN — FUROSEMIDE 40 MG: 40 TABLET ORAL at 09:00

## 2025-03-20 RX ADMIN — SODIUM CHLORIDE 2 G: 1 TABLET ORAL at 09:01

## 2025-03-20 RX ADMIN — PANTOPRAZOLE SODIUM 40 MG: 40 TABLET, DELAYED RELEASE ORAL at 09:01

## 2025-03-20 RX ADMIN — OXCARBAZEPINE 150 MG: 150 TABLET, FILM COATED ORAL at 09:01

## 2025-03-20 RX ADMIN — AMPICILLIN SODIUM AND SULBACTAM SODIUM 3 G: 2; 1 INJECTION, POWDER, FOR SOLUTION INTRAMUSCULAR; INTRAVENOUS at 09:17

## 2025-03-20 RX ADMIN — LUBIPROSTONE 24 MCG: 24 CAPSULE, GELATIN COATED ORAL at 09:01

## 2025-03-20 RX ADMIN — ROSUVASTATIN CALCIUM 10 MG: 10 TABLET, FILM COATED ORAL at 09:01

## 2025-03-20 RX ADMIN — LEVOTHYROXINE SODIUM 25 MCG: 0.03 TABLET ORAL at 05:16

## 2025-03-20 RX ADMIN — Medication 10 ML: at 09:03

## 2025-03-20 RX ADMIN — AMPICILLIN SODIUM AND SULBACTAM SODIUM 3 G: 2; 1 INJECTION, POWDER, FOR SOLUTION INTRAMUSCULAR; INTRAVENOUS at 04:32

## 2025-03-20 RX ADMIN — ROSUVASTATIN CALCIUM 20 MG: 10 TABLET, FILM COATED ORAL at 09:01

## 2025-03-20 NOTE — DISCHARGE SUMMARY
[facility]  Discharge Summary    Date of Service: 3/20/2025  Patient Name: Morgan Quick  : 1955  MRN: [id number]    Date of Admission: 3/18/2025  Discharge Diagnosis:   Left periorbital and facial cellulitis  Probable community acquired pneumonia, unspecified bacterial organism  Acute hypoxia  Hypertension  Dyslipidemia  Bipolar disorder  Hypothyroidism    Date of Discharge: 3/20/2025  Primary Care Physician: Ruddy Teran MD      Presenting Problem:   Hypoxia [R09.02]  Periorbital cellulitis of left eye [L03.213]    Active and Resolved Hospital Problems:  Active Hospital Problems    Diagnosis POA    **Hypoxia [R09.02] Yes      Resolved Hospital Problems   No resolved problems to display.         Hospital Course     HPI:    Patient is a 69-year-old female who presented to the hospital with complaints of left facial swelling and erythema.  Please see H&P for details.    Hospital Course:  The patient found to have left facial cellulitis with periorbital cellulitis.  She was started on IV antibiotics with Unasyn.  There was also concern for pneumonia on imaging as the patient did have some hypoxia with chest x-ray showing some infiltrates.  Unasyn also was used to treat pneumonia.  Her symptoms improved for the next 48 hours with improvement in her erythema and swelling of the face as well as her breathing.  She will have a walking oximetry prior to discharge to determine any O2 needs.  She does use O2 at night at home but may require it during the day.  She is being transitioned to oral antibiotics to complete a course of treatment.  She is stable for discharge.    Patient had a 6-minute walk test done which demonstrates patient will need 2L/min via nasal cannula at home.        DISCHARGE Follow Up Recommendations for labs and diagnostics:   Follow-up with your PCP in 1 week.        Day of Discharge     Vital Signs:  Temp:  [96 °F (35.6 °C)-98.2 °F (36.8 °C)]  97.4 °F (36.3 °C)  Heart Rate:  [64-81] 81  Resp:  [11-18] 14  BP: (109-134)/(59-83) 113/63  Flow (L/min) (Oxygen Therapy):  [2-3] 2    Physical Exam:  Physical Exam   General Appearance:  Alert, cooperative, no distress, appears stated age  Head:  Normocephalic, without obvious abnormality, atraumatic  Eyes:  PERRL, conjunctiva/corneas clear, EOM's intact, fundi benign, both eyes  Ears:  Normal TM's and external ear canals, both ears  Nose: Nares normal, septum midline, mucosa normal, no drainage or sinus tenderness  Throat: Lips, mucosa, and tongue normal; teeth and gums normal  Neck: Supple, symmetrical, trachea midline, no adenopathy, thyroid: not enlarged, symmetric, no tenderness/mass/nodules, no carotid bruit or JVD  Lungs:   Clear to auscultation bilaterally, respirations unlabored  Heart:  Regular rate and rhythm, S1, S2 normal, no murmur, rub or gallop  Abdomen:  Soft, non-tender, bowel sounds active all four quadrants,  no masses, no organomegaly  Extremities: Extremities normal, atraumatic, no cyanosis or edema  Pulses: 2+ and symmetric  Skin: Skin color, texture, turgor normal, no rashes or lesions  Neurologic: Normal        Pertinent  and/or Most Recent Results     LAB RESULTS:      Lab 03/19/25 0331 03/18/25  1025   WBC 9.11 11.79*   HEMOGLOBIN 11.3* 11.0*   HEMATOCRIT 39.7 38.2   PLATELETS 231 275   NEUTROS ABS 7.06* 9.07*   IMMATURE GRANS (ABS) 0.02 0.05   LYMPHS ABS 1.06 1.57   MONOS ABS 0.85 1.00*   EOS ABS 0.10 0.07   MCV 81.5 81.4         Lab 03/19/25  0331 03/18/25  1025 03/18/25  1007   SODIUM 140  --  138   POTASSIUM 4.0  --  4.1   CHLORIDE 102  --  104   CO2 27.3  --  22.0   ANION GAP 10.7  --  12.0   BUN 9  --  7*   CREATININE 0.54*  --  0.57   EGFR 99.8  --  98.5   GLUCOSE 105*  --  144*   CALCIUM 9.3  --  9.3   MAGNESIUM 2.2 2.2  --    PHOSPHORUS 4.1  --   --              Lab 03/18/25  1025   PROBNP 68.6                 Lab 03/18/25  1114   PH, ARTERIAL 7.387   PCO2, ARTERIAL 45.8   PO2  ART 73.7*   O2 SATURATION ART 94.3   FIO2 40   HCO3 ART 27.5   BASE EXCESS ART 2.0     Brief Urine Lab Results       None          Microbiology Results (last 10 days)       Procedure Component Value - Date/Time    Respiratory Panel PCR w/COVID-19(SARS-CoV-2) KIRSTEN/JOO/VINCENZO/PAD/COR/YESSY In-House, NP Swab in UTM/VTM, 2 HR TAT - Swab, Nasopharynx [[id number]]  (Normal) Collected: 03/18/25 1139    Lab Status: Final result Specimen: Swab from Nasopharynx Updated: 03/18/25 1227     ADENOVIRUS, PCR Not Detected     Coronavirus 229E Not Detected     Coronavirus HKU1 Not Detected     Coronavirus NL63 Not Detected     Coronavirus OC43 Not Detected     COVID19 Not Detected     Human Metapneumovirus Not Detected     Human Rhinovirus/Enterovirus Not Detected     Influenza A PCR Not Detected     Influenza B PCR Not Detected     Parainfluenza Virus 1 Not Detected     Parainfluenza Virus 2 Not Detected     Parainfluenza Virus 3 Not Detected     Parainfluenza Virus 4 Not Detected     RSV, PCR Not Detected     Bordetella pertussis pcr Not Detected     Bordetella parapertussis PCR Not Detected     Chlamydophila pneumoniae PCR Not Detected     Mycoplasma pneumo by PCR Not Detected    Narrative:      In the setting of a positive respiratory panel with a viral infection PLUS a negative procalcitonin without other underlying concern for bacterial infection, consider observing off antibiotics or discontinuation of antibiotics and continue supportive care. If the respiratory panel is positive for atypical bacterial infection (Bordetella pertussis, Chlamydophila pneumoniae, or Mycoplasma pneumoniae), consider antibiotic de-escalation to target atypical bacterial infection.            CT Head Without Contrast  Result Date: 3/18/2025  Impression: Impression: 1.No acute intracranial findings. 2.Left periorbital soft tissue swelling. Electronically Signed: John Lan MD  3/18/2025 1:36 PM EDT  Workstation ID: [id number]    XR Chest 1 View  Result  Date: 3/18/2025  Impression: Impression: 1.New airspace disease in the right upper lung field. 2.Cardiomegaly. Electronically Signed: John Lan MD  3/18/2025 10:40 AM EDT  Workstation ID: [id number]              Results for orders placed during the hospital encounter of 09/24/22    Adult Transthoracic Echo Complete W/ Cont if Necessary Per Protocol    Interpretation Summary  · Estimated left ventricular EF was in agreement with the calculated left ventricular EF. Left ventricular ejection fraction appears to be 56 - 60%. Left ventricular systolic function is normal.  · There is calcification of the aortic valve mainly affecting the non-coronary, left coronary and right coronary cusp(s).  · The study is technically difficult for diagnosis.      Labs Pending at Discharge:  Pending Results       None            Procedures Performed           Consults:   Consults       Date and Time Order Name Status Description    3/18/2025 12:01 PM Hospitalist (on-call MD unless specified)                Discharge Details        Discharge Medications        New Medications        Instructions Start Date   amoxicillin-clavulanate 875-125 MG per tablet  Commonly known as: AUGMENTIN   1 tablet, Oral, 2 Times Daily             Continue These Medications        Instructions Start Date   cetirizine 10 MG tablet  Commonly known as: zyrTEC   10 mg, Every Night at Bedtime      cyclobenzaprine 5 MG tablet  Commonly known as: FLEXERIL   5 mg, Oral, 3 Times Daily PRN      Diclofenac Sodium 1 % gel gel  Commonly known as: VOLTAREN   4 g, Topical, 4 Times Daily PRN      folic acid 1 MG tablet  Commonly known as: FOLVITE   1 mg, Daily      furosemide 40 MG tablet  Commonly known as: LASIX   40 mg, Daily      guaiFENesin 600 MG 12 hr tablet  Commonly known as: MUCINEX   1,200 mg, Oral, Every 12 Hours Scheduled      HYDROcodone-acetaminophen  MG per tablet  Commonly known as: NORCO   Take 1 tablet by mouth 2 (Two) Times a Day.      Jardiance  10 MG tablet tablet  Generic drug: empagliflozin   Take 1 tablet by mouth Daily.      levothyroxine 25 MCG tablet  Commonly known as: SYNTHROID, LEVOTHROID   25 mcg, Oral, Every Early Morning      lubiprostone 24 MCG capsule  Commonly known as: AMITIZA   24 mcg, 2 Times Daily      ondansetron 4 MG tablet  Commonly known as: ZOFRAN   1-2 tablets, Oral, Every 6 Hours PRN      OXcarbazepine 150 MG tablet  Commonly known as: TRILEPTAL   150 mg, Oral, 2 Times Daily      pantoprazole 40 MG EC tablet  Commonly known as: PROTONIX   40 mg, Oral, Every Early Morning      potassium chloride 10 MEQ CR capsule  Commonly known as: MICRO-K   10 mEq, Daily      risperiDONE 0.25 MG tablet  Commonly known as: risperDAL   0.25 mg, Oral, Nightly      rosuvastatin 10 MG tablet  Commonly known as: CRESTOR   10 mg, Daily      rosuvastatin 20 MG tablet  Commonly known as: CRESTOR   20 mg, Daily      sennosides-docusate 8.6-50 MG per tablet  Commonly known as: PERICOLACE   1 tablet, 2 Times Daily      sertraline 100 MG tablet  Commonly known as: ZOLOFT   200 mg, Oral, Daily      sodium chloride 1 g tablet   2 g, Oral, 3 Times Daily With Meals      traZODone 50 MG tablet  Commonly known as: DESYREL   50 mg, Oral, Nightly               Allergies   Allergen Reactions    Contrast Dye (Echo Or Unknown Ct/Mr) Shortness Of Breath     Pt states she had trouble breathing when she had contrast in the past.    Iodinated Contrast Media Hives         Discharge Disposition:     Home or Self Care    Diet:  Hospital:  Diet Order   Procedures    Diet: Cardiac; Healthy Heart (2-3 Na+); Fluid Consistency: Thin (IDDSI 0)         Discharge Activity:         CODE STATUS:  Code Status and Medical Interventions: CPR (Attempt to Resuscitate); Full Support   Ordered at: 03/18/25 1418     Code Status (Patient has no pulse and is not breathing):    CPR (Attempt to Resuscitate)     Medical Interventions (Patient has pulse or is breathing):    Full Support         No  future appointments.    Additional Instructions for the Follow-ups that You Need to Schedule       Discharge Follow-up with PCP   As directed       Currently Documented PCP:    Ruddy Teran MD    PCP Phone Number:    [phone]     Follow Up Details: Follow-up with your PCP in 1 to 2 weeks.                Time spent on Discharge including face to face service: Greater than 30 minutes    Signature: Electronically signed by Don Edmondson MD, 03/20/25, 12:13 EDT.  [facility] Hospitalist Team

## 2025-03-20 NOTE — CASE MANAGEMENT/SOCIAL WORK
Continued Stay Note   Mookie     Patient Name: Morgan Quick  MRN: [id number]  Today's Date: 3/20/2025    Admit Date: 3/18/2025    Plan: DC PLAN: From [facility] assisted living. Current home 02 3L at night. [facility] 02 2L Thru [facility]. May need transport       Discharge Plan       Row Name 03/20/25 1601       Plan    Plan DC PLAN: From [facility] assisted living. Current home 02 3L at night. [facility] 02 2L Thru [facility]. May need transport    Patient/Family in Agreement with Plan yes    Plan Comments 6 min walk completed, patient needs continuous 02 2L orders requested, DCP report sent to [facility], message sent to liasion, verbalized understanding. States Tech will be out shortly. Unsure if patient needs assistance with transport. ER  included in chat. Anticipate discharge today.                         Expected Discharge Date and Time       Expected Discharge Date Expected Discharge Time    Mar 20, 2025           Brittany Guillaume RN   Case Management  [phone]

## 2025-03-20 NOTE — PLAN OF CARE
Problem: Adult Inpatient Plan of Care  Goal: Absence of Hospital-Acquired Illness or Injury  Intervention: Identify and Manage Fall Risk  Recent Flowsheet Documentation  Taken 3/20/2025 0208 by Mauro Davies RN  Safety Promotion/Fall Prevention:   assistive device/personal items within reach   clutter free environment maintained   fall prevention program maintained   nonskid shoes/slippers when out of bed   room organization consistent   safety round/check completed  Taken 3/20/2025 0003 by Mauro Davies RN  Safety Promotion/Fall Prevention:   assistive device/personal items within reach   clutter free environment maintained   fall prevention program maintained   nonskid shoes/slippers when out of bed   room organization consistent   safety round/check completed  Taken 3/19/2025 2212 by Mauro Davies RN  Safety Promotion/Fall Prevention:   assistive device/personal items within reach   clutter free environment maintained   fall prevention program maintained   nonskid shoes/slippers when out of bed   room organization consistent   safety round/check completed  Taken 3/19/2025 2000 by Mauro Davies RN  Safety Promotion/Fall Prevention:   assistive device/personal items within reach   clutter free environment maintained   fall prevention program maintained   nonskid shoes/slippers when out of bed   room organization consistent   safety round/check completed  Intervention: Prevent Skin Injury  Recent Flowsheet Documentation  Taken 3/20/2025 0003 by Mauro Davies RN  Body Position: (up in chair) other (see comments)  Taken 3/19/2025 2212 by Mauro Davies RN  Body Position: weight shifting  Taken 3/19/2025 2000 by Mauro Davies RN  Body Position: weight shifting  Intervention: Prevent and Manage VTE (Venous Thromboembolism) Risk  Recent Flowsheet Documentation  Taken 3/19/2025 2000 by Mauro Davies RN  VTE Prevention/Management:   SCDs (sequential compression devices) off   patient  refused intervention  Intervention: Prevent Infection  Recent Flowsheet Documentation  Taken 3/20/2025 0208 by Mauro Davies RN  Infection Prevention:   environmental surveillance performed   equipment surfaces disinfected   hand hygiene promoted   personal protective equipment utilized   rest/sleep promoted   single patient room provided   visitors restricted/screened  Taken 3/20/2025 0003 by Mauro Davies RN  Infection Prevention:   environmental surveillance performed   equipment surfaces disinfected   hand hygiene promoted   personal protective equipment utilized   rest/sleep promoted   single patient room provided   visitors restricted/screened  Taken 3/19/2025 2212 by Mauro Davies RN  Infection Prevention:   environmental surveillance performed   equipment surfaces disinfected   hand hygiene promoted   personal protective equipment utilized   rest/sleep promoted   single patient room provided   visitors restricted/screened  Taken 3/19/2025 2000 by Mauro Davies RN  Infection Prevention:   environmental surveillance performed   equipment surfaces disinfected   hand hygiene promoted   personal protective equipment utilized   rest/sleep promoted   single patient room provided   visitors restricted/screened  Goal: Optimal Comfort and Wellbeing  Intervention: Provide Person-Centered Care  Recent Flowsheet Documentation  Taken 3/19/2025 2000 by Mauro Davies RN  Trust Relationship/Rapport: care explained   Goal Outcome Evaluation:

## 2025-03-20 NOTE — PROGRESS NOTES
Exercise Oximetry    Patient Name:Morgan Quick   MRN: [id number]   Date: 03/20/25             ROOM AIR BASELINE   SpO2% 93   Heart Rate 75   Blood Pressure 113/63     EXERCISE ON ROOM AIR SpO2% EXERCISE ON O2 @ 2 LPM SpO2%   1 MINUTE 94 1 MINUTE    2 MINUTES 90 2 MINUTES    3 MINUTES 91 3 MINUTES    4 MINUTES 86 4 MINUTES    5 MINUTES  5 MINUTES 92   6 MINUTES  6 MINUTES 92              Distance Walked   Distance Walked   Dyspnea (Fabien Scale)   Dyspnea (Fabien Scale)   Fatigue (Fabien Scale)   Fatigue (Fabien Scale)   SpO2% Post Exercise   SpO2% Post Exercise   HR Post Exercise   HR Post Exercise   Time to Recovery   Time to Recovery     Comments: patient ambulated on room at for 4 minutes when spo2 dropped to 86%. Placed patient on 2L NC and sats came up to 92%. Patient meets requirements for at home oxygen at this time.

## 2025-03-21 ENCOUNTER — READMISSION MANAGEMENT (OUTPATIENT)
Dept: CALL CENTER | Facility: HOSPITAL | Age: 70
End: 2025-03-21
Payer: MEDICARE

## 2025-03-21 NOTE — OUTREACH NOTE
Prep Survey      Flowsheet Row Responses   [facility] facility patient discharged from? [facility]   Is LACE score < 7 ? No   Eligibility Readm Mgmt   Discharge diagnosis Hypoxia   Does the patient have one of the following disease processes/diagnoses(primary or secondary)? Other   Does the patient have Home health ordered? No   Is there a DME ordered? Yes   What DME was ordered? Claudia--oxygen at 3L at HS   Medication alerts for this patient see AVS   General alerts for this patient [facility]   Prep survey completed? Yes            Mariaa ELIZABETH - Registered Nurse

## 2025-03-21 NOTE — CASE MANAGEMENT/SOCIAL WORK
Case Management Discharge Note      Final Note: Routine home    Provided Post Acute Provider List?: N/A  Provided Post Acute Provider Quality & Resource List?: N/A    Selected Continued Care - Discharged on 3/20/2025 Admission date: 3/18/2025 - Discharge disposition: Home or Self Care             Transportation Services  Private: Car    Final Discharge Disposition Code: 01 - home or self-care

## 2025-03-24 ENCOUNTER — READMISSION MANAGEMENT (OUTPATIENT)
Dept: CALL CENTER | Facility: HOSPITAL | Age: 70
End: 2025-03-24
Payer: MEDICARE

## 2025-03-24 NOTE — OUTREACH NOTE
Medical Week 1 Survey      Flowsheet Row Responses   [facility] facility patient discharged from? [facility]   Does the patient have one of the following disease processes/diagnoses(primary or secondary)? Other   Week 1 attempt successful? No   Unsuccessful attempts Attempt 1            Natasha Wisdom Registered Nurse

## 2025-03-28 ENCOUNTER — READMISSION MANAGEMENT (OUTPATIENT)
Dept: CALL CENTER | Facility: HOSPITAL | Age: 70
End: 2025-03-28
Payer: MEDICARE

## 2025-03-28 NOTE — OUTREACH NOTE
Medical Week 1 Survey      Flowsheet Row Responses   [facility] facility patient discharged from? [facility]   Does the patient have one of the following disease processes/diagnoses(primary or secondary)? Other   Week 1 attempt successful? No   Unsuccessful attempts Attempt 2            Lizeth MARCUM - Registered Nurse

## 2025-04-08 ENCOUNTER — READMISSION MANAGEMENT (OUTPATIENT)
Dept: CALL CENTER | Facility: HOSPITAL | Age: 70
End: 2025-04-08
Payer: MEDICARE

## 2025-04-08 NOTE — OUTREACH NOTE
Medical Week 3 Survey      Flowsheet Row Responses   [facility] patient discharged from? [facility]   Does the patient have one of the following disease processes/diagnoses(primary or secondary)? Other   Week 3 attempt successful? Yes   Call start time 1205   Call end time 1207   General alerts for this patient [place] [facility]   Discharge diagnosis Hypoxia   Meds reviewed with patient/caregiver? Yes   Is the patient having any side effects they believe may be caused by any medication additions or changes? No   Does the patient have all medications ordered at discharge? Yes   Is the patient taking all medications as directed (includes completed medication regime)? Yes   Does the patient have a primary care provider?  Yes   Comments regarding PCP Sees PCP at [facility]   Does the patient have an appointment with their PCP within 7 days of discharge? Yes   Has the patient kept scheduled appointments due by today? Yes   What is the patient's perception of their health status since discharge? Improving   Week 3 Call Completed? Yes   Graduated Yes   Wrap up additional comments Very brief call with Staff at [facility], patient doing well.   Call end time 1207            Fabiola VELASQUEZ - Registered Nurse   Spoke to father he is aware

## 2025-08-16 ENCOUNTER — HOSPITAL ENCOUNTER (EMERGENCY)
Facility: HOSPITAL | Age: 70
Discharge: HOME | End: 2025-08-17
Attending: EMERGENCY MEDICINE
Payer: MEDICARE

## 2025-08-16 DIAGNOSIS — R42 DIZZINESS: ICD-10-CM

## 2025-08-16 DIAGNOSIS — N30.90 CYSTITIS: Primary | ICD-10-CM

## 2025-08-16 LAB
ALBUMIN SERPL-MCNC: 4.5 G/DL (ref 3.5–5.2)
ALBUMIN/GLOB SERPL: 1.5 G/DL
ALP SERPL-CCNC: 133 U/L (ref 39–117)
ALT SERPL W P-5'-P-CCNC: 9 U/L (ref 1–33)
ANION GAP SERPL CALCULATED.3IONS-SCNC: 12.2 MMOL/L (ref 5–15)
AST SERPL-CCNC: 17 U/L (ref 1–32)
BASOPHILS # BLD AUTO: 0.02 10*3/MM3 (ref 0–0.2)
BASOPHILS NFR BLD AUTO: 0.3 % (ref 0–1.5)
BILIRUB SERPL-MCNC: 0.5 MG/DL (ref 0–1.2)
BUN SERPL-MCNC: 11.5 MG/DL (ref 8–23)
BUN/CREAT SERPL: 17.2 (ref 7–25)
CALCIUM SPEC-SCNC: 10.5 MG/DL (ref 8.6–10.5)
CHLORIDE SERPL-SCNC: 97 MMOL/L (ref 98–107)
CO2 SERPL-SCNC: 27.8 MMOL/L (ref 22–29)
CREAT SERPL-MCNC: 0.67 MG/DL (ref 0.57–1)
DEPRECATED RDW RBC AUTO: 48 FL (ref 37–54)
EGFRCR SERPLBLD CKD-EPI 2021: 94.7 ML/MIN/1.73
EOSINOPHIL # BLD AUTO: 0.04 10*3/MM3 (ref 0–0.4)
EOSINOPHIL NFR BLD AUTO: 0.7 % (ref 0.3–6.2)
ERYTHROCYTE [DISTWIDTH] IN BLOOD BY AUTOMATED COUNT: 16.3 % (ref 12.3–15.4)
GLOBULIN UR ELPH-MCNC: 3.1 GM/DL
GLUCOSE SERPL-MCNC: 90 MG/DL (ref 65–99)
HCT VFR BLD AUTO: 40.3 % (ref 34–46.6)
HGB BLD-MCNC: 12 G/DL (ref 12–15.9)
IMM GRANULOCYTES # BLD AUTO: 0 10*3/MM3 (ref 0–0.05)
IMM GRANULOCYTES NFR BLD AUTO: 0 % (ref 0–0.5)
LYMPHOCYTES # BLD AUTO: 0.87 10*3/MM3 (ref 0.7–3.1)
LYMPHOCYTES NFR BLD AUTO: 15 % (ref 19.6–45.3)
MCH RBC QN AUTO: 24.1 PG (ref 26.6–33)
MCHC RBC AUTO-ENTMCNC: 29.8 G/DL (ref 31.5–35.7)
MCV RBC AUTO: 81.1 FL (ref 79–97)
MONOCYTES # BLD AUTO: 0.42 10*3/MM3 (ref 0.1–0.9)
MONOCYTES NFR BLD AUTO: 7.2 % (ref 5–12)
NEUTROPHILS NFR BLD AUTO: 4.46 10*3/MM3 (ref 1.7–7)
NEUTROPHILS NFR BLD AUTO: 76.8 % (ref 42.7–76)
PLATELET # BLD AUTO: 214 10*3/MM3 (ref 140–450)
PMV BLD AUTO: 9.3 FL (ref 6–12)
POTASSIUM SERPL-SCNC: 3.4 MMOL/L (ref 3.5–5.2)
PROT SERPL-MCNC: 7.6 G/DL (ref 6–8.5)
RBC # BLD AUTO: 4.97 10*6/MM3 (ref 3.77–5.28)
SODIUM SERPL-SCNC: 137 MMOL/L (ref 136–145)
WBC NRBC COR # BLD AUTO: 5.81 10*3/MM3 (ref 3.4–10.8)

## 2025-08-16 PROCEDURE — 25010000002 CEFTRIAXONE PER 250 MG: Performed by: EMERGENCY MEDICINE

## 2025-08-16 PROCEDURE — 99283 EMERGENCY DEPT VISIT LOW MDM: CPT | Performed by: EMERGENCY MEDICINE

## 2025-08-16 PROCEDURE — 96365 THER/PROPH/DIAG IV INF INIT: CPT

## 2025-08-16 PROCEDURE — 25810000003 SODIUM CHLORIDE 0.9 % SOLUTION: Performed by: EMERGENCY MEDICINE

## 2025-08-16 PROCEDURE — 85025 COMPLETE CBC W/AUTO DIFF WBC: CPT | Performed by: EMERGENCY MEDICINE

## 2025-08-16 PROCEDURE — 80053 COMPREHEN METABOLIC PANEL: CPT | Performed by: EMERGENCY MEDICINE

## 2025-08-16 RX ORDER — SODIUM CHLORIDE 0.9 % (FLUSH) 0.9 %
10 SYRINGE (ML) INJECTION AS NEEDED
Status: DISCONTINUED | OUTPATIENT
Start: 2025-08-16 | End: 2025-08-17 | Stop reason: HOSPADM

## 2025-08-16 RX ADMIN — SODIUM CHLORIDE 1000 ML: 9 INJECTION, SOLUTION INTRAVENOUS at 22:44

## 2025-08-16 RX ADMIN — CEFTRIAXONE 2000 MG: 2 INJECTION, POWDER, FOR SOLUTION INTRAMUSCULAR; INTRAVENOUS at 22:44

## 2025-08-17 ENCOUNTER — HOSPITAL ENCOUNTER (EMERGENCY)
Facility: HOSPITAL | Age: 70
Discharge: NURSING, REHAB OR LONG-TERM CARE | End: 2025-08-18
Admitting: EMERGENCY MEDICINE
Payer: MEDICARE

## 2025-08-17 ENCOUNTER — APPOINTMENT (OUTPATIENT)
Dept: CT IMAGING | Facility: HOSPITAL | Age: 70
End: 2025-08-17
Payer: MEDICARE

## 2025-08-17 VITALS
TEMPERATURE: 98 F | SYSTOLIC BLOOD PRESSURE: 136 MMHG | HEIGHT: 60 IN | DIASTOLIC BLOOD PRESSURE: 53 MMHG | OXYGEN SATURATION: 95 % | RESPIRATION RATE: 16 BRPM | BODY MASS INDEX: 45.16 KG/M2 | WEIGHT: 230 LBS | HEART RATE: 78 BPM

## 2025-08-17 VITALS
WEIGHT: 218.26 LBS | SYSTOLIC BLOOD PRESSURE: 175 MMHG | HEART RATE: 86 BPM | BODY MASS INDEX: 42.85 KG/M2 | DIASTOLIC BLOOD PRESSURE: 110 MMHG | OXYGEN SATURATION: 96 % | HEIGHT: 60 IN | RESPIRATION RATE: 19 BRPM | TEMPERATURE: 97.6 F

## 2025-08-17 DIAGNOSIS — K59.00 CONSTIPATION, UNSPECIFIED CONSTIPATION TYPE: Primary | ICD-10-CM

## 2025-08-17 DIAGNOSIS — N30.00 ACUTE CYSTITIS WITHOUT HEMATURIA: Primary | ICD-10-CM

## 2025-08-17 LAB
ALBUMIN SERPL-MCNC: 4.5 G/DL (ref 3.5–5.2)
ALBUMIN/GLOB SERPL: 1.7 G/DL
ALP SERPL-CCNC: 122 U/L (ref 39–117)
ALT SERPL W P-5'-P-CCNC: 10 U/L (ref 1–33)
ANION GAP SERPL CALCULATED.3IONS-SCNC: 15.5 MMOL/L (ref 5–15)
AST SERPL-CCNC: 22 U/L (ref 1–32)
BASOPHILS # BLD AUTO: 0.02 10*3/MM3 (ref 0–0.2)
BASOPHILS NFR BLD AUTO: 0.3 % (ref 0–1.5)
BILIRUB SERPL-MCNC: 0.5 MG/DL (ref 0–1.2)
BILIRUB UR QL STRIP: NEGATIVE
BUN SERPL-MCNC: 12.2 MG/DL (ref 8–23)
BUN/CREAT SERPL: 21.4 (ref 7–25)
CALCIUM SPEC-SCNC: 10 MG/DL (ref 8.6–10.5)
CHLORIDE SERPL-SCNC: 99 MMOL/L (ref 98–107)
CLARITY UR: CLEAR
CO2 SERPL-SCNC: 21.5 MMOL/L (ref 22–29)
COLOR UR: YELLOW
CREAT SERPL-MCNC: 0.57 MG/DL (ref 0.57–1)
DEPRECATED RDW RBC AUTO: 46.3 FL (ref 37–54)
EGFRCR SERPLBLD CKD-EPI 2021: 98.5 ML/MIN/1.73
EOSINOPHIL # BLD AUTO: 0.04 10*3/MM3 (ref 0–0.4)
EOSINOPHIL NFR BLD AUTO: 0.5 % (ref 0.3–6.2)
ERYTHROCYTE [DISTWIDTH] IN BLOOD BY AUTOMATED COUNT: 16.2 % (ref 12.3–15.4)
ETHANOL UR QL: <0.01 %
GLOBULIN UR ELPH-MCNC: 2.7 GM/DL
GLUCOSE SERPL-MCNC: 80 MG/DL (ref 65–99)
GLUCOSE UR STRIP-MCNC: ABNORMAL MG/DL
HCT VFR BLD AUTO: 40.4 % (ref 34–46.6)
HGB BLD-MCNC: 12.3 G/DL (ref 12–15.9)
HGB UR QL STRIP.AUTO: NEGATIVE
IMM GRANULOCYTES # BLD AUTO: 0.02 10*3/MM3 (ref 0–0.05)
IMM GRANULOCYTES NFR BLD AUTO: 0.3 % (ref 0–0.5)
KETONES UR QL STRIP: ABNORMAL
LEUKOCYTE ESTERASE UR QL STRIP.AUTO: NEGATIVE
LIPASE SERPL-CCNC: 35 U/L (ref 13–60)
LYMPHOCYTES # BLD AUTO: 0.87 10*3/MM3 (ref 0.7–3.1)
LYMPHOCYTES NFR BLD AUTO: 10.9 % (ref 19.6–45.3)
MCH RBC QN AUTO: 24.3 PG (ref 26.6–33)
MCHC RBC AUTO-ENTMCNC: 30.4 G/DL (ref 31.5–35.7)
MCV RBC AUTO: 79.7 FL (ref 79–97)
MONOCYTES # BLD AUTO: 0.59 10*3/MM3 (ref 0.1–0.9)
MONOCYTES NFR BLD AUTO: 7.4 % (ref 5–12)
NEUTROPHILS NFR BLD AUTO: 6.42 10*3/MM3 (ref 1.7–7)
NEUTROPHILS NFR BLD AUTO: 80.6 % (ref 42.7–76)
NITRITE UR QL STRIP: NEGATIVE
NRBC BLD AUTO-RTO: 0 /100 WBC (ref 0–0.2)
PH UR STRIP.AUTO: 5.5 [PH] (ref 5–8)
PLATELET # BLD AUTO: 228 10*3/MM3 (ref 140–450)
PMV BLD AUTO: 9.2 FL (ref 6–12)
POTASSIUM SERPL-SCNC: 3.7 MMOL/L (ref 3.5–5.2)
PROT SERPL-MCNC: 7.2 G/DL (ref 6–8.5)
PROT UR QL STRIP: NEGATIVE
RBC # BLD AUTO: 5.07 10*6/MM3 (ref 3.77–5.28)
SODIUM SERPL-SCNC: 136 MMOL/L (ref 136–145)
SP GR UR STRIP: 1.01 (ref 1–1.03)
UROBILINOGEN UR QL STRIP: ABNORMAL
WBC NRBC COR # BLD AUTO: 7.96 10*3/MM3 (ref 3.4–10.8)

## 2025-08-17 PROCEDURE — 99284 EMERGENCY DEPT VISIT MOD MDM: CPT

## 2025-08-17 PROCEDURE — 81003 URINALYSIS AUTO W/O SCOPE: CPT

## 2025-08-17 PROCEDURE — 80053 COMPREHEN METABOLIC PANEL: CPT

## 2025-08-17 PROCEDURE — P9612 CATHETERIZE FOR URINE SPEC: HCPCS

## 2025-08-17 PROCEDURE — 74176 CT ABD & PELVIS W/O CONTRAST: CPT

## 2025-08-17 PROCEDURE — 83690 ASSAY OF LIPASE: CPT

## 2025-08-17 PROCEDURE — 82077 ASSAY SPEC XCP UR&BREATH IA: CPT

## 2025-08-17 PROCEDURE — 85025 COMPLETE CBC W/AUTO DIFF WBC: CPT

## 2025-08-17 RX ORDER — POTASSIUM CHLORIDE 1500 MG/1
20 TABLET, EXTENDED RELEASE ORAL ONCE
Status: COMPLETED | OUTPATIENT
Start: 2025-08-17 | End: 2025-08-17

## 2025-08-17 RX ORDER — CEFDINIR 300 MG/1
300 CAPSULE ORAL 2 TIMES DAILY
Qty: 10 CAPSULE | Refills: 0 | Status: SHIPPED | OUTPATIENT
Start: 2025-08-17

## 2025-08-17 RX ADMIN — POTASSIUM CHLORIDE 20 MEQ: 1500 TABLET, EXTENDED RELEASE ORAL at 03:56

## 2025-08-18 VITALS
OXYGEN SATURATION: 97 % | SYSTOLIC BLOOD PRESSURE: 144 MMHG | TEMPERATURE: 98.8 F | HEIGHT: 60 IN | HEART RATE: 86 BPM | WEIGHT: 218.26 LBS | RESPIRATION RATE: 17 BRPM | BODY MASS INDEX: 42.85 KG/M2 | DIASTOLIC BLOOD PRESSURE: 65 MMHG

## (undated) DEVICE — PK ENDO GI 50

## (undated) DEVICE — BITEBLOCK ENDO W/STRAP 60F A/ LF DISP

## (undated) DEVICE — SINGLE-USE BIOPSY FORCEPS: Brand: RADIAL JAW 4